# Patient Record
Sex: FEMALE | Race: WHITE | NOT HISPANIC OR LATINO | Employment: UNEMPLOYED | URBAN - METROPOLITAN AREA
[De-identification: names, ages, dates, MRNs, and addresses within clinical notes are randomized per-mention and may not be internally consistent; named-entity substitution may affect disease eponyms.]

---

## 2017-02-03 ENCOUNTER — HISTORICAL (OUTPATIENT)
Dept: CARDIOLOGY | Facility: CLINIC | Age: 52
End: 2017-02-03

## 2017-03-09 ENCOUNTER — HISTORICAL (OUTPATIENT)
Dept: ADMINISTRATIVE | Facility: HOSPITAL | Age: 52
End: 2017-03-09

## 2017-05-12 ENCOUNTER — HISTORICAL (OUTPATIENT)
Dept: WOUND CARE | Facility: HOSPITAL | Age: 52
End: 2017-05-12

## 2017-05-25 ENCOUNTER — HISTORICAL (OUTPATIENT)
Dept: ADMINISTRATIVE | Facility: HOSPITAL | Age: 52
End: 2017-05-25

## 2017-05-25 LAB
ALBUMIN SERPL-MCNC: 3.8 GM/DL (ref 3.4–5)
ALBUMIN/GLOB SERPL: 1 RATIO (ref 1–2)
ALP SERPL-CCNC: 107 UNIT/L (ref 20–120)
ALT SERPL-CCNC: 12 UNIT/L
AST SERPL-CCNC: 16 UNIT/L
BILIRUB SERPL-MCNC: 0.3 MG/DL
BILIRUBIN DIRECT+TOT PNL SERPL-MCNC: <0.1 MG/DL
BILIRUBIN DIRECT+TOT PNL SERPL-MCNC: >0.2 MG/DL
BUN SERPL-MCNC: 18 MG/DL (ref 7–25)
CALCIUM SERPL-MCNC: 9.2 MG/DL (ref 8.4–10.3)
CHLORIDE SERPL-SCNC: 97 MMOL/L (ref 96–110)
CHOLEST SERPL-MCNC: 187 MG/DL
CHOLEST/HDLC SERPL: 2.9 {RATIO} (ref 0–4.4)
CO2 SERPL-SCNC: 26 MMOL/L (ref 24–32)
CREAT SERPL-MCNC: 0.6 MG/DL (ref 0.7–1.1)
EST. AVERAGE GLUCOSE BLD GHB EST-MCNC: 146 MG/DL
GLOBULIN SER-MCNC: 3.8 GM/ML (ref 2.3–3.5)
GLUCOSE SERPL-MCNC: 149 MG/DL (ref 65–99)
HBA1C MFR BLD: 6.7 % (ref 4.7–5.6)
HDLC SERPL-MCNC: 65 MG/DL
LDLC SERPL CALC-MCNC: 97 MG/DL (ref 0–130)
POTASSIUM SERPL-SCNC: 4.1 MMOL/L (ref 3.6–5.2)
PROT SERPL-MCNC: 7.6 GM/DL (ref 6–8)
SODIUM SERPL-SCNC: 132 MMOL/L (ref 135–146)
TRIGL SERPL-MCNC: 124 MG/DL
VLDLC SERPL CALC-MCNC: 25 MG/DL

## 2018-05-14 ENCOUNTER — HISTORICAL (OUTPATIENT)
Dept: RADIOLOGY | Facility: HOSPITAL | Age: 53
End: 2018-05-14

## 2019-02-08 ENCOUNTER — HISTORICAL (OUTPATIENT)
Dept: RADIOLOGY | Facility: HOSPITAL | Age: 54
End: 2019-02-08

## 2019-02-22 ENCOUNTER — HISTORICAL (OUTPATIENT)
Dept: ADMINISTRATIVE | Facility: HOSPITAL | Age: 54
End: 2019-02-22

## 2019-02-22 LAB
ABS NEUT (OLG): 10.55 X10(3)/MCL (ref 2.1–9.2)
ALBUMIN SERPL-MCNC: 3.7 GM/DL (ref 3.4–5)
ALBUMIN/GLOB SERPL: 0.9 RATIO (ref 1.1–2)
ALP SERPL-CCNC: 116 UNIT/L (ref 45–117)
ALT SERPL-CCNC: 13 UNIT/L (ref 12–78)
AST SERPL-CCNC: 12 UNIT/L (ref 15–37)
BASOPHILS # BLD AUTO: 0.05 X10(3)/MCL
BASOPHILS NFR BLD AUTO: 0 %
BILIRUB SERPL-MCNC: 0.3 MG/DL (ref 0.2–1)
BILIRUBIN DIRECT+TOT PNL SERPL-MCNC: <0.1 MG/DL
BILIRUBIN DIRECT+TOT PNL SERPL-MCNC: >0.2 MG/DL
BUN SERPL-MCNC: 13 MG/DL (ref 7–18)
CALCIUM SERPL-MCNC: 9.2 MG/DL (ref 8.5–10.1)
CHLORIDE SERPL-SCNC: 103 MMOL/L (ref 98–107)
CO2 SERPL-SCNC: 24 MMOL/L (ref 21–32)
CREAT SERPL-MCNC: 0.8 MG/DL (ref 0.6–1.3)
EOSINOPHIL # BLD AUTO: 0.27 X10(3)/MCL
EOSINOPHIL NFR BLD AUTO: 2 %
ERYTHROCYTE [DISTWIDTH] IN BLOOD BY AUTOMATED COUNT: 20.8 % (ref 11.5–14.5)
FERRITIN SERPL-MCNC: 9.5 NG/ML (ref 8–388)
GLOBULIN SER-MCNC: 4.3 GM/ML (ref 2.3–3.5)
GLUCOSE SERPL-MCNC: 180 MG/DL (ref 74–106)
HCT VFR BLD AUTO: 36.1 % (ref 35–46)
HGB BLD-MCNC: 10.7 GM/DL (ref 12–16)
IMM GRANULOCYTES # BLD AUTO: 0.19 10*3/UL
IMM GRANULOCYTES NFR BLD AUTO: 1 %
IRON SATN MFR SERPL: 3.2 % (ref 15–50)
IRON SERPL-MCNC: 15 MCG/DL (ref 50–170)
LDH SERPL-CCNC: 149 UNIT/L (ref 84–246)
LYMPHOCYTES # BLD AUTO: 3.11 X10(3)/MCL
LYMPHOCYTES NFR BLD AUTO: 20 % (ref 13–40)
MCH RBC QN AUTO: 21.8 PG (ref 26–34)
MCHC RBC AUTO-ENTMCNC: 29.6 GM/DL (ref 31–37)
MCV RBC AUTO: 73.7 FL (ref 80–100)
MONOCYTES # BLD AUTO: 1.01 X10(3)/MCL
MONOCYTES NFR BLD AUTO: 7 % (ref 4–12)
NEUTROPHILS # BLD AUTO: 10.55 X10(3)/MCL
NEUTROPHILS NFR BLD AUTO: 69 X10(3)/MCL
PLATELET # BLD AUTO: 425 X10(3)/MCL (ref 130–400)
PMV BLD AUTO: 11.6 FL (ref 7.4–10.4)
POTASSIUM SERPL-SCNC: 3.9 MMOL/L (ref 3.5–5.1)
PROT SERPL-MCNC: 8 GM/DL (ref 6.4–8.2)
RBC # BLD AUTO: 4.9 X10(6)/MCL (ref 4–5.2)
SODIUM SERPL-SCNC: 135 MMOL/L (ref 136–145)
TIBC SERPL-MCNC: 468 MCG/DL (ref 250–450)
TRANSFERRIN SERPL-MCNC: 356 MG/DL (ref 200–360)
VIT B12 SERPL-MCNC: 698 PG/ML (ref 193–986)
WBC # SPEC AUTO: 15.2 X10(3)/MCL (ref 4.5–11)

## 2019-03-08 ENCOUNTER — HISTORICAL (OUTPATIENT)
Dept: ADMINISTRATIVE | Facility: HOSPITAL | Age: 54
End: 2019-03-08

## 2019-03-08 LAB
ABS NEUT (OLG): 6 X10(3)/MCL (ref 2.1–9.2)
BASOPHILS # BLD AUTO: 0.07 X10(3)/MCL
BASOPHILS NFR BLD AUTO: 1 %
EOSINOPHIL # BLD AUTO: 0.3 10*3/UL
EOSINOPHIL NFR BLD AUTO: 3 %
ERYTHROCYTE [DISTWIDTH] IN BLOOD BY AUTOMATED COUNT: 21 % (ref 11.5–14.5)
HCT VFR BLD AUTO: 37.8 % (ref 35–46)
HGB BLD-MCNC: 11.1 GM/DL (ref 12–16)
IMM GRANULOCYTES # BLD AUTO: 0.09 10*3/UL
IMM GRANULOCYTES NFR BLD AUTO: 1 %
LYMPHOCYTES # BLD AUTO: 3.06 X10(3)/MCL
LYMPHOCYTES NFR BLD AUTO: 30 % (ref 13–40)
MCH RBC QN AUTO: 21 PG (ref 26–34)
MCHC RBC AUTO-ENTMCNC: 29.4 GM/DL (ref 31–37)
MCV RBC AUTO: 71.6 FL (ref 80–100)
MONOCYTES # BLD AUTO: 0.84 X10(3)/MCL
MONOCYTES NFR BLD AUTO: 8 % (ref 4–12)
NEUTROPHILS # BLD AUTO: 6 X10(3)/MCL
NEUTROPHILS NFR BLD AUTO: 58 X10(3)/MCL
PLATELET # BLD AUTO: 532 X10(3)/MCL (ref 130–400)
PMV BLD AUTO: 10.9 FL (ref 7.4–10.4)
RBC # BLD AUTO: 5.28 X10(6)/MCL (ref 4–5.2)
WBC # SPEC AUTO: 10.4 X10(3)/MCL (ref 4.5–11)

## 2019-03-15 ENCOUNTER — HISTORICAL (OUTPATIENT)
Dept: INFUSION THERAPY | Facility: HOSPITAL | Age: 54
End: 2019-03-15

## 2019-03-22 ENCOUNTER — HISTORICAL (OUTPATIENT)
Dept: INFUSION THERAPY | Facility: HOSPITAL | Age: 54
End: 2019-03-22

## 2019-04-18 ENCOUNTER — HISTORICAL (OUTPATIENT)
Dept: ADMINISTRATIVE | Facility: HOSPITAL | Age: 54
End: 2019-04-18

## 2019-04-18 LAB
ABS NEUT (OLG): 5.83 X10(3)/MCL (ref 2.1–9.2)
ALBUMIN SERPL-MCNC: 3.5 GM/DL (ref 3.4–5)
ALBUMIN/GLOB SERPL: 0.9 RATIO (ref 1.1–2)
ALP SERPL-CCNC: 97 UNIT/L (ref 45–117)
ALT SERPL-CCNC: 30 UNIT/L (ref 12–78)
AST SERPL-CCNC: 16 UNIT/L (ref 15–37)
BASOPHILS # BLD AUTO: 0.05 X10(3)/MCL
BASOPHILS NFR BLD AUTO: 0 %
BILIRUB SERPL-MCNC: 0.2 MG/DL (ref 0.2–1)
BILIRUBIN DIRECT+TOT PNL SERPL-MCNC: 0.1 MG/DL
BILIRUBIN DIRECT+TOT PNL SERPL-MCNC: 0.1 MG/DL
BUN SERPL-MCNC: 11 MG/DL (ref 7–18)
CALCIUM SERPL-MCNC: 9.4 MG/DL (ref 8.5–10.1)
CHLORIDE SERPL-SCNC: 105 MMOL/L (ref 98–107)
CO2 SERPL-SCNC: 28 MMOL/L (ref 21–32)
CREAT SERPL-MCNC: 0.7 MG/DL (ref 0.6–1.3)
EOSINOPHIL # BLD AUTO: 0.39 10*3/UL
EOSINOPHIL NFR BLD AUTO: 4 %
ERYTHROCYTE [DISTWIDTH] IN BLOOD BY AUTOMATED COUNT: 29.4 % (ref 11.5–14.5)
FERRITIN SERPL-MCNC: 33 NG/ML (ref 8–388)
GLOBULIN SER-MCNC: 3.8 GM/ML (ref 2.3–3.5)
GLUCOSE SERPL-MCNC: 202 MG/DL (ref 74–106)
HCT VFR BLD AUTO: 44.7 % (ref 35–46)
HGB BLD-MCNC: 14 GM/DL (ref 12–16)
IMM GRANULOCYTES # BLD AUTO: 0.11 10*3/UL
IMM GRANULOCYTES NFR BLD AUTO: 1 %
IRON SATN MFR SERPL: 9.4 % (ref 15–50)
IRON SERPL-MCNC: 34 MCG/DL (ref 50–170)
LYMPHOCYTES # BLD AUTO: 2.77 X10(3)/MCL
LYMPHOCYTES NFR BLD AUTO: 28 % (ref 13–40)
MCH RBC QN AUTO: 24.8 PG (ref 26–34)
MCHC RBC AUTO-ENTMCNC: 31.3 GM/DL (ref 31–37)
MCV RBC AUTO: 79.3 FL (ref 80–100)
MONOCYTES # BLD AUTO: 0.86 X10(3)/MCL
MONOCYTES NFR BLD AUTO: 9 % (ref 4–12)
NEUTROPHILS # BLD AUTO: 5.83 X10(3)/MCL
NEUTROPHILS NFR BLD AUTO: 58 X10(3)/MCL
PLATELET # BLD AUTO: 508 X10(3)/MCL (ref 130–400)
PMV BLD AUTO: 10.7 FL (ref 7.4–10.4)
POTASSIUM SERPL-SCNC: 4.3 MMOL/L (ref 3.5–5.1)
PROT SERPL-MCNC: 7.3 GM/DL (ref 6.4–8.2)
RBC # BLD AUTO: 5.64 X10(6)/MCL (ref 4–5.2)
SODIUM SERPL-SCNC: 138 MMOL/L (ref 136–145)
TIBC SERPL-MCNC: 362 MCG/DL (ref 250–450)
TRANSFERRIN SERPL-MCNC: 278 MG/DL (ref 200–360)
WBC # SPEC AUTO: 10 X10(3)/MCL (ref 4.5–11)

## 2019-06-12 ENCOUNTER — HISTORICAL (OUTPATIENT)
Dept: ADMINISTRATIVE | Facility: HOSPITAL | Age: 54
End: 2019-06-12

## 2019-06-12 LAB
ABS NEUT (OLG): 7.67 X10(3)/MCL (ref 2.1–9.2)
ALBUMIN SERPL-MCNC: 3.6 GM/DL (ref 3.4–5)
ALBUMIN/GLOB SERPL: 0.8 RATIO (ref 1.1–2)
ALP SERPL-CCNC: 115 UNIT/L (ref 45–117)
ALT SERPL-CCNC: 25 UNIT/L (ref 12–78)
AST SERPL-CCNC: 9 UNIT/L (ref 15–37)
BASOPHILS # BLD AUTO: 0.06 X10(3)/MCL
BASOPHILS NFR BLD AUTO: 0 %
BILIRUB SERPL-MCNC: 0.3 MG/DL (ref 0.2–1)
BILIRUBIN DIRECT+TOT PNL SERPL-MCNC: 0.1 MG/DL
BILIRUBIN DIRECT+TOT PNL SERPL-MCNC: 0.2 MG/DL
BUN SERPL-MCNC: 12 MG/DL (ref 7–18)
CALCIUM SERPL-MCNC: 9 MG/DL (ref 8.5–10.1)
CHLORIDE SERPL-SCNC: 106 MMOL/L (ref 98–107)
CO2 SERPL-SCNC: 27 MMOL/L (ref 21–32)
CREAT SERPL-MCNC: 0.8 MG/DL (ref 0.6–1.3)
EOSINOPHIL # BLD AUTO: 0.28 10*3/UL
EOSINOPHIL NFR BLD AUTO: 3 %
ERYTHROCYTE [DISTWIDTH] IN BLOOD BY AUTOMATED COUNT: 19.7 % (ref 11.5–14.5)
FERRITIN SERPL-MCNC: 10.8 NG/ML (ref 8–388)
GLOBULIN SER-MCNC: 4.4 GM/ML (ref 2.3–3.5)
GLUCOSE SERPL-MCNC: 244 MG/DL (ref 74–106)
HCT VFR BLD AUTO: 45 % (ref 35–46)
HGB BLD-MCNC: 14.9 GM/DL (ref 12–16)
IMM GRANULOCYTES # BLD AUTO: 0.1 10*3/UL
IMM GRANULOCYTES NFR BLD AUTO: 1 %
IRON SATN MFR SERPL: 7 % (ref 15–50)
IRON SERPL-MCNC: 27 MCG/DL (ref 50–170)
LYMPHOCYTES # BLD AUTO: 1.96 X10(3)/MCL
LYMPHOCYTES NFR BLD AUTO: 18 % (ref 13–40)
MCH RBC QN AUTO: 27.3 PG (ref 26–34)
MCHC RBC AUTO-ENTMCNC: 33.1 GM/DL (ref 31–37)
MCV RBC AUTO: 82.6 FL (ref 80–100)
MONOCYTES # BLD AUTO: 0.89 X10(3)/MCL
MONOCYTES NFR BLD AUTO: 8 % (ref 4–12)
NEUTROPHILS # BLD AUTO: 7.67 X10(3)/MCL
NEUTROPHILS NFR BLD AUTO: 70 X10(3)/MCL
PLATELET # BLD AUTO: 446 X10(3)/MCL (ref 130–400)
PMV BLD AUTO: 9.9 FL (ref 7.4–10.4)
POTASSIUM SERPL-SCNC: 4.4 MMOL/L (ref 3.5–5.1)
PROT SERPL-MCNC: 8 GM/DL (ref 6.4–8.2)
RBC # BLD AUTO: 5.45 X10(6)/MCL (ref 4–5.2)
SODIUM SERPL-SCNC: 137 MMOL/L (ref 136–145)
TIBC SERPL-MCNC: 386 MCG/DL (ref 250–450)
TRANSFERRIN SERPL-MCNC: 302 MG/DL (ref 200–360)
WBC # SPEC AUTO: 11 X10(3)/MCL (ref 4.5–11)

## 2019-06-18 ENCOUNTER — HISTORICAL (OUTPATIENT)
Dept: SURGERY | Facility: HOSPITAL | Age: 54
End: 2019-06-18

## 2019-06-18 LAB
ABS NEUT (OLG): 9.96 X10(3)/MCL (ref 2.1–9.2)
BASOPHILS # BLD AUTO: 0.06 X10(3)/MCL
BASOPHILS NFR BLD AUTO: 0 %
EOSINOPHIL # BLD AUTO: 0.24 X10(3)/MCL
EOSINOPHIL NFR BLD AUTO: 2 %
ERYTHROCYTE [DISTWIDTH] IN BLOOD BY AUTOMATED COUNT: 18.8 % (ref 11.5–14.5)
HCT VFR BLD AUTO: 45.2 % (ref 35–46)
HGB BLD-MCNC: 14.8 GM/DL (ref 12–16)
IMM GRANULOCYTES # BLD AUTO: 0.14 10*3/UL
IMM GRANULOCYTES NFR BLD AUTO: 1 %
INR PPP: 0.99 (ref 0.9–1.2)
LYMPHOCYTES # BLD AUTO: 2.57 X10(3)/MCL
LYMPHOCYTES NFR BLD AUTO: 18 % (ref 13–40)
MCH RBC QN AUTO: 27.8 PG (ref 26–34)
MCHC RBC AUTO-ENTMCNC: 32.7 GM/DL (ref 31–37)
MCV RBC AUTO: 85 FL (ref 80–100)
MONOCYTES # BLD AUTO: 0.94 X10(3)/MCL
MONOCYTES NFR BLD AUTO: 7 % (ref 4–12)
NEUTROPHILS # BLD AUTO: 9.96 X10(3)/MCL
NEUTROPHILS NFR BLD AUTO: 72 X10(3)/MCL
PLATELET # BLD AUTO: 437 X10(3)/MCL (ref 130–400)
PMV BLD AUTO: 9.9 FL (ref 7.4–10.4)
PROTHROMBIN TIME: 13 SECOND(S) (ref 11.9–14.4)
RBC # BLD AUTO: 5.32 X10(6)/MCL (ref 4–5.2)
WBC # SPEC AUTO: 13.9 X10(3)/MCL (ref 4.5–11)

## 2019-07-03 ENCOUNTER — HISTORICAL (OUTPATIENT)
Dept: RADIOLOGY | Facility: HOSPITAL | Age: 54
End: 2019-07-03

## 2019-07-10 ENCOUNTER — HISTORICAL (OUTPATIENT)
Dept: ADMINISTRATIVE | Facility: HOSPITAL | Age: 54
End: 2019-07-10

## 2019-07-10 LAB
ABS NEUT (OLG): 6.73 X10(3)/MCL (ref 2.1–9.2)
ALBUMIN SERPL-MCNC: 3.6 GM/DL (ref 3.4–5)
ALBUMIN/GLOB SERPL: 0.9 RATIO (ref 1.1–2)
ALP SERPL-CCNC: 87 UNIT/L (ref 45–117)
ALT SERPL-CCNC: 17 UNIT/L (ref 12–78)
AST SERPL-CCNC: 8 UNIT/L (ref 15–37)
BASOPHILS # BLD AUTO: 0.07 X10(3)/MCL
BASOPHILS NFR BLD AUTO: 1 %
BILIRUB SERPL-MCNC: 0.4 MG/DL (ref 0.2–1)
BILIRUBIN DIRECT+TOT PNL SERPL-MCNC: 0.1 MG/DL
BILIRUBIN DIRECT+TOT PNL SERPL-MCNC: 0.3 MG/DL
BUN SERPL-MCNC: 10 MG/DL (ref 7–18)
CALCIUM SERPL-MCNC: 8.9 MG/DL (ref 8.5–10.1)
CHLORIDE SERPL-SCNC: 107 MMOL/L (ref 98–107)
CO2 SERPL-SCNC: 25 MMOL/L (ref 21–32)
CREAT SERPL-MCNC: 0.8 MG/DL (ref 0.6–1.3)
EOSINOPHIL # BLD AUTO: 0.35 X10(3)/MCL
EOSINOPHIL NFR BLD AUTO: 3 %
ERYTHROCYTE [DISTWIDTH] IN BLOOD BY AUTOMATED COUNT: 15.5 % (ref 11.5–14.5)
FERRITIN SERPL-MCNC: 7.9 NG/ML (ref 8–388)
GLOBULIN SER-MCNC: 3.9 GM/ML (ref 2.3–3.5)
GLUCOSE SERPL-MCNC: 228 MG/DL (ref 74–106)
HCT VFR BLD AUTO: 44.4 % (ref 35–46)
HGB BLD-MCNC: 14.2 GM/DL (ref 12–16)
IMM GRANULOCYTES # BLD AUTO: 0.11 10*3/UL
IMM GRANULOCYTES NFR BLD AUTO: 1 %
IRON SATN MFR SERPL: 8.6 % (ref 15–50)
IRON SERPL-MCNC: 35 MCG/DL (ref 50–170)
LYMPHOCYTES # BLD AUTO: 2.24 X10(3)/MCL
LYMPHOCYTES NFR BLD AUTO: 22 % (ref 13–40)
MCH RBC QN AUTO: 27.2 PG (ref 26–34)
MCHC RBC AUTO-ENTMCNC: 32 GM/DL (ref 31–37)
MCV RBC AUTO: 85.1 FL (ref 80–100)
MONOCYTES # BLD AUTO: 0.79 X10(3)/MCL
MONOCYTES NFR BLD AUTO: 8 % (ref 4–12)
NEUTROPHILS # BLD AUTO: 6.73 X10(3)/MCL
NEUTROPHILS NFR BLD AUTO: 65 X10(3)/MCL
PLATELET # BLD AUTO: 439 X10(3)/MCL (ref 130–400)
PMV BLD AUTO: 10.7 FL (ref 7.4–10.4)
POTASSIUM SERPL-SCNC: 4.2 MMOL/L (ref 3.5–5.1)
PROT SERPL-MCNC: 7.5 GM/DL (ref 6.4–8.2)
RBC # BLD AUTO: 5.22 X10(6)/MCL (ref 4–5.2)
SODIUM SERPL-SCNC: 138 MMOL/L (ref 136–145)
TIBC SERPL-MCNC: 408 MCG/DL (ref 250–450)
TRANSFERRIN SERPL-MCNC: 308 MG/DL (ref 200–360)
WBC # SPEC AUTO: 10.3 X10(3)/MCL (ref 4.5–11)

## 2019-08-22 ENCOUNTER — HISTORICAL (OUTPATIENT)
Dept: RADIOLOGY | Facility: HOSPITAL | Age: 54
End: 2019-08-22

## 2019-08-28 ENCOUNTER — HISTORICAL (OUTPATIENT)
Dept: ADMINISTRATIVE | Facility: HOSPITAL | Age: 54
End: 2019-08-28

## 2019-08-28 LAB
ABS NEUT (OLG): 8.06 X10(3)/MCL (ref 2.1–9.2)
ALBUMIN SERPL-MCNC: 3.5 GM/DL (ref 3.4–5)
ALBUMIN/GLOB SERPL: 0.9 RATIO (ref 1.1–2)
ALP SERPL-CCNC: 92 UNIT/L (ref 45–117)
ALT SERPL-CCNC: 13 UNIT/L (ref 12–78)
AST SERPL-CCNC: 6 UNIT/L (ref 15–37)
BASOPHILS # BLD AUTO: 0.1 X10(3)/MCL (ref 0–0.2)
BASOPHILS NFR BLD AUTO: 1 %
BILIRUB SERPL-MCNC: 0.3 MG/DL (ref 0.2–1)
BILIRUBIN DIRECT+TOT PNL SERPL-MCNC: <0.1 MG/DL
BILIRUBIN DIRECT+TOT PNL SERPL-MCNC: ABNORMAL MG/DL
BUN SERPL-MCNC: 10 MG/DL (ref 7–18)
CALCIUM SERPL-MCNC: 9.1 MG/DL (ref 8.5–10.1)
CHLORIDE SERPL-SCNC: 106 MMOL/L (ref 98–107)
CHOLEST SERPL-MCNC: 183 MG/DL
CHOLEST/HDLC SERPL: 2.9 {RATIO} (ref 0–4.4)
CO2 SERPL-SCNC: 22 MMOL/L (ref 21–32)
CREAT SERPL-MCNC: 0.9 MG/DL (ref 0.6–1.3)
EOSINOPHIL # BLD AUTO: 0.3 X10(3)/MCL (ref 0–0.9)
EOSINOPHIL NFR BLD AUTO: 3 %
ERYTHROCYTE [DISTWIDTH] IN BLOOD BY AUTOMATED COUNT: 15.1 % (ref 11.5–14.5)
GLOBULIN SER-MCNC: 4 GM/ML (ref 2.3–3.5)
GLUCOSE SERPL-MCNC: 318 MG/DL (ref 74–106)
HCT VFR BLD AUTO: 31 % (ref 35–46)
HDLC SERPL-MCNC: 64 MG/DL
HGB BLD-MCNC: 9.5 GM/DL (ref 12–16)
IMM GRANULOCYTES # BLD AUTO: 0.11 10*3/UL
IMM GRANULOCYTES NFR BLD AUTO: 1 %
LDLC SERPL CALC-MCNC: 91 MG/DL (ref 0–130)
LYMPHOCYTES # BLD AUTO: 2.4 X10(3)/MCL (ref 0.6–4.6)
LYMPHOCYTES NFR BLD AUTO: 20 %
MCH RBC QN AUTO: 24.3 PG (ref 26–34)
MCHC RBC AUTO-ENTMCNC: 30.6 GM/DL (ref 31–37)
MCV RBC AUTO: 79.3 FL (ref 80–100)
MONOCYTES # BLD AUTO: 1.1 X10(3)/MCL (ref 0.1–1.3)
MONOCYTES NFR BLD AUTO: 9 %
NEUTROPHILS # BLD AUTO: 8.06 X10(3)/MCL (ref 2.1–9.2)
NEUTROPHILS NFR BLD AUTO: 67 %
PLATELET # BLD AUTO: 425 X10(3)/MCL (ref 130–400)
PMV BLD AUTO: 10.8 FL (ref 7.4–10.4)
POTASSIUM SERPL-SCNC: 4.5 MMOL/L (ref 3.5–5.1)
PROT SERPL-MCNC: 7.5 GM/DL (ref 6.4–8.2)
RBC # BLD AUTO: 3.91 X10(6)/MCL (ref 4–5.2)
SODIUM SERPL-SCNC: 137 MMOL/L (ref 136–145)
TRIGL SERPL-MCNC: 141 MG/DL
VLDLC SERPL CALC-MCNC: 28 MG/DL
WBC # SPEC AUTO: 12.1 X10(3)/MCL (ref 4.5–11)

## 2019-09-09 ENCOUNTER — HISTORICAL (OUTPATIENT)
Dept: INFUSION THERAPY | Facility: HOSPITAL | Age: 54
End: 2019-09-09

## 2019-09-16 ENCOUNTER — HISTORICAL (OUTPATIENT)
Dept: INFUSION THERAPY | Facility: HOSPITAL | Age: 54
End: 2019-09-16

## 2019-09-23 ENCOUNTER — HISTORICAL (OUTPATIENT)
Dept: CARDIOLOGY | Facility: HOSPITAL | Age: 54
End: 2019-09-23

## 2019-11-04 ENCOUNTER — HISTORICAL (OUTPATIENT)
Dept: ADMINISTRATIVE | Facility: HOSPITAL | Age: 54
End: 2019-11-04

## 2019-11-04 LAB
ABS NEUT (OLG): 5.64 X10(3)/MCL (ref 2.1–9.2)
ALBUMIN SERPL-MCNC: 3.4 GM/DL (ref 3.4–5)
ALBUMIN/GLOB SERPL: 0.8 RATIO (ref 1.1–2)
ALP SERPL-CCNC: 91 UNIT/L (ref 45–117)
ALT SERPL-CCNC: 20 UNIT/L (ref 12–78)
AST SERPL-CCNC: 11 UNIT/L (ref 15–37)
BASOPHILS # BLD AUTO: 0.1 X10(3)/MCL (ref 0–0.2)
BASOPHILS NFR BLD AUTO: 1 %
BILIRUB SERPL-MCNC: 0.2 MG/DL (ref 0.2–1)
BILIRUBIN DIRECT+TOT PNL SERPL-MCNC: <0.1 MG/DL (ref 0–0.2)
BILIRUBIN DIRECT+TOT PNL SERPL-MCNC: >0.1 MG/DL
BUN SERPL-MCNC: 11 MG/DL (ref 7–18)
CALCIUM SERPL-MCNC: 8.7 MG/DL (ref 8.5–10.1)
CHLORIDE SERPL-SCNC: 106 MMOL/L (ref 98–107)
CO2 SERPL-SCNC: 25 MMOL/L (ref 21–32)
CREAT SERPL-MCNC: 0.8 MG/DL (ref 0.6–1.3)
EOSINOPHIL # BLD AUTO: 0.2 X10(3)/MCL (ref 0–0.9)
EOSINOPHIL NFR BLD AUTO: 3 %
ERYTHROCYTE [DISTWIDTH] IN BLOOD BY AUTOMATED COUNT: 24.6 % (ref 11.5–14.5)
FERRITIN SERPL-MCNC: 23 NG/ML (ref 8–388)
GLOBULIN SER-MCNC: 4.2 GM/ML (ref 2.3–3.5)
GLUCOSE SERPL-MCNC: 217 MG/DL (ref 74–106)
HCT VFR BLD AUTO: 45.6 % (ref 35–46)
HGB BLD-MCNC: 13.7 GM/DL (ref 12–16)
IMM GRANULOCYTES # BLD AUTO: 0.07 10*3/UL
IMM GRANULOCYTES NFR BLD AUTO: 1 %
IRON SATN MFR SERPL: 9.9 % (ref 15–50)
IRON SERPL-MCNC: 38 MCG/DL (ref 50–170)
LYMPHOCYTES # BLD AUTO: 1.3 X10(3)/MCL (ref 0.6–4.6)
LYMPHOCYTES NFR BLD AUTO: 16 %
MCH RBC QN AUTO: 25 PG (ref 26–34)
MCHC RBC AUTO-ENTMCNC: 30 GM/DL (ref 31–37)
MCV RBC AUTO: 83.4 FL (ref 80–100)
MONOCYTES # BLD AUTO: 0.7 X10(3)/MCL (ref 0.1–1.3)
MONOCYTES NFR BLD AUTO: 9 %
NEUTROPHILS # BLD AUTO: 5.64 X10(3)/MCL (ref 2.1–9.2)
NEUTROPHILS NFR BLD AUTO: 70 %
PLATELET # BLD AUTO: 431 X10(3)/MCL (ref 130–400)
PMV BLD AUTO: 10.1 FL (ref 7.4–10.4)
POTASSIUM SERPL-SCNC: 4.5 MMOL/L (ref 3.5–5.1)
PROT SERPL-MCNC: 7.6 GM/DL (ref 6.4–8.2)
RBC # BLD AUTO: 5.47 X10(6)/MCL (ref 4–5.2)
SODIUM SERPL-SCNC: 137 MMOL/L (ref 136–145)
TIBC SERPL-MCNC: 382 MCG/DL (ref 250–450)
TRANSFERRIN SERPL-MCNC: 291 MG/DL (ref 200–360)
WBC # SPEC AUTO: 8 X10(3)/MCL (ref 4.5–11)

## 2020-03-10 ENCOUNTER — HISTORICAL (OUTPATIENT)
Dept: ADMINISTRATIVE | Facility: HOSPITAL | Age: 55
End: 2020-03-10

## 2020-03-10 LAB
ABS NEUT (OLG): 9.57 X10(3)/MCL (ref 2.1–9.2)
BASOPHILS # BLD AUTO: 0.1 X10(3)/MCL (ref 0–0.2)
BASOPHILS NFR BLD AUTO: 1 %
EOSINOPHIL # BLD AUTO: 0.4 X10(3)/MCL (ref 0–0.9)
EOSINOPHIL NFR BLD AUTO: 3 %
ERYTHROCYTE [DISTWIDTH] IN BLOOD BY AUTOMATED COUNT: 22.7 % (ref 11.5–14.5)
FERRITIN SERPL-MCNC: 19.2 NG/ML (ref 8–388)
HCT VFR BLD AUTO: 47.3 % (ref 35–46)
HGB BLD-MCNC: 15 GM/DL (ref 12–16)
IMM GRANULOCYTES # BLD AUTO: 0.19 10*3/UL
IMM GRANULOCYTES NFR BLD AUTO: 1 %
IRON SATN MFR SERPL: 13.2 % (ref 15–50)
IRON SERPL-MCNC: 62 MCG/DL (ref 50–170)
LYMPHOCYTES # BLD AUTO: 3.3 X10(3)/MCL (ref 0.6–4.6)
LYMPHOCYTES NFR BLD AUTO: 22 %
MCH RBC QN AUTO: 26.1 PG (ref 26–34)
MCHC RBC AUTO-ENTMCNC: 31.7 GM/DL (ref 31–37)
MCV RBC AUTO: 82.4 FL (ref 80–100)
MONOCYTES # BLD AUTO: 1.1 X10(3)/MCL (ref 0.1–1.3)
MONOCYTES NFR BLD AUTO: 8 %
NEUTROPHILS # BLD AUTO: 9.57 X10(3)/MCL (ref 2.1–9.2)
NEUTROPHILS NFR BLD AUTO: 65 %
PLATELET # BLD AUTO: 549 X10(3)/MCL (ref 130–400)
PMV BLD AUTO: 11.3 FL (ref 7.4–10.4)
RBC # BLD AUTO: 5.74 X10(6)/MCL (ref 4–5.2)
TIBC SERPL-MCNC: 471 MCG/DL (ref 250–450)
TRANSFERRIN SERPL-MCNC: 293 MG/DL (ref 200–360)
WBC # SPEC AUTO: 14.7 X10(3)/MCL (ref 4.5–11)

## 2020-03-11 ENCOUNTER — HISTORICAL (OUTPATIENT)
Dept: LAB | Facility: HOSPITAL | Age: 55
End: 2020-03-11

## 2020-03-11 LAB
ALBUMIN SERPL-MCNC: 4 GM/DL (ref 3.4–5)
ALBUMIN/GLOB SERPL: 1.1 RATIO (ref 1.1–2)
ALP SERPL-CCNC: 95 UNIT/L (ref 46–116)
ALT SERPL-CCNC: 18 UNIT/L (ref 12–78)
APPEARANCE, UA: CLEAR
AST SERPL-CCNC: 14 UNIT/L (ref 15–37)
BACTERIA SPEC CULT: ABNORMAL
BILIRUB SERPL-MCNC: 0.3 MG/DL (ref 0.2–1)
BILIRUB UR QL STRIP: ABNORMAL
BILIRUBIN DIRECT+TOT PNL SERPL-MCNC: 0.09 MG/DL (ref 0–0.2)
BILIRUBIN DIRECT+TOT PNL SERPL-MCNC: 0.21 MG/DL (ref 0–0.8)
BUN SERPL-MCNC: 14.4 MG/DL (ref 7–18)
CALCIUM SERPL-MCNC: 10.1 MG/DL (ref 8.5–10.1)
CHLORIDE SERPL-SCNC: 99 MMOL/L (ref 98–107)
CHOLEST SERPL-MCNC: 174 MG/DL (ref 0–200)
CHOLEST/HDLC SERPL: 3 {RATIO} (ref 0–4)
CO2 SERPL-SCNC: 27.1 MMOL/L (ref 21–32)
COLOR UR: YELLOW
CREAT SERPL-MCNC: 0.88 MG/DL (ref 0.6–1.3)
ERYTHROCYTE [DISTWIDTH] IN BLOOD BY AUTOMATED COUNT: 22.6 % (ref 11.5–17)
EST. AVERAGE GLUCOSE BLD GHB EST-MCNC: 151 MG/DL
GLOBULIN SER-MCNC: 3.6 GM/DL (ref 2.4–3.5)
GLUCOSE (UA): NEGATIVE
GLUCOSE SERPL-MCNC: 120 MG/DL (ref 74–106)
HBA1C MFR BLD: 6.9 % (ref 4.5–6.2)
HCT VFR BLD AUTO: 47.3 % (ref 37–47)
HDLC SERPL-MCNC: 58 MG/DL (ref 40–60)
HGB BLD-MCNC: 15.4 GM/DL (ref 12–16)
HGB UR QL STRIP: NEGATIVE
KETONES UR QL STRIP: NEGATIVE
LDLC SERPL CALC-MCNC: 91 MG/DL (ref 0–129)
LEUKOCYTE ESTERASE UR QL STRIP: ABNORMAL
MCH RBC QN AUTO: 26.6 PG (ref 27–31)
MCHC RBC AUTO-ENTMCNC: 32.6 GM/DL (ref 33–36)
MCV RBC AUTO: 81.8 FL (ref 80–94)
NITRITE UR QL STRIP: NEGATIVE
PH UR STRIP: 5.5 [PH] (ref 5–9)
PLATELET # BLD AUTO: 576 X10(3)/MCL (ref 130–400)
PMV BLD AUTO: 11 FL (ref 9.4–12.4)
POTASSIUM SERPL-SCNC: 4.5 MMOL/L (ref 3.5–5.1)
PROT SERPL-MCNC: 7.6 GM/DL (ref 6.4–8.2)
PROT UR QL STRIP: 30
RBC # BLD AUTO: 5.78 X10(6)/MCL (ref 4.2–5.4)
RBC #/AREA URNS HPF: ABNORMAL /[HPF]
SODIUM SERPL-SCNC: 135 MMOL/L (ref 136–145)
SP GR UR STRIP: >=1.03 (ref 1–1.03)
SQUAMOUS EPITHELIAL, UA: ABNORMAL
TRIGL SERPL-MCNC: 125 MG/DL
TSH SERPL-ACNC: 2.31 MIU/ML (ref 0.36–3.74)
UROBILINOGEN UR STRIP-ACNC: 0.2
VLDLC SERPL CALC-MCNC: 25 MG/DL
WBC # SPEC AUTO: 12.8 X10(3)/MCL (ref 4.5–11.5)
WBC #/AREA URNS HPF: ABNORMAL /HPF

## 2020-03-13 LAB — FINAL CULTURE: NO GROWTH

## 2020-04-27 ENCOUNTER — HISTORICAL (OUTPATIENT)
Dept: INFUSION THERAPY | Facility: HOSPITAL | Age: 55
End: 2020-04-27

## 2020-04-27 LAB
ABS NEUT (OLG): 10.35 X10(3)/MCL (ref 2.1–9.2)
ALBUMIN SERPL-MCNC: 3.3 GM/DL (ref 3.4–5)
ALBUMIN/GLOB SERPL: 0.8 RATIO (ref 1.1–2)
ALP SERPL-CCNC: 93 UNIT/L (ref 45–117)
ALT SERPL-CCNC: 13 UNIT/L (ref 12–78)
AST SERPL-CCNC: 10 UNIT/L (ref 15–37)
BASOPHILS # BLD AUTO: 0.1 X10(3)/MCL (ref 0–0.2)
BASOPHILS NFR BLD AUTO: 0 %
BILIRUB SERPL-MCNC: 0.2 MG/DL (ref 0.2–1)
BILIRUBIN DIRECT+TOT PNL SERPL-MCNC: <0.1 MG/DL (ref 0–0.2)
BILIRUBIN DIRECT+TOT PNL SERPL-MCNC: ABNORMAL MG/DL
BUN SERPL-MCNC: 16 MG/DL (ref 7–18)
CALCIUM SERPL-MCNC: 8.8 MG/DL (ref 8.5–10.1)
CHLORIDE SERPL-SCNC: 103 MMOL/L (ref 98–107)
CO2 SERPL-SCNC: 25 MMOL/L (ref 21–32)
CREAT SERPL-MCNC: 0.8 MG/DL (ref 0.6–1.3)
EOSINOPHIL # BLD AUTO: 0.4 X10(3)/MCL (ref 0–0.9)
EOSINOPHIL NFR BLD AUTO: 3 %
ERYTHROCYTE [DISTWIDTH] IN BLOOD BY AUTOMATED COUNT: 17.3 % (ref 11.5–14.5)
FERRITIN SERPL-MCNC: 4.9 NG/ML (ref 8–388)
GLOBULIN SER-MCNC: 4.3 GM/ML (ref 2.3–3.5)
GLUCOSE SERPL-MCNC: 276 MG/DL (ref 74–106)
HCT VFR BLD AUTO: 38.1 % (ref 35–46)
HGB BLD-MCNC: 12.4 GM/DL (ref 12–16)
IMM GRANULOCYTES # BLD AUTO: 0.17 10*3/UL
IMM GRANULOCYTES NFR BLD AUTO: 1 %
IRON SATN MFR SERPL: 5 % (ref 15–50)
IRON SERPL-MCNC: 20 MCG/DL (ref 50–170)
LYMPHOCYTES # BLD AUTO: 2.2 X10(3)/MCL (ref 0.6–4.6)
LYMPHOCYTES NFR BLD AUTO: 16 %
MCH RBC QN AUTO: 27 PG (ref 26–34)
MCHC RBC AUTO-ENTMCNC: 32.5 GM/DL (ref 31–37)
MCV RBC AUTO: 83 FL (ref 80–100)
MONOCYTES # BLD AUTO: 1.2 X10(3)/MCL (ref 0.1–1.3)
MONOCYTES NFR BLD AUTO: 8 %
NEUTROPHILS # BLD AUTO: 10.35 X10(3)/MCL (ref 2.1–9.2)
NEUTROPHILS NFR BLD AUTO: 72 %
PLATELET # BLD AUTO: 500 X10(3)/MCL (ref 130–400)
PMV BLD AUTO: 10.6 FL (ref 7.4–10.4)
POTASSIUM SERPL-SCNC: 4.4 MMOL/L (ref 3.5–5.1)
PROT SERPL-MCNC: 7.6 GM/DL (ref 6.4–8.2)
RBC # BLD AUTO: 4.59 X10(6)/MCL (ref 4–5.2)
SODIUM SERPL-SCNC: 136 MMOL/L (ref 136–145)
TIBC SERPL-MCNC: 400 MCG/DL (ref 250–450)
TRANSFERRIN SERPL-MCNC: 310 MG/DL (ref 200–360)
WBC # SPEC AUTO: 14.4 X10(3)/MCL (ref 4.5–11)

## 2020-05-04 ENCOUNTER — HISTORICAL (OUTPATIENT)
Dept: INFUSION THERAPY | Facility: HOSPITAL | Age: 55
End: 2020-05-04

## 2020-06-01 ENCOUNTER — HISTORICAL (OUTPATIENT)
Dept: ADMINISTRATIVE | Facility: HOSPITAL | Age: 55
End: 2020-06-01

## 2020-06-01 LAB
ABS NEUT (OLG): 10.13 X10(3)/MCL (ref 2.1–9.2)
BASOPHILS # BLD AUTO: 0.1 X10(3)/MCL (ref 0–0.2)
BASOPHILS NFR BLD AUTO: 1 %
EOSINOPHIL # BLD AUTO: 0.5 X10(3)/MCL (ref 0–0.9)
EOSINOPHIL NFR BLD AUTO: 3 %
ERYTHROCYTE [DISTWIDTH] IN BLOOD BY AUTOMATED COUNT: 18.1 % (ref 11.5–14.5)
FERRITIN SERPL-MCNC: 84.9 NG/ML (ref 8–388)
HCT VFR BLD AUTO: 39 % (ref 35–46)
HGB BLD-MCNC: 12.8 GM/DL (ref 12–16)
IMM GRANULOCYTES # BLD AUTO: 0.3 10*3/UL
IMM GRANULOCYTES NFR BLD AUTO: 2 %
IRON SATN MFR SERPL: 15.4 % (ref 15–50)
IRON SERPL-MCNC: 49 MCG/DL (ref 50–170)
LYMPHOCYTES # BLD AUTO: 3 X10(3)/MCL (ref 0.6–4.6)
LYMPHOCYTES NFR BLD AUTO: 20 %
MCH RBC QN AUTO: 29.2 PG (ref 26–34)
MCHC RBC AUTO-ENTMCNC: 32.8 GM/DL (ref 31–37)
MCV RBC AUTO: 88.8 FL (ref 80–100)
MONOCYTES # BLD AUTO: 1.2 X10(3)/MCL (ref 0.1–1.3)
MONOCYTES NFR BLD AUTO: 8 %
NEUTROPHILS # BLD AUTO: 10.13 X10(3)/MCL (ref 2.1–9.2)
NEUTROPHILS NFR BLD AUTO: 66 %
PLATELET # BLD AUTO: 561 X10(3)/MCL (ref 130–400)
PMV BLD AUTO: 11.4 FL (ref 7.4–10.4)
RBC # BLD AUTO: 4.39 X10(6)/MCL (ref 4–5.2)
TIBC SERPL-MCNC: 318 MCG/DL (ref 250–450)
TRANSFERRIN SERPL-MCNC: 246 MG/DL (ref 200–360)
WBC # SPEC AUTO: 15.3 X10(3)/MCL (ref 4.5–11)

## 2020-07-01 ENCOUNTER — HISTORICAL (OUTPATIENT)
Dept: HEMATOLOGY/ONCOLOGY | Facility: CLINIC | Age: 55
End: 2020-07-01

## 2020-07-01 LAB
ABS NEUT (OLG): 9.57 X10(3)/MCL (ref 2.1–9.2)
ALBUMIN SERPL-MCNC: 3.6 GM/DL (ref 3.4–5)
ALBUMIN/GLOB SERPL: 0.8 RATIO (ref 1.1–2)
ALP SERPL-CCNC: 113 UNIT/L (ref 45–117)
ALT SERPL-CCNC: 19 UNIT/L (ref 12–78)
AST SERPL-CCNC: 12 UNIT/L (ref 15–37)
BASOPHILS # BLD AUTO: 0.1 X10(3)/MCL (ref 0–0.2)
BASOPHILS NFR BLD AUTO: 0 %
BILIRUB SERPL-MCNC: 0.3 MG/DL (ref 0.2–1)
BILIRUBIN DIRECT+TOT PNL SERPL-MCNC: 0.1 MG/DL (ref 0–0.2)
BILIRUBIN DIRECT+TOT PNL SERPL-MCNC: 0.2 MG/DL
BUN SERPL-MCNC: 8 MG/DL (ref 7–18)
CALCIUM SERPL-MCNC: 9.2 MG/DL (ref 8.5–10.1)
CHLORIDE SERPL-SCNC: 103 MMOL/L (ref 98–107)
CO2 SERPL-SCNC: 27 MMOL/L (ref 21–32)
CREAT SERPL-MCNC: 0.8 MG/DL (ref 0.6–1.3)
EOSINOPHIL # BLD AUTO: 0.3 X10(3)/MCL (ref 0–0.9)
EOSINOPHIL NFR BLD AUTO: 2 %
ERYTHROCYTE [DISTWIDTH] IN BLOOD BY AUTOMATED COUNT: 15.9 % (ref 11.5–14.5)
FERRITIN SERPL-MCNC: 11.3 NG/ML (ref 8–388)
GLOBULIN SER-MCNC: 4.3 GM/ML (ref 2.3–3.5)
GLUCOSE SERPL-MCNC: 235 MG/DL (ref 74–106)
HCT VFR BLD AUTO: 42.3 % (ref 35–46)
HGB BLD-MCNC: 13.8 GM/DL (ref 12–16)
IMM GRANULOCYTES # BLD AUTO: 0.21 10*3/UL
IMM GRANULOCYTES NFR BLD AUTO: 2 %
IRON SATN MFR SERPL: 5.6 % (ref 15–50)
IRON SERPL-MCNC: 22 MCG/DL (ref 50–170)
LYMPHOCYTES # BLD AUTO: 3.2 X10(3)/MCL (ref 0.6–4.6)
LYMPHOCYTES NFR BLD AUTO: 22 %
MCH RBC QN AUTO: 28.7 PG (ref 26–34)
MCHC RBC AUTO-ENTMCNC: 32.6 GM/DL (ref 31–37)
MCV RBC AUTO: 87.9 FL (ref 80–100)
MONOCYTES # BLD AUTO: 1 X10(3)/MCL (ref 0.1–1.3)
MONOCYTES NFR BLD AUTO: 7 %
NEUTROPHILS # BLD AUTO: 9.57 X10(3)/MCL (ref 2.1–9.2)
NEUTROPHILS NFR BLD AUTO: 67 %
PLATELET # BLD AUTO: 506 X10(3)/MCL (ref 130–400)
PMV BLD AUTO: 11.3 FL (ref 7.4–10.4)
POTASSIUM SERPL-SCNC: 4 MMOL/L (ref 3.5–5.1)
PROT SERPL-MCNC: 7.9 GM/DL (ref 6.4–8.2)
RBC # BLD AUTO: 4.81 X10(6)/MCL (ref 4–5.2)
SODIUM SERPL-SCNC: 136 MMOL/L (ref 136–145)
TIBC SERPL-MCNC: 391 MCG/DL (ref 250–450)
TRANSFERRIN SERPL-MCNC: 308 MG/DL (ref 200–360)
WBC # SPEC AUTO: 14.3 X10(3)/MCL (ref 4.5–11)

## 2020-07-06 ENCOUNTER — HISTORICAL (OUTPATIENT)
Dept: LAB | Facility: HOSPITAL | Age: 55
End: 2020-07-06

## 2020-07-23 ENCOUNTER — HISTORICAL (OUTPATIENT)
Dept: RADIOLOGY | Facility: HOSPITAL | Age: 55
End: 2020-07-23

## 2020-09-02 ENCOUNTER — HISTORICAL (OUTPATIENT)
Dept: HEMATOLOGY/ONCOLOGY | Facility: CLINIC | Age: 55
End: 2020-09-02

## 2020-09-02 LAB
ABS NEUT (OLG): 9.22 X10(3)/MCL (ref 2.1–9.2)
ALBUMIN SERPL-MCNC: 3.8 GM/DL (ref 3.4–5)
ALBUMIN/GLOB SERPL: 0.9 RATIO (ref 1.1–2)
ALP SERPL-CCNC: 95 UNIT/L (ref 45–117)
ALT SERPL-CCNC: 21 UNIT/L (ref 12–78)
AST SERPL-CCNC: 19 UNIT/L (ref 15–37)
BASOPHILS # BLD AUTO: 0.1 X10(3)/MCL (ref 0–0.2)
BASOPHILS NFR BLD AUTO: 1 %
BILIRUB SERPL-MCNC: 0.4 MG/DL (ref 0.2–1)
BILIRUBIN DIRECT+TOT PNL SERPL-MCNC: 0.1 MG/DL (ref 0–0.2)
BILIRUBIN DIRECT+TOT PNL SERPL-MCNC: 0.3 MG/DL
BUN SERPL-MCNC: 11 MG/DL (ref 7–18)
CALCIUM SERPL-MCNC: 9.7 MG/DL (ref 8.5–10.1)
CHLORIDE SERPL-SCNC: 104 MMOL/L (ref 98–107)
CO2 SERPL-SCNC: 22 MMOL/L (ref 21–32)
CREAT SERPL-MCNC: 0.9 MG/DL (ref 0.6–1.3)
EOSINOPHIL # BLD AUTO: 0.4 X10(3)/MCL (ref 0–0.9)
EOSINOPHIL NFR BLD AUTO: 3 %
ERYTHROCYTE [DISTWIDTH] IN BLOOD BY AUTOMATED COUNT: 17.2 % (ref 11.5–14.5)
FERRITIN SERPL-MCNC: 21 NG/ML (ref 8–388)
GLOBULIN SER-MCNC: 4.3 GM/ML (ref 2.3–3.5)
GLUCOSE SERPL-MCNC: 196 MG/DL (ref 74–106)
HCT VFR BLD AUTO: 45.1 % (ref 35–46)
HGB BLD-MCNC: 14.8 GM/DL (ref 12–16)
IMM GRANULOCYTES # BLD AUTO: 0.13 10*3/UL
IMM GRANULOCYTES NFR BLD AUTO: 1 %
IRON SATN MFR SERPL: 15.8 % (ref 15–50)
IRON SERPL-MCNC: 74 MCG/DL (ref 50–170)
LYMPHOCYTES # BLD AUTO: 2.9 X10(3)/MCL (ref 0.6–4.6)
LYMPHOCYTES NFR BLD AUTO: 21 %
MCH RBC QN AUTO: 28.2 PG (ref 26–34)
MCHC RBC AUTO-ENTMCNC: 32.8 GM/DL (ref 31–37)
MCV RBC AUTO: 85.9 FL (ref 80–100)
MONOCYTES # BLD AUTO: 1.2 X10(3)/MCL (ref 0.1–1.3)
MONOCYTES NFR BLD AUTO: 9 %
NEUTROPHILS # BLD AUTO: 9.22 X10(3)/MCL (ref 2.1–9.2)
NEUTROPHILS NFR BLD AUTO: 66 %
PLATELET # BLD AUTO: 534 X10(3)/MCL (ref 130–400)
PMV BLD AUTO: 11.1 FL (ref 7.4–10.4)
POTASSIUM SERPL-SCNC: 3.7 MMOL/L (ref 3.5–5.1)
PROT SERPL-MCNC: 8.1 GM/DL (ref 6.4–8.2)
RBC # BLD AUTO: 5.25 X10(6)/MCL (ref 4–5.2)
SODIUM SERPL-SCNC: 135 MMOL/L (ref 136–145)
TIBC SERPL-MCNC: 468 MCG/DL (ref 250–450)
TRANSFERRIN SERPL-MCNC: 290 MG/DL (ref 200–360)
WBC # SPEC AUTO: 13.9 X10(3)/MCL (ref 4.5–11)

## 2020-11-12 ENCOUNTER — HISTORICAL (OUTPATIENT)
Dept: LAB | Facility: HOSPITAL | Age: 55
End: 2020-11-12

## 2020-11-12 ENCOUNTER — HISTORICAL (OUTPATIENT)
Dept: HEMATOLOGY/ONCOLOGY | Facility: CLINIC | Age: 55
End: 2020-11-12

## 2020-11-12 LAB
ABS NEUT (OLG): 9.68 X10(3)/MCL (ref 2.1–9.2)
ALBUMIN SERPL-MCNC: 3.6 GM/DL (ref 3.5–5)
ALBUMIN/GLOB SERPL: 1 RATIO (ref 1.1–2)
ALP SERPL-CCNC: 108 UNIT/L (ref 40–150)
ALT SERPL-CCNC: 15 UNIT/L (ref 0–55)
APPEARANCE, UA: CLEAR
AST SERPL-CCNC: 13 UNIT/L (ref 5–34)
BACTERIA SPEC CULT: ABNORMAL
BASOPHILS # BLD AUTO: 0.1 X10(3)/MCL (ref 0–0.2)
BASOPHILS NFR BLD AUTO: 1 %
BILIRUB SERPL-MCNC: 0.3 MG/DL
BILIRUB UR QL STRIP: NEGATIVE
BILIRUBIN DIRECT+TOT PNL SERPL-MCNC: 0.1 MG/DL (ref 0–0.5)
BILIRUBIN DIRECT+TOT PNL SERPL-MCNC: 0.2 MG/DL (ref 0–0.8)
BUN SERPL-MCNC: 12.5 MG/DL (ref 9.8–20.1)
CALCIUM SERPL-MCNC: 9.4 MG/DL (ref 8.4–10.2)
CHLORIDE SERPL-SCNC: 100 MMOL/L (ref 98–107)
CO2 SERPL-SCNC: 27 MMOL/L (ref 22–29)
COLOR UR: YELLOW
CREAT SERPL-MCNC: 0.72 MG/DL (ref 0.55–1.02)
EOSINOPHIL # BLD AUTO: 0.3 X10(3)/MCL (ref 0–0.9)
EOSINOPHIL NFR BLD AUTO: 2 %
ERYTHROCYTE [DISTWIDTH] IN BLOOD BY AUTOMATED COUNT: 15.3 % (ref 11.5–14.5)
EST. AVERAGE GLUCOSE BLD GHB EST-MCNC: 205.9 MG/DL
FERRITIN SERPL-MCNC: 21.15 NG/ML (ref 4.63–204)
GLOBULIN SER-MCNC: 3.7 GM/DL (ref 2.4–3.5)
GLUCOSE (UA): NEGATIVE
GLUCOSE SERPL-MCNC: 164 MG/DL (ref 74–100)
HBA1C MFR BLD: 8.8 %
HCT VFR BLD AUTO: 45.7 % (ref 35–46)
HGB BLD-MCNC: 15.1 GM/DL (ref 12–16)
HGB UR QL STRIP: NEGATIVE
IMM GRANULOCYTES # BLD AUTO: 0.28 10*3/UL
IMM GRANULOCYTES NFR BLD AUTO: 2 %
IRON SATN MFR SERPL: 11 % (ref 20–50)
IRON SERPL-MCNC: 38 UG/DL (ref 50–170)
KETONES UR QL STRIP: NEGATIVE
LEUKOCYTE ESTERASE UR QL STRIP: ABNORMAL
LYMPHOCYTES # BLD AUTO: 2.8 X10(3)/MCL (ref 0.6–4.6)
LYMPHOCYTES NFR BLD AUTO: 19 %
MCH RBC QN AUTO: 28.5 PG (ref 26–34)
MCHC RBC AUTO-ENTMCNC: 33 GM/DL (ref 31–37)
MCV RBC AUTO: 86.2 FL (ref 80–100)
MONOCYTES # BLD AUTO: 1 X10(3)/MCL (ref 0.1–1.3)
MONOCYTES NFR BLD AUTO: 7 %
NEUTROPHILS # BLD AUTO: 9.68 X10(3)/MCL (ref 2.1–9.2)
NEUTROPHILS NFR BLD AUTO: 68 %
NITRITE UR QL STRIP: NEGATIVE
PH UR STRIP: 6 [PH] (ref 5–9)
PLATELET # BLD AUTO: 567 X10(3)/MCL (ref 130–400)
PMV BLD AUTO: 11 FL (ref 7.4–10.4)
POTASSIUM SERPL-SCNC: 3.7 MMOL/L (ref 3.5–5.1)
PROT SERPL-MCNC: 7.3 GM/DL (ref 6.4–8.3)
PROT UR QL STRIP: NEGATIVE
RBC # BLD AUTO: 5.3 X10(6)/MCL (ref 4–5.2)
RBC #/AREA URNS HPF: ABNORMAL /[HPF]
SODIUM SERPL-SCNC: 135 MMOL/L (ref 136–145)
SP GR UR STRIP: 1.01 (ref 1–1.03)
SQUAMOUS EPITHELIAL, UA: ABNORMAL
TIBC SERPL-MCNC: 294 UG/DL (ref 70–310)
TIBC SERPL-MCNC: 332 UG/DL (ref 250–450)
TRANSFERRIN SERPL-MCNC: 307 MG/DL (ref 180–382)
UROBILINOGEN UR STRIP-ACNC: 0.2
WBC # SPEC AUTO: 14.2 X10(3)/MCL (ref 4.5–11)
WBC #/AREA URNS HPF: ABNORMAL /HPF

## 2021-01-11 ENCOUNTER — HISTORICAL (OUTPATIENT)
Dept: HEMATOLOGY/ONCOLOGY | Facility: CLINIC | Age: 56
End: 2021-01-11

## 2021-01-11 LAB
ABS NEUT (OLG): 8.3 X10(3)/MCL (ref 2.1–9.2)
BASOPHILS # BLD AUTO: 0.1 X10(3)/MCL (ref 0–0.2)
BASOPHILS NFR BLD AUTO: 0 %
EOSINOPHIL # BLD AUTO: 0.4 X10(3)/MCL (ref 0–0.9)
EOSINOPHIL NFR BLD AUTO: 3 %
ERYTHROCYTE [DISTWIDTH] IN BLOOD BY AUTOMATED COUNT: 14.1 % (ref 11.5–14.5)
FERRITIN SERPL-MCNC: 4.51 NG/ML (ref 4.63–204)
HCT VFR BLD AUTO: 38.2 % (ref 35–46)
HGB BLD-MCNC: 12.2 GM/DL (ref 12–16)
IMM GRANULOCYTES # BLD AUTO: 0.23 10*3/UL
IMM GRANULOCYTES NFR BLD AUTO: 2 %
IRON SATN MFR SERPL: 6 % (ref 20–50)
IRON SERPL-MCNC: 23 UG/DL (ref 50–170)
LYMPHOCYTES # BLD AUTO: 3.1 X10(3)/MCL (ref 0.6–4.6)
LYMPHOCYTES NFR BLD AUTO: 23 %
MCH RBC QN AUTO: 26.6 PG (ref 26–34)
MCHC RBC AUTO-ENTMCNC: 31.9 GM/DL (ref 31–37)
MCV RBC AUTO: 83.2 FL (ref 80–100)
MONOCYTES # BLD AUTO: 1.2 X10(3)/MCL (ref 0.1–1.3)
MONOCYTES NFR BLD AUTO: 9 %
NEUTROPHILS # BLD AUTO: 8.3 X10(3)/MCL (ref 2.1–9.2)
NEUTROPHILS NFR BLD AUTO: 62 %
PLATELET # BLD AUTO: 472 X10(3)/MCL (ref 130–400)
PMV BLD AUTO: 10.8 FL (ref 7.4–10.4)
RBC # BLD AUTO: 4.59 X10(6)/MCL (ref 4–5.2)
TIBC SERPL-MCNC: 368 UG/DL (ref 70–310)
TIBC SERPL-MCNC: 391 UG/DL (ref 250–450)
TRANSFERRIN SERPL-MCNC: 362 MG/DL (ref 180–382)
WBC # SPEC AUTO: 13.3 X10(3)/MCL (ref 4.5–11)

## 2021-03-17 ENCOUNTER — HISTORICAL (OUTPATIENT)
Dept: LAB | Facility: HOSPITAL | Age: 56
End: 2021-03-17

## 2021-03-17 LAB
ALBUMIN SERPL-MCNC: 3.9 GM/DL (ref 3.5–5)
ALBUMIN/GLOB SERPL: 1.3 RATIO (ref 1.1–2)
ALP SERPL-CCNC: 96 UNIT/L (ref 40–150)
ALT SERPL-CCNC: 17 UNIT/L (ref 0–55)
APPEARANCE, UA: ABNORMAL
AST SERPL-CCNC: 18 UNIT/L (ref 5–34)
BACTERIA SPEC CULT: ABNORMAL
BILIRUB SERPL-MCNC: 0.5 MG/DL
BILIRUB UR QL STRIP: NEGATIVE
BILIRUBIN DIRECT+TOT PNL SERPL-MCNC: 0.2 MG/DL (ref 0–0.5)
BILIRUBIN DIRECT+TOT PNL SERPL-MCNC: 0.3 MG/DL (ref 0–0.8)
BUN SERPL-MCNC: 11.5 MG/DL (ref 9.8–20.1)
CALCIUM SERPL-MCNC: 8.7 MG/DL (ref 8.4–10.2)
CHLORIDE SERPL-SCNC: 103 MMOL/L (ref 98–107)
CHOLEST SERPL-MCNC: 191 MG/DL
CHOLEST/HDLC SERPL: 3 {RATIO} (ref 0–5)
CO2 SERPL-SCNC: 23 MMOL/L (ref 22–29)
COLOR UR: YELLOW
CREAT SERPL-MCNC: 0.62 MG/DL (ref 0.55–1.02)
ERYTHROCYTE [DISTWIDTH] IN BLOOD BY AUTOMATED COUNT: 15.8 % (ref 11.5–17)
EST. AVERAGE GLUCOSE BLD GHB EST-MCNC: 191.5 MG/DL
GLOBULIN SER-MCNC: 3.1 GM/DL (ref 2.4–3.5)
GLUCOSE (UA): NEGATIVE
GLUCOSE SERPL-MCNC: 78 MG/DL (ref 74–100)
HBA1C MFR BLD: 8.3 %
HCT VFR BLD AUTO: 42.6 % (ref 37–47)
HDLC SERPL-MCNC: 60 MG/DL (ref 35–60)
HGB BLD-MCNC: 12.7 GM/DL (ref 12–16)
HGB UR QL STRIP: ABNORMAL
KETONES UR QL STRIP: NEGATIVE
LDLC SERPL CALC-MCNC: 107 MG/DL (ref 50–140)
LEUKOCYTE ESTERASE UR QL STRIP: ABNORMAL
MCH RBC QN AUTO: 23.2 PG (ref 27–31)
MCHC RBC AUTO-ENTMCNC: 29.8 GM/DL (ref 33–36)
MCV RBC AUTO: 77.9 FL (ref 80–94)
NITRITE UR QL STRIP: NEGATIVE
PH UR STRIP: 6.5 [PH] (ref 5–9)
PLATELET # BLD AUTO: 559 X10(3)/MCL (ref 130–400)
PMV BLD AUTO: 10.7 FL (ref 9.4–12.4)
POTASSIUM SERPL-SCNC: 4.7 MMOL/L (ref 3.5–5.1)
PROT SERPL-MCNC: 7 GM/DL (ref 6.4–8.3)
PROT UR QL STRIP: NEGATIVE
RBC # BLD AUTO: 5.47 X10(6)/MCL (ref 4.2–5.4)
RBC #/AREA URNS HPF: ABNORMAL /HPF
SODIUM SERPL-SCNC: 140 MMOL/L (ref 136–145)
SP GR UR STRIP: 1.02 (ref 1–1.03)
SQUAMOUS EPITHELIAL, UA: ABNORMAL
TRIGL SERPL-MCNC: 121 MG/DL (ref 37–140)
TSH SERPL-ACNC: 2.37 UIU/ML (ref 0.35–4.94)
UROBILINOGEN UR STRIP-ACNC: 0.2
VLDLC SERPL CALC-MCNC: 24 MG/DL
WBC # SPEC AUTO: 13.1 X10(3)/MCL (ref 4.5–11.5)
WBC #/AREA URNS HPF: ABNORMAL /HPF

## 2021-04-14 ENCOUNTER — HISTORICAL (OUTPATIENT)
Dept: LAB | Facility: HOSPITAL | Age: 56
End: 2021-04-14

## 2021-04-14 LAB
ALBUMIN SERPL-MCNC: 3.7 GM/DL (ref 3.5–5)
ALBUMIN/GLOB SERPL: 1 RATIO (ref 1.1–2)
ALP SERPL-CCNC: 75 UNIT/L (ref 40–150)
ALT SERPL-CCNC: 14 UNIT/L (ref 0–55)
APPEARANCE, UA: CLEAR
AST SERPL-CCNC: 17 UNIT/L (ref 5–34)
BACTERIA #/AREA URNS AUTO: ABNORMAL /HPF
BILIRUB SERPL-MCNC: 0.2 MG/DL
BILIRUB UR QL STRIP: NEGATIVE
BILIRUBIN DIRECT+TOT PNL SERPL-MCNC: 0.1 MG/DL (ref 0–0.5)
BILIRUBIN DIRECT+TOT PNL SERPL-MCNC: 0.1 MG/DL (ref 0–0.8)
BUN SERPL-MCNC: 9.3 MG/DL (ref 9.8–20.1)
CALCIUM SERPL-MCNC: 9.8 MG/DL (ref 8.4–10.2)
CHLORIDE SERPL-SCNC: 105 MMOL/L (ref 98–107)
CHOLEST SERPL-MCNC: 176 MG/DL
CHOLEST/HDLC SERPL: 3 {RATIO} (ref 0–5)
CO2 SERPL-SCNC: 22 MMOL/L (ref 22–29)
COLOR UR: YELLOW
CREAT SERPL-MCNC: 0.71 MG/DL (ref 0.55–1.02)
GLOBULIN SER-MCNC: 3.6 GM/DL (ref 2.4–3.5)
GLUCOSE (UA): NEGATIVE
GLUCOSE SERPL-MCNC: 108 MG/DL (ref 74–100)
HDLC SERPL-MCNC: 56 MG/DL (ref 35–60)
HGB UR QL STRIP: NEGATIVE
KETONES UR QL STRIP: NEGATIVE
LDLC SERPL CALC-MCNC: 91 MG/DL (ref 50–140)
LEUKOCYTE ESTERASE UR QL STRIP: NEGATIVE
NITRITE UR QL STRIP.AUTO: NEGATIVE
PH UR STRIP: 7.5 [PH] (ref 5–9)
POTASSIUM SERPL-SCNC: 4.3 MMOL/L (ref 3.5–5.1)
PROT SERPL-MCNC: 7.3 GM/DL (ref 6.4–8.3)
PROT UR QL STRIP: NEGATIVE
RBC #/AREA URNS HPF: ABNORMAL /[HPF]
SODIUM SERPL-SCNC: 140 MMOL/L (ref 136–145)
SP GR UR STRIP: 1.02 (ref 1–1.03)
SQUAMOUS EPITHELIAL, UA: ABNORMAL
TRIGL SERPL-MCNC: 145 MG/DL (ref 37–140)
UROBILINOGEN UR STRIP-ACNC: 0.2
VLDLC SERPL CALC-MCNC: 29 MG/DL
WBC #/AREA URNS AUTO: ABNORMAL /[HPF]

## 2021-04-17 LAB — FINAL CULTURE: NO GROWTH

## 2021-04-28 ENCOUNTER — HISTORICAL (OUTPATIENT)
Dept: ADMINISTRATIVE | Facility: HOSPITAL | Age: 56
End: 2021-04-28

## 2021-04-28 LAB
ABS NEUT (OLG): 11.98 X10(3)/MCL (ref 2.1–9.2)
BASOPHILS # BLD AUTO: 0.1 X10(3)/MCL (ref 0–0.2)
BASOPHILS NFR BLD AUTO: 0 %
EOSINOPHIL # BLD AUTO: 0.3 X10(3)/MCL (ref 0–0.9)
EOSINOPHIL NFR BLD AUTO: 2 %
ERYTHROCYTE [DISTWIDTH] IN BLOOD BY AUTOMATED COUNT: 18.3 % (ref 11.5–14.5)
FERRITIN SERPL-MCNC: 7.61 NG/ML (ref 4.63–204)
HCT VFR BLD AUTO: 36.4 % (ref 35–46)
HGB BLD-MCNC: 10.9 GM/DL (ref 12–16)
IMM GRANULOCYTES # BLD AUTO: 0.19 10*3/UL
IMM GRANULOCYTES NFR BLD AUTO: 1 %
IRON SATN MFR SERPL: 4 % (ref 20–50)
IRON SERPL-MCNC: 14 UG/DL (ref 50–170)
LYMPHOCYTES # BLD AUTO: 1.9 X10(3)/MCL (ref 0.6–4.6)
LYMPHOCYTES NFR BLD AUTO: 12 %
MCH RBC QN AUTO: 21.8 PG (ref 26–34)
MCHC RBC AUTO-ENTMCNC: 29.9 GM/DL (ref 31–37)
MCV RBC AUTO: 72.9 FL (ref 80–100)
MONOCYTES # BLD AUTO: 1.1 X10(3)/MCL (ref 0.1–1.3)
MONOCYTES NFR BLD AUTO: 7 %
NEUTROPHILS # BLD AUTO: 11.98 X10(3)/MCL (ref 2.1–9.2)
NEUTROPHILS NFR BLD AUTO: 77 %
PLATELET # BLD AUTO: 531 X10(3)/MCL (ref 130–400)
PMV BLD AUTO: 11.2 FL (ref 7.4–10.4)
RBC # BLD AUTO: 4.99 X10(6)/MCL (ref 4–5.2)
TIBC SERPL-MCNC: 362 UG/DL (ref 70–310)
TIBC SERPL-MCNC: 376 UG/DL (ref 250–450)
TRANSFERRIN SERPL-MCNC: 343 MG/DL (ref 180–382)
WBC # SPEC AUTO: 15.6 X10(3)/MCL (ref 4.5–11)

## 2021-05-17 ENCOUNTER — HISTORICAL (OUTPATIENT)
Dept: INFUSION THERAPY | Facility: HOSPITAL | Age: 56
End: 2021-05-17

## 2021-05-24 ENCOUNTER — HISTORICAL (OUTPATIENT)
Dept: INFUSION THERAPY | Facility: HOSPITAL | Age: 56
End: 2021-05-24

## 2021-06-04 ENCOUNTER — HISTORICAL (OUTPATIENT)
Dept: RADIOLOGY | Facility: HOSPITAL | Age: 56
End: 2021-06-04

## 2021-07-09 ENCOUNTER — HISTORICAL (OUTPATIENT)
Dept: INFUSION THERAPY | Facility: HOSPITAL | Age: 56
End: 2021-07-09

## 2021-07-09 LAB
ABS NEUT (OLG): 9.06 X10(3)/MCL (ref 2.1–9.2)
ALBUMIN SERPL-MCNC: 3.8 GM/DL (ref 3.5–5)
ALBUMIN/GLOB SERPL: 1.2 RATIO (ref 1.1–2)
ALP SERPL-CCNC: 102 UNIT/L (ref 40–150)
ALT SERPL-CCNC: 19 UNIT/L (ref 0–55)
AST SERPL-CCNC: 13 UNIT/L (ref 5–34)
BASOPHILS # BLD AUTO: 0.1 X10(3)/MCL (ref 0–0.2)
BASOPHILS NFR BLD AUTO: 1 %
BILIRUB SERPL-MCNC: 0.1 MG/DL
BILIRUBIN DIRECT+TOT PNL SERPL-MCNC: 0 MG/DL (ref 0–0.8)
BILIRUBIN DIRECT+TOT PNL SERPL-MCNC: 0.1 MG/DL (ref 0–0.5)
BUN SERPL-MCNC: 15.4 MG/DL (ref 9.8–20.1)
CALCIUM SERPL-MCNC: 10.2 MG/DL (ref 8.4–10.2)
CHLORIDE SERPL-SCNC: 100 MMOL/L (ref 98–107)
CO2 SERPL-SCNC: 24 MMOL/L (ref 22–29)
CREAT SERPL-MCNC: 0.85 MG/DL (ref 0.55–1.02)
EOSINOPHIL # BLD AUTO: 0.4 X10(3)/MCL (ref 0–0.9)
EOSINOPHIL NFR BLD AUTO: 3 %
ERYTHROCYTE [DISTWIDTH] IN BLOOD BY AUTOMATED COUNT: 19.4 % (ref 11.5–14.5)
EST CREAT CLEARANCE SER (OHS): 72.53 ML/MIN
FERRITIN SERPL-MCNC: 22.48 NG/ML (ref 4.63–204)
GLOBULIN SER-MCNC: 3.2 GM/DL (ref 2.4–3.5)
GLUCOSE SERPL-MCNC: 289 MG/DL (ref 74–100)
HCT VFR BLD AUTO: 42.6 % (ref 35–46)
HGB BLD-MCNC: 13.7 GM/DL (ref 12–16)
IMM GRANULOCYTES # BLD AUTO: 0.18 10*3/UL
IMM GRANULOCYTES NFR BLD AUTO: 1 %
IRON SATN MFR SERPL: 8 % (ref 20–50)
IRON SERPL-MCNC: 24 UG/DL (ref 50–170)
LYMPHOCYTES # BLD AUTO: 3 X10(3)/MCL (ref 0.6–4.6)
LYMPHOCYTES NFR BLD AUTO: 22 %
MCH RBC QN AUTO: 27.6 PG (ref 26–34)
MCHC RBC AUTO-ENTMCNC: 32.2 GM/DL (ref 31–37)
MCV RBC AUTO: 85.9 FL (ref 80–100)
MONOCYTES # BLD AUTO: 1.2 X10(3)/MCL (ref 0.1–1.3)
MONOCYTES NFR BLD AUTO: 8 %
NEUTROPHILS # BLD AUTO: 9.06 X10(3)/MCL (ref 2.1–9.2)
NEUTROPHILS NFR BLD AUTO: 65 %
NRBC BLD AUTO-RTO: 0 % (ref 0–0.2)
PLATELET # BLD AUTO: 562 X10(3)/MCL (ref 130–400)
PMV BLD AUTO: 10.3 FL (ref 7.4–10.4)
POTASSIUM SERPL-SCNC: 4.9 MMOL/L (ref 3.5–5.1)
PROT SERPL-MCNC: 7 GM/DL (ref 6.4–8.3)
RBC # BLD AUTO: 4.96 X10(6)/MCL (ref 4–5.2)
SODIUM SERPL-SCNC: 136 MMOL/L (ref 136–145)
TIBC SERPL-MCNC: 294 UG/DL (ref 70–310)
TIBC SERPL-MCNC: 318 UG/DL (ref 250–450)
TRANSFERRIN SERPL-MCNC: 290 MG/DL (ref 180–382)
WBC # SPEC AUTO: 14 X10(3)/MCL (ref 4.5–11)

## 2021-07-19 ENCOUNTER — HISTORICAL (OUTPATIENT)
Dept: INFUSION THERAPY | Facility: HOSPITAL | Age: 56
End: 2021-07-19

## 2021-08-31 ENCOUNTER — HISTORICAL (OUTPATIENT)
Dept: LAB | Facility: HOSPITAL | Age: 56
End: 2021-08-31

## 2021-08-31 LAB
ABS NEUT (OLG): 9.07 X10(3)/MCL (ref 2.1–9.2)
ALBUMIN SERPL-MCNC: 4.1 GM/DL (ref 3.5–5)
ALBUMIN/GLOB SERPL: 1.3 RATIO (ref 1.1–2)
ALP SERPL-CCNC: 131 UNIT/L (ref 40–150)
ALT SERPL-CCNC: 31 UNIT/L (ref 0–55)
APPEARANCE, UA: CLEAR
AST SERPL-CCNC: 20 UNIT/L (ref 5–34)
BACTERIA SPEC CULT: ABNORMAL
BASOPHILS # BLD AUTO: 0 X10(3)/MCL (ref 0–0.2)
BASOPHILS NFR BLD AUTO: 0 %
BILIRUB SERPL-MCNC: 0.7 MG/DL
BILIRUB UR QL STRIP: ABNORMAL
BILIRUBIN DIRECT+TOT PNL SERPL-MCNC: 0.2 MG/DL (ref 0–0.5)
BILIRUBIN DIRECT+TOT PNL SERPL-MCNC: 0.5 MG/DL (ref 0–0.8)
BUN SERPL-MCNC: 15.1 MG/DL (ref 9.8–20.1)
CALCIUM SERPL-MCNC: 9.5 MG/DL (ref 8.4–10.2)
CHLORIDE SERPL-SCNC: 101 MMOL/L (ref 98–107)
CO2 SERPL-SCNC: 22 MMOL/L (ref 22–29)
COLOR UR: YELLOW
CREAT SERPL-MCNC: 0.76 MG/DL (ref 0.55–1.02)
EOSINOPHIL # BLD AUTO: 0.4 X10(3)/MCL (ref 0–0.9)
EOSINOPHIL NFR BLD AUTO: 3 %
ERYTHROCYTE [DISTWIDTH] IN BLOOD BY AUTOMATED COUNT: 16.4 % (ref 11.5–17)
EST. AVERAGE GLUCOSE BLD GHB EST-MCNC: 217.3 MG/DL
FERRITIN SERPL-MCNC: 232.3 NG/ML (ref 4.63–204)
GLOBULIN SER-MCNC: 3.2 GM/DL (ref 2.4–3.5)
GLUCOSE (UA): NEGATIVE
GLUCOSE SERPL-MCNC: 225 MG/DL (ref 74–100)
HBA1C MFR BLD: 9.2 %
HCT VFR BLD AUTO: 49.5 % (ref 37–47)
HGB BLD-MCNC: 16.9 GM/DL (ref 12–16)
HGB UR QL STRIP: NEGATIVE
IMM GRANULOCYTES # BLD AUTO: 0.13 % (ref 0–0.02)
IMM GRANULOCYTES NFR BLD AUTO: 1 % (ref 0–0.43)
IRON SATN MFR SERPL: 31 % (ref 20–50)
IRON SERPL-MCNC: 86 UG/DL (ref 50–170)
KETONES UR QL STRIP: ABNORMAL
LEUKOCYTE ESTERASE UR QL STRIP: ABNORMAL
LYMPHOCYTES # BLD AUTO: 1.8 X10(3)/MCL (ref 0.6–4.6)
LYMPHOCYTES NFR BLD AUTO: 14 %
MCH RBC QN AUTO: 29.3 PG (ref 27–31)
MCHC RBC AUTO-ENTMCNC: 34.1 GM/DL (ref 33–36)
MCV RBC AUTO: 85.9 FL (ref 80–94)
MONOCYTES # BLD AUTO: 1 X10(3)/MCL (ref 0.1–1.3)
MONOCYTES NFR BLD AUTO: 8 %
NEUTROPHILS # BLD AUTO: 9.07 X10(3)/MCL (ref 1.4–7.9)
NEUTROPHILS NFR BLD AUTO: 73 %
NITRITE UR QL STRIP: NEGATIVE
PH UR STRIP: 6 [PH] (ref 5–9)
PLATELET # BLD AUTO: 504 X10(3)/MCL (ref 130–400)
PMV BLD AUTO: 10.5 FL (ref 9.4–12.4)
POTASSIUM SERPL-SCNC: 4.5 MMOL/L (ref 3.5–5.1)
PROT SERPL-MCNC: 7.3 GM/DL (ref 6.4–8.3)
PROT UR QL STRIP: 30
RBC # BLD AUTO: 5.76 X10(6)/MCL (ref 4.2–5.4)
RBC #/AREA URNS HPF: ABNORMAL /[HPF]
SODIUM SERPL-SCNC: 136 MMOL/L (ref 136–145)
SP GR UR STRIP: >=1.03 (ref 1–1.03)
SQUAMOUS EPITHELIAL, UA: ABNORMAL
TIBC SERPL-MCNC: 193 UG/DL (ref 70–310)
TIBC SERPL-MCNC: 279 UG/DL (ref 250–450)
TRANSFERRIN SERPL-MCNC: 232 MG/DL (ref 180–382)
UROBILINOGEN UR STRIP-ACNC: 1
WBC # SPEC AUTO: 12.4 X10(3)/MCL (ref 4.5–11.5)
WBC #/AREA URNS HPF: ABNORMAL /HPF

## 2021-09-03 LAB — FINAL CULTURE: NORMAL

## 2021-09-28 ENCOUNTER — HISTORICAL (OUTPATIENT)
Dept: LAB | Facility: HOSPITAL | Age: 56
End: 2021-09-28

## 2021-09-28 LAB
ABS NEUT (OLG): 8.27 X10(3)/MCL (ref 2.1–9.2)
ALBUMIN SERPL-MCNC: 3.7 GM/DL (ref 3.5–5)
ALBUMIN/GLOB SERPL: 1.1 RATIO (ref 1.1–2)
ALP SERPL-CCNC: 121 UNIT/L (ref 40–150)
ALT SERPL-CCNC: 34 UNIT/L (ref 0–55)
AST SERPL-CCNC: 18 UNIT/L (ref 5–34)
BASOPHILS # BLD AUTO: 0 X10(3)/MCL (ref 0–0.2)
BASOPHILS NFR BLD AUTO: 0 %
BILIRUB SERPL-MCNC: 0.4 MG/DL
BILIRUBIN DIRECT+TOT PNL SERPL-MCNC: 0.2 MG/DL (ref 0–0.5)
BILIRUBIN DIRECT+TOT PNL SERPL-MCNC: 0.2 MG/DL (ref 0–0.8)
BUN SERPL-MCNC: 11.8 MG/DL (ref 9.8–20.1)
CALCIUM SERPL-MCNC: 9.5 MG/DL (ref 8.4–10.2)
CHLORIDE SERPL-SCNC: 102 MMOL/L (ref 98–107)
CHOLEST SERPL-MCNC: 150 MG/DL
CHOLEST/HDLC SERPL: 4 {RATIO} (ref 0–5)
CO2 SERPL-SCNC: 25 MMOL/L (ref 22–29)
CREAT SERPL-MCNC: 1.01 MG/DL (ref 0.55–1.02)
DEPRECATED CALCIDIOL+CALCIFEROL SERPL-MC: 48.2 NG/ML (ref 30–80)
EOSINOPHIL # BLD AUTO: 0.5 X10(3)/MCL (ref 0–0.9)
EOSINOPHIL NFR BLD AUTO: 4 %
ERYTHROCYTE [DISTWIDTH] IN BLOOD BY AUTOMATED COUNT: 15.7 % (ref 11.5–17)
EST. AVERAGE GLUCOSE BLD GHB EST-MCNC: 223.1 MG/DL
GLOBULIN SER-MCNC: 3.5 GM/DL (ref 2.4–3.5)
GLUCOSE SERPL-MCNC: 320 MG/DL (ref 74–100)
HBA1C MFR BLD: 9.4 %
HCT VFR BLD AUTO: 44 % (ref 37–47)
HDLC SERPL-MCNC: 42 MG/DL (ref 35–60)
HGB BLD-MCNC: 14.8 GM/DL (ref 12–16)
IMM GRANULOCYTES # BLD AUTO: 0.13 % (ref 0–0.02)
IMM GRANULOCYTES NFR BLD AUTO: 1.1 % (ref 0–0.43)
LDLC SERPL CALC-MCNC: 80 MG/DL (ref 50–140)
LYMPHOCYTES # BLD AUTO: 2.3 X10(3)/MCL (ref 0.6–4.6)
LYMPHOCYTES NFR BLD AUTO: 19 %
MCH RBC QN AUTO: 29.5 PG (ref 27–31)
MCHC RBC AUTO-ENTMCNC: 33.6 GM/DL (ref 33–36)
MCV RBC AUTO: 87.6 FL (ref 80–94)
MONOCYTES # BLD AUTO: 0.9 X10(3)/MCL (ref 0.1–1.3)
MONOCYTES NFR BLD AUTO: 7 %
NEUTROPHILS # BLD AUTO: 8.27 X10(3)/MCL (ref 1.4–7.9)
NEUTROPHILS NFR BLD AUTO: 68 %
PLATELET # BLD AUTO: 434 X10(3)/MCL (ref 130–400)
PMV BLD AUTO: 10.1 FL (ref 9.4–12.4)
POTASSIUM SERPL-SCNC: 4.6 MMOL/L (ref 3.5–5.1)
PROT SERPL-MCNC: 7.2 GM/DL (ref 6.4–8.3)
RBC # BLD AUTO: 5.02 X10(6)/MCL (ref 4.2–5.4)
SODIUM SERPL-SCNC: 137 MMOL/L (ref 136–145)
TRIGL SERPL-MCNC: 141 MG/DL (ref 37–140)
TSH SERPL-ACNC: 3.46 UIU/ML (ref 0.35–4.94)
VLDLC SERPL CALC-MCNC: 28 MG/DL
WBC # SPEC AUTO: 12.1 X10(3)/MCL (ref 4.5–11.5)

## 2021-10-27 LAB — CRC RECOMMENDATION EXT: NORMAL

## 2021-11-23 ENCOUNTER — HISTORICAL (OUTPATIENT)
Dept: LAB | Facility: HOSPITAL | Age: 56
End: 2021-11-23

## 2021-11-23 LAB
ABS NEUT (OLG): 8.46 X10(3)/MCL (ref 2.1–9.2)
BASOPHILS # BLD AUTO: 0 X10(3)/MCL (ref 0–0.2)
BASOPHILS NFR BLD AUTO: 0 %
EOSINOPHIL # BLD AUTO: 0.4 X10(3)/MCL (ref 0–0.9)
EOSINOPHIL NFR BLD AUTO: 4 %
ERYTHROCYTE [DISTWIDTH] IN BLOOD BY AUTOMATED COUNT: 14.1 % (ref 11.5–17)
FERRITIN SERPL-MCNC: 103.9 NG/ML (ref 4.63–204)
HCT VFR BLD AUTO: 44.3 % (ref 37–47)
HGB BLD-MCNC: 14.9 GM/DL (ref 12–16)
IMM GRANULOCYTES # BLD AUTO: 0.16 % (ref 0–0.02)
IMM GRANULOCYTES NFR BLD AUTO: 1.3 % (ref 0–0.43)
IRON SATN MFR SERPL: 28 % (ref 20–50)
IRON SERPL-MCNC: 86 UG/DL (ref 50–170)
LYMPHOCYTES # BLD AUTO: 2.1 X10(3)/MCL (ref 0.6–4.6)
LYMPHOCYTES NFR BLD AUTO: 17 %
MCH RBC QN AUTO: 30.4 PG (ref 27–31)
MCHC RBC AUTO-ENTMCNC: 33.6 GM/DL (ref 33–36)
MCV RBC AUTO: 90.4 FL (ref 80–94)
MONOCYTES # BLD AUTO: 0.9 X10(3)/MCL (ref 0.1–1.3)
MONOCYTES NFR BLD AUTO: 8 %
NEUTROPHILS # BLD AUTO: 8.46 X10(3)/MCL (ref 1.4–7.9)
NEUTROPHILS NFR BLD AUTO: 70 %
PLATELET # BLD AUTO: 487 X10(3)/MCL (ref 130–400)
PMV BLD AUTO: 10.3 FL (ref 9.4–12.4)
RBC # BLD AUTO: 4.9 X10(6)/MCL (ref 4.2–5.4)
TIBC SERPL-MCNC: 226 UG/DL (ref 70–310)
TIBC SERPL-MCNC: 312 UG/DL (ref 250–450)
TRANSFERRIN SERPL-MCNC: 266 MG/DL (ref 180–382)
WBC # SPEC AUTO: 12.1 X10(3)/MCL (ref 4.5–11.5)

## 2021-12-01 ENCOUNTER — HISTORICAL (OUTPATIENT)
Dept: LAB | Facility: HOSPITAL | Age: 56
End: 2021-12-01

## 2021-12-01 LAB
ABS NEUT (OLG): 9.04 X10(3)/MCL (ref 2.1–9.2)
ALBUMIN SERPL-MCNC: 3.8 GM/DL (ref 3.5–5)
ALBUMIN/GLOB SERPL: 1.2 RATIO (ref 1.1–2)
ALP SERPL-CCNC: 123 UNIT/L (ref 40–150)
ALT SERPL-CCNC: 19 UNIT/L (ref 0–55)
AST SERPL-CCNC: 13 UNIT/L (ref 5–34)
BASOPHILS # BLD AUTO: 0.1 X10(3)/MCL (ref 0–0.2)
BASOPHILS NFR BLD AUTO: 1 %
BILIRUB SERPL-MCNC: 0.4 MG/DL
BILIRUBIN DIRECT+TOT PNL SERPL-MCNC: 0.2 MG/DL (ref 0–0.5)
BILIRUBIN DIRECT+TOT PNL SERPL-MCNC: 0.2 MG/DL (ref 0–0.8)
BUN SERPL-MCNC: 10.9 MG/DL (ref 9.8–20.1)
CALCIUM SERPL-MCNC: 9.9 MG/DL (ref 8.7–10.5)
CHLORIDE SERPL-SCNC: 100 MMOL/L (ref 98–107)
CO2 SERPL-SCNC: 26 MMOL/L (ref 22–29)
CREAT SERPL-MCNC: 1.04 MG/DL (ref 0.55–1.02)
DEPRECATED CALCIDIOL+CALCIFEROL SERPL-MC: 49.8 NG/ML (ref 30–80)
EOSINOPHIL # BLD AUTO: 0.4 X10(3)/MCL (ref 0–0.9)
EOSINOPHIL NFR BLD AUTO: 3 %
ERYTHROCYTE [DISTWIDTH] IN BLOOD BY AUTOMATED COUNT: 13.7 % (ref 11.5–17)
GLOBULIN SER-MCNC: 3.3 GM/DL (ref 2.4–3.5)
GLUCOSE SERPL-MCNC: 341 MG/DL (ref 74–100)
HCT VFR BLD AUTO: 46.1 % (ref 37–47)
HGB BLD-MCNC: 15.3 GM/DL (ref 12–16)
IMM GRANULOCYTES # BLD AUTO: 0.14 % (ref 0–0.02)
IMM GRANULOCYTES NFR BLD AUTO: 1.1 % (ref 0–0.43)
LIPASE SERPL-CCNC: 33 U/L
LYMPHOCYTES # BLD AUTO: 2.2 X10(3)/MCL (ref 0.6–4.6)
LYMPHOCYTES NFR BLD AUTO: 17 %
MCH RBC QN AUTO: 30.1 PG (ref 27–31)
MCHC RBC AUTO-ENTMCNC: 33.2 GM/DL (ref 33–36)
MCV RBC AUTO: 90.6 FL (ref 80–94)
MONOCYTES # BLD AUTO: 0.9 X10(3)/MCL (ref 0.1–1.3)
MONOCYTES NFR BLD AUTO: 7 %
NEUTROPHILS # BLD AUTO: 9.04 X10(3)/MCL (ref 1.4–7.9)
NEUTROPHILS NFR BLD AUTO: 71 %
PLATELET # BLD AUTO: 476 X10(3)/MCL (ref 130–400)
PMV BLD AUTO: 10.6 FL (ref 9.4–12.4)
POTASSIUM SERPL-SCNC: 4.7 MMOL/L (ref 3.5–5.1)
PROT SERPL-MCNC: 7.1 GM/DL (ref 6.4–8.3)
RBC # BLD AUTO: 5.09 X10(6)/MCL (ref 4.2–5.4)
SODIUM SERPL-SCNC: 135 MMOL/L (ref 136–145)
VIT B12 SERPL-MCNC: 1266 PG/ML (ref 213–816)
WBC # SPEC AUTO: 12.8 X10(3)/MCL (ref 4.5–11.5)

## 2022-01-07 ENCOUNTER — HISTORICAL (OUTPATIENT)
Dept: RADIOLOGY | Facility: HOSPITAL | Age: 57
End: 2022-01-07

## 2022-02-21 ENCOUNTER — HISTORICAL (OUTPATIENT)
Dept: RESPIRATORY THERAPY | Facility: HOSPITAL | Age: 57
End: 2022-02-21

## 2022-03-02 ENCOUNTER — HISTORICAL (OUTPATIENT)
Dept: LAB | Facility: HOSPITAL | Age: 57
End: 2022-03-02

## 2022-03-02 ENCOUNTER — HISTORICAL (OUTPATIENT)
Dept: ADMINISTRATIVE | Facility: HOSPITAL | Age: 57
End: 2022-03-02

## 2022-03-02 LAB
EST. AVERAGE GLUCOSE BLD GHB EST-MCNC: 174.3 MG/DL
HBA1C MFR BLD: 7.7 %

## 2022-04-10 ENCOUNTER — HISTORICAL (OUTPATIENT)
Dept: ADMINISTRATIVE | Facility: HOSPITAL | Age: 57
End: 2022-04-10
Payer: MEDICAID

## 2022-04-25 VITALS
WEIGHT: 219.13 LBS | BODY MASS INDEX: 34.39 KG/M2 | DIASTOLIC BLOOD PRESSURE: 78 MMHG | SYSTOLIC BLOOD PRESSURE: 117 MMHG | OXYGEN SATURATION: 99 % | HEIGHT: 67 IN

## 2022-04-29 ENCOUNTER — HISTORICAL (OUTPATIENT)
Dept: ADMINISTRATIVE | Facility: HOSPITAL | Age: 57
End: 2022-04-29
Payer: MEDICAID

## 2022-04-29 LAB
ABS NEUT (OLG): 9.27 (ref 2.1–9.2)
ALBUMIN SERPL-MCNC: 3.7 G/DL (ref 3.5–5)
ALBUMIN/GLOB SERPL: 1.1 {RATIO} (ref 1.1–2)
ALP SERPL-CCNC: 111 U/L (ref 40–150)
ALT SERPL-CCNC: 25 U/L (ref 0–55)
AST SERPL-CCNC: 19 U/L (ref 5–34)
BASOPHILS # BLD AUTO: 0.1 10*3/UL (ref 0–0.2)
BASOPHILS NFR BLD AUTO: 1 %
BILIRUB SERPL-MCNC: 0.3 MG/DL
BILIRUBIN DIRECT+TOT PNL SERPL-MCNC: 0.1 (ref 0–0.5)
BILIRUBIN DIRECT+TOT PNL SERPL-MCNC: 0.2 (ref 0–0.8)
BUN SERPL-MCNC: 9.9 MG/DL (ref 9.8–20.1)
CALCIUM SERPL-MCNC: 9.9 MG/DL (ref 8.7–10.5)
CHLORIDE SERPL-SCNC: 102 MMOL/L (ref 98–107)
CO2 SERPL-SCNC: 24 MMOL/L (ref 22–29)
CREAT SERPL-MCNC: 0.77 MG/DL (ref 0.55–1.02)
EOSINOPHIL # BLD AUTO: 0.4 10*3/UL (ref 0–0.9)
EOSINOPHIL NFR BLD AUTO: 3 %
ERYTHROCYTE [DISTWIDTH] IN BLOOD BY AUTOMATED COUNT: 16.5 % (ref 11.5–14.5)
FERRITIN SERPL-MCNC: 23.28 NG/ML (ref 4.63–204)
FLAG2 (OHS): 60
FLAG3 (OHS): 80
FLAGS (OHS): 80
GLOBULIN SER-MCNC: 3.5 G/DL (ref 2.4–3.5)
GLUCOSE SERPL-MCNC: 263 MG/DL (ref 74–100)
HCT VFR BLD AUTO: 45.7 % (ref 35–46)
HEMOLYSIS INTERF INDEX SERPL-ACNC: 7
HGB BLD-MCNC: 14.7 G/DL (ref 12–16)
ICTERIC INTERF INDEX SERPL-ACNC: 0
IMM GRANULOCYTES # BLD AUTO: 0.19 10*3/UL
IMM GRANULOCYTES NFR BLD AUTO: 1 %
IMM. NE 1 SUSPECT FLAG (OHS): 10
IMM. NE 2 SUSPECT FLAG (OHS): 10
IRON SATN MFR SERPL: 41 % (ref 20–50)
IRON SERPL-MCNC: 140 UG/DL (ref 50–170)
LIPEMIC INTERF INDEX SERPL-ACNC: 3
LOW EVENT # SUSPECT FLAG (OHS): 80
LYMPHOCYTES # BLD AUTO: 2.1 10*3/UL (ref 0.6–4.6)
LYMPHOCYTES NFR BLD AUTO: 16 %
MANUAL DIFF? (OHS): NO
MCH RBC QN AUTO: 27.5 PG (ref 26–34)
MCHC RBC AUTO-ENTMCNC: 32.2 G/DL (ref 31–37)
MCV RBC AUTO: 85.6 FL (ref 80–100)
MO BLASTS SUSPECT FLAG (OHS): 40
MONOCYTES # BLD AUTO: 1 10*3/UL (ref 0.1–1.3)
MONOCYTES NFR BLD AUTO: 8 %
NEUTROPHILS # BLD AUTO: 9.27 10*3/UL (ref 2.1–9.2)
NEUTROPHILS NFR BLD AUTO: 71 %
NRBC BLD AUTO-RTO: 0 % (ref 0–0.2)
PLATELET # BLD AUTO: 535 10*3/UL (ref 130–400)
PLATELET CLUMPS SUSPECT FLAG (OHS): 30
PMV BLD AUTO: 11 FL (ref 7.4–10.4)
POTASSIUM SERPL-SCNC: 4.5 MMOL/L (ref 3.5–5.1)
PROT SERPL-MCNC: 7.2 G/DL (ref 6.4–8.3)
RBC # BLD AUTO: 5.34 10*6/UL (ref 4–5.2)
SODIUM SERPL-SCNC: 136 MMOL/L (ref 136–145)
TIBC SERPL-MCNC: 200 UG/DL (ref 70–310)
TIBC SERPL-MCNC: 340 UG/DL (ref 250–450)
TRANSFERRIN SERPL-MCNC: 293 MG/DL (ref 180–382)
WBC # SPEC AUTO: 13.1 10*3/UL (ref 4.5–11)
ZZGIANT PLATELETS (OHS): 20

## 2022-05-04 DIAGNOSIS — D50.9 IRON DEFICIENCY ANEMIA, UNSPECIFIED IRON DEFICIENCY ANEMIA TYPE: ICD-10-CM

## 2022-05-04 RX ORDER — SODIUM CHLORIDE 0.9 % (FLUSH) 0.9 %
10 SYRINGE (ML) INJECTION
Status: CANCELLED | OUTPATIENT
Start: 2022-05-09

## 2022-05-04 RX ORDER — EPINEPHRINE 0.3 MG/.3ML
0.3 INJECTION SUBCUTANEOUS ONCE AS NEEDED
Status: CANCELLED | OUTPATIENT
Start: 2022-05-09

## 2022-05-04 RX ORDER — DIPHENHYDRAMINE HYDROCHLORIDE 50 MG/ML
50 INJECTION INTRAMUSCULAR; INTRAVENOUS ONCE AS NEEDED
Status: CANCELLED | OUTPATIENT
Start: 2022-05-09

## 2022-05-04 RX ORDER — METHYLPREDNISOLONE SOD SUCC 125 MG
125 VIAL (EA) INJECTION ONCE AS NEEDED
Status: CANCELLED | OUTPATIENT
Start: 2022-05-09

## 2022-05-04 RX ORDER — HEPARIN 100 UNIT/ML
500 SYRINGE INTRAVENOUS
Status: CANCELLED | OUTPATIENT
Start: 2022-05-09

## 2022-05-09 ENCOUNTER — INFUSION (OUTPATIENT)
Dept: INFUSION THERAPY | Facility: HOSPITAL | Age: 57
End: 2022-05-09
Attending: INTERNAL MEDICINE
Payer: MEDICAID

## 2022-05-09 VITALS — BODY MASS INDEX: 34.95 KG/M2 | HEIGHT: 67 IN | HEART RATE: 78 BPM | WEIGHT: 222.69 LBS

## 2022-05-09 DIAGNOSIS — D50.9 IRON DEFICIENCY ANEMIA, UNSPECIFIED IRON DEFICIENCY ANEMIA TYPE: Primary | ICD-10-CM

## 2022-05-09 PROCEDURE — 63600175 PHARM REV CODE 636 W HCPCS: Mod: JG | Performed by: PHARMACIST

## 2022-05-09 PROCEDURE — 96367 TX/PROPH/DG ADDL SEQ IV INF: CPT

## 2022-05-09 PROCEDURE — 96365 THER/PROPH/DIAG IV INF INIT: CPT

## 2022-05-09 PROCEDURE — A4216 STERILE WATER/SALINE, 10 ML: HCPCS | Performed by: PHARMACIST

## 2022-05-09 PROCEDURE — 25000003 PHARM REV CODE 250: Performed by: PHARMACIST

## 2022-05-09 RX ORDER — EPINEPHRINE 0.3 MG/.3ML
0.3 INJECTION SUBCUTANEOUS ONCE AS NEEDED
Status: CANCELLED | OUTPATIENT
Start: 2022-05-09

## 2022-05-09 RX ORDER — HEPARIN 100 UNIT/ML
500 SYRINGE INTRAVENOUS
Status: CANCELLED | OUTPATIENT
Start: 2022-05-09

## 2022-05-09 RX ORDER — SODIUM CHLORIDE 0.9 % (FLUSH) 0.9 %
10 SYRINGE (ML) INJECTION
Status: CANCELLED | OUTPATIENT
Start: 2022-05-09

## 2022-05-09 RX ORDER — SODIUM CHLORIDE 0.9 % (FLUSH) 0.9 %
10 SYRINGE (ML) INJECTION
Status: DISCONTINUED | OUTPATIENT
Start: 2022-05-09 | End: 2022-05-09 | Stop reason: HOSPADM

## 2022-05-09 RX ORDER — DIPHENHYDRAMINE HYDROCHLORIDE 50 MG/ML
50 INJECTION INTRAMUSCULAR; INTRAVENOUS ONCE AS NEEDED
Status: CANCELLED | OUTPATIENT
Start: 2022-05-09

## 2022-05-09 RX ORDER — METHYLPREDNISOLONE SOD SUCC 125 MG
125 VIAL (EA) INJECTION ONCE AS NEEDED
Status: CANCELLED | OUTPATIENT
Start: 2022-05-09

## 2022-05-09 RX ADMIN — FERUMOXYTOL 510 MG: 510 INJECTION INTRAVENOUS at 12:05

## 2022-05-16 ENCOUNTER — INFUSION (OUTPATIENT)
Dept: INFUSION THERAPY | Facility: HOSPITAL | Age: 57
End: 2022-05-16
Attending: INTERNAL MEDICINE
Payer: MEDICAID

## 2022-05-16 VITALS
OXYGEN SATURATION: 99 % | BODY MASS INDEX: 34.53 KG/M2 | HEIGHT: 67 IN | RESPIRATION RATE: 24 BRPM | WEIGHT: 220 LBS | HEART RATE: 88 BPM | TEMPERATURE: 98 F | SYSTOLIC BLOOD PRESSURE: 112 MMHG | DIASTOLIC BLOOD PRESSURE: 61 MMHG

## 2022-05-16 DIAGNOSIS — D50.9 IRON DEFICIENCY ANEMIA, UNSPECIFIED IRON DEFICIENCY ANEMIA TYPE: Primary | ICD-10-CM

## 2022-05-16 PROCEDURE — 25000003 PHARM REV CODE 250: Performed by: PHARMACIST

## 2022-05-16 PROCEDURE — 63600175 PHARM REV CODE 636 W HCPCS: Mod: JG | Performed by: PHARMACIST

## 2022-05-16 PROCEDURE — 96365 THER/PROPH/DIAG IV INF INIT: CPT

## 2022-05-16 RX ORDER — EPINEPHRINE 0.3 MG/.3ML
0.3 INJECTION SUBCUTANEOUS ONCE AS NEEDED
Status: CANCELLED | OUTPATIENT
Start: 2022-05-16

## 2022-05-16 RX ORDER — HEPARIN 100 UNIT/ML
500 SYRINGE INTRAVENOUS
Status: CANCELLED | OUTPATIENT
Start: 2022-05-16

## 2022-05-16 RX ORDER — DIPHENHYDRAMINE HYDROCHLORIDE 50 MG/ML
50 INJECTION INTRAMUSCULAR; INTRAVENOUS ONCE AS NEEDED
Status: CANCELLED | OUTPATIENT
Start: 2022-05-16

## 2022-05-16 RX ORDER — SODIUM CHLORIDE 0.9 % (FLUSH) 0.9 %
10 SYRINGE (ML) INJECTION
Status: DISCONTINUED | OUTPATIENT
Start: 2022-05-16 | End: 2022-05-16 | Stop reason: HOSPADM

## 2022-05-16 RX ORDER — SODIUM CHLORIDE 0.9 % (FLUSH) 0.9 %
10 SYRINGE (ML) INJECTION
Status: CANCELLED | OUTPATIENT
Start: 2022-05-16

## 2022-05-16 RX ORDER — METHYLPREDNISOLONE SOD SUCC 125 MG
125 VIAL (EA) INJECTION ONCE AS NEEDED
Status: CANCELLED | OUTPATIENT
Start: 2022-05-16

## 2022-05-16 RX ADMIN — FERUMOXYTOL 510 MG: 510 INJECTION INTRAVENOUS at 11:05

## 2022-05-16 RX ADMIN — SODIUM CHLORIDE: 9 INJECTION, SOLUTION INTRAVENOUS at 12:05

## 2022-05-16 NOTE — NURSING
Pt here for feraheme #2.  Reports feeling very weak.  Instructed pt that if she did not start feeling better or in symptom worsened, to go to ER.

## 2022-05-16 NOTE — PLAN OF CARE
Careplan initiated, pt has received numerous doses of feraheme but states is very weak and a little SOB

## 2022-05-17 ENCOUNTER — OFFICE VISIT (OUTPATIENT)
Dept: CARDIOLOGY | Facility: CLINIC | Age: 57
End: 2022-05-17
Payer: MEDICAID

## 2022-05-17 VITALS
HEART RATE: 109 BPM | HEIGHT: 67 IN | BODY MASS INDEX: 34.16 KG/M2 | SYSTOLIC BLOOD PRESSURE: 107 MMHG | WEIGHT: 217.63 LBS | TEMPERATURE: 98 F | DIASTOLIC BLOOD PRESSURE: 73 MMHG | RESPIRATION RATE: 20 BRPM | OXYGEN SATURATION: 99 %

## 2022-05-17 DIAGNOSIS — Z72.0 TOBACCO USE: ICD-10-CM

## 2022-05-17 DIAGNOSIS — E78.5 HYPERLIPIDEMIA LDL GOAL <70: ICD-10-CM

## 2022-05-17 DIAGNOSIS — E11.9 DIABETES MELLITUS WITHOUT COMPLICATION: ICD-10-CM

## 2022-05-17 DIAGNOSIS — I10 PRIMARY HYPERTENSION: ICD-10-CM

## 2022-05-17 DIAGNOSIS — I25.10 MILD CAD: ICD-10-CM

## 2022-05-17 DIAGNOSIS — E66.9 OBESITY (BMI 30-39.9): ICD-10-CM

## 2022-05-17 DIAGNOSIS — R06.09 DOE (DYSPNEA ON EXERTION): ICD-10-CM

## 2022-05-17 PROCEDURE — 99214 OFFICE O/P EST MOD 30 MIN: CPT | Mod: S$PBB,,, | Performed by: NURSE PRACTITIONER

## 2022-05-17 PROCEDURE — 1160F PR REVIEW ALL MEDS BY PRESCRIBER/CLIN PHARMACIST DOCUMENTED: ICD-10-PCS | Mod: CPTII,,, | Performed by: NURSE PRACTITIONER

## 2022-05-17 PROCEDURE — 3051F PR MOST RECENT HEMOGLOBIN A1C LEVEL 7.0 - < 8.0%: ICD-10-PCS | Mod: CPTII,,, | Performed by: NURSE PRACTITIONER

## 2022-05-17 PROCEDURE — 3051F HG A1C>EQUAL 7.0%<8.0%: CPT | Mod: CPTII,,, | Performed by: NURSE PRACTITIONER

## 2022-05-17 PROCEDURE — 3008F PR BODY MASS INDEX (BMI) DOCUMENTED: ICD-10-PCS | Mod: CPTII,,, | Performed by: NURSE PRACTITIONER

## 2022-05-17 PROCEDURE — 3008F BODY MASS INDEX DOCD: CPT | Mod: CPTII,,, | Performed by: NURSE PRACTITIONER

## 2022-05-17 PROCEDURE — 99214 PR OFFICE/OUTPT VISIT, EST, LEVL IV, 30-39 MIN: ICD-10-PCS | Mod: S$PBB,,, | Performed by: NURSE PRACTITIONER

## 2022-05-17 PROCEDURE — 1159F PR MEDICATION LIST DOCUMENTED IN MEDICAL RECORD: ICD-10-PCS | Mod: CPTII,,, | Performed by: NURSE PRACTITIONER

## 2022-05-17 PROCEDURE — 1160F RVW MEDS BY RX/DR IN RCRD: CPT | Mod: CPTII,,, | Performed by: NURSE PRACTITIONER

## 2022-05-17 PROCEDURE — 99214 OFFICE O/P EST MOD 30 MIN: CPT | Mod: PBBFAC | Performed by: NURSE PRACTITIONER

## 2022-05-17 PROCEDURE — 3074F SYST BP LT 130 MM HG: CPT | Mod: CPTII,,, | Performed by: NURSE PRACTITIONER

## 2022-05-17 PROCEDURE — 3078F PR MOST RECENT DIASTOLIC BLOOD PRESSURE < 80 MM HG: ICD-10-PCS | Mod: CPTII,,, | Performed by: NURSE PRACTITIONER

## 2022-05-17 PROCEDURE — 3078F DIAST BP <80 MM HG: CPT | Mod: CPTII,,, | Performed by: NURSE PRACTITIONER

## 2022-05-17 PROCEDURE — 1159F MED LIST DOCD IN RCRD: CPT | Mod: CPTII,,, | Performed by: NURSE PRACTITIONER

## 2022-05-17 PROCEDURE — 3074F PR MOST RECENT SYSTOLIC BLOOD PRESSURE < 130 MM HG: ICD-10-PCS | Mod: CPTII,,, | Performed by: NURSE PRACTITIONER

## 2022-05-17 PROCEDURE — 4010F PR ACE/ARB THEARPY RXD/TAKEN: ICD-10-PCS | Mod: CPTII,,, | Performed by: NURSE PRACTITIONER

## 2022-05-17 PROCEDURE — 4010F ACE/ARB THERAPY RXD/TAKEN: CPT | Mod: CPTII,,, | Performed by: NURSE PRACTITIONER

## 2022-05-17 RX ORDER — METOPROLOL TARTRATE 25 MG/1
25 TABLET, FILM COATED ORAL
COMMUNITY
Start: 2021-10-26 | End: 2022-08-19 | Stop reason: SDUPTHER

## 2022-05-17 RX ORDER — ASPIRIN 81 MG/1
TABLET ORAL
COMMUNITY
End: 2022-08-19 | Stop reason: SDUPTHER

## 2022-05-17 RX ORDER — CALCIUM CITRATE/VITAMIN D3 200MG-6.25
TABLET ORAL
COMMUNITY
Start: 2022-05-13 | End: 2023-12-06 | Stop reason: SDUPTHER

## 2022-05-17 RX ORDER — GLIPIZIDE 5 MG/1
5 TABLET ORAL
COMMUNITY
Start: 2022-04-25 | End: 2023-03-23

## 2022-05-17 RX ORDER — INSULIN GLARGINE 100 [IU]/ML
INJECTION, SOLUTION SUBCUTANEOUS
COMMUNITY
Start: 2022-05-12 | End: 2022-11-29 | Stop reason: SDUPTHER

## 2022-05-17 RX ORDER — ASPIRIN 81 MG/1
81 TABLET ORAL DAILY
COMMUNITY
Start: 2022-03-22

## 2022-05-17 RX ORDER — INSULIN ASPART 100 [IU]/ML
INJECTION, SUSPENSION SUBCUTANEOUS
COMMUNITY
End: 2022-11-29

## 2022-05-17 RX ORDER — FERROUS SULFATE TAB 325 MG (65 MG ELEMENTAL FE) 325 (65 FE) MG
325 TAB ORAL DAILY
COMMUNITY
Start: 2021-12-10 | End: 2022-11-29

## 2022-05-17 RX ORDER — QUETIAPINE FUMARATE 50 MG/1
50 TABLET, FILM COATED ORAL NIGHTLY
COMMUNITY
Start: 2022-01-21 | End: 2022-11-29

## 2022-05-17 RX ORDER — PRAVASTATIN SODIUM 40 MG/1
40 TABLET ORAL DAILY
COMMUNITY
Start: 2022-02-04 | End: 2022-06-02

## 2022-05-17 RX ORDER — CALCIUM CARBONATE/VITAMIN D3 400-133.3
TABLET ORAL
COMMUNITY
Start: 2022-02-04 | End: 2022-11-10

## 2022-05-17 RX ORDER — LISINOPRIL 20 MG/1
TABLET ORAL
COMMUNITY
Start: 2021-10-06 | End: 2022-08-19 | Stop reason: SDUPTHER

## 2022-05-17 RX ORDER — METFORMIN HYDROCHLORIDE 1000 MG/1
TABLET ORAL
COMMUNITY
Start: 2021-10-26 | End: 2022-07-12

## 2022-05-17 RX ORDER — ATORVASTATIN CALCIUM 40 MG/1
40 TABLET, FILM COATED ORAL NIGHTLY
COMMUNITY
Start: 2022-04-25 | End: 2022-06-02 | Stop reason: SDUPTHER

## 2022-05-17 RX ORDER — FLUOXETINE HYDROCHLORIDE 20 MG/1
CAPSULE ORAL
COMMUNITY
End: 2022-11-29

## 2022-05-17 RX ORDER — PANTOPRAZOLE SODIUM 40 MG/1
40 TABLET, DELAYED RELEASE ORAL DAILY
COMMUNITY
Start: 2021-12-01 | End: 2023-07-11 | Stop reason: SDUPTHER

## 2022-05-17 NOTE — PROGRESS NOTES
CHIEF COMPLAINT:   Chief Complaint   Patient presents with    Right side neck pain x 1 month, c/o weakness, states low ir     CPAP notes, for a new machine    CPAP notes, for a new machine                   Review of patient's allergies indicates:  No Known Allergies                                       HPI:  Jessica Walker 56 y.o. female with a past medical history of Mild CAD, JAK2 Gene mutation, Thrombocytosis, JIN on CPAP, tobacco abuse, DMII, HTN, HLD, and GERD presents for 6 month follow up and results of PFTs. Patient completed PFT testing in February of 2022 which revealed normal PFT except for mild reduction in diffusion capacity.  She completed an Echocardiogram on June 4, 2021 which revealed an ejection fraction of approximately 50 to 55%, mild MR, and mild TR. She completed a Dobutamine Stress Echocardiogram in July 2020 which was negative for ischemia.     Patient denies chest pain, but continues to endorse shortness of breath with minimal exertion.  She states her activity is significantly limited due to lower extremity weakness and shortness of breath.  She also reports occasional palpitations, but states she has not taken her metoprolol in 2 days.  She denies syncope.  She does have other noncardiac complaints such as right neck pain.  She reports nightly compliance with her CPAP and feels she is benefitting from use, however patient feels she may need a new CPAP machine due to a potential leak in the machine.    She continues to smoke approximately a pack of cigarettes per day, but states she will try to quit on her own.                                                                                                                                                                                                                                            Patient Active Problem List   Diagnosis    Iron deficiency anemia     No past surgical history on file.  Social History     Socioeconomic  History    Marital status: Single   Tobacco Use    Smoking status: Current Every Day Smoker     Packs/day: 1.00     Types: Cigarettes        No family history on file.      Current Outpatient Medications:     aspirin (ECOTRIN) 81 MG EC tablet, 1 tablet, Disp: , Rfl:     atorvastatin (LIPITOR) 40 MG tablet, Take 40 mg by mouth every evening., Disp: , Rfl:     FEROSUL 325 mg (65 mg iron) Tab tablet, , Disp: , Rfl:     FLUoxetine 20 MG capsule, 1 capsule, Disp: , Rfl:     GENTLE LAXATIVE, BISACODYL, 5 mg EC tablet, TAKE 2 TABLET BY MOUTH AS A ONE TIME DOSE AS DIRECTED PER COLONOSCOPY PREP INSTRUCTIONS, Disp: , Rfl:     glipiZIDE (GLUCOTROL) 5 MG tablet, , Disp: , Rfl:     insulin asp prt-insulin aspart, NovoLOG 70/30, (NOVOLOG 70/30) 100 unit/mL (70-30) Soln, as directed, Disp: , Rfl:     LANTUS SOLOSTAR U-100 INSULIN glargine 100 units/mL (3mL) SubQ pen, , Disp: , Rfl:     linaCLOtide (LINZESS) 72 mcg Cap capsule, 1 capsule at least 30 minutes before the first meal of the day on an empty stomach, Disp: , Rfl:     lisinopriL (PRINIVIL,ZESTRIL) 20 MG tablet, 1 tablet, Disp: , Rfl:     metFORMIN (GLUCOPHAGE) 1000 MG tablet, 1 Tablet, Disp: , Rfl:     metoprolol tartrate (LOPRESSOR) 25 MG tablet, Take 25 mg by mouth., Disp: , Rfl:     pantoprazole (PROTONIX) 40 MG tablet, 1 tablet, Disp: , Rfl:     pravastatin (PRAVACHOL) 40 MG tablet, Take 40 mg by mouth once daily., Disp: , Rfl:     QUEtiapine (SEROQUEL) 50 MG tablet, Take 50 mg by mouth nightly., Disp: , Rfl:     TRUE METRIX GLUCOSE TEST STRIP Strp, USE 1 STRIP TO CHECK GLUCOSE ONCE DAILY, Disp: , Rfl:     aspirin (ECOTRIN) 81 MG EC tablet, Take 81 mg by mouth once daily., Disp: , Rfl:      ROS:                                                                                                                                                                             Review of Systems   Constitutional: Negative.         Weakness   Respiratory: Positive  "for shortness of breath.    Cardiovascular: Negative.    Gastrointestinal: Negative.    Musculoskeletal: Positive for neck pain.   Skin: Negative.    Neurological: Negative.    Psychiatric/Behavioral: Negative.         Blood pressure 107/73, pulse 109, temperature 98.2 °F (36.8 °C), resp. rate 20, height 5' 7" (1.702 m), weight 98.7 kg (217 lb 9.5 oz), SpO2 99 %.   PE:  Physical Exam  Constitutional:       Appearance: Normal appearance.   HENT:      Head: Normocephalic and atraumatic.   Eyes:      Extraocular Movements: Extraocular movements intact.      Pupils: Pupils are equal, round, and reactive to light.   Cardiovascular:      Rate and Rhythm: Regular rhythm. Tachycardia present.   Pulmonary:      Effort: Pulmonary effort is normal.      Breath sounds: Normal breath sounds.   Abdominal:      Palpations: Abdomen is soft.   Musculoskeletal:         General: Normal range of motion.      Cervical back: Normal range of motion. No tenderness.   Skin:     General: Skin is warm and dry.   Neurological:      General: No focal deficit present.      Mental Status: She is alert and oriented to person, place, and time.   Psychiatric:         Mood and Affect: Mood normal.         Behavior: Behavior normal.       ASSESSMENT/PLAN:  Mild CAD per [facility] Sept. 2016  Treadmill Stress Test Sept. 2019 - [facility] treadmill score indicated moderate risk.  Left heart cath September 2016 with predominantly normal epicardial coronary arteries, normal LVEF 65%  Patient denies chest pain  Dobutamine Stress Echocardiogram July 2020 - negative for ischemia   EF 50-55% per Echo 6.4.21    POSEY  EF 50-55% per Echo 6.4.21  PFTs Feb. 2022 - normal PFTs except for mild reduction in diffusion capacity  Will order Dobutamine Stress Echocardiogram due to continued c/o SOB which may be patient's angina equivalent - patient unable to walk on a treadmill due to BLE weakness and SOB with minimal exertion    Palpitations  48 hour Holter monitor Aug. 2019 - see " results under HPI  Counseled on the importance of medication compliance - Continue Metoprolol Tartrate 25mg BID    HTN  BP at goal - 107/73  Continue Metoprolol Tartrate 25mg BID and Lisinopril 20mg daily  Counseled on low sodium diet    HLD (hyperlipidemia)  LDL not at goal - 80 per labs Sept. 2021  Continue Atorvastatin 40mg daily at bedtime  Encouraged diet and exercise    DM (diabetes mellitus)  Continue management per PCP  Last A1C improved, but not at goal- 7.7    Tobacco abuse  Counseled importance of smoking cessation  Continues to smoke about a pack of cigarettes per day - states she will try to quit     JIN on CPAP  Patient reports compliance with CPAP use and feels she is benefiting from CPAP use.   Patient feels she may need a new CPAP machine due to a potential leak in the machine.    Recommend continued nightly CPAP use       Dobutamine Stress Test  Follow up in Cardiology Clinic in 3 months  Follow up with PCP as directed

## 2022-05-17 NOTE — PATIENT INSTRUCTIONS
Dobutamine Stress Test  Follow up in Cardiology Clinic in 3 months  Follow up with PCP as directed

## 2022-05-21 NOTE — HISTORICAL [facility]ER
This is a historical note converted from Felix. Formatting and pictures may have been removed.  Please reference Felix for original formatting and attached multimedia. Chief Complaint  Iron Deficiency Anemia  History of Present Illness  ?  Treatment History:  Ferrous sulfate 325mg po daily started 2020  Feraheme x 2 doses as needed  Aspirin 81 mg daily  ?   Past medical history: NIDDM.? Hypertension.? Dyslipidemia.? History of anemia.? Vitamin D deficiency.? Tobacco abuse.? Obesity.? Obstructive sleep apnea.? HFpEF. Leukocytosis.? Cholecystectomy.? Goiter surgery.  Social history:?Single. ?Lives in [place], [place].? Has 2 grown up?sons. ?Has been smoking 1-1/2 packs of cigarettes daily since age 10. ?Has tried to quit smoking many times?but?does not get refills of Chantix, therefore, has been unable to. ?Currently, down to 10 cigarettes daily. ?Denies history of alcohol or illicit drug abuse.  Family history:?Sister  from cervical cancer at age 55.  Health maintenance:  EGD 2018 (Dr. Koo in [place]:?Esophageal dilation).?  Colonoscopy 2018?(Dr. Koo in [place]), apparently to be repeated in 2 years.?  Mammogram 2018: Negative for malignancy.  ThinPrep cervical Pap smear 2016: Negative for intraepithelial lesion or malignancy; negative for HPV high-risk types (type 16 and type 18/45)  ?   Menstrual and OB/GYN history:?Menopause in .? For last 3 months or so,?has experienced vaginal spotting.? Apparently, had Pap smear done at [facility]?2 months back?(probably 2018),?which was apparently unremarkable. Pelvic ultrasound on 2019 showed normal endometrium; likely myomatous changes of uterus with a lower uterine segment lipoma leiomyoma.?  ?  56 year-old lady referred from [facility] for evaluation of leukocytosis. For details, refer to note dated 2020. Please also refer to assessment and plan  section.  ?   Interval history: Seen in follow up today, 4/29/22; unaccompanied. Noted fatigue; denies other significant problems or concerns. Reportedly stopped iron pills. Was due for clinic follow up here apparently Nov 2021 however missed her appointment.  Review of Systems  Negative except as otherwise stated in HPI  Physical Exam  Vitals & Measurements  T:?36.5? ?C (Oral)? HR:?68(Peripheral)? RR:?18? BP:?118/75? SpO2:?99%?  HT:?170.00?cm? WT:?101.060?kg? BMI:?34.97?  General: NAD; fatigued  Eye: Pupils are equal, round and reactive to light, Extraocular movements intact, normal conjunctiva  HENT: Normocephalic. Oropharynx exam deferred; mask in place due to coronavirus pandemic  Neck: Supple, nontender  Respiratory: Respirations are non-labored, Breath sounds CTA bilaterally, Symmetrical chest wall expansion  Cardiovascular: RRR, normal peripheral perfusion, No edema  Breast: Exam deferred  Gastrointestinal: Non tender, non distended; present bowel sounds  GYN: Exam deferred  Genitourinary: Exam deferred  Lymphatics: No palpable adenopathies appreciated  Musculoskeletal: Moves all extremities  Integumentary: Intact  Neurologic: Alert and oriented. No focal defects  Psychiatric: Cooperative. Appropriate mood and affect?  Assessment/Plan  Iron deficiency anemia?  Recurrent  Presented as?moderate anemia with severe microcytosis?  EGD, colonoscopy?negative for any bleeders (02/2018)  Severe iron deficiency?  Feraheme?(03/2019)  Hemoglobin dropped from 14.8 (06/2019)?to 9.5 (08/28/2019) due to iron deficiency  Feraheme x2?(09/2019) -->?improvement in iron stores and normalization of hemoglobin?(9.5 --> 13.7)  CT?enterogram?(09/11/2019):?Hiatal hernia; 1.7 cm left adrenal nodule  -03/10/2020:?Remains iron deficient?(transferrin saturation 13.2%,?ferritin 19.2)  -Did not present for Feraheme shots in a timely manner  -04/27/2020:?Ferritin 4.9?(down from 19.2 on 03/10/2020); however, hemoglobin 12.4, MCV 83.0  (Iron  deficiency erythropoiesis)  -Feraheme?510 mg IV x2 (04/27/2020; 05/04/2020)  -06/01/2020: Iron deficiency resolved?with parenteral iron?therapy  -However,?as of 06/01/2020,?severe recurrent iron deficiency remains unexplained  ?   Labs today revealing iron deficiency;Pursue IV iron replacement with Feraheme x 2 doses given a week apart; to be scheduled first available  Continue daily baby ASA  RTC for routine labs to include repeat iron studies, visit in 2 months  Advised on following with [facility] for further recommendations  Refill today on Pantoprazole??  RTC for labs, visit in?2 months; see sooner if needed  ?  Thrombocytosis?(low risk?IPSET-thrombosis score ET)?  (age?< 60 years;?JAK2 V617F mutation positive)  GAGANDEEP-2 V617F mutation positive (03/2019)  She has low risk IPSET-thrombosis?score?ET?(age <60 years;?GAGANDEEP-2 V617F mutation positive)  Bone marrow consistent?(06/2019)  Low risk?IPSET-thrombosis score?ET  Cardiovascular risk factors: NIDDM,?hypertension, tobacco abuse  No indication of myelosuppressive therapy  Cont baby aspirin daily; platelets stable - 4/2022  ?  Chronic, very mild, benign-appearing, intermittent leukocytosis  Secondary to smoking?or underlying myeloproliferative disorder  GAGANDEEP-2 V617F mutation positive?(03/2019)  BCR-ABL 1 fusion transcripts negative?(03/2019)  Underlying essential thrombocytosis  stable  ?   Health?maintenance:  - Screening mammogram 07/03/2019 was BI-RADS 2, benign  -12/28/2020: ?bilateral MMG: Right Breast: Negative (BI-RADS 1) Left Breast: Negative (BI-RADS 1)  -1/7/22 Right Breast: Negative (BI-RADS 1); Left Breast: Negative (BI-RADS 1)  ?  ?  ?  ?   Problem List/Past Medical History  Ongoing  (HFpEF) heart failure with preserved ejection fraction  Acid reflux  CAD (coronary artery disease)  COVID-19 immunization  DM (diabetes mellitus)  HLD (hyperlipidemia)  HTN - Hypertension  Iron deficiency anemia  JAK2 gene mutation  Melena  JIN (obstructive sleep apnea)  Polyp  colon  Tobacco user  Historical  Pregnant  Pregnant  Procedure/Surgical History  Colonoscopy, flexible; diagnostic, including collection of specimen(s) by brushing or washing, when performed (separate procedure) (10/27/2021)  Esophagogastroduodenoscopy, flexible, transoral; with ablation of tumor(s), polyp(s), or other lesion(s) (includes pre- and post-dilation and guide wire passage, when performed) (10/27/2021)  Esophagogastroduodenoscopy, flexible, transoral; with biopsy, single or multiple (10/27/2021)  colonoscopy (10/26/2021)  EGD (10/26/2021)  Bone Marrow Biopsy and Aspiration (06/18/2019)  Diagnostic bone marrow; biopsy(ies) and aspiration(s) (06/18/2019)  Drainage of Bone Marrow, Percutaneous Approach, Diagnostic (06/18/2019)  Extraction of Iliac Bone Marrow, Percutaneous Approach, Diagnostic (06/18/2019)  EGD (10.2018)  Colonoscopy (2018)  [facility] (09/[facility]/2016)  Cholecystectomy  GOITER REMOVED   Medications  Aspirin Low Dose 81 mg oral delayed release tablet, 81 mg= 1 tab(s), Oral, Daily, 1 refills  atorvastatin 40 mg oral tablet, 40 mg= 1 tab(s), Oral, At Bedtime, 1 refills  glipiZIDE 5 mg oral tablet, 5 mg= 1 tab(s), Oral, Daily, 6 refills   mg oral tablet, 800 mg= 1 tab(s), Oral, q8hr, PRN, 1 refills  Lantus Solostar Pen 100 units/mL subcutaneous solution, 75 units, Subcutaneous, Daily, 6 refills,? ?Still taking, not as prescribed: taking 70 units  Linzess 72 mcg oral capsule, 72 mcg= 1 cap(s), Oral, qAM, PRN  lisinopril 20 mg oral tablet, 20 mg= 1 tab(s), Oral, Daily, 3 refills  metFORMIN 1000 mg oral tablet, 1000 mg= 1 tab(s), Oral, BID, 6 refills  metoprolol tartrate 25 mg oral tab, 25 mg= 1 tab(s), Oral, BID, 1 refills  nitroglycerin 0.4 mg sublingual TAB, 0.4 mg= 1 tab(s), SL, q5min, PRN, 6 refills  Pantoprazole 40 mg ORAL EC-Tablet, 40 mg= 1 tab(s), Oral, Daily, 1 refills  syringes/needles, See Instructions, 3 refills  true metrix glucometer, lancets, strips, See Instructions, 11 refills  True  Metrix glucose test strips, See Instructions, 3 refills  Allergies  No Known Allergies  Social History  Abuse/Neglect  No, 04/29/2022  Alcohol  Past, 09/28/2021  Employment/School  Work/School description: homemaker. Highest education level: Some college. Operates hazardous equipment: No., 01/06/2016  Exercise  Exercise duration: 0., 03/01/2015  Home/Environment  Lives with Alone. Living situation: Home/Independent. Home equipment: Glucose monitoring, BLOOD PRESSURE MONITOR. Alcohol abuse in household: No. Substance abuse in household: No. Smoker in household: Yes. Injuries/Abuse/Neglect in household: No. Feels unsafe at home: No. Safe place to go: Yes. Family/Friends available for support: Yes. Concern for family members at home: No. Major illness in household: No. Financial concerns: No. TV/Computer concerns: No., 07/13/2017  Nutrition/Health  Regular, Good, 11/02/2021  Sexual  Sexually active: No. Sexually active at age 13 Years. Number of lifetime partners 10. Sexual orientation: Heterosexual. History of sexual abuse: No., 03/01/2015  Substance Use - No Risk, 03/01/2015  Never, 09/28/2021  Tobacco  10 or more cigarettes (1/2 pack or more)/day in last 30 days, No, 04/29/2022  Family History  Acute myocardial infarction.: Negative: Mother, Father, Sister and Brother.  Alcoholism.: Mother.  Cataract.: Mother.  Cervical cancer: Sister.  Depression.: Sister.  Diabetes mellitus type 2: Mother and Father.  Drug addiction.: Sister.  Heart disease: Mother.  Hyperlipidemia.: Mother.  Hypertension.: Mother and Father.  Primary malignant neoplasm of female genital organ: Sister.  Suicidal thoughts: Sister.  Tobacco user: Sister.  Immunizations  Vaccine Date Status Comments   influenza virus vaccine, inactivated 12/01/2021 Given    influenza virus vaccine, inactivated 11/05/2018 Recorded SWLA   influenza virus vaccine, inactivated 10/24/2016 Given    influenza virus vaccine, inactivated 10/29/2014 Recorded    tetanus toxoid  03/19/2008 Recorded    pneumococcal vacc 02/28/2008 Recorded    Health Maintenance  Health Maintenance  ???Pending?(in the next year)  ??? ??OverDue  ??? ? ? ?Tetanus Vaccine due??03/19/18??and every 10??year(s)  ??? ? ? ?Diabetes Maintenance-Eye Exam due??09/13/19??and every 2??year(s)  ??? ? ? ?Smoking Cessation due??01/01/22??and every 1??year(s)  ??? ? ? ?Alcohol Misuse Screening due??01/02/22??and every 1??year(s)  ??? ? ? ?Functional Assessment due??01/02/22??and every 1??year(s)  ??? ??Due?  ??? ? ? ?Diabetes Maintenance-Foot Exam due??04/29/22??Unknown Frequency  ??? ? ? ?HF-Ejection Fraction Past 13 Months if Hospitalized due??04/29/22??and every 1??year(s)  ??? ? ? ?HF-Fluid Restriction/Low Sodium Diet Education due??04/29/22??and every 1??year(s)  ??? ? ? ?Hypertension Management-Education due??04/29/22??and every 1??year(s)  ??? ? ? ?Lung Cancer Screening due??04/29/22??and every 1??year(s)  ??? ? ? ?Zoster Vaccine due??04/29/22??Unknown Frequency  ??? ??Due In Future?  ??? ? ? ?HF-LVEF not due until??06/04/22??and every 1??year(s)  ??? ? ? ?HF-ACEI/ARB Prescribed if Clinically Indicated not due until??10/06/22??and every 1??year(s)  ??? ? ? ?ADL Screening not due until??11/02/22??and every 1??year(s)  ??? ? ? ?Obesity Screening not due until??01/01/23??and every 1??year(s)  ??? ? ? ?Aspirin Therapy for CVD Prevention not due until??01/10/23??and every 1??year(s)  ??? ? ? ?HF-Heart Failure Education not due until??01/11/23??and every 366??day(s)  ??? ? ? ?Diabetes Maintenance-HgbA1c not due until??03/02/23??and every 1??year(s)  ???Satisfied?(in the past 1 year)  ??? ??Satisfied?  ??? ? ? ?ADL Screening on??11/02/21.??Satisfied by Mackenzie Junior LPN  ??? ? ? ?Alcohol Misuse Screening on??11/02/21.??Satisfied by Mackenzie Junior LPN  ??? ? ? ?Aspirin Therapy for CVD Prevention on??01/10/22.??Satisfied by Shahida Becker  ??? ? ? ?Blood Pressure Screening on??04/29/22.??Satisfied by Janak  Anne MENON  ??? ? ? ?Body Mass Index Check on??04/29/22.??Satisfied by LYNSEY Briceno Ericka  ??? ? ? ?Breast Cancer Screening on??01/07/22.??Satisfied by Karime Mata  ??? ? ? ?Cervical Cancer Screening on??11/02/21.??Satisfied by Moriah Roman  ??? ? ? ?Coronary Artery Disease Maintenance-Antiplatelet Agent Prescribed on??01/10/22.??Satisfied by Shahida Becker  ??? ? ? ?Coronary Artery Disease Maintenance-Lipid Lowering Therapy on??01/10/22.??Satisfied by Shahida Becker  ??? ? ? ?Depression Screening on??04/29/22.??Satisfied by LYNSEY Briceno Ericka  ??? ? ? ?Diabetes Maintenance-HgbA1c on??03/02/22.??Satisfied by Micah Lopez  ??? ? ? ?Diabetes Maintenance-Fasting Lipid Profile on??09/28/21.??Satisfied by Micah Lopez  ??? ? ? ?Diabetes Screening on??04/29/22.??Satisfied by Sera Ho MT  ??? ? ? ?Functional Assessment on??10/27/21.??Satisfied by Jocelyn Mitchell RN  ??? ? ? ?HF-ACEI/ARB Prescribed if Clinically Indicated on??10/06/21.??Satisfied by January Darnell NP  ??? ? ? ?HF-Beta Blocker Prescribed if Clinically Indicated on??01/10/22.??Satisfied by Shahida Becker  ??? ? ? ?Hypertension Management-Blood Pressure on??04/29/22.??Satisfied by LYSNEY Briceno Ericka  ??? ? ? ?Hypertension Management-BMP on??04/29/22.??Satisfied by Sera Ho MT  ??? ? ? ?Influenza Vaccine on??12/01/21.??Satisfied by LYNSEY Tong Shawndala  ??? ? ? ?Lipid Screening on??09/28/21.??Satisfied by Micah Lopez  ??? ? ? ?Obesity Screening on??04/29/22.??Satisfied by LYNSEY Briceno Ericka  ?  Lab Results  Test Name Test Result Date/Time   Potassium Lvl 4.5 mmol/L 04/29/2022 12:19 CDT   Chloride 102 mmol/L 04/29/2022 12:19 CDT   CO2 24 mmol/L 04/29/2022 12:19 CDT   Calcium Lvl 9.9 mg/dL 04/29/2022 12:19 CDT   Glucose Lvl 263 mg/dL (High) 04/29/2022 12:19 CDT   BUN 9.9 mg/dL 04/29/2022 12:19 CDT   Creatinine 0.77 mg/dL 04/29/2022 12:19 CDT   eGFR- 04/29/2022 12:19  CDT   eGFR-LEEANNE 82 mL/min/1.73 m2 (Low) 04/29/2022 12:19 CDT   Bili Total 0.3 mg/dL 04/29/2022 12:19 CDT   AST 19 unit/L 04/29/2022 12:19 CDT   ALT 25 unit/L 04/29/2022 12:19 CDT   Alk Phos 111 unit/L 04/29/2022 12:19 CDT   Albumin Lvl 3.7 gm/dL 04/29/2022 12:19 CDT   TIBC 340 ug/dL 04/29/2022 12:19 CDT   Ferritin Lvl 23.28 ng/mL 04/29/2022 12:19 CDT   WBC 13.1 x10(3)/mcL (High) 04/29/2022 12:19 CDT   Hgb 14.7 gm/dL 04/29/2022 12:19 CDT   Hct 45.7 % 04/29/2022 12:19 CDT   Platelet 535 x10(3)/mcL (High) 04/29/2022 12:19 CDT   MCV 85.6 fL 04/29/2022 12:19 CDT   Neutro Auto 71 % 04/29/2022 12:19 CDT   Mono Auto 8 % 04/29/2022 12:19 CDT

## 2022-06-02 DIAGNOSIS — E78.5 HYPERLIPIDEMIA, UNSPECIFIED HYPERLIPIDEMIA TYPE: Primary | ICD-10-CM

## 2022-06-02 RX ORDER — ATORVASTATIN CALCIUM 40 MG/1
40 TABLET, FILM COATED ORAL NIGHTLY
Qty: 90 TABLET | Refills: 3 | Status: SHIPPED | OUTPATIENT
Start: 2022-06-02 | End: 2022-08-02 | Stop reason: SDUPTHER

## 2022-06-21 ENCOUNTER — HOSPITAL ENCOUNTER (OUTPATIENT)
Dept: CARDIOLOGY | Facility: HOSPITAL | Age: 57
Discharge: HOME | End: 2022-06-21
Attending: NURSE PRACTITIONER
Payer: MEDICAID

## 2022-06-21 VITALS — RESPIRATION RATE: 20 BRPM | SYSTOLIC BLOOD PRESSURE: 137 MMHG | DIASTOLIC BLOOD PRESSURE: 103 MMHG | HEART RATE: 72 BPM

## 2022-06-21 DIAGNOSIS — I25.10 MILD CAD: ICD-10-CM

## 2022-06-21 DIAGNOSIS — R06.09 DOE (DYSPNEA ON EXERTION): ICD-10-CM

## 2022-06-21 PROCEDURE — 63600175 PHARM REV CODE 636 W HCPCS

## 2022-06-21 PROCEDURE — 25000003 PHARM REV CODE 250: Performed by: NURSE PRACTITIONER

## 2022-06-21 PROCEDURE — 63600175 PHARM REV CODE 636 W HCPCS: Performed by: NURSE PRACTITIONER

## 2022-06-21 PROCEDURE — 93351 STRESS TTE COMPLETE: CPT

## 2022-06-21 PROCEDURE — 25000003 PHARM REV CODE 250

## 2022-06-21 RX ORDER — DOBUTAMINE HYDROCHLORIDE 400 MG/100ML
20 INJECTION INTRAVENOUS CONTINUOUS
Status: DISCONTINUED | OUTPATIENT
Start: 2022-06-21 | End: 2023-09-18 | Stop reason: ALTCHOICE

## 2022-06-21 RX ORDER — METOPROLOL TARTRATE 1 MG/ML
5 INJECTION, SOLUTION INTRAVENOUS
Status: COMPLETED | OUTPATIENT
Start: 2022-06-21 | End: 2022-06-21

## 2022-06-21 RX ORDER — DOBUTAMINE HYDROCHLORIDE 400 MG/100ML
20 INJECTION INTRAVENOUS CONTINUOUS
Status: CANCELLED | OUTPATIENT
Start: 2022-06-21

## 2022-06-21 RX ORDER — ATROPINE SULFATE 0.4 MG/ML
0.4 INJECTION, SOLUTION ENDOTRACHEAL; INTRAMEDULLARY; INTRAMUSCULAR; INTRAVENOUS; SUBCUTANEOUS
Status: COMPLETED | OUTPATIENT
Start: 2022-06-21 | End: 2022-06-21

## 2022-06-21 RX ADMIN — DOBUTAMINE HYDROCHLORIDE 20 MCG/KG/MIN: 400 INJECTION INTRAVENOUS at 09:06

## 2022-06-21 RX ADMIN — ATROPINE SULFATE 0.4 MG: 0.4 INJECTION, SOLUTION INTRAMUSCULAR; INTRAVENOUS; SUBCUTANEOUS at 08:06

## 2022-06-21 RX ADMIN — METOPROLOL TARTRATE 5 MG: 5 INJECTION, SOLUTION INTRAVENOUS at 08:06

## 2022-06-22 LAB
CV STRESS BASE HR: 77 BPM
DIASTOLIC BLOOD PRESSURE: 103 MMHG
OHS CV CPX 85 PERCENT MAX PREDICTED HEART RATE MALE: 133
OHS CV CPX MAX PREDICTED HEART RATE: 157
OHS CV CPX PATIENT IS FEMALE: 1
OHS CV CPX PATIENT IS MALE: 0
OHS CV CPX PEAK DIASTOLIC BLOOD PRESSURE: 77 MMHG
OHS CV CPX PEAK HEAR RATE: 187 BPM
OHS CV CPX PEAK RATE PRESSURE PRODUCT: NORMAL
OHS CV CPX PEAK SYSTOLIC BLOOD PRESSURE: 156 MMHG
OHS CV CPX PERCENT MAX PREDICTED HEART RATE ACHIEVED: 119
OHS CV CPX RATE PRESSURE PRODUCT PRESENTING: NORMAL
SYSTOLIC BLOOD PRESSURE: 137 MMHG

## 2022-06-29 ENCOUNTER — OFFICE VISIT (OUTPATIENT)
Dept: HEMATOLOGY/ONCOLOGY | Facility: CLINIC | Age: 57
End: 2022-06-29
Payer: MEDICAID

## 2022-06-29 ENCOUNTER — INFUSION (OUTPATIENT)
Dept: INFUSION THERAPY | Facility: HOSPITAL | Age: 57
End: 2022-06-29
Attending: INTERNAL MEDICINE
Payer: MEDICAID

## 2022-06-29 VITALS
DIASTOLIC BLOOD PRESSURE: 71 MMHG | HEART RATE: 102 BPM | BODY MASS INDEX: 34.6 KG/M2 | SYSTOLIC BLOOD PRESSURE: 100 MMHG | HEIGHT: 67 IN | TEMPERATURE: 98 F | WEIGHT: 220.44 LBS | OXYGEN SATURATION: 96 %

## 2022-06-29 DIAGNOSIS — D50.8 OTHER IRON DEFICIENCY ANEMIA: Primary | ICD-10-CM

## 2022-06-29 DIAGNOSIS — E61.1 IRON DEFICIENCY: Primary | ICD-10-CM

## 2022-06-29 DIAGNOSIS — D75.839 THROMBOCYTOSIS: ICD-10-CM

## 2022-06-29 DIAGNOSIS — Z72.0 TOBACCO USE: ICD-10-CM

## 2022-06-29 PROCEDURE — 99214 OFFICE O/P EST MOD 30 MIN: CPT | Mod: S$PBB,,, | Performed by: NURSE PRACTITIONER

## 2022-06-29 PROCEDURE — 96374 THER/PROPH/DIAG INJ IV PUSH: CPT

## 2022-06-29 PROCEDURE — 3008F PR BODY MASS INDEX (BMI) DOCUMENTED: ICD-10-PCS | Mod: CPTII,,, | Performed by: NURSE PRACTITIONER

## 2022-06-29 PROCEDURE — 99214 PR OFFICE/OUTPT VISIT, EST, LEVL IV, 30-39 MIN: ICD-10-PCS | Mod: S$PBB,,, | Performed by: NURSE PRACTITIONER

## 2022-06-29 PROCEDURE — 3074F SYST BP LT 130 MM HG: CPT | Mod: CPTII,,, | Performed by: NURSE PRACTITIONER

## 2022-06-29 PROCEDURE — 3008F BODY MASS INDEX DOCD: CPT | Mod: CPTII,,, | Performed by: NURSE PRACTITIONER

## 2022-06-29 PROCEDURE — 63600175 PHARM REV CODE 636 W HCPCS: Mod: JG | Performed by: NURSE PRACTITIONER

## 2022-06-29 PROCEDURE — 96375 TX/PRO/DX INJ NEW DRUG ADDON: CPT

## 2022-06-29 PROCEDURE — 4010F PR ACE/ARB THEARPY RXD/TAKEN: ICD-10-PCS | Mod: CPTII,,, | Performed by: NURSE PRACTITIONER

## 2022-06-29 PROCEDURE — 4010F ACE/ARB THERAPY RXD/TAKEN: CPT | Mod: CPTII,,, | Performed by: NURSE PRACTITIONER

## 2022-06-29 PROCEDURE — 99214 OFFICE O/P EST MOD 30 MIN: CPT | Mod: PBBFAC,25 | Performed by: NURSE PRACTITIONER

## 2022-06-29 PROCEDURE — 25000003 PHARM REV CODE 250: Performed by: NURSE PRACTITIONER

## 2022-06-29 PROCEDURE — 3078F PR MOST RECENT DIASTOLIC BLOOD PRESSURE < 80 MM HG: ICD-10-PCS | Mod: CPTII,,, | Performed by: NURSE PRACTITIONER

## 2022-06-29 PROCEDURE — 3078F DIAST BP <80 MM HG: CPT | Mod: CPTII,,, | Performed by: NURSE PRACTITIONER

## 2022-06-29 PROCEDURE — 1159F PR MEDICATION LIST DOCUMENTED IN MEDICAL RECORD: ICD-10-PCS | Mod: CPTII,,, | Performed by: NURSE PRACTITIONER

## 2022-06-29 PROCEDURE — 3074F PR MOST RECENT SYSTOLIC BLOOD PRESSURE < 130 MM HG: ICD-10-PCS | Mod: CPTII,,, | Performed by: NURSE PRACTITIONER

## 2022-06-29 PROCEDURE — 1159F MED LIST DOCD IN RCRD: CPT | Mod: CPTII,,, | Performed by: NURSE PRACTITIONER

## 2022-06-29 RX ORDER — DIPHENHYDRAMINE HYDROCHLORIDE 50 MG/ML
50 INJECTION INTRAMUSCULAR; INTRAVENOUS ONCE AS NEEDED
Status: CANCELLED | OUTPATIENT
Start: 2022-06-29

## 2022-06-29 RX ORDER — METHYLPREDNISOLONE SOD SUCC 125 MG
125 VIAL (EA) INJECTION ONCE AS NEEDED
Status: CANCELLED | OUTPATIENT
Start: 2022-06-29

## 2022-06-29 RX ORDER — HEPARIN 100 UNIT/ML
500 SYRINGE INTRAVENOUS
Status: CANCELLED | OUTPATIENT
Start: 2022-06-29

## 2022-06-29 RX ORDER — EPINEPHRINE 0.3 MG/.3ML
0.3 INJECTION SUBCUTANEOUS ONCE AS NEEDED
Status: CANCELLED | OUTPATIENT
Start: 2022-06-29

## 2022-06-29 RX ORDER — SODIUM CHLORIDE 0.9 % (FLUSH) 0.9 %
10 SYRINGE (ML) INJECTION
Status: CANCELLED | OUTPATIENT
Start: 2022-07-06

## 2022-06-29 RX ORDER — SODIUM CHLORIDE 0.9 % (FLUSH) 0.9 %
10 SYRINGE (ML) INJECTION
Status: CANCELLED | OUTPATIENT
Start: 2022-06-29

## 2022-06-29 RX ORDER — HEPARIN 100 UNIT/ML
500 SYRINGE INTRAVENOUS
Status: CANCELLED | OUTPATIENT
Start: 2022-07-06

## 2022-06-29 RX ADMIN — FERUMOXYTOL 510 MG: 510 INJECTION INTRAVENOUS at 12:06

## 2022-06-29 NOTE — PROGRESS NOTES
Chief Complaint   Iron Deficiency Anemia; follow up     Treatment History:  Ferrous sulfate 325mg po daily started 2020  Feraheme x 2 doses as needed  Aspirin 81 mg daily    Past medical history: NIDDM. Hypertension. Dyslipidemia. History of anemia. Vitamin D deficiency. Tobacco abuse. Obesity. Obstructive sleep apnea. HFpEF. Leukocytosis. Cholecystectomy. Goiter surgery.  Social history: Single. Lives in [place], [place]. Has 2 grown up sons. Has been smoking 1-1/2 packs of cigarettes daily since age 10. Has tried to quit smoking many times but does not get refills of Chantix, therefore, has been unable to. Currently, down to 10 cigarettes daily. Denies history of alcohol or illicit drug abuse.  Family history: Sister  from cervical cancer at age 55.  Health maintenance:  EGD 2018 (Dr. Koo in [place]: Esophageal dilation).   Colonoscopy 2018 (Dr. Koo in [place]), apparently to be repeated in 2 years.   Mammogram 2018: Negative for malignancy.  ThinPrep cervical Pap smear 2016: Negative for intraepithelial lesion or malignancy; negative for HPV high-risk types (type 16 and type 18/45)    Menstrual and OB/GYN history: Menopause in . For last 3 months or so, has experienced vaginal spotting. Apparently, had Pap smear done at [facility] 2 months back (probably 2018), which was apparently unremarkable. Pelvic ultrasound on 2019 showed normal endometrium; likely myomatous changes of uterus with a lower uterine segment lipoma leiomyoma.     History if Present Illness   56 year-old lady referred from [facility] for evaluation of leukocytosis, iron deficiency anemia.     Interval history 2022: Patient presents today for scheduled follow up for Iron Deficiency Anemia. She is currently receiving Feraheme infusions. She reports some weakness, fatigue, hot flashes, shortness of breath on exertion, urinary  frequency, back pain and confusion. She denies any fever, chills, lymphadenopathy, HA, heartburn, numbness/tingling, or skin lesions. Labs are stable and were reviewed with patient. Patient is still taking Asprin 81mg daily. She states she last saw GI about 6 months ago for a colonoscopy. She has no other needs, concerns or complaints at this time.     Review of Systems Negative except as otherwise stated in HPI     Physical Exam   Vitals:    06/29/22 1033   BP: 100/71   Pulse: 102   Temp: 97.5 °F (36.4 °C)     General: fatigued; NAD   Eye: Pupils are equal, round and reactive to light, Extraocular movements intact, normal conjunctiva  HENT: Normocephalic. Oropharynx exam deferred; mask in place due to coronavirus pandemic  Neck: Supple, nontender  Respiratory: Respirations are non-labored, Breath sounds CTA bilaterally, Symmetrical chest wall expansion  Cardiovascular: RRR, normal peripheral perfusion, No edema  Breast: Exam deferred  Gastrointestinal: Non tender, non distended; present bowel sounds  GYN: Exam deferred  Genitourinary: Exam deferred  Lymphatics: No palpable adenopathies appreciated  Musculoskeletal: Moves all extremities  Integumentary: Intact  Neurologic: Alert and oriented. No focal defects  Psychiatric: Cooperative. Appropriate mood and affect     Lab Results   Component Value Date    WBC 16.8 (H) 06/29/2022    RBC 5.06 06/29/2022    HGB 13.4 06/29/2022    HCT 43.5 06/29/2022    MCV 86.0 06/29/2022    MCH 26.5 (L) 06/29/2022    MCHC 30.8 (L) 06/29/2022    RDW 17.1 (H) 06/29/2022     (H) 06/29/2022    MPV 11.1 06/29/2022     CMP  Sodium Level   Date Value Ref Range Status   06/29/2022 137 136 - 145 mmol/L Final     Potassium Level   Date Value Ref Range Status   06/29/2022 4.5 3.5 - 5.1 mmol/L Final     Carbon Dioxide   Date Value Ref Range Status   06/29/2022 24 22 - 29 mmol/L Final     Blood Urea Nitrogen   Date Value Ref Range Status   06/29/2022 12.0 9.8 - 20.1 mg/dL Final      Creatinine   Date Value Ref Range Status   06/29/2022 0.89 0.55 - 1.02 mg/dL Final     Calcium Level Total   Date Value Ref Range Status   06/29/2022 9.8 8.4 - 10.2 mg/dL Final     Albumin Level   Date Value Ref Range Status   06/29/2022 3.8 3.5 - 5.0 gm/dL Final     Bilirubin Total   Date Value Ref Range Status   06/29/2022 0.3 <=1.5 mg/dL Final     Alkaline Phosphatase   Date Value Ref Range Status   06/29/2022 112 40 - 150 unit/L Final     Aspartate Aminotransferase   Date Value Ref Range Status   06/29/2022 15 5 - 34 unit/L Final     Alanine Aminotransferase   Date Value Ref Range Status   06/29/2022 17 0 - 55 unit/L Final     Estimated GFR-Non    Date Value Ref Range Status   06/29/2022 >60 mls/min/1.73/m2 Final     Lab Results   Component Value Date    IRON 26 (L) 06/29/2022    TIBC 353 06/29/2022    FERRITIN 24.88 06/29/2022       Assessment   1.) Iron deficiency anemia   Recurrent  Presented as moderate anemia with severe microcytosis   EGD, colonoscopy negative for any bleeders (02/2018)  Severe iron deficiency   Feraheme (03/2019)  Hemoglobin dropped from 14.8 (06/2019) to 9.5 (08/28/2019) due to iron deficiency  Feraheme x2 (09/2019) --> improvement in iron stores and normalization of hemoglobin (9.5 --> 13.7)  CT enterogram (09/11/2019): Hiatal hernia; 1.7 cm left adrenal nodule  -03/10/2020: Remains iron deficient (transferrin saturation 13.2%, ferritin 19.2)  -Did not present for Feraheme shots in a timely manner  -04/27/2020: Ferritin 4.9 (down from 19.2 on 03/10/2020); however, hemoglobin 12.4, MCV 83.0  (Iron deficiency erythropoiesis)  -Feraheme 510 mg IV x2 (04/27/2020; 05/04/2020)  -06/01/2020: Iron deficiency resolved with parenteral iron therapy  -However, as of 06/01/2020, severe recurrent iron deficiency remains unexplained    Plan:   Labs today revealing iron deficiency; Pursue IV iron replacement with Feraheme x 2 doses given a week apart; first dose today  Continue  daily baby ASA  RTC for labs, visit in 2 months; future doses of IV iron if needed     2.) Thrombocytosis (low risk IPSET-thrombosis score ET)   (age < 60 years; JAK2 V617F mutation positive)  GAGANDEEP-2 V617F mutation positive (03/2019)  She has low risk IPSET-thrombosis score ET (age <60 years; GAGANDEEP-2 V617F mutation positive)  Bone marrow consistent (06/2019)  Low risk IPSET-thrombosis score ET  Cardiovascular risk factors: NIDDM, hypertension, tobacco abuse  No indication of myelosuppressive therapy  Cont baby aspirin daily; platelets stable     3.) Chronic, very mild, benign-appearing, intermittent leukocytosis  Secondary to smoking or underlying myeloproliferative disorder  GAGANDEEP-2 V617F mutation positive (03/2019)  BCR-ABL 1 fusion transcripts negative (03/2019)  Underlying essential thrombocytosis  stable    4.) Health maintenance:  - Screening mammogram 07/03/2019 was BI-RADS 2, benign  -12/28/2020: bilateral MMG: Right Breast: Negative (BI-RADS 1) Left Breast: Negative (BI-RADS 1)  -1/7/22 Right Breast: Negative (BI-RADS 1); Left Breast: Negative (BI-RADS 1)

## 2022-07-06 ENCOUNTER — INFUSION (OUTPATIENT)
Dept: INFUSION THERAPY | Facility: HOSPITAL | Age: 57
End: 2022-07-06
Attending: INTERNAL MEDICINE
Payer: MEDICAID

## 2022-07-06 VITALS
RESPIRATION RATE: 20 BRPM | DIASTOLIC BLOOD PRESSURE: 68 MMHG | HEART RATE: 92 BPM | BODY MASS INDEX: 34.78 KG/M2 | HEIGHT: 67 IN | SYSTOLIC BLOOD PRESSURE: 123 MMHG | WEIGHT: 221.56 LBS

## 2022-07-06 DIAGNOSIS — E61.1 IRON DEFICIENCY: ICD-10-CM

## 2022-07-06 DIAGNOSIS — D50.9 IRON DEFICIENCY ANEMIA, UNSPECIFIED IRON DEFICIENCY ANEMIA TYPE: Primary | ICD-10-CM

## 2022-07-06 PROCEDURE — 96375 TX/PRO/DX INJ NEW DRUG ADDON: CPT

## 2022-07-06 PROCEDURE — 96365 THER/PROPH/DIAG IV INF INIT: CPT

## 2022-07-06 PROCEDURE — 25000003 PHARM REV CODE 250: Performed by: NURSE PRACTITIONER

## 2022-07-06 PROCEDURE — 63600175 PHARM REV CODE 636 W HCPCS: Mod: JG | Performed by: NURSE PRACTITIONER

## 2022-07-06 RX ORDER — METHYLPREDNISOLONE SOD SUCC 125 MG
125 VIAL (EA) INJECTION ONCE AS NEEDED
Status: CANCELLED | OUTPATIENT
Start: 2022-07-06

## 2022-07-06 RX ORDER — DIPHENHYDRAMINE HYDROCHLORIDE 50 MG/ML
50 INJECTION INTRAMUSCULAR; INTRAVENOUS ONCE AS NEEDED
Status: DISCONTINUED | OUTPATIENT
Start: 2022-07-06 | End: 2022-07-06 | Stop reason: HOSPADM

## 2022-07-06 RX ORDER — HEPARIN 100 UNIT/ML
500 SYRINGE INTRAVENOUS
Status: DISCONTINUED | OUTPATIENT
Start: 2022-07-06 | End: 2022-07-06 | Stop reason: HOSPADM

## 2022-07-06 RX ORDER — SODIUM CHLORIDE 0.9 % (FLUSH) 0.9 %
10 SYRINGE (ML) INJECTION
Status: DISCONTINUED | OUTPATIENT
Start: 2022-07-06 | End: 2022-07-06 | Stop reason: HOSPADM

## 2022-07-06 RX ORDER — EPINEPHRINE 0.3 MG/.3ML
0.3 INJECTION SUBCUTANEOUS ONCE AS NEEDED
Status: CANCELLED | OUTPATIENT
Start: 2022-07-06

## 2022-07-06 RX ORDER — METHYLPREDNISOLONE SOD SUCC 125 MG
125 VIAL (EA) INJECTION ONCE AS NEEDED
Status: DISCONTINUED | OUTPATIENT
Start: 2022-07-06 | End: 2022-07-06 | Stop reason: HOSPADM

## 2022-07-06 RX ORDER — DIPHENHYDRAMINE HYDROCHLORIDE 50 MG/ML
50 INJECTION INTRAMUSCULAR; INTRAVENOUS ONCE AS NEEDED
Status: CANCELLED | OUTPATIENT
Start: 2022-07-06

## 2022-07-06 RX ORDER — HEPARIN 100 UNIT/ML
500 SYRINGE INTRAVENOUS
Status: CANCELLED | OUTPATIENT
Start: 2022-07-06

## 2022-07-06 RX ADMIN — FERUMOXYTOL 510 MG: 510 INJECTION INTRAVENOUS at 11:07

## 2022-07-06 RX ADMIN — SODIUM CHLORIDE: 9 INJECTION, SOLUTION INTRAVENOUS at 11:07

## 2022-08-02 DIAGNOSIS — E78.5 HYPERLIPIDEMIA, UNSPECIFIED HYPERLIPIDEMIA TYPE: ICD-10-CM

## 2022-08-02 RX ORDER — ATORVASTATIN CALCIUM 40 MG/1
40 TABLET, FILM COATED ORAL NIGHTLY
Qty: 90 TABLET | Refills: 3 | Status: SHIPPED | OUTPATIENT
Start: 2022-08-02 | End: 2023-08-17 | Stop reason: SDUPTHER

## 2022-08-19 ENCOUNTER — OFFICE VISIT (OUTPATIENT)
Dept: CARDIOLOGY | Facility: CLINIC | Age: 57
End: 2022-08-19
Payer: MEDICAID

## 2022-08-19 VITALS
OXYGEN SATURATION: 98 % | HEART RATE: 76 BPM | SYSTOLIC BLOOD PRESSURE: 105 MMHG | RESPIRATION RATE: 17 BRPM | WEIGHT: 222.44 LBS | HEIGHT: 67 IN | TEMPERATURE: 98 F | DIASTOLIC BLOOD PRESSURE: 72 MMHG | BODY MASS INDEX: 34.91 KG/M2

## 2022-08-19 DIAGNOSIS — I10 PRIMARY HYPERTENSION: ICD-10-CM

## 2022-08-19 DIAGNOSIS — E11.9 DIABETES MELLITUS WITHOUT COMPLICATION: ICD-10-CM

## 2022-08-19 DIAGNOSIS — I25.10 MILD CAD: ICD-10-CM

## 2022-08-19 DIAGNOSIS — E78.5 HYPERLIPIDEMIA LDL GOAL <70: ICD-10-CM

## 2022-08-19 DIAGNOSIS — E66.9 OBESITY (BMI 30-39.9): ICD-10-CM

## 2022-08-19 DIAGNOSIS — Z72.0 TOBACCO USE: ICD-10-CM

## 2022-08-19 DIAGNOSIS — R06.09 DOE (DYSPNEA ON EXERTION): Primary | ICD-10-CM

## 2022-08-19 PROCEDURE — 4010F ACE/ARB THERAPY RXD/TAKEN: CPT | Mod: CPTII,,, | Performed by: NURSE PRACTITIONER

## 2022-08-19 PROCEDURE — 99215 OFFICE O/P EST HI 40 MIN: CPT | Mod: PBBFAC | Performed by: NURSE PRACTITIONER

## 2022-08-19 PROCEDURE — 3008F PR BODY MASS INDEX (BMI) DOCUMENTED: ICD-10-PCS | Mod: CPTII,,, | Performed by: NURSE PRACTITIONER

## 2022-08-19 PROCEDURE — 1160F RVW MEDS BY RX/DR IN RCRD: CPT | Mod: CPTII,,, | Performed by: NURSE PRACTITIONER

## 2022-08-19 PROCEDURE — 1159F PR MEDICATION LIST DOCUMENTED IN MEDICAL RECORD: ICD-10-PCS | Mod: CPTII,,, | Performed by: NURSE PRACTITIONER

## 2022-08-19 PROCEDURE — 1159F MED LIST DOCD IN RCRD: CPT | Mod: CPTII,,, | Performed by: NURSE PRACTITIONER

## 2022-08-19 PROCEDURE — 3008F BODY MASS INDEX DOCD: CPT | Mod: CPTII,,, | Performed by: NURSE PRACTITIONER

## 2022-08-19 PROCEDURE — 3074F PR MOST RECENT SYSTOLIC BLOOD PRESSURE < 130 MM HG: ICD-10-PCS | Mod: CPTII,,, | Performed by: NURSE PRACTITIONER

## 2022-08-19 PROCEDURE — 4010F PR ACE/ARB THEARPY RXD/TAKEN: ICD-10-PCS | Mod: CPTII,,, | Performed by: NURSE PRACTITIONER

## 2022-08-19 PROCEDURE — 99213 PR OFFICE/OUTPT VISIT, EST, LEVL III, 20-29 MIN: ICD-10-PCS | Mod: S$PBB,,, | Performed by: NURSE PRACTITIONER

## 2022-08-19 PROCEDURE — 3078F PR MOST RECENT DIASTOLIC BLOOD PRESSURE < 80 MM HG: ICD-10-PCS | Mod: CPTII,,, | Performed by: NURSE PRACTITIONER

## 2022-08-19 PROCEDURE — 3078F DIAST BP <80 MM HG: CPT | Mod: CPTII,,, | Performed by: NURSE PRACTITIONER

## 2022-08-19 PROCEDURE — 99213 OFFICE O/P EST LOW 20 MIN: CPT | Mod: S$PBB,,, | Performed by: NURSE PRACTITIONER

## 2022-08-19 PROCEDURE — 1160F PR REVIEW ALL MEDS BY PRESCRIBER/CLIN PHARMACIST DOCUMENTED: ICD-10-PCS | Mod: CPTII,,, | Performed by: NURSE PRACTITIONER

## 2022-08-19 PROCEDURE — 3074F SYST BP LT 130 MM HG: CPT | Mod: CPTII,,, | Performed by: NURSE PRACTITIONER

## 2022-08-19 RX ORDER — OMEPRAZOLE 20 MG/1
20 CAPSULE, DELAYED RELEASE ORAL DAILY
COMMUNITY
Start: 2021-10-26 | End: 2022-11-29

## 2022-08-19 RX ORDER — METOPROLOL TARTRATE 25 MG/1
25 TABLET, FILM COATED ORAL 2 TIMES DAILY
Qty: 180 TABLET | Refills: 3 | Status: SHIPPED | OUTPATIENT
Start: 2022-08-19 | End: 2023-08-17 | Stop reason: SDUPTHER

## 2022-08-19 RX ORDER — VARENICLINE TARTRATE 1 MG/1
TABLET, FILM COATED ORAL
COMMUNITY
Start: 2022-08-16 | End: 2022-08-19 | Stop reason: SDUPTHER

## 2022-08-19 RX ORDER — VARENICLINE TARTRATE 1 MG/1
1 TABLET, FILM COATED ORAL 2 TIMES DAILY
Qty: 60 TABLET | Refills: 2 | Status: SHIPPED | OUTPATIENT
Start: 2022-08-19 | End: 2022-11-29

## 2022-08-19 RX ORDER — LISINOPRIL 20 MG/1
20 TABLET ORAL DAILY
Qty: 90 TABLET | Refills: 3 | Status: SHIPPED | OUTPATIENT
Start: 2022-08-19 | End: 2023-08-17 | Stop reason: SDUPTHER

## 2022-08-19 RX ORDER — IBUPROFEN 800 MG/1
800 TABLET ORAL
COMMUNITY
Start: 2022-03-02

## 2022-08-19 NOTE — PROGRESS NOTES
"CHIEF COMPLAINT:   Chief Complaint   Patient presents with    Follow-up    Dizziness    Shortness of Breath     Patient here for 3 mth f/u w stress results. Pt c/o "lightheadedness" and dyspnea on exertion. Pt denies cp and palpitations. Questions.                   Review of patient's allergies indicates:  No Known Allergies                                       HPI:  Jessica Walker 56 y.o. female with a past medical history of Mild CAD, JAK2 Gene mutation, Thrombocytosis, JIN on CPAP, tobacco abuse, DMII, HTN, HLD, and GERD presents for follow up and results of Dobutamine Stress Echocardiogram completed on 6.21.22 in which stress echo portion of the study was negative for myocardial ischemia.  Patient completed PFT testing in February of 2022 which revealed normal PFT except for mild reduction in diffusion capacity.  She completed an Echocardiogram on June 4, 2021 which revealed an ejection fraction of approximately 50 to 55%, mild MR, and mild TR.  She completed a Dobutamine Stress Echocardiogram in July 2020 which was negative for ischemia.     Patient denies chest pain, palpitations, orthopnea, or syncope.  She continues to endorse shortness of breath with exertion, but feels the shortness of breath has mildly improved since her previous visit.  She also reports occasional lightheadedness with ambulation, but denies near-syncope.  She reports she has not been using her CPAP nightly for the last week as she has to clean her mask.  She reports compliance with her medications.  She continues to smoke approximately a pack of cigarettes per day, but is interested in quitting.  Patient is asking for refills of Chantix.                                                                                                                                                                 Dobutamine Stress Echocardiogram 6.21.22:  The stress echo portion of this study is negative for myocardial ischemia.  The ECG " portion of this study is positive for myocardial ischemia.  There were no arrhythmias during stress.                                                                         Patient Active Problem List   Diagnosis    Iron deficiency anemia    POSEY (dyspnea on exertion)    Primary hypertension    Mild CAD    Hyperlipidemia LDL goal <70    Diabetes mellitus without complication    Obesity (BMI 30-39.9)    Tobacco use    Thrombocytosis    Iron deficiency     Past Surgical History:   Procedure Laterality Date    CHOLECYSTECTOMY       Social History     Socioeconomic History    Marital status: Single   Tobacco Use    Smoking status: Current Every Day Smoker     Packs/day: 1.00     Types: Cigarettes    Smokeless tobacco: Never Used   Substance and Sexual Activity    Alcohol use: Not Currently    Drug use: Not Currently    Sexual activity: Not Currently        Family History   Problem Relation Age of Onset    Diabetes Mother     Coronary artery disease Mother     Diabetes Father     Cervical cancer Sister     Fibromyalgia Sister     Breast cancer Sister          Current Outpatient Medications:     atorvastatin (LIPITOR) 40 MG tablet, Take 1 tablet (40 mg total) by mouth every evening., Disp: 90 tablet, Rfl: 3    FEROSUL 325 mg (65 mg iron) Tab tablet, , Disp: , Rfl:     FLUoxetine 20 MG capsule, 1 capsule, Disp: , Rfl:     glipiZIDE (GLUCOTROL) 5 MG tablet, , Disp: , Rfl:     ibuprofen (ADVIL,MOTRIN) 800 MG tablet, Take 800 mg by mouth., Disp: , Rfl:     omeprazole (PRILOSEC) 20 MG capsule, Take 20 mg by mouth., Disp: , Rfl:     aspirin (ECOTRIN) 81 MG EC tablet, Take 81 mg by mouth once daily., Disp: , Rfl:     GENTLE LAXATIVE, BISACODYL, 5 mg EC tablet, TAKE 2 TABLET BY MOUTH AS A ONE TIME DOSE AS DIRECTED PER COLONOSCOPY PREP INSTRUCTIONS, Disp: , Rfl:     insulin asp prt-insulin aspart, NovoLOG 70/30, (NOVOLOG 70/30) 100 unit/mL (70-30) Soln, as directed, Disp: , Rfl:     LANTUS SOLOSTAR  "U-100 INSULIN glargine 100 units/mL (3mL) SubQ pen, , Disp: , Rfl:     linaCLOtide (LINZESS) 72 mcg Cap capsule, 1 capsule at least 30 minutes before the first meal of the day on an empty stomach, Disp: , Rfl:     lisinopriL (PRINIVIL,ZESTRIL) 20 MG tablet, 1 tablet, Disp: , Rfl:     metFORMIN (GLUCOPHAGE) 1000 MG tablet, TAKE ONE TABLET BY MOUTH TWICE A DAY, Disp: 60 tablet, Rfl: 6    metoprolol tartrate (LOPRESSOR) 25 MG tablet, Take 25 mg by mouth., Disp: , Rfl:     pantoprazole (PROTONIX) 40 MG tablet, 1 tablet, Disp: , Rfl:     QUEtiapine (SEROQUEL) 50 MG tablet, Take 50 mg by mouth nightly., Disp: , Rfl:     TRUE METRIX GLUCOSE TEST STRIP Strp, USE 1 STRIP TO CHECK GLUCOSE ONCE DAILY, Disp: , Rfl:     varenicline (CHANTIX) 1 mg Tab, Take by mouth., Disp: , Rfl:     Current Facility-Administered Medications:     DOBUtamine 1000 mg in D5W 250 mL infusion, 20 mcg/kg/min (Order-Specific), Intravenous, Continuous, Shahida Francisco, ANP, Last Rate: 29.5 mL/hr at 06/21/22 0945, 20 mcg/kg/min at 06/21/22 0945     ROS:                                                                                                                                                                             Review of Systems   Constitutional: Negative.         Weakness   Respiratory: Positive for shortness of breath.    Cardiovascular: Negative.    Gastrointestinal: Negative.    Skin: Negative.    Neurological: Positive for dizziness.   Psychiatric/Behavioral: Negative.         Blood pressure 105/72, pulse 76, temperature 98 °F (36.7 °C), resp. rate 17, height 5' 6.97" (1.701 m), weight 100.9 kg (222 lb 7.1 oz), SpO2 98 %.   PE:  Physical Exam  Constitutional:       Appearance: Normal appearance.   HENT:      Head: Normocephalic and atraumatic.   Eyes:      Extraocular Movements: Extraocular movements intact.      Pupils: Pupils are equal, round, and reactive to light.   Cardiovascular:      Rate and Rhythm: Regular rhythm. " Tachycardia present.   Pulmonary:      Effort: Pulmonary effort is normal.      Breath sounds: Normal breath sounds.   Abdominal:      Palpations: Abdomen is soft.   Musculoskeletal:         General: Normal range of motion.      Cervical back: Normal range of motion.   Skin:     General: Skin is warm and dry.   Neurological:      General: No focal deficit present.      Mental Status: She is alert and oriented to person, place, and time.   Psychiatric:         Mood and Affect: Mood normal.         Behavior: Behavior normal.       ASSESSMENT/PLAN:  Mild CAD per [facility] Sept. 2016  Dobutamine Stress Echocardiogram June 2022:  The stress echo portion of this study is negative for myocardial ischemia. The ECG portion of this study is positive for myocardial ischemia.  Treadmill Stress Test Sept. 2019 - [facility] treadmill score indicated moderate risk.  Left heart cath September 2016 with predominantly normal epicardial coronary arteries, normal LVEF 65%  EF 50-55% per Echo 6.4.21    POSEY - mildly improved  EF 50-55% per Echo 6.4.21  PFTs Feb. 2022 - normal PFTs except for mild reduction in diffusion capacity  Dobutamine Stress Echocardiogram 6.21.22: The stress echo portion of this study is negative for myocardial ischemia. The ECG portion of this study is positive for myocardial ischemia.    Palpitations - resolved  48 hour Holter monitor Aug. 2019 - see results under HPI  Continue Metoprolol Tartrate 25mg BID    HTN  BP at goal - 105/72  Continue Metoprolol Tartrate 25mg BID and Lisinopril 20mg daily  Counseled on low sodium diet    HLD (hyperlipidemia)  LDL not at goal - 80 per labs Sept. 2021  Will request recent FLP from [facility]   Continue Atorvastatin 40mg daily at bedtime  Encouraged diet and exercise    DM (diabetes mellitus)  Continue management per PCP    Tobacco abuse  Counseled importance of smoking cessation  Continues to smoke about a pack of cigarettes per day - states she will try to quit   Refilled Chantix per  patient's request    JIN on CPAP  Patient states she has not been using her CPAP for the last week as she has to clean her mask     Recommend nightly CPAP use     Request recent FLP results from [facility]   Follow-up in cardiology clinic in 4 months

## 2022-08-29 ENCOUNTER — OFFICE VISIT (OUTPATIENT)
Dept: HEMATOLOGY/ONCOLOGY | Facility: CLINIC | Age: 57
End: 2022-08-29
Payer: MEDICAID

## 2022-08-29 ENCOUNTER — INFUSION (OUTPATIENT)
Dept: INFUSION THERAPY | Facility: HOSPITAL | Age: 57
End: 2022-08-29
Attending: INTERNAL MEDICINE
Payer: MEDICAID

## 2022-08-29 VITALS
OXYGEN SATURATION: 98 % | WEIGHT: 222 LBS | BODY MASS INDEX: 34.84 KG/M2 | TEMPERATURE: 98 F | SYSTOLIC BLOOD PRESSURE: 126 MMHG | RESPIRATION RATE: 20 BRPM | DIASTOLIC BLOOD PRESSURE: 80 MMHG | HEIGHT: 67 IN | HEART RATE: 73 BPM

## 2022-08-29 VITALS
WEIGHT: 222 LBS | OXYGEN SATURATION: 98 % | TEMPERATURE: 98 F | SYSTOLIC BLOOD PRESSURE: 118 MMHG | BODY MASS INDEX: 35.68 KG/M2 | HEART RATE: 70 BPM | HEIGHT: 66 IN | RESPIRATION RATE: 16 BRPM | DIASTOLIC BLOOD PRESSURE: 78 MMHG

## 2022-08-29 DIAGNOSIS — F32.A DEPRESSION, UNSPECIFIED DEPRESSION TYPE: ICD-10-CM

## 2022-08-29 DIAGNOSIS — Z79.899 ENCOUNTER FOR MEDICATION MANAGEMENT: ICD-10-CM

## 2022-08-29 DIAGNOSIS — R45.86 MOOD CHANGE: ICD-10-CM

## 2022-08-29 DIAGNOSIS — E61.1 IRON DEFICIENCY: Primary | ICD-10-CM

## 2022-08-29 DIAGNOSIS — D50.8 OTHER IRON DEFICIENCY ANEMIA: Primary | ICD-10-CM

## 2022-08-29 PROCEDURE — 3008F PR BODY MASS INDEX (BMI) DOCUMENTED: ICD-10-PCS | Mod: CPTII,,, | Performed by: NURSE PRACTITIONER

## 2022-08-29 PROCEDURE — 4010F ACE/ARB THERAPY RXD/TAKEN: CPT | Mod: CPTII,,, | Performed by: NURSE PRACTITIONER

## 2022-08-29 PROCEDURE — 96365 THER/PROPH/DIAG IV INF INIT: CPT

## 2022-08-29 PROCEDURE — 3079F DIAST BP 80-89 MM HG: CPT | Mod: CPTII,,, | Performed by: NURSE PRACTITIONER

## 2022-08-29 PROCEDURE — 63600175 PHARM REV CODE 636 W HCPCS: Mod: JG | Performed by: NURSE PRACTITIONER

## 2022-08-29 PROCEDURE — 99214 PR OFFICE/OUTPT VISIT, EST, LEVL IV, 30-39 MIN: ICD-10-PCS | Mod: S$PBB,,, | Performed by: NURSE PRACTITIONER

## 2022-08-29 PROCEDURE — 25000003 PHARM REV CODE 250: Performed by: NURSE PRACTITIONER

## 2022-08-29 PROCEDURE — 4010F PR ACE/ARB THEARPY RXD/TAKEN: ICD-10-PCS | Mod: CPTII,,, | Performed by: NURSE PRACTITIONER

## 2022-08-29 PROCEDURE — 3074F PR MOST RECENT SYSTOLIC BLOOD PRESSURE < 130 MM HG: ICD-10-PCS | Mod: CPTII,,, | Performed by: NURSE PRACTITIONER

## 2022-08-29 PROCEDURE — 3079F PR MOST RECENT DIASTOLIC BLOOD PRESSURE 80-89 MM HG: ICD-10-PCS | Mod: CPTII,,, | Performed by: NURSE PRACTITIONER

## 2022-08-29 PROCEDURE — 99214 OFFICE O/P EST MOD 30 MIN: CPT | Mod: S$PBB,,, | Performed by: NURSE PRACTITIONER

## 2022-08-29 PROCEDURE — 3074F SYST BP LT 130 MM HG: CPT | Mod: CPTII,,, | Performed by: NURSE PRACTITIONER

## 2022-08-29 PROCEDURE — 99215 OFFICE O/P EST HI 40 MIN: CPT | Mod: PBBFAC,25 | Performed by: NURSE PRACTITIONER

## 2022-08-29 PROCEDURE — 3008F BODY MASS INDEX DOCD: CPT | Mod: CPTII,,, | Performed by: NURSE PRACTITIONER

## 2022-08-29 RX ORDER — SODIUM CHLORIDE 0.9 % (FLUSH) 0.9 %
10 SYRINGE (ML) INJECTION
Status: DISCONTINUED | OUTPATIENT
Start: 2022-08-29 | End: 2022-08-29 | Stop reason: HOSPADM

## 2022-08-29 RX ORDER — SODIUM CHLORIDE 0.9 % (FLUSH) 0.9 %
10 SYRINGE (ML) INJECTION
Status: DISCONTINUED | OUTPATIENT
Start: 2022-08-29 | End: 2022-08-29

## 2022-08-29 RX ORDER — HEPARIN 100 UNIT/ML
500 SYRINGE INTRAVENOUS
Status: DISCONTINUED | OUTPATIENT
Start: 2022-08-29 | End: 2022-08-29

## 2022-08-29 RX ORDER — HEPARIN 100 UNIT/ML
500 SYRINGE INTRAVENOUS
Status: CANCELLED | OUTPATIENT
Start: 2022-09-05

## 2022-08-29 RX ORDER — HEPARIN 100 UNIT/ML
500 SYRINGE INTRAVENOUS
Status: CANCELLED | OUTPATIENT
Start: 2022-08-29

## 2022-08-29 RX ORDER — SODIUM CHLORIDE 0.9 % (FLUSH) 0.9 %
10 SYRINGE (ML) INJECTION
Status: CANCELLED | OUTPATIENT
Start: 2022-08-29

## 2022-08-29 RX ORDER — SODIUM CHLORIDE 0.9 % (FLUSH) 0.9 %
10 SYRINGE (ML) INJECTION
Status: CANCELLED | OUTPATIENT
Start: 2022-09-05

## 2022-08-29 RX ORDER — HEPARIN 100 UNIT/ML
500 SYRINGE INTRAVENOUS
Status: DISCONTINUED | OUTPATIENT
Start: 2022-08-29 | End: 2022-08-29 | Stop reason: HOSPADM

## 2022-08-29 RX ADMIN — FERUMOXYTOL 510 MG: 510 INJECTION INTRAVENOUS at 12:08

## 2022-08-29 NOTE — PROGRESS NOTES
Chief Complaint   Iron Deficiency Anemia; follow up     Treatment History:  Ferrous sulfate 325mg po daily started 2020  Feraheme x 2 doses as needed  Aspirin 81 mg daily    Past medical history: NIDDM. Hypertension. Dyslipidemia. History of anemia. Vitamin D deficiency. Tobacco abuse. Obesity. Obstructive sleep apnea. HFpEF. Leukocytosis. Cholecystectomy. Goiter surgery.  Social history: Single. Lives in [place], [place]. Has 2 grown up sons. Has been smoking 1-1/2 packs of cigarettes daily since age 10. Has tried to quit smoking many times but does not get refills of Chantix, therefore, has been unable to. Currently, down to 10 cigarettes daily. Denies history of alcohol or illicit drug abuse.  Family history: Sister  from cervical cancer at age 55.  Health maintenance:  EGD 2018 (Dr. Koo in [place]: Esophageal dilation).   Colonoscopy 2018 (Dr. Koo in [place]), apparently to be repeated in 2 years.   Mammogram 2018: Negative for malignancy.  ThinPrep cervical Pap smear 2016: Negative for intraepithelial lesion or malignancy; negative for HPV high-risk types (type 16 and type 18/45)    Menstrual and OB/GYN history: Menopause in . For last 3 months or so, has experienced vaginal spotting. Apparently, had Pap smear done at [facility] 2 months back (probably 2018), which was apparently unremarkable. Pelvic ultrasound on 2019 showed normal endometrium; likely myomatous changes of uterus with a lower uterine segment lipoma leiomyoma.     History if Present Illness   56 year-old lady referred from [facility] for evaluation of leukocytosis, iron deficiency anemia.     Interval history 2022: Clinic follow up today for iron deficiency anemia. In the past has received IV Feraheme infusions. She reports some weakness, fatigue, hot flashes, shortness of breath on exertion, urinary frequency, back pain and  confusion, not new symptoms and not progressive in nature. Mood changes and reportedly feeling depressed; denies harming self or others or thoughts of suicidal ideation. She denies any fever, chills, lymphadenopathy, HA, heartburn, numbness/tingling, or skin lesions. Labs are stable and were reviewed with patient. Continues on Asprin 81mg daily. Denies other significant complaints or concerns.    Review of Systems Negative except as otherwise stated in HPI     Physical Exam   Vitals:    08/29/22 1033   BP: 126/80   Pulse: 73   Resp: 20   Temp: 98.2 °F (36.8 °C)     General: fatigued; NAD   Eye: Pupils are equal, round and reactive to light, Extraocular movements intact, normal conjunctiva  HENT: Normocephalic. Oropharynx exam deferred; mask in place due to coronavirus pandemic  Neck: Supple, nontender  Respiratory: Respirations are non-labored, Breath sounds CTA bilaterally, Symmetrical chest wall expansion  Cardiovascular: RRR, normal peripheral perfusion, No edema  Breast: Exam deferred  Gastrointestinal: Non tender, non distended; present bowel sounds  GYN: Exam deferred  Genitourinary: Exam deferred  Lymphatics: No palpable adenopathies appreciated  Musculoskeletal: Moves all extremities  Integumentary: Intact  Neurologic: Alert and oriented. No focal defects  Psychiatric: Cooperative. Appropriate mood and affect     Assessment   1.) Iron deficiency anemia   Recurrent  Presented as moderate anemia with severe microcytosis   EGD, colonoscopy negative for any bleeders (02/2018)  Severe iron deficiency   Feraheme (03/2019)  Hemoglobin dropped from 14.8 (06/2019) to 9.5 (08/28/2019) due to iron deficiency  Feraheme x2 (09/2019) --> improvement in iron stores and normalization of hemoglobin (9.5 --> 13.7)  CT enterogram (09/11/2019): Hiatal hernia; 1.7 cm left adrenal nodule  -03/10/2020: Remains iron deficient (transferrin saturation 13.2%, ferritin 19.2)  -Did not present for Feraheme shots in a timely  manner  -04/27/2020: Ferritin 4.9 (down from 19.2 on 03/10/2020); however, hemoglobin 12.4, MCV 83.0  (Iron deficiency erythropoiesis)  -Feraheme 510 mg IV x2 (04/27/2020; 05/04/2020)  -06/01/2020: Iron deficiency resolved with parenteral iron therapy  -However, as of 06/01/2020, severe recurrent iron deficiency remains unexplained    Plan:   Labs today revealing iron deficiency; pursue IV iron replacement today with Feraheme x 1 dose today  Referral to GI at this location for further evaluation; in the past apparently had EGD / Colonoscopy most recently in 2020 in [place] - she is wanting to establish care closer to home   RTC for labs, visit in 2 months; future doses of IV iron if needed     2.) Thrombocytosis (low risk IPSET-thrombosis score ET)   (age < 60 years; JAK2 V617F mutation positive)  GAGANDEEP-2 V617F mutation positive (03/2019)  She has low risk IPSET-thrombosis score ET (age <60 years; GAGANDEEP-2 V617F mutation positive)  Bone marrow consistent (06/2019)  Low risk IPSET-thrombosis score ET  Cardiovascular risk factors: NIDDM, hypertension, tobacco abuse  No indication of myelosuppressive therapy    Plan: Cont baby aspirin daily; platelets stable     3.) Chronic, very mild, benign-appearing, intermittent leukocytosis  Secondary to smoking or underlying myeloproliferative disorder  GAGANDEEP-2 V617F mutation positive (03/2019)  BCR-ABL 1 fusion transcripts negative (03/2019)  Underlying essential thrombocytosis; stable     4.) Health maintenance:  - Screening mammogram 07/03/2019 was BI-RADS 2, benign  -12/28/2020: bilateral MMG: Right Breast: Negative (BI-RADS 1) Left Breast: Negative (BI-RADS 1)  -1/7/22 Right Breast: Negative (BI-RADS 1); Left Breast: Negative (BI-RADS 1)    Plan: next annual screening MMG due 1/27/23     5.) Mood changes / depression  - denies any thoughts of harming self, others or thoughts of suicidal ideation    - Plan: referral to psych for further evaluation

## 2022-08-30 DIAGNOSIS — Z00.00 WELLNESS EXAMINATION: Primary | ICD-10-CM

## 2022-09-01 DIAGNOSIS — R14.0 POSTPRANDIAL ABDOMINAL BLOATING: Primary | ICD-10-CM

## 2022-09-08 ENCOUNTER — INFUSION (OUTPATIENT)
Dept: INFUSION THERAPY | Facility: HOSPITAL | Age: 57
End: 2022-09-08
Attending: INTERNAL MEDICINE
Payer: MEDICAID

## 2022-09-08 VITALS
BODY MASS INDEX: 36.4 KG/M2 | RESPIRATION RATE: 20 BRPM | WEIGHT: 225.5 LBS | HEART RATE: 88 BPM | OXYGEN SATURATION: 99 % | TEMPERATURE: 98 F | DIASTOLIC BLOOD PRESSURE: 83 MMHG | SYSTOLIC BLOOD PRESSURE: 116 MMHG

## 2022-09-08 DIAGNOSIS — E61.1 IRON DEFICIENCY: Primary | ICD-10-CM

## 2022-09-08 PROCEDURE — 96374 THER/PROPH/DIAG INJ IV PUSH: CPT

## 2022-09-08 PROCEDURE — 25000003 PHARM REV CODE 250: Performed by: NURSE PRACTITIONER

## 2022-09-08 PROCEDURE — 63600175 PHARM REV CODE 636 W HCPCS: Mod: JG | Performed by: NURSE PRACTITIONER

## 2022-09-08 PROCEDURE — 96367 TX/PROPH/DG ADDL SEQ IV INF: CPT

## 2022-09-08 RX ORDER — SODIUM CHLORIDE 0.9 % (FLUSH) 0.9 %
10 SYRINGE (ML) INJECTION
Status: DISCONTINUED | OUTPATIENT
Start: 2022-09-08 | End: 2022-09-08 | Stop reason: HOSPADM

## 2022-09-08 RX ORDER — HEPARIN 100 UNIT/ML
500 SYRINGE INTRAVENOUS
Status: DISCONTINUED | OUTPATIENT
Start: 2022-09-08 | End: 2022-09-08 | Stop reason: HOSPADM

## 2022-09-08 RX ADMIN — FERUMOXYTOL 510 MG: 510 INJECTION INTRAVENOUS at 11:09

## 2022-09-08 RX ADMIN — SODIUM CHLORIDE: 9 INJECTION, SOLUTION INTRAVENOUS at 11:09

## 2022-09-09 ENCOUNTER — HOSPITAL ENCOUNTER (OUTPATIENT)
Dept: RADIOLOGY | Facility: HOSPITAL | Age: 57
Discharge: HOME | End: 2022-09-09
Attending: FAMILY MEDICINE
Payer: MEDICAID

## 2022-09-09 DIAGNOSIS — R14.0 POSTPRANDIAL ABDOMINAL BLOATING: ICD-10-CM

## 2022-09-09 PROCEDURE — 78264 GASTRIC EMPTYING IMG STUDY: CPT | Mod: TC

## 2022-09-28 DIAGNOSIS — D50.8 OTHER IRON DEFICIENCY ANEMIA: Primary | ICD-10-CM

## 2022-10-10 ENCOUNTER — PATIENT OUTREACH (OUTPATIENT)
Dept: GASTROENTEROLOGY | Facility: CLINIC | Age: 57
End: 2022-10-10
Payer: MEDICAID

## 2022-10-10 DIAGNOSIS — D50.9 IRON DEFICIENCY ANEMIA, UNSPECIFIED IRON DEFICIENCY ANEMIA TYPE: Primary | ICD-10-CM

## 2022-10-10 LAB — CRC RECOMMENDATION EXT: NORMAL

## 2022-10-19 RX ORDER — NITROGLYCERIN 0.4 MG/1
0.4 TABLET SUBLINGUAL
Status: CANCELLED | OUTPATIENT
Start: 2022-10-19 | End: 2022-10-19

## 2022-10-19 RX ORDER — NITROGLYCERIN 0.4 MG/1
0.4 TABLET SUBLINGUAL EVERY 5 MIN PRN
Qty: 25 TABLET | Refills: 6 | Status: SHIPPED | OUTPATIENT
Start: 2022-10-19 | End: 2024-02-19

## 2022-10-21 ENCOUNTER — PATIENT OUTREACH (OUTPATIENT)
Dept: GASTROENTEROLOGY | Facility: CLINIC | Age: 57
End: 2022-10-21
Payer: MEDICAID

## 2022-10-21 LAB — CRC RECOMMENDATION EXT: NORMAL

## 2022-11-10 ENCOUNTER — OFFICE VISIT (OUTPATIENT)
Dept: GYNECOLOGY | Facility: CLINIC | Age: 57
End: 2022-11-10
Payer: MEDICAID

## 2022-11-10 VITALS
HEIGHT: 66 IN | HEART RATE: 89 BPM | OXYGEN SATURATION: 97 % | BODY MASS INDEX: 35.29 KG/M2 | DIASTOLIC BLOOD PRESSURE: 75 MMHG | WEIGHT: 219.56 LBS | TEMPERATURE: 98 F | SYSTOLIC BLOOD PRESSURE: 107 MMHG | RESPIRATION RATE: 18 BRPM

## 2022-11-10 DIAGNOSIS — Z12.31 SCREENING MAMMOGRAM FOR BREAST CANCER: ICD-10-CM

## 2022-11-10 DIAGNOSIS — R30.0 DYSURIA: ICD-10-CM

## 2022-11-10 DIAGNOSIS — D50.8 OTHER IRON DEFICIENCY ANEMIA: Primary | ICD-10-CM

## 2022-11-10 DIAGNOSIS — D75.839 THROMBOCYTOSIS: ICD-10-CM

## 2022-11-10 DIAGNOSIS — Z01.419 WOMEN'S ANNUAL ROUTINE GYNECOLOGICAL EXAMINATION: Primary | ICD-10-CM

## 2022-11-10 LAB
C TRACH DNA SPEC QL NAA+PROBE: NOT DETECTED
CLUE CELLS VAG QL WET PREP: NORMAL
N GONORRHOEA DNA SPEC QL NAA+PROBE: NOT DETECTED
T VAGINALIS VAG QL WET PREP: NORMAL
WBC #/AREA VAG WET PREP: NORMAL
YEAST SPEC QL WET PREP: NORMAL

## 2022-11-10 PROCEDURE — 4010F PR ACE/ARB THEARPY RXD/TAKEN: ICD-10-PCS | Mod: CPTII,,, | Performed by: NURSE PRACTITIONER

## 2022-11-10 PROCEDURE — 99215 OFFICE O/P EST HI 40 MIN: CPT | Mod: PBBFAC | Performed by: NURSE PRACTITIONER

## 2022-11-10 PROCEDURE — 87210 SMEAR WET MOUNT SALINE/INK: CPT | Performed by: NURSE PRACTITIONER

## 2022-11-10 PROCEDURE — 3008F BODY MASS INDEX DOCD: CPT | Mod: CPTII,,, | Performed by: NURSE PRACTITIONER

## 2022-11-10 PROCEDURE — 99396 PR PREVENTIVE VISIT,EST,40-64: ICD-10-PCS | Mod: S$PBB,,, | Performed by: NURSE PRACTITIONER

## 2022-11-10 PROCEDURE — 99396 PREV VISIT EST AGE 40-64: CPT | Mod: S$PBB,,, | Performed by: NURSE PRACTITIONER

## 2022-11-10 PROCEDURE — 3078F DIAST BP <80 MM HG: CPT | Mod: CPTII,,, | Performed by: NURSE PRACTITIONER

## 2022-11-10 PROCEDURE — 3074F PR MOST RECENT SYSTOLIC BLOOD PRESSURE < 130 MM HG: ICD-10-PCS | Mod: CPTII,,, | Performed by: NURSE PRACTITIONER

## 2022-11-10 PROCEDURE — 3008F PR BODY MASS INDEX (BMI) DOCUMENTED: ICD-10-PCS | Mod: CPTII,,, | Performed by: NURSE PRACTITIONER

## 2022-11-10 PROCEDURE — 3074F SYST BP LT 130 MM HG: CPT | Mod: CPTII,,, | Performed by: NURSE PRACTITIONER

## 2022-11-10 PROCEDURE — 3078F PR MOST RECENT DIASTOLIC BLOOD PRESSURE < 80 MM HG: ICD-10-PCS | Mod: CPTII,,, | Performed by: NURSE PRACTITIONER

## 2022-11-10 PROCEDURE — 1159F PR MEDICATION LIST DOCUMENTED IN MEDICAL RECORD: ICD-10-PCS | Mod: CPTII,,, | Performed by: NURSE PRACTITIONER

## 2022-11-10 PROCEDURE — 87491 CHLMYD TRACH DNA AMP PROBE: CPT | Performed by: NURSE PRACTITIONER

## 2022-11-10 PROCEDURE — 87591 N.GONORRHOEAE DNA AMP PROB: CPT | Performed by: NURSE PRACTITIONER

## 2022-11-10 PROCEDURE — 4010F ACE/ARB THERAPY RXD/TAKEN: CPT | Mod: CPTII,,, | Performed by: NURSE PRACTITIONER

## 2022-11-10 PROCEDURE — 1159F MED LIST DOCD IN RCRD: CPT | Mod: CPTII,,, | Performed by: NURSE PRACTITIONER

## 2022-11-10 RX ORDER — DULAGLUTIDE 1.5 MG/.5ML
1.5 INJECTION, SOLUTION SUBCUTANEOUS
COMMUNITY
Start: 2022-11-06 | End: 2023-12-06 | Stop reason: SDUPTHER

## 2022-11-10 RX ORDER — INSULIN GLARGINE 100 [IU]/ML
70 INJECTION, SOLUTION SUBCUTANEOUS NIGHTLY
COMMUNITY
Start: 2021-10-26 | End: 2023-08-03

## 2022-11-10 RX ORDER — OMEPRAZOLE 40 MG/1
40 CAPSULE, DELAYED RELEASE ORAL EVERY MORNING
COMMUNITY
Start: 2022-09-11 | End: 2023-03-23

## 2022-11-10 RX ORDER — FERROUS SULFATE 325(65) MG
325 TABLET ORAL
COMMUNITY
Start: 2021-09-28 | End: 2022-11-29 | Stop reason: SDUPTHER

## 2022-11-10 RX ORDER — IBUPROFEN 200 MG
1 TABLET ORAL DAILY
COMMUNITY
Start: 2022-10-04 | End: 2023-03-23

## 2022-11-10 NOTE — PROGRESS NOTES
"Patient ID: Jessica Walker is a 57 y.o. female.    Chief Complaint: Well Woman      Review of patient's allergies indicates:  No Known Allergies      Past Medical History:   Diagnosis Date    Anemia     Depression     Diabetes mellitus     Hypertension             HPI:  Pt is  here for annual gyn. Pt is postmenopausal since . Denies vaginal bleeding or discharge.Denies abd/pelvic pain. Pt c/o dysuria/cloudy urine and vaginal irritation. States has not been sexually active since . The patient is diabetic and states her glucose is occasionally high. She has outside pcp at [facility]. Hx of ASCUS HPV negative in . Last pap 2021=NIL; HPV negative. . Pt had normal colonoscopy 10/27/2021. Pt has hx of Depression. This is managed by pcp. Denies SI.     Review of Systems:   Negative except for findings in HPI     Objective:   /75 (BP Location: Left arm, Patient Position: Sitting, BP Method: Large (Automatic))   Pulse 89   Temp 97.9 °F (36.6 °C)   Resp 18   Ht 5' 6" (1.676 m)   Wt 99.6 kg (219 lb 9.3 oz)   SpO2 97%   BMI 35.44 kg/m²    Physical Exam:  GENERAL: Pt is aware and alert and  in no acute distress.  BREASTS: Bilateral-No masses, nipple discharge, skin changes, tenderness.  ABDOMEN: Soft, non tender.  VULVA:  No lesions or skin changes.  URETHRA: No lesions  BLADDER: No tenderness.  VAGINA: Mucosa normal,no discharge or lesions.  CERVIX:  no CMT, NO discharge; NO lesions  BIMANUAL EXAM:  The uterus is not enlarged, is mobile, nontender, no palpable masses. Emerson adnexa reveal no evidence of masses; no fullness   SKIN: Warm and Dry  PSYCHIATRIC: Patient is awake and alert. Mood and affect are normal.Denies SI/HI    Assessment:   Women's annual routine gynecological examination    Dysuria  -     Wet Prep, Genital  -     Chlamydia/GC, PCR  -     Urinalysis, Reflex to Urine Culture Urine, Clean Catch; Future; Expected date: 2022    Screening mammogram for breast cancer  -     Mammo " Digital Screening Bilat w/ Claude; Future; Expected date: 02/10/2023          1. Women's annual routine gynecological examination    2. Dysuria    3. Screening mammogram for breast cancer               -pelvic; pap utd per acog guidelines  -pt unable to provide urine sample in clinic; outpatient ua ordered  -encouraged f/u with pcp for better glucose control; states has an appt tomorrow  -info for [employer] given to pt if she would like additional support for her depression; informed pt that she has to call herself for an appt  - Contact clinic with any vaginal bleeding as this would be abnormal in postmenopausal pt.   Plan:       Follow up in about 1 year (around 11/10/2023).

## 2022-11-11 ENCOUNTER — OFFICE VISIT (OUTPATIENT)
Dept: HEMATOLOGY/ONCOLOGY | Facility: CLINIC | Age: 57
End: 2022-11-11
Payer: MEDICAID

## 2022-11-11 VITALS
DIASTOLIC BLOOD PRESSURE: 69 MMHG | TEMPERATURE: 98 F | BODY MASS INDEX: 35.03 KG/M2 | SYSTOLIC BLOOD PRESSURE: 125 MMHG | RESPIRATION RATE: 20 BRPM | OXYGEN SATURATION: 97 % | WEIGHT: 218 LBS | HEIGHT: 66 IN | HEART RATE: 78 BPM

## 2022-11-11 DIAGNOSIS — D50.9 IRON DEFICIENCY ANEMIA, UNSPECIFIED IRON DEFICIENCY ANEMIA TYPE: Primary | ICD-10-CM

## 2022-11-11 DIAGNOSIS — Z79.899 ENCOUNTER FOR MEDICATION MANAGEMENT: ICD-10-CM

## 2022-11-11 DIAGNOSIS — D50.8 OTHER IRON DEFICIENCY ANEMIA: Primary | ICD-10-CM

## 2022-11-11 DIAGNOSIS — D75.839 THROMBOCYTOSIS: ICD-10-CM

## 2022-11-11 DIAGNOSIS — D50.8 OTHER IRON DEFICIENCY ANEMIA: ICD-10-CM

## 2022-11-11 DIAGNOSIS — Z72.0 TOBACCO USE: ICD-10-CM

## 2022-11-11 LAB
ALBUMIN SERPL-MCNC: 3.7 GM/DL (ref 3.5–5)
ALBUMIN/GLOB SERPL: 1.1 RATIO (ref 1.1–2)
ALP SERPL-CCNC: 87 UNIT/L (ref 40–150)
ALT SERPL-CCNC: 12 UNIT/L (ref 0–55)
AST SERPL-CCNC: 13 UNIT/L (ref 5–34)
BASOPHILS # BLD AUTO: 0.08 X10(3)/MCL (ref 0–0.2)
BASOPHILS NFR BLD AUTO: 0.6 %
BILIRUBIN DIRECT+TOT PNL SERPL-MCNC: 0.4 MG/DL
BUN SERPL-MCNC: 14.3 MG/DL (ref 9.8–20.1)
CALCIUM SERPL-MCNC: 9.9 MG/DL (ref 8.4–10.2)
CHLORIDE SERPL-SCNC: 105 MMOL/L (ref 98–107)
CO2 SERPL-SCNC: 27 MMOL/L (ref 22–29)
CREAT SERPL-MCNC: 0.72 MG/DL (ref 0.55–1.02)
EOSINOPHIL # BLD AUTO: 0.38 X10(3)/MCL (ref 0–0.9)
EOSINOPHIL NFR BLD AUTO: 3 %
ERYTHROCYTE [DISTWIDTH] IN BLOOD BY AUTOMATED COUNT: 15.7 % (ref 11.5–17)
FERRITIN SERPL-MCNC: 251.7 NG/ML (ref 4.63–204)
GFR SERPLBLD CREATININE-BSD FMLA CKD-EPI: >60 MLS/MIN/1.73/M2
GLOBULIN SER-MCNC: 3.3 GM/DL (ref 2.4–3.5)
GLUCOSE SERPL-MCNC: 131 MG/DL (ref 74–100)
HCT VFR BLD AUTO: 39.1 % (ref 37–47)
HGB BLD-MCNC: 12.8 GM/DL (ref 12–16)
IMM GRANULOCYTES # BLD AUTO: 0.46 X10(3)/MCL (ref 0–0.04)
IMM GRANULOCYTES NFR BLD AUTO: 3.6 %
LYMPHOCYTES # BLD AUTO: 1.98 X10(3)/MCL (ref 0.6–4.6)
LYMPHOCYTES NFR BLD AUTO: 15.7 %
MCH RBC QN AUTO: 30.5 PG (ref 27–31)
MCHC RBC AUTO-ENTMCNC: 32.7 MG/DL (ref 33–36)
MCV RBC AUTO: 93.1 FL (ref 80–94)
MONOCYTES # BLD AUTO: 0.86 X10(3)/MCL (ref 0.1–1.3)
MONOCYTES NFR BLD AUTO: 6.8 %
NEUTROPHILS # BLD AUTO: 8.9 X10(3)/MCL (ref 2.1–9.2)
NEUTROPHILS NFR BLD AUTO: 70.3 %
NRBC BLD AUTO-RTO: 0 %
PLATELET # BLD AUTO: 543 X10(3)/MCL (ref 130–400)
PMV BLD AUTO: 10.9 FL (ref 7.4–10.4)
POTASSIUM SERPL-SCNC: 4.2 MMOL/L (ref 3.5–5.1)
PROT SERPL-MCNC: 7 GM/DL (ref 6.4–8.3)
RBC # BLD AUTO: 4.2 X10(6)/MCL (ref 4.2–5.4)
SODIUM SERPL-SCNC: 142 MMOL/L (ref 136–145)
WBC # SPEC AUTO: 12.7 X10(3)/MCL (ref 4.5–11.5)

## 2022-11-11 PROCEDURE — 99214 OFFICE O/P EST MOD 30 MIN: CPT | Mod: S$PBB,,, | Performed by: NURSE PRACTITIONER

## 2022-11-11 PROCEDURE — 3078F PR MOST RECENT DIASTOLIC BLOOD PRESSURE < 80 MM HG: ICD-10-PCS | Mod: CPTII,,, | Performed by: NURSE PRACTITIONER

## 2022-11-11 PROCEDURE — 3074F PR MOST RECENT SYSTOLIC BLOOD PRESSURE < 130 MM HG: ICD-10-PCS | Mod: CPTII,,, | Performed by: NURSE PRACTITIONER

## 2022-11-11 PROCEDURE — 99215 OFFICE O/P EST HI 40 MIN: CPT | Mod: PBBFAC | Performed by: NURSE PRACTITIONER

## 2022-11-11 PROCEDURE — 1159F PR MEDICATION LIST DOCUMENTED IN MEDICAL RECORD: ICD-10-PCS | Mod: CPTII,,, | Performed by: NURSE PRACTITIONER

## 2022-11-11 PROCEDURE — 4010F ACE/ARB THERAPY RXD/TAKEN: CPT | Mod: CPTII,,, | Performed by: NURSE PRACTITIONER

## 2022-11-11 PROCEDURE — 3008F PR BODY MASS INDEX (BMI) DOCUMENTED: ICD-10-PCS | Mod: CPTII,,, | Performed by: NURSE PRACTITIONER

## 2022-11-11 PROCEDURE — 99214 PR OFFICE/OUTPT VISIT, EST, LEVL IV, 30-39 MIN: ICD-10-PCS | Mod: S$PBB,,, | Performed by: NURSE PRACTITIONER

## 2022-11-11 PROCEDURE — 1159F MED LIST DOCD IN RCRD: CPT | Mod: CPTII,,, | Performed by: NURSE PRACTITIONER

## 2022-11-11 PROCEDURE — 36415 COLL VENOUS BLD VENIPUNCTURE: CPT

## 2022-11-11 PROCEDURE — 3078F DIAST BP <80 MM HG: CPT | Mod: CPTII,,, | Performed by: NURSE PRACTITIONER

## 2022-11-11 PROCEDURE — 4010F PR ACE/ARB THEARPY RXD/TAKEN: ICD-10-PCS | Mod: CPTII,,, | Performed by: NURSE PRACTITIONER

## 2022-11-11 PROCEDURE — 3008F BODY MASS INDEX DOCD: CPT | Mod: CPTII,,, | Performed by: NURSE PRACTITIONER

## 2022-11-11 PROCEDURE — 3074F SYST BP LT 130 MM HG: CPT | Mod: CPTII,,, | Performed by: NURSE PRACTITIONER

## 2022-11-11 RX ORDER — SODIUM CHLORIDE 0.9 % (FLUSH) 0.9 %
10 SYRINGE (ML) INJECTION
Status: CANCELLED | OUTPATIENT
Start: 2022-11-11

## 2022-11-11 RX ORDER — HEPARIN 100 UNIT/ML
500 SYRINGE INTRAVENOUS
Status: CANCELLED | OUTPATIENT
Start: 2022-11-11

## 2022-11-11 RX ORDER — HEPARIN 100 UNIT/ML
500 SYRINGE INTRAVENOUS
Status: CANCELLED | OUTPATIENT
Start: 2023-01-12

## 2022-11-11 RX ORDER — SODIUM CHLORIDE 0.9 % (FLUSH) 0.9 %
10 SYRINGE (ML) INJECTION
Status: CANCELLED | OUTPATIENT
Start: 2023-01-12

## 2022-11-11 NOTE — PROGRESS NOTES
Chief Complaint   Iron Deficiency Anemia; follow up     Treatment History:  Ferrous sulfate 325mg po daily started 2020  Feraheme x 2 doses as needed  Aspirin 81 mg daily    Past medical history: NIDDM. Hypertension. Dyslipidemia. History of anemia. Vitamin D deficiency. Tobacco abuse. Obesity. Obstructive sleep apnea. HFpEF. Leukocytosis. Cholecystectomy. Goiter surgery.  Social history: Single. Lives in [place], [place]. Has 2 grown up sons. Has been smoking 1-1/2 packs of cigarettes daily since age 10. Has tried to quit smoking many times but does not get refills of Chantix, therefore, has been unable to. Currently, down to 10 cigarettes daily. Denies history of alcohol or illicit drug abuse.  Family history: Sister  from cervical cancer at age 55.  Health maintenance:  EGD 2018 (Dr. Koo in [place]: Esophageal dilation).   Colonoscopy 2018 (Dr. Koo in [place]), apparently to be repeated in 2 years.   Mammogram 2018: Negative for malignancy.  ThinPrep cervical Pap smear 2016: Negative for intraepithelial lesion or malignancy; negative for HPV high-risk types (type 16 and type 18/45)    Menstrual and OB/GYN history: Menopause in . For last 3 months or so, has experienced vaginal spotting. Apparently, had Pap smear done at [facility] 2 months back (probably 2018), which was apparently unremarkable. Pelvic ultrasound on 2019 showed normal endometrium; likely myomatous changes of uterus with a lower uterine segment lipoma leiomyoma.     History if Present Illness   57 year-old lady referred from [facility] for evaluation of leukocytosis, iron deficiency anemia.     Interval history 22: Clinic follow up today for iron deficiency anemia. In the past has received IV Feraheme infusions. She reports the same weakness symptoms and reportedly reports an episode in the interim since her last visit of nose  bleeding with no further occurrences. Otherwise no new significant complaints or concerns.     Review of Systems Negative except as otherwise stated in HPI     Physical Exam   Vitals:    11/11/22 1006   BP: 125/69   Pulse: 78   Resp: 20   Temp: 97.7 °F (36.5 °C)     General: fatigued; NAD   Eye: Pupils are equal, round and reactive to light, Extraocular movements intact, normal conjunctiva  HENT: Normocephalic. Oropharynx exam deferred; mask in place due to coronavirus pandemic  Neck: Supple, nontender  Respiratory: Respirations are non-labored, Breath sounds CTA bilaterally, Symmetrical chest wall expansion  Cardiovascular: RRR, normal peripheral perfusion, No edema  Breast: Exam deferred  Gastrointestinal: Non tender, non distended; present bowel sounds  GYN: Exam deferred  Genitourinary: Exam deferred  Lymphatics: No palpable adenopathies appreciated  Musculoskeletal: Moves all extremities  Integumentary: Intact  Neurologic: Alert and oriented. No focal defects  Psychiatric: Cooperative. Appropriate mood and affect     Assessment   1.) Iron deficiency anemia   Recurrent  Presented as moderate anemia with severe microcytosis   EGD, colonoscopy negative for any bleeders (02/2018)  Severe iron deficiency   Feraheme (03/2019)  Hemoglobin dropped from 14.8 (06/2019) to 9.5 (08/28/2019) due to iron deficiency  Feraheme x2 (09/2019) --> improvement in iron stores and normalization of hemoglobin (9.5 --> 13.7)  CT enterogram (09/11/2019): Hiatal hernia; 1.7 cm left adrenal nodule  -03/10/2020: Remains iron deficient (transferrin saturation 13.2%, ferritin 19.2)  -Did not present for Feraheme shots in a timely manner  -04/27/2020: Ferritin 4.9 (down from 19.2 on 03/10/2020); however, hemoglobin 12.4, MCV 83.0  (Iron deficiency erythropoiesis)  -Feraheme 510 mg IV x2 (04/27/2020; 05/04/2020)  -06/01/2020: Iron deficiency resolved with parenteral iron therapy  -However, as of 06/01/2020, severe recurrent iron deficiency  remains unexplained  -11/11/2022: Adequate ferritin level 251 on labs     Plan:   -Referral to GI at this location for further evaluation; in the past apparently had EGD / Colonoscopy most recently in 2020 in [place] - she is wanting to establish care closer to home ; scheduled with GI here on 11/29/22  -Adequate ferritin level today therefore holding off on IV iron supplementation  -RTC for labs, visit in 2 months; future doses of IV iron to be given if needed based on iron studies     2.) Thrombocytosis (low risk IPSET-thrombosis score ET)   (age < 60 years; JAK2 V617F mutation positive)  GAGANDEEP-2 V617F mutation positive (03/2019)  She has low risk IPSET-thrombosis score ET (age <60 years; GAGANDEEP-2 V617F mutation positive)  Bone marrow consistent (06/2019)  Low risk IPSET-thrombosis score ET  Cardiovascular risk factors: NIDDM, hypertension, tobacco abuse  No indication of myelosuppressive therapy    Plan: Cont baby aspirin daily; platelets stable     3.) Chronic, very mild, benign-appearing, intermittent leukocytosis  Secondary to smoking or underlying myeloproliferative disorder  GAGANDEEP-2 V617F mutation positive (03/2019)  BCR-ABL 1 fusion transcripts negative (03/2019)  Underlying essential thrombocytosis; stable     4.) Health maintenance:  - Screening mammogram 07/03/2019 was BI-RADS 2, benign  -12/28/2020: bilateral MMG: Right Breast: Negative (BI-RADS 1) Left Breast: Negative (BI-RADS 1)  -1/7/22 Right Breast: Negative (BI-RADS 1); Left Breast: Negative (BI-RADS 1)    Plan: next annual screening MMG due 1/27/23     5.) Mood changes / depression  - denies any thoughts of harming self, others or thoughts of suicidal ideation    - Plan: referral to psych for further evaluation

## 2022-11-29 ENCOUNTER — OFFICE VISIT (OUTPATIENT)
Dept: GASTROENTEROLOGY | Facility: CLINIC | Age: 57
End: 2022-11-29
Payer: MEDICAID

## 2022-11-29 VITALS
WEIGHT: 218.19 LBS | HEIGHT: 67 IN | OXYGEN SATURATION: 97 % | DIASTOLIC BLOOD PRESSURE: 70 MMHG | BODY MASS INDEX: 34.25 KG/M2 | TEMPERATURE: 98 F | SYSTOLIC BLOOD PRESSURE: 108 MMHG | HEART RATE: 66 BPM | RESPIRATION RATE: 16 BRPM

## 2022-11-29 DIAGNOSIS — D50.9 IRON DEFICIENCY ANEMIA, UNSPECIFIED IRON DEFICIENCY ANEMIA TYPE: ICD-10-CM

## 2022-11-29 DIAGNOSIS — Z86.010 PERSONAL HISTORY OF COLONIC POLYPS: Primary | ICD-10-CM

## 2022-11-29 DIAGNOSIS — R10.13 EPIGASTRIC ABDOMINAL PAIN: ICD-10-CM

## 2022-11-29 DIAGNOSIS — Z72.0 TOBACCO USER: ICD-10-CM

## 2022-11-29 PROBLEM — Z86.0100 PERSONAL HISTORY OF COLONIC POLYPS: Status: ACTIVE | Noted: 2022-11-29

## 2022-11-29 PROCEDURE — 3078F DIAST BP <80 MM HG: CPT | Mod: CPTII,,, | Performed by: NURSE PRACTITIONER

## 2022-11-29 PROCEDURE — 1160F RVW MEDS BY RX/DR IN RCRD: CPT | Mod: CPTII,,, | Performed by: NURSE PRACTITIONER

## 2022-11-29 PROCEDURE — 4010F PR ACE/ARB THEARPY RXD/TAKEN: ICD-10-PCS | Mod: CPTII,,, | Performed by: NURSE PRACTITIONER

## 2022-11-29 PROCEDURE — 99214 PR OFFICE/OUTPT VISIT, EST, LEVL IV, 30-39 MIN: ICD-10-PCS | Mod: S$PBB,,, | Performed by: NURSE PRACTITIONER

## 2022-11-29 PROCEDURE — 99215 OFFICE O/P EST HI 40 MIN: CPT | Mod: PBBFAC | Performed by: NURSE PRACTITIONER

## 2022-11-29 PROCEDURE — 3008F BODY MASS INDEX DOCD: CPT | Mod: CPTII,,, | Performed by: NURSE PRACTITIONER

## 2022-11-29 PROCEDURE — 4010F ACE/ARB THERAPY RXD/TAKEN: CPT | Mod: CPTII,,, | Performed by: NURSE PRACTITIONER

## 2022-11-29 PROCEDURE — 3078F PR MOST RECENT DIASTOLIC BLOOD PRESSURE < 80 MM HG: ICD-10-PCS | Mod: CPTII,,, | Performed by: NURSE PRACTITIONER

## 2022-11-29 PROCEDURE — 1159F PR MEDICATION LIST DOCUMENTED IN MEDICAL RECORD: ICD-10-PCS | Mod: CPTII,,, | Performed by: NURSE PRACTITIONER

## 2022-11-29 PROCEDURE — 99214 OFFICE O/P EST MOD 30 MIN: CPT | Mod: S$PBB,,, | Performed by: NURSE PRACTITIONER

## 2022-11-29 PROCEDURE — 3074F SYST BP LT 130 MM HG: CPT | Mod: CPTII,,, | Performed by: NURSE PRACTITIONER

## 2022-11-29 PROCEDURE — 3074F PR MOST RECENT SYSTOLIC BLOOD PRESSURE < 130 MM HG: ICD-10-PCS | Mod: CPTII,,, | Performed by: NURSE PRACTITIONER

## 2022-11-29 PROCEDURE — 3008F PR BODY MASS INDEX (BMI) DOCUMENTED: ICD-10-PCS | Mod: CPTII,,, | Performed by: NURSE PRACTITIONER

## 2022-11-29 PROCEDURE — 1159F MED LIST DOCD IN RCRD: CPT | Mod: CPTII,,, | Performed by: NURSE PRACTITIONER

## 2022-11-29 PROCEDURE — 1160F PR REVIEW ALL MEDS BY PRESCRIBER/CLIN PHARMACIST DOCUMENTED: ICD-10-PCS | Mod: CPTII,,, | Performed by: NURSE PRACTITIONER

## 2022-11-29 NOTE — ASSESSMENT & PLAN NOTE
She underwent colonoscopy with Dr. Koo February 26, 2018 with findings of diverticulosis in the left side of the colon, sigmoid colon and ascending colon, normal mucosa in the right side of the colon, and tubular adenomatous polyp in the ascending colon with a recommended recall of 5 years.

## 2022-11-29 NOTE — ASSESSMENT & PLAN NOTE
Requested colonoscopy results from 1 year ago from Dr. Wong for further review  GERD lifestyle modifications  Reflux precautions  Limit NSAID use  Recommend tobacco cessation   Continue pantoprazole 40 mg daily  EGD and colonoscopy (may cancel colonoscopy procedure pending review of results from Dr. Wong)  Call with updates  ER precautions provided

## 2022-11-29 NOTE — PROGRESS NOTES
Subjective:       Patient ID: Jessica Walker is a 57 y.o. female.    Chief Complaint: Anemia and Abdominal Pain (epigastric)    This 57-year-old  female with a history of pancreatitis, depression, diabetes mellitus, hypertension, and cholecystectomy is referred for anemia.  She presents unaccompanied.  She reports intermittent epigastric abdominal pain described as sharp and aching and nonradiating for approximately one year.  She is unable to identify specific triggers and reports relief with pantoprazole 40 mg daily.  She has occasional nausea without vomiting.  She has frequent regurgitation of acid and burning noted at the back of her throat.  Her appetite is fair and she denies unintentional weight loss.  She denies nocturnal symptoms, fever, chills, hematemesis, odynophagia, dysphagia, or early satiety.  Bowel habits are described as formed stools once daily without melena, hematochezia, fecal urgency, fecal incontinence, or pain with defecation.  She has received iron infusions in the past and denies currently taking oral iron supplementation.     Gastric emptying study was within normal limits September 1, 2022.      She reports having an EGD with Dr. Koo 4-5 years ago but is unsure of the results of the procedure.  She reports having a colonoscopy approximately 1 year ago with Dr. Wong (will request results).  She underwent colonoscopy with Dr. Koo February 26, 2018 with findings of diverticulosis in the left side of the colon, sigmoid colon and ascending colon, normal mucosa in the right side of the colon, and tubular adenomatous polyp in the ascending colon with a recommended recall of 5 years.  She takes aspirin 81 mg daily.  She smokes 1 pack of cigarettes daily and denies alcohol use.  She denies a family history of IBD, colon polyps, or colon cancer.    Review of patient's allergies indicates:  No Known Allergies    Past Medical History:   Diagnosis Date    Anemia      Depression     Diabetes mellitus     Hypertension      Past Surgical History:   Procedure Laterality Date    CHOLECYSTECTOMY      COLONOSCOPY  10/2021     Family History:   family history includes Breast cancer in her sister; Cervical cancer in her sister; Coronary artery disease in her mother; Diabetes in her father and mother; Fibromyalgia in her sister.    Social History:    reports that she has been smoking cigarettes. She has been smoking an average of 1 pack per day. She has never used smokeless tobacco. She reports that she does not currently use alcohol. She reports that she does not currently use drugs.    Review of Systems  Negative except as noted in the HPI.      Objective:      Physical Exam  Constitutional:       Appearance: Normal appearance.   HENT:      Head: Normocephalic.      Mouth/Throat:      Mouth: Mucous membranes are moist.   Eyes:      Extraocular Movements: Extraocular movements intact.      Conjunctiva/sclera: Conjunctivae normal.      Pupils: Pupils are equal, round, and reactive to light.   Cardiovascular:      Rate and Rhythm: Normal rate and regular rhythm.      Pulses: Normal pulses.      Heart sounds: Normal heart sounds.   Pulmonary:      Effort: Pulmonary effort is normal.      Breath sounds: Normal breath sounds.   Abdominal:      General: Bowel sounds are normal.      Palpations: Abdomen is soft.   Musculoskeletal:         General: Normal range of motion.      Cervical back: Normal range of motion and neck supple.   Skin:     General: Skin is warm and dry.   Neurological:      General: No focal deficit present.      Mental Status: She is alert and oriented to person, place, and time.   Psychiatric:         Mood and Affect: Mood normal.         Behavior: Behavior normal.         Thought Content: Thought content normal.         Judgment: Judgment normal.       Home Medications:     Current Outpatient Medications   Medication Sig    pantoprazole (PROTONIX) 40 MG tablet Take 40 mg  by mouth once daily.    aspirin (ECOTRIN) 81 MG EC tablet Take 81 mg by mouth once daily.    atorvastatin (LIPITOR) 40 MG tablet Take 1 tablet (40 mg total) by mouth every evening.    FEROSUL 325 mg (65 mg iron) Tab tablet     ferrous sulfate (FEOSOL) 325 mg (65 mg iron) Tab tablet Take 325 mg by mouth.    FLUoxetine 20 MG capsule 1 capsule    glipiZIDE (GLUCOTROL) 5 MG tablet     ibuprofen (ADVIL,MOTRIN) 800 MG tablet Take 800 mg by mouth.    insulin asp prt-insulin aspart, NovoLOG 70/30, (NOVOLOG 70/30) 100 unit/mL (70-30) Soln as directed    insulin glargine (LANTUS) 100 unit/mL injection Inject into the skin.    LANTUS SOLOSTAR U-100 INSULIN glargine 100 units/mL (3mL) SubQ pen     linaCLOtide (LINZESS) 72 mcg Cap capsule 1 capsule at least 30 minutes before the first meal of the day on an empty stomach    lisinopriL (PRINIVIL,ZESTRIL) 20 MG tablet Take 1 tablet (20 mg total) by mouth once daily.    metFORMIN (GLUCOPHAGE) 1000 MG tablet TAKE ONE TABLET BY MOUTH TWICE A DAY    metoprolol tartrate (LOPRESSOR) 25 MG tablet Take 1 tablet (25 mg total) by mouth 2 (two) times daily.    nicotine (NICODERM CQ) 14 mg/24 hr 1 patch once daily.    nitroGLYCERIN (NITROSTAT) 0.4 MG SL tablet Place 1 tablet (0.4 mg total) under the tongue every 5 (five) minutes as needed for Chest pain.    omeprazole (PRILOSEC) 20 MG capsule Take 20 mg by mouth.    omeprazole (PRILOSEC) 40 MG capsule Take 40 mg by mouth every morning.    QUEtiapine (SEROQUEL) 50 MG tablet Take 50 mg by mouth nightly.    TRUE METRIX GLUCOSE TEST STRIP Strp USE 1 STRIP TO CHECK GLUCOSE ONCE DAILY    TRULICITY 1.5 mg/0.5 mL pen injector Inject 1.5 mg into the skin every 7 days.    varenicline (CHANTIX) 1 mg Tab Take 1 tablet (1 mg total) by mouth 2 (two) times daily.     Current Facility-Administered Medications   Medication Frequency    DOBUtamine 1000 mg in D5W 250 mL infusion Continuous     Laboratory Results:     Recent Results (from the past 2016 hour(s))    Wet Prep, Genital    Collection Time: 11/10/22  8:45 AM    Specimen: Cervicovaginal; Genital   Result Value Ref Range    WBC, Wet Prep None Seen None Seen    Clue Cells, Wet Prep None Seen None Seen    Trichomonas, Wet Prep None Seen None Seen    Yeast, Wet Prep None Seen None Seen   Chlamydia/GC, PCR    Collection Time: 11/10/22  8:45 AM    Specimen: Endocervical; Genital   Result Value Ref Range    Chlamydia trachomatis PCR Not Detected Not Detected    N. gonorrhea PCR Not Detected Not Detected   Comprehensive Metabolic Panel    Collection Time: 11/11/22  6:33 AM   Result Value Ref Range    Sodium Level 142 136 - 145 mmol/L    Potassium Level 4.2 3.5 - 5.1 mmol/L    Chloride 105 98 - 107 mmol/L    Carbon Dioxide 27 22 - 29 mmol/L    Glucose Level 131 (H) 74 - 100 mg/dL    Blood Urea Nitrogen 14.3 9.8 - 20.1 mg/dL    Creatinine 0.72 0.55 - 1.02 mg/dL    Calcium Level Total 9.9 8.4 - 10.2 mg/dL    Protein Total 7.0 6.4 - 8.3 gm/dL    Albumin Level 3.7 3.5 - 5.0 gm/dL    Globulin 3.3 2.4 - 3.5 gm/dL    Albumin/Globulin Ratio 1.1 1.1 - 2.0 ratio    Bilirubin Total 0.4 <=1.5 mg/dL    Alkaline Phosphatase 87 40 - 150 unit/L    Alanine Aminotransferase 12 0 - 55 unit/L    Aspartate Aminotransferase 13 5 - 34 unit/L    eGFR >60 mls/min/1.73/m2   Ferritin    Collection Time: 11/11/22  6:33 AM   Result Value Ref Range    Ferritin Level 251.70 (H) 4.63 - 204.00 ng/mL   CBC with Differential    Collection Time: 11/11/22  6:33 AM   Result Value Ref Range    WBC 12.7 (H) 4.5 - 11.5 x10(3)/mcL    RBC 4.20 4.20 - 5.40 x10(6)/mcL    Hgb 12.8 12.0 - 16.0 gm/dL    Hct 39.1 37.0 - 47.0 %    MCV 93.1 80.0 - 94.0 fL    MCH 30.5 27.0 - 31.0 pg    MCHC 32.7 (L) 33.0 - 36.0 mg/dL    RDW 15.7 11.5 - 17.0 %    Platelet 543 (H) 130 - 400 x10(3)/mcL    MPV 10.9 (H) 7.4 - 10.4 fL    Neut % 70.3 %    Lymph % 15.7 %    Mono % 6.8 %    Eos % 3.0 %    Basophil % 0.6 %    Lymph # 1.98 0.6 - 4.6 x10(3)/mcL    Neut # 8.9 2.1 - 9.2 x10(3)/mcL     Mono # 0.86 0.1 - 1.3 x10(3)/mcL    Eos # 0.38 0 - 0.9 x10(3)/mcL    Baso # 0.08 0 - 0.2 x10(3)/mcL    IG# 0.46 (H) 0 - 0.04 x10(3)/mcL    IG% 3.6 %    NRBC% 0.0 %     Imaging Results:     Narrative & Impression  EXAMINATION:  NM GASTRIC EMPTYING     CLINICAL HISTORY:  R14.0;Abdominal distension (gaseous)     TECHNIQUE:  Scintigraphic analysis of solid gastric motility was  performed following 2.9 mCi of Technetium 99 sulfur colloid labeled solid meal. Time activity curve was generated.     FINDINGS:  There was normal progression of stomach contents. Meal moved from the gastric fundus to the antrum in a normal emptying linear pattern.     70% gastric emptying occurred in one hour and there was 85% gastric emptying occurred in 90 minutes. There was 93% gastric emptying in 2 hours. Delayed scintigraphy was performed and 100 % gastric emptying occurred in 3 hour.     Achievement of the half emptying time or time required by the stomach to empty 50% of the ingested material in 90 minutes is regarded adequate.     Impression:     Solid gastric emptying within normal limits.        Electronically signed by: Thai Davila  Date:                                            09/09/2022  Time:                                           14:20    Assessment/Plan:     Problem List Items Addressed This Visit          Oncology    Iron deficiency anemia     Requested colonoscopy results from 1 year ago from Dr. Wong for further review  GERD lifestyle modifications  Reflux precautions  Limit NSAID use  Recommend tobacco cessation   Continue pantoprazole 40 mg daily  EGD and colonoscopy (may cancel colonoscopy procedure pending review of results from Dr. Wong)  Call with updates  ER precautions provided           Relevant Orders    Case Request Endoscopy: EGD (ESOPHAGOGASTRODUODENOSCOPY), COLONOSCOPY (Completed)       GI    Epigastric abdominal pain     See above         Relevant Orders    Case Request Endoscopy: EGD  (ESOPHAGOGASTRODUODENOSCOPY), COLONOSCOPY (Completed)    Personal history of colonic polyps - Primary     She underwent colonoscopy with Dr. Koo February 26, 2018 with findings of diverticulosis in the left side of the colon, sigmoid colon and ascending colon, normal mucosa in the right side of the colon, and tubular adenomatous polyp in the ascending colon with a recommended recall of 5 years.            Other    Tobacco user     Recommend tobacco cessation.         Relevant Orders    Ambulatory referral/consult to Smoking Cessation Program

## 2022-11-30 ENCOUNTER — PATIENT OUTREACH (OUTPATIENT)
Dept: GASTROENTEROLOGY | Facility: CLINIC | Age: 57
End: 2022-11-30
Payer: MEDICAID

## 2022-12-02 ENCOUNTER — TELEPHONE (OUTPATIENT)
Dept: GASTROENTEROLOGY | Facility: CLINIC | Age: 57
End: 2022-12-02
Payer: MEDICAID

## 2022-12-02 NOTE — TELEPHONE ENCOUNTER
Pt notified of results/recommendations, verbalized understanding. EGD/Capsule scheduled 6/26/23 per pt request. Instructions mailed to pt.    ----- Message from ANAHI Arreguin sent at 11/30/2022  1:11 PM CST -----  Regarding: EGD and colonoscopy; capsule study  I have reviewed EGD and colonoscopy report from Dr. Wong from 10/27/21.  Colonoscopy with findings of diffuse diverticulosis and EGD with findings of large hiatal hernia and gastric punctate AVM fulgurated with APC, small superficial duodenal ulcerations.  Pathology benign.  Her colonoscopy procedure can be canceled.  I would recommend she undergo EGD with capsule placement secondary to noted AVMs on EGD in October 2021.  I have placed an order for capsule study.  Please provide ER precautions for any overt GI bleeding.  Thanks    ----- Message -----  From: Ally Cabrera LPN  Sent: 11/30/2022  12:21 PM CST  To: ANAHI Arreguin    Colonoscopy Dr. Wong 10/27/2021

## 2023-01-10 PROBLEM — D47.3 ESSENTIAL THROMBOCYTOSIS: Status: ACTIVE | Noted: 2023-01-10

## 2023-01-10 PROBLEM — Z15.89 JAK2 GENE MUTATION: Status: ACTIVE | Noted: 2023-01-10

## 2023-01-11 ENCOUNTER — INFUSION (OUTPATIENT)
Dept: INFUSION THERAPY | Facility: HOSPITAL | Age: 58
End: 2023-01-11
Attending: INTERNAL MEDICINE
Payer: MEDICAID

## 2023-01-11 ENCOUNTER — OFFICE VISIT (OUTPATIENT)
Dept: HEMATOLOGY/ONCOLOGY | Facility: CLINIC | Age: 58
End: 2023-01-11
Payer: MEDICAID

## 2023-01-11 VITALS
SYSTOLIC BLOOD PRESSURE: 115 MMHG | DIASTOLIC BLOOD PRESSURE: 76 MMHG | HEART RATE: 82 BPM | RESPIRATION RATE: 20 BRPM | HEIGHT: 67 IN | TEMPERATURE: 97 F | WEIGHT: 219.38 LBS | BODY MASS INDEX: 34.43 KG/M2 | OXYGEN SATURATION: 99 %

## 2023-01-11 DIAGNOSIS — Z15.89 JAK2 GENE MUTATION: ICD-10-CM

## 2023-01-11 DIAGNOSIS — D50.8 OTHER IRON DEFICIENCY ANEMIA: Primary | ICD-10-CM

## 2023-01-11 DIAGNOSIS — E61.1 IRON DEFICIENCY: Primary | ICD-10-CM

## 2023-01-11 DIAGNOSIS — D47.3 ESSENTIAL THROMBOCYTOSIS: ICD-10-CM

## 2023-01-11 PROCEDURE — 1160F PR REVIEW ALL MEDS BY PRESCRIBER/CLIN PHARMACIST DOCUMENTED: ICD-10-PCS | Mod: CPTII,,, | Performed by: INTERNAL MEDICINE

## 2023-01-11 PROCEDURE — 96365 THER/PROPH/DIAG IV INF INIT: CPT

## 2023-01-11 PROCEDURE — 63600175 PHARM REV CODE 636 W HCPCS: Mod: JG | Performed by: NURSE PRACTITIONER

## 2023-01-11 PROCEDURE — 1160F RVW MEDS BY RX/DR IN RCRD: CPT | Mod: CPTII,,, | Performed by: INTERNAL MEDICINE

## 2023-01-11 PROCEDURE — 3008F PR BODY MASS INDEX (BMI) DOCUMENTED: ICD-10-PCS | Mod: CPTII,,, | Performed by: INTERNAL MEDICINE

## 2023-01-11 PROCEDURE — 1159F MED LIST DOCD IN RCRD: CPT | Mod: CPTII,,, | Performed by: INTERNAL MEDICINE

## 2023-01-11 PROCEDURE — 3074F PR MOST RECENT SYSTOLIC BLOOD PRESSURE < 130 MM HG: ICD-10-PCS | Mod: CPTII,,, | Performed by: INTERNAL MEDICINE

## 2023-01-11 PROCEDURE — 3008F BODY MASS INDEX DOCD: CPT | Mod: CPTII,,, | Performed by: INTERNAL MEDICINE

## 2023-01-11 PROCEDURE — 99214 OFFICE O/P EST MOD 30 MIN: CPT | Mod: S$PBB,,, | Performed by: INTERNAL MEDICINE

## 2023-01-11 PROCEDURE — 99214 OFFICE O/P EST MOD 30 MIN: CPT | Mod: PBBFAC | Performed by: INTERNAL MEDICINE

## 2023-01-11 PROCEDURE — 99214 PR OFFICE/OUTPT VISIT, EST, LEVL IV, 30-39 MIN: ICD-10-PCS | Mod: S$PBB,,, | Performed by: INTERNAL MEDICINE

## 2023-01-11 PROCEDURE — 25000003 PHARM REV CODE 250: Performed by: NURSE PRACTITIONER

## 2023-01-11 PROCEDURE — 3074F SYST BP LT 130 MM HG: CPT | Mod: CPTII,,, | Performed by: INTERNAL MEDICINE

## 2023-01-11 PROCEDURE — 3078F PR MOST RECENT DIASTOLIC BLOOD PRESSURE < 80 MM HG: ICD-10-PCS | Mod: CPTII,,, | Performed by: INTERNAL MEDICINE

## 2023-01-11 PROCEDURE — 3078F DIAST BP <80 MM HG: CPT | Mod: CPTII,,, | Performed by: INTERNAL MEDICINE

## 2023-01-11 PROCEDURE — 1159F PR MEDICATION LIST DOCUMENTED IN MEDICAL RECORD: ICD-10-PCS | Mod: CPTII,,, | Performed by: INTERNAL MEDICINE

## 2023-01-11 RX ORDER — SODIUM CHLORIDE 0.9 % (FLUSH) 0.9 %
10 SYRINGE (ML) INJECTION
Status: DISCONTINUED | OUTPATIENT
Start: 2023-01-11 | End: 2023-01-11 | Stop reason: HOSPADM

## 2023-01-11 RX ORDER — HEPARIN 100 UNIT/ML
500 SYRINGE INTRAVENOUS
Status: DISCONTINUED | OUTPATIENT
Start: 2023-01-11 | End: 2023-01-11 | Stop reason: HOSPADM

## 2023-01-11 RX ADMIN — FERUMOXYTOL 510 MG: 510 INJECTION INTRAVENOUS at 03:01

## 2023-01-11 RX ADMIN — SODIUM CHLORIDE: 9 INJECTION, SOLUTION INTRAVENOUS at 03:01

## 2023-01-11 NOTE — NURSING
0821  Pt did labs, saw provider, and here for #1 feraheme.  Reports epigastric burning 2/10.  Also reports SOB, constipation, nausea, and fatigue.

## 2023-01-11 NOTE — PROGRESS NOTES
History:  Past Medical History:   Diagnosis Date    Anemia     Depression     Diabetes mellitus     Hypertension    Past medical history: NIDDM.  Hypertension.  Dyslipidemia.  History of anemia.  Vitamin D deficiency.  Tobacco abuse.  Obesity.  Obstructive sleep apnea.  HFpEF. Leukocytosis.  Cholecystectomy.  Goiter surgery.  Social history: Single.  Lives in [place], [place].  Has 2 grown up sons.  Has been smoking 1-1/2 packs of cigarettes daily since age 10.  Has tried to quit smoking many times but does not get refills of Chantix, therefore, has been unable to.  Currently, down to 10 cigarettes daily.  Denies history of alcohol or illicit drug abuse.  Family history: Sister  from cervical cancer at age 55.  Health maintenance:   EGD 2018 (Dr. Koo in [place]: Esophageal dilation).    Colonoscopy 2018 (Dr. Koo in [place]), apparently to be repeated in 2 years.    Mammogram 2018: Negative for malignancy.   ThinPrep cervical Pap smear 2016: Negative for intraepithelial lesion or malignancy; negative for HPV high-risk types (type 16 and type 18/45)  Menstrual and OB/GYN history: Menopause in .  For last 3 months or so, has experienced vaginal spotting.  Apparently, had Pap smear done at [facility] 2 months back (probably 2018), which was apparently unremarkable. Pelvic ultrasound on 2019 showed normal endometrium; likely myomatous changes of uterus with a lower uterine segment lipoma leiomyoma.     Past Surgical History:   Procedure Laterality Date    CHOLECYSTECTOMY      COLONOSCOPY  10/2021      Social History     Socioeconomic History    Marital status: Single   Tobacco Use    Smoking status: Every Day     Packs/day: 1.00     Types: Cigarettes    Smokeless tobacco: Never   Substance and Sexual Activity    Alcohol use: Not Currently    Drug use: Not Currently    Sexual activity: Not Currently      Family History   Problem Relation  Age of Onset    Diabetes Mother     Coronary artery disease Mother     Diabetes Father     Cervical cancer Sister     Fibromyalgia Sister     Breast cancer Sister         Reason for Follow-up:  -recurrent severe iron-deficiency anemia; no definite GI source of bleeding  -low risk essential thrombocytosis    History of Present Illness:   Anemia        Oncologic/Hematologic History:  Oncology History    No history exists.   53-year-old lady referred from [facility] for evaluation of leukocytosis.    For details, please see my last note dated 09/03/2020.  Please also refer to assessment and plan section.    02/22/2019:  Presents for initial hematology consultation.  Has been feeling weak and tired for last 10 years, without any recent worsening.  Menopause in 2015.  Vaginal spotting for last 3 months, apparently with negative Pap smear, and on ultrasound of pelvis, normal endometrium but uterine leiomyomata.  No repeated fevers or infections.  No unintentional weight loss.  No unusual headaches, loss of consciousness, seizures, strokelike symptoms.  No unusual cough, hemoptysis, bleeding in any other form, or severe exertional dyspnea.  No abdominal pain, nausea, or vomiting.  No hematemesis, melena, or hematochezia.  Appetite is variable but more or less stable.  No urinary problems.      Interval History:  [No matching plan found]   [No matching plan found]   11/16/2020:   -Screening mammogram scheduled for 12/28/2020   -11/12/2020: Iron deficiency noted (serum iron 38, TIBC 342, transferrin saturation 11%, ferritin 21.15).  Hemoglobin 15.1, remains normal.  Benign-appearing leukocytosis persists, 14.2K.  Platelets 567,000/mm³, stable (low risk essential thrombocytosis).   Patient improved via telemedicine visit, audio only.  She was at her home in [place], [place].  Chronically depressed.  Sounded depressed over the phone.  Denied suicidal or homicidal ideation.  Said that she has been  depressed for many years and they have been trying different medications on her.  Denies abdominal pain, nausea, vomiting, or unusual bleeding in any form.  Appetite is fair.  Taking baby aspirin daily for low risk essential thrombocytosis with no unusual headaches, focal neurological symptoms, vision impairment, dizziness, paresthesias, etc.    01/11/2023:  -08/04/2020:  [facility]:  Insurance denied capsule endoscopy.  Upper single balloon enteroscopy performed.  1. Duodenum biopsy:  Minimal focal acute inflammation with reactive changes; no celiac sprue  2. Duodenal bulb:  Biopsy showed no significant histopathologic findings; no evidence of celiac sprue  3. Stomach biopsy:  Chemical/reactive gastropathy; H pylori stain negative  -S/P Feraheme 510 mg IV x2 (05/17/2021, 05/24/2021)  -S/P Feraheme 510 mg IV x2 (07/09/2021, 07/19/2021)  -11/06/2021:  Thin prep cervical Pap smear:  NILM; negative for high-risk HPV  -06/04/2021: TTE:  LVEF 50-55%  -10/27/2021: Colonoscopy (iron-deficiency anemia):  Diffuse diverticulosis; otherwise unremarkable  -10/27/2021:  EGD with APC fulguration and biopsy (iron-deficiency anemia): unctate AVMs within the stomach.  A directed fulguration with APC with good result achieved, superficial ulcerations within the duodenal bulb, possibly secondary to daily aspirin use.  Biopsy taken in the antrum to rule out Helicobacter pylori by pathology  -01/07/2022:  Bilateral screening mammogram (comparison:  12/28/2020 mammogram):  Both breasts negative (BI-RADS 1)  -02/21/2022: PFTs:  Normal PFT except for mild reduction in diffusion capacity.  -S/P Feraheme 510 mg IV x2 (08/29/2022, 09/08/2022)  -11/11/2022:  WBC 12.7.  Hemoglobin 12.8.  Platelets 543 K. ferritin 251.70 (was 93.95 on 08/29/2022, 24.88 on 06/29/2022) (transferrin saturation was 19% on 08/29/2022, 7% on 06/29/2022)  -01/11/2023: Labs reviewed.  Hemoglobin 11.2 (hemoglobin was 15.9 on 08/29/2022).  MCV 85.0.  Platelets 562 K, stable.   Ferritin 9.02, down from 251.7 on 11/11/2022.  Transferrin saturation is 6%, low.  CMP unremarkable except glucose 188 mg/dL.  Labs reviewed.    Presents for a follow-up visit.  Chronic generalized weakness, fatigue, exertional dyspnea, heartburn, and nausea.  No GI bleeding in the form of hematemesis, melena, or hematochezia.  No chronic diarrhea.  No bleeding in any other form.  Once again, iron deficient.      Medications:  Current Outpatient Medications on File Prior to Visit   Medication Sig Dispense Refill    aspirin (ECOTRIN) 81 MG EC tablet Take 81 mg by mouth once daily.      atorvastatin (LIPITOR) 40 MG tablet Take 1 tablet (40 mg total) by mouth every evening. 90 tablet 3    glipiZIDE (GLUCOTROL) 5 MG tablet Take 5 mg by mouth daily with breakfast.      ibuprofen (ADVIL,MOTRIN) 800 MG tablet Take 800 mg by mouth.      insulin glargine (LANTUS) 100 unit/mL injection Inject 70 Units into the skin every evening.      linaCLOtide (LINZESS) 72 mcg Cap capsule Take 72 mcg by mouth as needed.      lisinopriL (PRINIVIL,ZESTRIL) 20 MG tablet Take 1 tablet (20 mg total) by mouth once daily. 90 tablet 3    metFORMIN (GLUCOPHAGE) 1000 MG tablet TAKE ONE TABLET BY MOUTH TWICE A DAY 60 tablet 6    metoprolol tartrate (LOPRESSOR) 25 MG tablet Take 1 tablet (25 mg total) by mouth 2 (two) times daily. 180 tablet 3    nicotine (NICODERM CQ) 14 mg/24 hr 1 patch once daily.      nitroGLYCERIN (NITROSTAT) 0.4 MG SL tablet Place 1 tablet (0.4 mg total) under the tongue every 5 (five) minutes as needed for Chest pain. 25 tablet 6    omeprazole (PRILOSEC) 40 MG capsule Take 40 mg by mouth every morning.      pantoprazole (PROTONIX) 40 MG tablet Take 40 mg by mouth once daily.      TRULICITY 1.5 mg/0.5 mL pen injector Inject 1.5 mg into the skin every 7 days.      TRUE METRIX GLUCOSE TEST STRIP Strp USE 1 STRIP TO CHECK GLUCOSE ONCE DAILY       Current Facility-Administered Medications on File Prior to Visit   Medication Dose  Route Frequency Provider Last Rate Last Admin    DOBUtamine 1000 mg in D5W 250 mL infusion  20 mcg/kg/min (Order-Specific) Intravenous Continuous DIDIER Blanco 29.5 mL/hr at 06/21/22 0945 20 mcg/kg/min at 06/21/22 0945       Review of Systems:   All systems reviewed and found to be negative except for the symptoms detailed above    Physical Examination:   VITAL SIGNS:   Vitals:    01/11/23 1347   BP: 115/76   Pulse: 82   Resp: 20   Temp: 97.3 °F (36.3 °C)     GENERAL:  In no apparent distress.    HEAD:  No signs of head trauma.  EYES:  Pupils are equal.  Extraocular motions intact.    EARS:  Hearing grossly intact.  MOUTH:  Oropharynx is normal.   NECK:  No adenopathy, no JVD.     CHEST:  Chest with clear breath sounds bilaterally.  No wheezes, rales, rhonchi.    CARDIAC:  Regular rate and rhythm.  S1 and S2, without murmurs, gallops, rubs.  VASCULAR:  No Edema.  Peripheral pulses normal and equal in all extremities.  ABDOMEN:  Soft, without detectable tenderness.  No sign of distention.  No   rebound or guarding, and no masses palpated.   Bowel Sounds normal.  MUSCULOSKELETAL:  Good range of motion of all major joints. Extremities without clubbing, cyanosis or edema.    NEUROLOGIC EXAM:  Alert and oriented x 3.  No focal sensory or strength deficits.   Speech normal.  Follows commands.  PSYCHIATRIC:  Mood normal.    No results for input(s): CBC in the last 72 hours.   No results for input(s): CMP in the last 72 hours.     Assessment:  Problem List Items Addressed This Visit          Oncology    Iron deficiency anemia - Primary    Essential thrombocytosis       Genetic    JAK2 gene mutation    #Severe iron deficiency anemia:   Presented as moderate anemia with severe microcytosis    EGD, colonoscopy negative for any bleeders (02/2018)   Severe iron deficiency    Feraheme (03/2019)   Hemoglobin dropped from 14.8 (06/2019) to 9.5 (08/28/2019) due to iron deficiency   Feraheme x2 (09/2019) --> improvement in  iron stores and normalization of hemoglobin (9.5 --> 13.7)   CT enterogram (09/11/2019): Hiatal hernia; 1.7 cm left adrenal nodule   -03/10/2020: Remains iron deficient (transferrin saturation 13.2%, ferritin 19.2)   -Did not present for Feraheme shots in a timely manner   -04/27/2020: Ferritin 4.9 (down from 19.2 on 03/10/2020); however, hemoglobin 12.4, MCV 83.0   (Iron deficiency erythropoiesis)   -Feraheme 510 mg IV x2 (04/27/2020; 05/04/2020)   -06/01/2020: Iron deficiency resolved with parenteral iron therapy   -However, as of 06/01/2020, severe recurrent iron deficiency remains unexplained   -07/01/2020: Iron stores dropping again within a month; hemoglobin remains normal   -09/02/2020: Iron stores normal/stable (ferritin 21, transferrin saturation 15.8%); hemoglobin 14.8, normal   -09/08/2020: Feels better and more energetic than before   -11/12/2020: Iron deficient; hemoglobin 15.1  -08/04/2020:  [facility]:  Insurance denied capsule endoscopy.  Upper single balloon enteroscopy performed.  1. Duodenum biopsy:  Minimal focal acute inflammation with reactive changes; no celiac sprue  2. Duodenal bulb:  Biopsy showed no significant histopathologic findings; no evidence of celiac sprue  3. Stomach biopsy:  Chemical/reactive gastropathy; H pylori stain negative  -S/P Feraheme 510 mg IV x2 (05/17/2021, 05/24/2021)  -S/P Feraheme 510 mg IV x2 (07/09/2021, 07/19/2021)  -10/27/2021: Colonoscopy (iron-deficiency anemia):  Diffuse diverticulosis; otherwise unremarkable  -10/27/2021:  EGD with APC fulguration and biopsy (iron-deficiency anemia): unctate AVMs within the stomach.  A directed fulguration with APC with good result achieved, superficial ulcerations within the duodenal bulb, possibly secondary to daily aspirin use.  Biopsy taken in the antrum to rule out Helicobacter pylori by pathology  -S/P Feraheme 510 mg IV x2 (08/29/2022, 09/08/2022)  -11/11/2022:  WBC 12.7.  Hemoglobin 12.8.  Platelets 543 K. ferritin  251.70 (was 93.95 on 08/29/2022, 24.88 on 06/29/2022) (transferrin saturation was 19% on 08/29/2022, 7% on 06/29/2022)  -01/11/2023: Labs reviewed.  Hemoglobin 11.2 (hemoglobin was 15.9 on 08/29/2022).  MCV 85.0.  Platelets 562 K, stable.  Ferritin 9.02, down from 251.7 on 11/11/2022.  Transferrin saturation is 6%, low.  CMP unremarkable except glucose 188 mg/dL.  To summarize:   -Recurrent severe iron deficiency anemia, unexplained   -EGD, colonoscopy negative (02/2018)   -CT enterogram negative (09/11/2019)   -has required multiple rounds of parenteral iron therapy over past 4-5 years  -no source of bleeding, H pylori gastritis, or celiac sprue on multiple GI endoscopic procedures including EGD, colonoscopy, CT enteroscopy, and upper single balloon enteroscopy   [ferritin level has dropped:  9.02 (01/11/2023), down from 251.7 (11/11/2022)]  [Hemoglobin: 11.2 on 01/11/2023; down from 15.9 on 08/29/2022)       #Thrombocytosis (low risk IPSET-thrombosis score ET)    (age < 60 years; JAK2 V617F mutation positive)   GAGANDEEP-2 V617F mutation positive (03/2019)   Bone marrow consistent (06/2019)   Low risk IPSET-thrombosis score ET   Cardiovascular risk factors: NIDDM, hypertension, tobacco abuse   -04/27/2020: Platelets 500K, stable   -06/01/2020: Platelets 561K, more or less stable   -09/02/2020: Platelets 544K, stable   -11/12/2020: Platelets 567,000/mm³, stable  -11/11/2022:  Platelets 543 K, stable        #Chronic, very mild, benign-appearing, intermittent leukocytosis:   Secondary to smoking or underlying myeloproliferative disorder   GAGANDEEP-2 V617F mutation positive (03/2019)   BCR-ABL 1 fusion transcripts negative (03/2019)   Underlying essential thrombocytosis      Plan:  -Recurrent severe iron deficiency anemia, unexplained  -EGD, colonoscopy negative (02/2018)  -CT enterogram negative (09/11/2019)  -has required multiple rounds of parenteral iron therapy over past 4-5 years  -no source of bleeding, H pylori  gastritis, or celiac sprue on multiple GI endoscopic procedures including EGD, colonoscopy, CT enteroscopy, and upper single balloon enteroscopy  >>>  [ferritin level has dropped:  9.02 (01/11/2023), down from 251.7 (11/11/2022)]  [Hemoglobin: 11.2 on 01/11/2023; down from 15.9 on 08/29/2022)  >>>  Chronic recurrent iron-deficiency anemia, requiring multiple rounds of parenteral iron therapy in the past, essentially with negative exhaustive GI workup  Iron deficient once again   Proceed with Feraheme 510 mg IV x2, administered a week apart   Assess response with repeat CBC, serum iron, TIBC, and ferritin 6 weeks later   She does not want to take iron pills because, in the past, iron pills have been ineffective    Low risk essential thrombocytosis   No indication of myelosuppressive therapy   Continue baby aspirin daily  Avoid smoking.  Medical management of NIDDM and hypertension which are cardiovascular risk factors.  Deferred to PCP.    Follow-up in 6 weeks, with repeat CBC, serum iron, TIBC, and ferritin to assess response to parenteral iron therapy.    Above discussed at length with her.  All questions answered.    Discussed labs and gave her copies of relevant reports.    She understands and agrees with this plan.        Follow-up:  No follow-ups on file.

## 2023-01-11 NOTE — Clinical Note
Proceed with Feraheme 510 mg IV x2, administered a week apart  In 6 weeks, follow-up visit with CBC, serum iron, TIBC, and ferritin  Continue baby aspirin daily.

## 2023-01-18 ENCOUNTER — INFUSION (OUTPATIENT)
Dept: INFUSION THERAPY | Facility: HOSPITAL | Age: 58
End: 2023-01-18
Attending: INTERNAL MEDICINE
Payer: MEDICAID

## 2023-01-18 DIAGNOSIS — E61.1 IRON DEFICIENCY: Primary | ICD-10-CM

## 2023-01-18 PROCEDURE — 25000003 PHARM REV CODE 250: Performed by: NURSE PRACTITIONER

## 2023-01-18 PROCEDURE — 63600175 PHARM REV CODE 636 W HCPCS: Mod: JG | Performed by: NURSE PRACTITIONER

## 2023-01-18 PROCEDURE — 96365 THER/PROPH/DIAG IV INF INIT: CPT

## 2023-01-18 RX ORDER — HEPARIN 100 UNIT/ML
500 SYRINGE INTRAVENOUS
Status: DISCONTINUED | OUTPATIENT
Start: 2023-01-18 | End: 2023-01-18 | Stop reason: HOSPADM

## 2023-01-18 RX ORDER — SODIUM CHLORIDE 0.9 % (FLUSH) 0.9 %
10 SYRINGE (ML) INJECTION
Status: DISCONTINUED | OUTPATIENT
Start: 2023-01-18 | End: 2023-01-18 | Stop reason: HOSPADM

## 2023-01-18 RX ADMIN — FERUMOXYTOL 510 MG: 510 INJECTION INTRAVENOUS at 12:01

## 2023-02-01 ENCOUNTER — HOSPITAL ENCOUNTER (OUTPATIENT)
Dept: RADIOLOGY | Facility: HOSPITAL | Age: 58
Discharge: HOME | End: 2023-02-01
Attending: NURSE PRACTITIONER
Payer: MEDICAID

## 2023-02-01 DIAGNOSIS — Z12.31 SCREENING MAMMOGRAM FOR BREAST CANCER: ICD-10-CM

## 2023-02-01 PROCEDURE — 77067 SCR MAMMO BI INCL CAD: CPT | Mod: 26,,, | Performed by: RADIOLOGY

## 2023-02-01 PROCEDURE — 77067 MAMMO DIGITAL SCREENING BILAT WITH TOMO: ICD-10-PCS | Mod: 26,,, | Performed by: RADIOLOGY

## 2023-02-01 PROCEDURE — 77063 BREAST TOMOSYNTHESIS BI: CPT | Mod: 26,,, | Performed by: RADIOLOGY

## 2023-02-01 PROCEDURE — 77063 MAMMO DIGITAL SCREENING BILAT WITH TOMO: ICD-10-PCS | Mod: 26,,, | Performed by: RADIOLOGY

## 2023-02-01 PROCEDURE — 77067 SCR MAMMO BI INCL CAD: CPT | Mod: TC

## 2023-02-02 ENCOUNTER — OFFICE VISIT (OUTPATIENT)
Dept: CARDIOLOGY | Facility: CLINIC | Age: 58
End: 2023-02-02
Payer: MEDICAID

## 2023-02-02 VITALS
TEMPERATURE: 98 F | OXYGEN SATURATION: 95 % | RESPIRATION RATE: 18 BRPM | HEART RATE: 89 BPM | DIASTOLIC BLOOD PRESSURE: 72 MMHG | BODY MASS INDEX: 33.43 KG/M2 | SYSTOLIC BLOOD PRESSURE: 96 MMHG | HEIGHT: 67 IN | WEIGHT: 213 LBS

## 2023-02-02 DIAGNOSIS — Z72.0 TOBACCO USER: ICD-10-CM

## 2023-02-02 DIAGNOSIS — I25.10 MILD CAD: Primary | ICD-10-CM

## 2023-02-02 DIAGNOSIS — E78.5 HYPERLIPIDEMIA LDL GOAL <70: ICD-10-CM

## 2023-02-02 DIAGNOSIS — Z15.89 JAK2 GENE MUTATION: ICD-10-CM

## 2023-02-02 DIAGNOSIS — I10 PRIMARY HYPERTENSION: ICD-10-CM

## 2023-02-02 PROCEDURE — 3008F PR BODY MASS INDEX (BMI) DOCUMENTED: ICD-10-PCS | Mod: CPTII,,, | Performed by: NURSE PRACTITIONER

## 2023-02-02 PROCEDURE — 3078F PR MOST RECENT DIASTOLIC BLOOD PRESSURE < 80 MM HG: ICD-10-PCS | Mod: CPTII,,, | Performed by: NURSE PRACTITIONER

## 2023-02-02 PROCEDURE — 1160F RVW MEDS BY RX/DR IN RCRD: CPT | Mod: CPTII,,, | Performed by: NURSE PRACTITIONER

## 2023-02-02 PROCEDURE — 1159F PR MEDICATION LIST DOCUMENTED IN MEDICAL RECORD: ICD-10-PCS | Mod: CPTII,,, | Performed by: NURSE PRACTITIONER

## 2023-02-02 PROCEDURE — 3074F PR MOST RECENT SYSTOLIC BLOOD PRESSURE < 130 MM HG: ICD-10-PCS | Mod: CPTII,,, | Performed by: NURSE PRACTITIONER

## 2023-02-02 PROCEDURE — 1159F MED LIST DOCD IN RCRD: CPT | Mod: CPTII,,, | Performed by: NURSE PRACTITIONER

## 2023-02-02 PROCEDURE — 99215 OFFICE O/P EST HI 40 MIN: CPT | Mod: PBBFAC | Performed by: NURSE PRACTITIONER

## 2023-02-02 PROCEDURE — 1160F PR REVIEW ALL MEDS BY PRESCRIBER/CLIN PHARMACIST DOCUMENTED: ICD-10-PCS | Mod: CPTII,,, | Performed by: NURSE PRACTITIONER

## 2023-02-02 PROCEDURE — 99214 OFFICE O/P EST MOD 30 MIN: CPT | Mod: S$PBB,,, | Performed by: NURSE PRACTITIONER

## 2023-02-02 PROCEDURE — 3074F SYST BP LT 130 MM HG: CPT | Mod: CPTII,,, | Performed by: NURSE PRACTITIONER

## 2023-02-02 PROCEDURE — 3078F DIAST BP <80 MM HG: CPT | Mod: CPTII,,, | Performed by: NURSE PRACTITIONER

## 2023-02-02 PROCEDURE — 3008F BODY MASS INDEX DOCD: CPT | Mod: CPTII,,, | Performed by: NURSE PRACTITIONER

## 2023-02-02 PROCEDURE — 99214 PR OFFICE/OUTPT VISIT, EST, LEVL IV, 30-39 MIN: ICD-10-PCS | Mod: S$PBB,,, | Performed by: NURSE PRACTITIONER

## 2023-02-02 NOTE — PATIENT INSTRUCTIONS
Follow-up in cardiology clinic in 6 months with FLP or sooner if needed   Follow-up with PCP as directed

## 2023-02-02 NOTE — PROGRESS NOTES
CHIEF COMPLAINT:   Chief Complaint   Patient presents with    4mt f/u - pt c/o continued POSEY.     Pt states she has no exercise, stays in bed 24/7.                   Review of patient's allergies indicates:  No Known Allergies                                       HPI:  Jessica Walker 57 y.o. female with Mild CAD, JAK2 Gene mutation, Thrombocytosis, JIN on CPAP, tobacco abuse, DM II, HTN, HLD, and GERD who presents for routine follow up and ongoing care. The patient completed a Dobutamine Stress Echocardiogram  on 6.21.22 in which stress echo portion of the study was negative for myocardial ischemia.  Patient completed PFT testing in February of 2022 which revealed normal PFT except for mild reduction in diffusion capacity.  She completed an Echocardiogram on June 4, 2021 which revealed an ejection fraction of approximately 50 to 55%, mild MR, and mild TR.  She completed a Dobutamine Stress Echocardiogram in July 2020 which was negative for ischemia.     The patient continues to endorse ongoing shortness of breath with exertion.  However she endorses a sedentary lifestyle stating that she generally stays in bed the majority of the day. She reports that she does heavy housework only a few times a year and light housework a few times a week.  She denies any exertional chest pain, palpitations, orthopnea or syncope.  She statesd that she is not compliant with her CPAP nightly due to an ill-fitting mask and dry eyes.  She continues to smoke pack of cigarettes a day but states that she would like to quit.                                                                                                                                                                 Dobutamine Stress Echocardiogram 6.21.22:  The stress echo portion of this study is negative for myocardial ischemia.  The ECG portion of this study is positive for myocardial ischemia.  There were no arrhythmias during stress.                                                                          Patient Active Problem List   Diagnosis    Iron deficiency anemia    POSEY (dyspnea on exertion)    Primary hypertension    Mild CAD    Hyperlipidemia LDL goal <70    Diabetes mellitus without complication    Obesity (BMI 30-39.9)    Tobacco user    Thrombocytosis    Iron deficiency    Personal history of colonic polyps    Epigastric abdominal pain    Essential thrombocytosis    JAK2 gene mutation     Past Surgical History:   Procedure Laterality Date    CHOLECYSTECTOMY      COLONOSCOPY  10/2021     Social History     Socioeconomic History    Marital status: Single   Tobacco Use    Smoking status: Every Day     Packs/day: 1.00     Types: Cigarettes    Smokeless tobacco: Never   Substance and Sexual Activity    Alcohol use: Not Currently    Drug use: Not Currently    Sexual activity: Not Currently        Family History   Problem Relation Age of Onset    Diabetes Mother     Coronary artery disease Mother     Diabetes Father     Cervical cancer Sister     Fibromyalgia Sister     Breast cancer Sister          Current Outpatient Medications:     aspirin (ECOTRIN) 81 MG EC tablet, Take 81 mg by mouth once daily., Disp: , Rfl:     atorvastatin (LIPITOR) 40 MG tablet, Take 1 tablet (40 mg total) by mouth every evening., Disp: 90 tablet, Rfl: 3    ibuprofen (ADVIL,MOTRIN) 800 MG tablet, Take 800 mg by mouth., Disp: , Rfl:     insulin glargine (LANTUS) 100 unit/mL injection, Inject 70 Units into the skin every evening., Disp: , Rfl:     lisinopriL (PRINIVIL,ZESTRIL) 20 MG tablet, Take 1 tablet (20 mg total) by mouth once daily., Disp: 90 tablet, Rfl: 3    metFORMIN (GLUCOPHAGE) 1000 MG tablet, TAKE ONE TABLET BY MOUTH TWICE A DAY, Disp: 60 tablet, Rfl: 6    metoprolol tartrate (LOPRESSOR) 25 MG tablet, Take 1 tablet (25 mg total) by mouth 2 (two) times daily., Disp: 180 tablet, Rfl: 3    nicotine (NICODERM CQ) 14 mg/24 hr, 1 patch once daily., Disp: , Rfl:     nitroGLYCERIN  "(NITROSTAT) 0.4 MG SL tablet, Place 1 tablet (0.4 mg total) under the tongue every 5 (five) minutes as needed for Chest pain., Disp: 25 tablet, Rfl: 6    pantoprazole (PROTONIX) 40 MG tablet, Take 40 mg by mouth once daily., Disp: , Rfl:     TRULICITY 1.5 mg/0.5 mL pen injector, Inject 1.5 mg into the skin every 7 days., Disp: , Rfl:     glipiZIDE (GLUCOTROL) 5 MG tablet, Take 5 mg by mouth daily with breakfast., Disp: , Rfl:     linaCLOtide (LINZESS) 72 mcg Cap capsule, Take 72 mcg by mouth as needed., Disp: , Rfl:     omeprazole (PRILOSEC) 40 MG capsule, Take 40 mg by mouth every morning., Disp: , Rfl:     TRUE METRIX GLUCOSE TEST STRIP Strp, USE 1 STRIP TO CHECK GLUCOSE ONCE DAILY, Disp: , Rfl:     Current Facility-Administered Medications:     DOBUtamine 1000 mg in D5W 250 mL infusion, 20 mcg/kg/min (Order-Specific), Intravenous, Continuous, DIDIER Blanco, Last Rate: 29.5 mL/hr at 06/21/22 0945, 20 mcg/kg/min at 06/21/22 0945     ROS:                                                                                                                                                                             Review of Systems   Constitutional: Negative.         Weakness   Respiratory:  Positive for shortness of breath.    Cardiovascular: Negative.    Gastrointestinal: Negative.    Skin: Negative.    Neurological:  Positive for dizziness.   Psychiatric/Behavioral: Negative.        Blood pressure 96/72, pulse 89, temperature 98.1 °F (36.7 °C), resp. rate 18, height 5' 7" (1.702 m), weight 96.6 kg (213 lb), SpO2 95 %.   PE:  Physical Exam  Constitutional:       Appearance: Normal appearance.   HENT:      Head: Normocephalic and atraumatic.   Eyes:      Extraocular Movements: Extraocular movements intact.      Pupils: Pupils are equal, round, and reactive to light.   Cardiovascular:      Rate and Rhythm: Regular rhythm. Tachycardia present.   Pulmonary:      Effort: Pulmonary effort is normal.      Breath " sounds: Normal breath sounds.   Abdominal:      Palpations: Abdomen is soft.   Musculoskeletal:         General: Normal range of motion.      Cervical back: Normal range of motion.   Skin:     General: Skin is warm and dry.   Neurological:      General: No focal deficit present.      Mental Status: She is alert and oriented to person, place, and time.   Psychiatric:         Mood and Affect: Mood normal.         Behavior: Behavior normal.     ASSESSMENT/PLAN:  Mild CAD per [facility] 2016  - Dobutamine Stress Echocardiogram June 2022:  The stress echo portion of this study is negative for myocardial ischemia. The ECG portion of this study is positive for myocardial ischemia.  - Treadmill Stress Test Sept. 2019 - [facility] treadmill score indicated moderate risk.  - Left heart cath September 2016 with predominantly normal epicardial coronary arteries, normal LVEF 65%  - EF 50-55% per Echo 6.4.21    POSEY - Reports stable POSEY  - EF 50-55% per Echo 6.4.21  - PFTs Feb. 2022 - normal PFTs except for mild reduction in diffusion capacity  - Dobutamine Stress Echocardiogram 6.21.22: The stress echo portion of this study is negative for myocardial ischemia. The ECG portion of this study is positive for myocardial ischemia.    Palpitations - resolved  - 48 hour Holter monitor Aug. 2019 - see results under HPI  - Continue Metoprolol Tartrate 25mg BID    HTN  - BP at goal   - Continue Metoprolol Tartrate 25mg BID and Lisinopril 20mg daily  - Counseled on low sodium diet    HLD (hyperlipidemia)  - LDL not at goal - 80 per labs Sept. 2021  - Continue Atorvastatin 40mg daily at bedtime  - Encouraged diet and exercise    DM (diabetes mellitus)  - Continue management per PCP    Tobacco abuse  - Counseled importance of smoking cessation  - Continues to smoke about a pack of cigarettes per day - states she will try to quit       JIN on CPAP  - Patient states she has not been using her CPAP for the last week as she has to clean her mask     -  Recommend nightly CPAP use       Follow-up in cardiology clinic in 6 months with [facility]P or sooner if needed   Follow-up with PCP as directed

## 2023-02-24 DIAGNOSIS — D50.8 OTHER IRON DEFICIENCY ANEMIA: ICD-10-CM

## 2023-02-24 DIAGNOSIS — D47.3 ESSENTIAL THROMBOCYTOSIS: Primary | ICD-10-CM

## 2023-02-27 ENCOUNTER — APPOINTMENT (OUTPATIENT)
Dept: HEMATOLOGY/ONCOLOGY | Facility: CLINIC | Age: 58
End: 2023-02-27
Payer: MEDICAID

## 2023-02-27 ENCOUNTER — OFFICE VISIT (OUTPATIENT)
Dept: HEMATOLOGY/ONCOLOGY | Facility: CLINIC | Age: 58
End: 2023-02-27
Attending: INTERNAL MEDICINE
Payer: MEDICAID

## 2023-02-27 VITALS
TEMPERATURE: 98 F | WEIGHT: 217 LBS | HEIGHT: 67 IN | DIASTOLIC BLOOD PRESSURE: 81 MMHG | BODY MASS INDEX: 34.06 KG/M2 | RESPIRATION RATE: 20 BRPM | HEART RATE: 90 BPM | SYSTOLIC BLOOD PRESSURE: 117 MMHG | OXYGEN SATURATION: 98 %

## 2023-02-27 DIAGNOSIS — D50.8 OTHER IRON DEFICIENCY ANEMIA: ICD-10-CM

## 2023-02-27 DIAGNOSIS — E61.1 IRON DEFICIENCY: ICD-10-CM

## 2023-02-27 DIAGNOSIS — D47.3 ESSENTIAL THROMBOCYTOSIS: ICD-10-CM

## 2023-02-27 DIAGNOSIS — D50.0 IRON DEFICIENCY ANEMIA DUE TO CHRONIC BLOOD LOSS: Primary | ICD-10-CM

## 2023-02-27 DIAGNOSIS — D47.3 ESSENTIAL THROMBOCYTOSIS: Primary | ICD-10-CM

## 2023-02-27 DIAGNOSIS — D75.839 THROMBOCYTOSIS: ICD-10-CM

## 2023-02-27 LAB
ALBUMIN SERPL-MCNC: 3.8 G/DL (ref 3.5–5)
ALBUMIN/GLOB SERPL: 1.1 RATIO (ref 1.1–2)
ALP SERPL-CCNC: 107 UNIT/L (ref 40–150)
ALT SERPL-CCNC: 19 UNIT/L (ref 0–55)
AST SERPL-CCNC: 15 UNIT/L (ref 5–34)
BASOPHILS # BLD AUTO: 0.1 X10(3)/MCL (ref 0–0.2)
BASOPHILS NFR BLD AUTO: 0.7 %
BILIRUBIN DIRECT+TOT PNL SERPL-MCNC: 0.2 MG/DL
BUN SERPL-MCNC: 11.7 MG/DL (ref 9.8–20.1)
CALCIUM SERPL-MCNC: 10.2 MG/DL (ref 8.4–10.2)
CHLORIDE SERPL-SCNC: 101 MMOL/L (ref 98–107)
CO2 SERPL-SCNC: 26 MMOL/L (ref 22–29)
CREAT SERPL-MCNC: 0.78 MG/DL (ref 0.55–1.02)
EOSINOPHIL # BLD AUTO: 0.55 X10(3)/MCL (ref 0–0.9)
EOSINOPHIL NFR BLD AUTO: 3.7 %
ERYTHROCYTE [DISTWIDTH] IN BLOOD BY AUTOMATED COUNT: 17.7 % (ref 11.5–17)
FERRITIN SERPL-MCNC: 55.55 NG/ML (ref 4.63–204)
GFR SERPLBLD CREATININE-BSD FMLA CKD-EPI: >60 MLS/MIN/1.73/M2
GLOBULIN SER-MCNC: 3.5 GM/DL (ref 2.4–3.5)
GLUCOSE SERPL-MCNC: 180 MG/DL (ref 74–100)
HCT VFR BLD AUTO: 44.7 % (ref 37–47)
HGB BLD-MCNC: 14.6 G/DL (ref 12–16)
IMM GRANULOCYTES # BLD AUTO: 0.26 X10(3)/MCL (ref 0–0.04)
IMM GRANULOCYTES NFR BLD AUTO: 1.7 %
IRON SATN MFR SERPL: 17 % (ref 20–50)
IRON SERPL-MCNC: 50 UG/DL (ref 50–170)
LYMPHOCYTES # BLD AUTO: 2.5 X10(3)/MCL (ref 0.6–4.6)
LYMPHOCYTES NFR BLD AUTO: 16.8 %
MCH RBC QN AUTO: 27.8 PG
MCHC RBC AUTO-ENTMCNC: 32.7 G/DL (ref 33–36)
MCV RBC AUTO: 85 FL (ref 80–94)
MONOCYTES # BLD AUTO: 0.99 X10(3)/MCL (ref 0.1–1.3)
MONOCYTES NFR BLD AUTO: 6.7 %
NEUTROPHILS # BLD AUTO: 10.48 X10(3)/MCL (ref 2.1–9.2)
NEUTROPHILS NFR BLD AUTO: 70.4 %
NRBC BLD AUTO-RTO: 0 %
PLATELET # BLD AUTO: 623 X10(3)/MCL (ref 130–400)
PMV BLD AUTO: 10.8 FL (ref 7.4–10.4)
POTASSIUM SERPL-SCNC: 4.3 MMOL/L (ref 3.5–5.1)
PROT SERPL-MCNC: 7.3 GM/DL (ref 6.4–8.3)
RBC # BLD AUTO: 5.26 X10(6)/MCL (ref 4.2–5.4)
SODIUM SERPL-SCNC: 138 MMOL/L (ref 136–145)
TIBC SERPL-MCNC: 251 UG/DL (ref 70–310)
TIBC SERPL-MCNC: 301 UG/DL (ref 250–450)
TRANSFERRIN SERPL-MCNC: 261 MG/DL (ref 180–382)
WBC # SPEC AUTO: 14.9 X10(3)/MCL (ref 4.5–11.5)

## 2023-02-27 PROCEDURE — 3008F BODY MASS INDEX DOCD: CPT | Mod: CPTII,,, | Performed by: INTERNAL MEDICINE

## 2023-02-27 PROCEDURE — 83550 IRON BINDING TEST: CPT

## 2023-02-27 PROCEDURE — 99213 PR OFFICE/OUTPT VISIT, EST, LEVL III, 20-29 MIN: ICD-10-PCS | Mod: S$PBB,,, | Performed by: INTERNAL MEDICINE

## 2023-02-27 PROCEDURE — 1159F PR MEDICATION LIST DOCUMENTED IN MEDICAL RECORD: ICD-10-PCS | Mod: CPTII,,, | Performed by: INTERNAL MEDICINE

## 2023-02-27 PROCEDURE — 3074F PR MOST RECENT SYSTOLIC BLOOD PRESSURE < 130 MM HG: ICD-10-PCS | Mod: CPTII,,, | Performed by: INTERNAL MEDICINE

## 2023-02-27 PROCEDURE — 1159F MED LIST DOCD IN RCRD: CPT | Mod: CPTII,,, | Performed by: INTERNAL MEDICINE

## 2023-02-27 PROCEDURE — 85025 COMPLETE CBC W/AUTO DIFF WBC: CPT

## 2023-02-27 PROCEDURE — 3008F PR BODY MASS INDEX (BMI) DOCUMENTED: ICD-10-PCS | Mod: CPTII,,, | Performed by: INTERNAL MEDICINE

## 2023-02-27 PROCEDURE — 80053 COMPREHEN METABOLIC PANEL: CPT

## 2023-02-27 PROCEDURE — 36415 COLL VENOUS BLD VENIPUNCTURE: CPT

## 2023-02-27 PROCEDURE — 1160F PR REVIEW ALL MEDS BY PRESCRIBER/CLIN PHARMACIST DOCUMENTED: ICD-10-PCS | Mod: CPTII,,, | Performed by: INTERNAL MEDICINE

## 2023-02-27 PROCEDURE — 82728 ASSAY OF FERRITIN: CPT

## 2023-02-27 PROCEDURE — 99214 OFFICE O/P EST MOD 30 MIN: CPT | Mod: PBBFAC | Performed by: INTERNAL MEDICINE

## 2023-02-27 PROCEDURE — 3079F DIAST BP 80-89 MM HG: CPT | Mod: CPTII,,, | Performed by: INTERNAL MEDICINE

## 2023-02-27 PROCEDURE — 1160F RVW MEDS BY RX/DR IN RCRD: CPT | Mod: CPTII,,, | Performed by: INTERNAL MEDICINE

## 2023-02-27 PROCEDURE — 3079F PR MOST RECENT DIASTOLIC BLOOD PRESSURE 80-89 MM HG: ICD-10-PCS | Mod: CPTII,,, | Performed by: INTERNAL MEDICINE

## 2023-02-27 PROCEDURE — 99213 OFFICE O/P EST LOW 20 MIN: CPT | Mod: S$PBB,,, | Performed by: INTERNAL MEDICINE

## 2023-02-27 PROCEDURE — 3074F SYST BP LT 130 MM HG: CPT | Mod: CPTII,,, | Performed by: INTERNAL MEDICINE

## 2023-02-27 NOTE — PROGRESS NOTES
History:  Past Medical History:   Diagnosis Date    Anemia     Depression     Diabetes mellitus     Hypertension    Past medical history: NIDDM.  Hypertension.  Dyslipidemia.  History of anemia.  Vitamin D deficiency.  Tobacco abuse.  Obesity.  Obstructive sleep apnea.  HFpEF. Leukocytosis.  Cholecystectomy.  Goiter surgery.  Social history: Single.  Lives in [place], [place].  Has 2 grown up sons.  Has been smoking 1-1/2 packs of cigarettes daily since age 10.  Has tried to quit smoking many times but does not get refills of Chantix, therefore, has been unable to.  Currently, down to 10 cigarettes daily.  Denies history of alcohol or illicit drug abuse.  Family history: Sister  from cervical cancer at age 55.  Health maintenance:   EGD 2018 (Dr. Koo in [place]: Esophageal dilation).    Colonoscopy 2018 (Dr. Koo in [place]), apparently to be repeated in 2 years.    Mammogram 2018: Negative for malignancy.   ThinPrep cervical Pap smear 2016: Negative for intraepithelial lesion or malignancy; negative for HPV high-risk types (type 16 and type 18/45)  Menstrual and OB/GYN history: Menopause in .  For last 3 months or so, has experienced vaginal spotting.  Apparently, had Pap smear done at [facility] 2 months back (probably 2018), which was apparently unremarkable. Pelvic ultrasound on 2019 showed normal endometrium; likely myomatous changes of uterus with a lower uterine segment lipoma leiomyoma.     Past Surgical History:   Procedure Laterality Date    CHOLECYSTECTOMY      COLONOSCOPY  10/2021      Social History     Socioeconomic History    Marital status: Single   Tobacco Use    Smoking status: Every Day     Packs/day: 1.00     Types: Cigarettes    Smokeless tobacco: Never   Substance and Sexual Activity    Alcohol use: Not Currently    Drug use: Not Currently    Sexual activity: Not Currently      Family History   Problem Relation  Age of Onset    Diabetes Mother     Coronary artery disease Mother     Diabetes Father     Cervical cancer Sister     Fibromyalgia Sister     Breast cancer Sister         Reason for Follow-up:  -recurrent severe iron-deficiency anemia; no definite GI source of bleeding  -low risk essential thrombocytosis    History of Present Illness:   Results        Oncologic/Hematologic History:  Oncology History    No history exists.   53-year-old lady referred from [facility] for evaluation of leukocytosis.    For details, please see my last note dated 09/03/2020.  Please also refer to assessment and plan section.    02/22/2019:  Presents for initial hematology consultation.  Has been feeling weak and tired for last 10 years, without any recent worsening.  Menopause in 2015.  Vaginal spotting for last 3 months, apparently with negative Pap smear, and on ultrasound of pelvis, normal endometrium but uterine leiomyomata.  No repeated fevers or infections.  No unintentional weight loss.  No unusual headaches, loss of consciousness, seizures, strokelike symptoms.  No unusual cough, hemoptysis, bleeding in any other form, or severe exertional dyspnea.  No abdominal pain, nausea, or vomiting.  No hematemesis, melena, or hematochezia.  Appetite is variable but more or less stable.  No urinary problems.      Interval History:  [No matching plan found]   [No matching plan found]   02/27/2023:   -S/P Feraheme 510 mg IV x2 (01/11/2023, 01/18/2023)  -02/01/2023:  Bilateral screening mammogram (comparison:  01/07/2022 mammogram): Both breasts negative (BI-RADS 1)  -no showed 02/22/2023  -02/27/2022:  Hemoglobin 14.6 (was 11.2 on 01/11/2023, 12.8 on 11/11/2022).  MCV 85.0, normal.  Serum iron 50.  TIBC 301.  Transferrin saturation 17% (was 6% on 01/11/2023).  Ferritin level is pending. Ferritin 55.55 (was 9.02 on 01/11/2023)  -02/27/2023:  Platelets 623 K  Labs reviewed.    Presents for a follow-up visit.  Despite  normalization of hemoglobin Feraheme, as usual, continues to complain of a multitude of chronic symptoms including weakness, fatigue, vision changes, pressure and dryness in eyes, hot flashes, dyspnea, trouble swallowing, nausea, numbness and tingling in feet, etc..  Fair appetite.  No abnormal bleeding in any form.      Medications:  Current Outpatient Medications on File Prior to Visit   Medication Sig Dispense Refill    aspirin (ECOTRIN) 81 MG EC tablet Take 81 mg by mouth once daily.      atorvastatin (LIPITOR) 40 MG tablet Take 1 tablet (40 mg total) by mouth every evening. 90 tablet 3    glipiZIDE (GLUCOTROL) 5 MG tablet Take 5 mg by mouth daily with breakfast.      ibuprofen (ADVIL,MOTRIN) 800 MG tablet Take 800 mg by mouth.      insulin glargine (LANTUS) 100 unit/mL injection Inject 70 Units into the skin every evening.      linaCLOtide (LINZESS) 72 mcg Cap capsule Take 72 mcg by mouth as needed.      lisinopriL (PRINIVIL,ZESTRIL) 20 MG tablet Take 1 tablet (20 mg total) by mouth once daily. 90 tablet 3    metFORMIN (GLUCOPHAGE) 1000 MG tablet TAKE ONE TABLET BY MOUTH TWICE A DAY 60 tablet 6    metoprolol tartrate (LOPRESSOR) 25 MG tablet Take 1 tablet (25 mg total) by mouth 2 (two) times daily. 180 tablet 3    nicotine (NICODERM CQ) 14 mg/24 hr 1 patch once daily.      nitroGLYCERIN (NITROSTAT) 0.4 MG SL tablet Place 1 tablet (0.4 mg total) under the tongue every 5 (five) minutes as needed for Chest pain. 25 tablet 6    omeprazole (PRILOSEC) 40 MG capsule Take 40 mg by mouth every morning.      pantoprazole (PROTONIX) 40 MG tablet Take 40 mg by mouth once daily.      TRUE METRIX GLUCOSE TEST STRIP Strp USE 1 STRIP TO CHECK GLUCOSE ONCE DAILY      TRULICITY 1.5 mg/0.5 mL pen injector Inject 1.5 mg into the skin every 7 days.       Current Facility-Administered Medications on File Prior to Visit   Medication Dose Route Frequency Provider Last Rate Last Admin    DOBUtamine 1000 mg in D5W 250 mL infusion  20  mcg/kg/min (Order-Specific) Intravenous Continuous Shahida Francisco, ANP 29.5 mL/hr at 06/21/22 0945 20 mcg/kg/min at 06/21/22 0945       Review of Systems:   All systems reviewed and found to be negative except for the symptoms detailed above    Physical Examination:   VITAL SIGNS:   Vitals:    02/27/23 0941   BP: 117/81   Pulse: 90   Resp: 20   Temp: 98.2 °F (36.8 °C)       GENERAL:  In no apparent distress.    HEAD:  No signs of head trauma.  EYES:  Pupils are equal.  Extraocular motions intact.    EARS:  Hearing grossly intact.  MOUTH:  Oropharynx is normal.   NECK:  No adenopathy, no JVD.     CHEST:  Chest with clear breath sounds bilaterally.  No wheezes, rales, rhonchi.    CARDIAC:  Regular rate and rhythm.  S1 and S2, without murmurs, gallops, rubs.  VASCULAR:  No Edema.  Peripheral pulses normal and equal in all extremities.  ABDOMEN:  Soft, without detectable tenderness.  No sign of distention.  No   rebound or guarding, and no masses palpated.   Bowel Sounds normal.  MUSCULOSKELETAL:  Good range of motion of all major joints. Extremities without clubbing, cyanosis or edema.    NEUROLOGIC EXAM:  Alert and oriented x 3.  No focal sensory or strength deficits.   Speech normal.  Follows commands.  PSYCHIATRIC:  Mood normal.    No results for input(s): CBC in the last 72 hours.   No results for input(s): CMP in the last 72 hours.     Assessment:  Problem List Items Addressed This Visit          Oncology    Iron deficiency anemia - Primary    Essential thrombocytosis     Severe iron deficiency anemia:   Presented as moderate anemia with severe microcytosis    EGD, colonoscopy negative for any bleeders (02/2018)   Severe iron deficiency    Feraheme (03/2019)   Hemoglobin dropped from 14.8 (06/2019) to 9.5 (08/28/2019) due to iron deficiency   Feraheme x2 (09/2019) --> improvement in iron stores and normalization of hemoglobin (9.5 --> 13.7)   CT enterogram (09/11/2019): Hiatal hernia; 1.7 cm left adrenal  nodule   -03/10/2020: Remains iron deficient (transferrin saturation 13.2%, ferritin 19.2)   -Did not present for Feraheme shots in a timely manner   -04/27/2020: Ferritin 4.9 (down from 19.2 on 03/10/2020); however, hemoglobin 12.4, MCV 83.0   (Iron deficiency erythropoiesis)   -Feraheme 510 mg IV x2 (04/27/2020; 05/04/2020)   -06/01/2020: Iron deficiency resolved with parenteral iron therapy   -However, as of 06/01/2020, severe recurrent iron deficiency remains unexplained   -07/01/2020: Iron stores dropping again within a month; hemoglobin remains normal   -09/02/2020: Iron stores normal/stable (ferritin 21, transferrin saturation 15.8%); hemoglobin 14.8, normal   -09/08/2020: Feels better and more energetic than before   -11/12/2020: Iron deficient; hemoglobin 15.1  -08/04/2020:  [facility]:  Insurance denied capsule endoscopy.  Upper single balloon enteroscopy performed.  1. Duodenum biopsy:  Minimal focal acute inflammation with reactive changes; no celiac sprue  2. Duodenal bulb:  Biopsy showed no significant histopathologic findings; no evidence of celiac sprue  3. Stomach biopsy:  Chemical/reactive gastropathy; H pylori stain negative  -S/P Feraheme 510 mg IV x2 (05/17/2021, 05/24/2021)  -S/P Feraheme 510 mg IV x2 (07/09/2021, 07/19/2021)  -10/27/2021: Colonoscopy (iron-deficiency anemia):  Diffuse diverticulosis; otherwise unremarkable  -10/27/2021:  EGD with APC fulguration and biopsy (iron-deficiency anemia): unctate AVMs within the stomach.  A directed fulguration with APC with good result achieved, superficial ulcerations within the duodenal bulb, possibly secondary to daily aspirin use.  Biopsy taken in the antrum to rule out Helicobacter pylori by pathology  -S/P Feraheme 510 mg IV x2 (08/29/2022, 09/08/2022)  -11/11/2022:  WBC 12.7.  Hemoglobin 12.8.  Platelets 543 K. ferritin 251.70 (was 93.95 on 08/29/2022, 24.88 on 06/29/2022) (transferrin saturation was 19% on 08/29/2022, 7% on  06/29/2022)  -01/11/2023: Labs reviewed.  Hemoglobin 11.2 (hemoglobin was 15.9 on 08/29/2022).  MCV 85.0.  Platelets 562 K, stable.  Ferritin 9.02, down from 251.7 on 11/11/2022.  Transferrin saturation is 6%, low.  CMP unremarkable except glucose 188 mg/dL.  -S/P Feraheme 510 mg IV x2 (01/11/2023, 01/18/2023)  To summarize:   -Recurrent severe iron deficiency anemia, unexplained   -EGD, colonoscopy negative (02/2018)   -CT enterogram negative (09/11/2019)   -has required multiple rounds of parenteral iron therapy over past 4-5 years  -no source of bleeding, H pylori gastritis, or celiac sprue on multiple GI endoscopic procedures including EGD, colonoscopy, CT enteroscopy, and upper single balloon enteroscopy   [ferritin level has dropped:  9.02 (01/11/2023), down from 251.7 (11/11/2022)]  [Hemoglobin: 11.2 on 01/11/2023; down from 15.9 on 08/29/2022)  -treated with Feraheme 510 mg IV x2 (01/11/2023, 01/18/2023)  -02/27/2023:  Hemoglobin 14.6, remarkably improved with Feraheme.  Transferrin saturation 17%, improved but remains low. 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 positive (03/2019)   Bone marrow consistent (06/2019)   Low risk IPSET-thrombosis score ET   Cardiovascular risk factors: NIDDM, hypertension, tobacco abuse   -04/27/2020: Platelets 500K, stable   -06/01/2020: Platelets 561K, more or less stable   -09/02/2020: Platelets 544K, stable   -11/12/2020: Platelets 567,000/mm³, stable  -11/11/2022:  Platelets 543 K, stable   -02/27/2023:  Platelets 623 K    Chronic, very mild, benign-appearing, intermittent leukocytosis:   Secondary to smoking or underlying myeloproliferative disorder   GAGANDEEP-2 V617F mutation positive (03/2019)   BCR-ABL 1 fusion transcripts negative (03/2019)   Underlying essential thrombocytosis      Plan:  -Recurrent severe iron deficiency anemia,  unexplained  -EGD, colonoscopy negative (02/2018)  -CT enterogram negative (09/11/2019)  -has required multiple rounds of parenteral iron therapy over past 4-5 years  -no source of bleeding, H pylori gastritis, or celiac sprue on multiple GI endoscopic procedures including EGD, colonoscopy, CT enteroscopy, and upper single balloon enteroscopy  >>>  [ferritin level has dropped:  9.02 (01/11/2023), down from 251.7 (11/11/2022)]  [Hemoglobin: 11.2 on 01/11/2023; down from 15.9 on 08/29/2022)  -treated with Feraheme 510 mg IV x2 (01/11/2023, 01/18/2023)  -02/27/2023:  Hemoglobin 14.6, remarkably improved with Feraheme.  Transferrin saturation 17%, improved but remains low. Ferritin 55.55 (was 9.02 on 01/11/2023)  >>>  Chronic recurrent iron-deficiency anemia, requiring multiple rounds of parenteral iron therapy in the past, essentially with negative exhaustive GI workup  Hemoglobin normalized post Feraheme x2 in January 2023; transferrin saturation remains low; ferritin level is pending  She does not want to take iron pills because, in the past, iron pills were ineffective    Low risk essential thrombocytosis   No indication of myelosuppressive therapy   Continue baby aspirin daily  Avoid smoking.    Medical management of NIDDM and hypertension which are cardiovascular risk factors.  Deferred to PCP.    Follow-up in 3 months, with CBC, CMP, serum iron, TIBC, ferritin.    Above discussed at length with her.  All questions answered.    Discussed labs and gave her copies of relevant reports.    She understands and agrees with this plan.    Follow-up:  No follow-ups on file.

## 2023-03-13 ENCOUNTER — TELEPHONE (OUTPATIENT)
Dept: GYNECOLOGY | Facility: CLINIC | Age: 58
End: 2023-03-13

## 2023-03-13 NOTE — TELEPHONE ENCOUNTER
Shabana has an opening tomorrow morning. Can patient please be called to see if she can make that appt so she can be evaluated. Thank you.     Any other cancellations this week please place patient on the schedule for a problem visit: PMB.

## 2023-03-13 NOTE — TELEPHONE ENCOUNTER
----- Message from Annita Rosales MT sent at 3/10/2023 12:10 PM CST -----  Regarding: Appointment request  Patient called and left a voicemail stating that for the past 6 to 7 months she has had PMB on and off. Currently its been daily. Requesting a call back to schedule an appointment to be seen.

## 2023-03-23 ENCOUNTER — OFFICE VISIT (OUTPATIENT)
Dept: GYNECOLOGY | Facility: CLINIC | Age: 58
End: 2023-03-23
Payer: MEDICAID

## 2023-03-23 VITALS
WEIGHT: 214 LBS | DIASTOLIC BLOOD PRESSURE: 75 MMHG | OXYGEN SATURATION: 98 % | HEART RATE: 96 BPM | RESPIRATION RATE: 16 BRPM | SYSTOLIC BLOOD PRESSURE: 114 MMHG | BODY MASS INDEX: 33.59 KG/M2 | HEIGHT: 67 IN

## 2023-03-23 DIAGNOSIS — N95.0 PMB (POSTMENOPAUSAL BLEEDING): Primary | ICD-10-CM

## 2023-03-23 LAB
APPEARANCE UR: ABNORMAL
B-HCG UR QL: NEGATIVE
BACTERIA #/AREA URNS AUTO: ABNORMAL /HPF
BILIRUB SERPL-MCNC: NEGATIVE MG/DL
BILIRUB UR QL STRIP.AUTO: NEGATIVE MG/DL
BLOOD URINE, POC: NEGATIVE
CLUE CELLS VAG QL WET PREP: NORMAL
COLOR UR AUTO: YELLOW
COLOR, POC UA: YELLOW
CTP QC/QA: YES
GLUCOSE UR QL STRIP.AUTO: NORMAL MG/DL
GLUCOSE UR QL STRIP: NEGATIVE
HPV16+18+H RISK 12 DNA CVX-IMP: NEGATIVE
HYALINE CASTS #/AREA URNS LPF: ABNORMAL /LPF
KETONES UR QL STRIP.AUTO: ABNORMAL MG/DL
KETONES UR QL STRIP: NORMAL
LEUKOCYTE ESTERASE UR QL STRIP.AUTO: 250 UNIT/L
LEUKOCYTE ESTERASE URINE, POC: NORMAL
MUCOUS THREADS URNS QL MICRO: ABNORMAL /LPF
NITRITE UR QL STRIP.AUTO: ABNORMAL
NITRITE, POC UA: POSITIVE
PH UR STRIP.AUTO: 6.5 [PH]
PH, POC UA: 6.5
PROT UR QL STRIP.AUTO: ABNORMAL MG/DL
PROTEIN, POC: NORMAL
RBC #/AREA URNS AUTO: ABNORMAL /HPF
RBC UR QL AUTO: NEGATIVE UNIT/L
SP GR UR STRIP.AUTO: 1.02
SPECIFIC GRAVITY, POC UA: 1.01
SQUAMOUS #/AREA URNS LPF: ABNORMAL /HPF
T VAGINALIS VAG QL WET PREP: NORMAL
UROBILINOGEN UR STRIP-ACNC: ABNORMAL MG/DL
UROBILINOGEN, POC UA: 1
WBC #/AREA URNS AUTO: ABNORMAL /HPF
WBC #/AREA VAG WET PREP: NORMAL
WBC CLUMPS UR QL AUTO: ABNORMAL /HPF
YEAST SPEC QL WET PREP: NORMAL

## 2023-03-23 PROCEDURE — 81001 URINALYSIS AUTO W/SCOPE: CPT | Mod: PBBFAC | Performed by: NURSE PRACTITIONER

## 2023-03-23 PROCEDURE — 81025 URINE PREGNANCY TEST: CPT | Mod: PBBFAC | Performed by: NURSE PRACTITIONER

## 2023-03-23 PROCEDURE — 87591 N.GONORRHOEAE DNA AMP PROB: CPT

## 2023-03-23 PROCEDURE — 3074F PR MOST RECENT SYSTOLIC BLOOD PRESSURE < 130 MM HG: ICD-10-PCS | Mod: CPTII,,, | Performed by: NURSE PRACTITIONER

## 2023-03-23 PROCEDURE — 99214 OFFICE O/P EST MOD 30 MIN: CPT | Mod: PBBFAC | Performed by: NURSE PRACTITIONER

## 2023-03-23 PROCEDURE — 87088 URINE BACTERIA CULTURE: CPT | Performed by: NURSE PRACTITIONER

## 2023-03-23 PROCEDURE — 87491 CHLMYD TRACH DNA AMP PROBE: CPT

## 2023-03-23 PROCEDURE — 3078F DIAST BP <80 MM HG: CPT | Mod: CPTII,,, | Performed by: NURSE PRACTITIONER

## 2023-03-23 PROCEDURE — 99214 OFFICE O/P EST MOD 30 MIN: CPT | Mod: S$PBB,,, | Performed by: NURSE PRACTITIONER

## 2023-03-23 PROCEDURE — 3078F PR MOST RECENT DIASTOLIC BLOOD PRESSURE < 80 MM HG: ICD-10-PCS | Mod: CPTII,,, | Performed by: NURSE PRACTITIONER

## 2023-03-23 PROCEDURE — 3008F PR BODY MASS INDEX (BMI) DOCUMENTED: ICD-10-PCS | Mod: CPTII,,, | Performed by: NURSE PRACTITIONER

## 2023-03-23 PROCEDURE — 99214 PR OFFICE/OUTPT VISIT, EST, LEVL IV, 30-39 MIN: ICD-10-PCS | Mod: S$PBB,,, | Performed by: NURSE PRACTITIONER

## 2023-03-23 PROCEDURE — 81001 URINALYSIS AUTO W/SCOPE: CPT | Performed by: NURSE PRACTITIONER

## 2023-03-23 PROCEDURE — 88174 CYTOPATH C/V AUTO IN FLUID: CPT | Performed by: NURSE PRACTITIONER

## 2023-03-23 PROCEDURE — 87077 CULTURE AEROBIC IDENTIFY: CPT | Performed by: NURSE PRACTITIONER

## 2023-03-23 PROCEDURE — 4010F ACE/ARB THERAPY RXD/TAKEN: CPT | Mod: CPTII,,, | Performed by: NURSE PRACTITIONER

## 2023-03-23 PROCEDURE — 1159F PR MEDICATION LIST DOCUMENTED IN MEDICAL RECORD: ICD-10-PCS | Mod: CPTII,,, | Performed by: NURSE PRACTITIONER

## 2023-03-23 PROCEDURE — 4010F PR ACE/ARB THEARPY RXD/TAKEN: ICD-10-PCS | Mod: CPTII,,, | Performed by: NURSE PRACTITIONER

## 2023-03-23 PROCEDURE — 3008F BODY MASS INDEX DOCD: CPT | Mod: CPTII,,, | Performed by: NURSE PRACTITIONER

## 2023-03-23 PROCEDURE — 1160F RVW MEDS BY RX/DR IN RCRD: CPT | Mod: CPTII,,, | Performed by: NURSE PRACTITIONER

## 2023-03-23 PROCEDURE — 87624 HPV HI-RISK TYP POOLED RSLT: CPT

## 2023-03-23 PROCEDURE — 1159F MED LIST DOCD IN RCRD: CPT | Mod: CPTII,,, | Performed by: NURSE PRACTITIONER

## 2023-03-23 PROCEDURE — 87210 SMEAR WET MOUNT SALINE/INK: CPT | Performed by: NURSE PRACTITIONER

## 2023-03-23 PROCEDURE — 1160F PR REVIEW ALL MEDS BY PRESCRIBER/CLIN PHARMACIST DOCUMENTED: ICD-10-PCS | Mod: CPTII,,, | Performed by: NURSE PRACTITIONER

## 2023-03-23 PROCEDURE — 3074F SYST BP LT 130 MM HG: CPT | Mod: CPTII,,, | Performed by: NURSE PRACTITIONER

## 2023-03-23 RX ORDER — NITROFURANTOIN 25; 75 MG/1; MG/1
100 CAPSULE ORAL 2 TIMES DAILY
Qty: 14 CAPSULE | Refills: 0 | Status: SHIPPED | OUTPATIENT
Start: 2023-03-23 | End: 2023-03-28 | Stop reason: ALTCHOICE

## 2023-03-23 NOTE — PROGRESS NOTES
"Patient ID: Jessica Walker is a 57 y.o. female.    Chief Complaint: PMB      Review of patient's allergies indicates:  No Known Allergies        HPI:  Pt is  here with c/o vaginal spotting intermittent for 3 months. Denies discharge.Reports had suprapubic pain yesterday but otherwise denies pelvic pain. She is postmenopausal since 2015. PMHx includes HTN/DM/Depression. She has outside pcp at [facility].Pt had normal colonoscopy 10/27/2021.      Review of Systems:   Negative except for findings in HPI     Objective:   /75   Pulse 96   Resp 16   Ht 5' 7" (1.702 m)   Wt 97.1 kg (214 lb)   SpO2 98%   BMI 33.52 kg/m²    Physical Exam:  GENERAL: Pt is aware and alert and  in no acute distress.  ABDOMEN: Soft, non tender.  VULVA:  No lesions or skin changes.  URETHRA: No lesions  BLADDER: No tenderness.  VAGINA: Mucosa normal,no discharge or lesions.NO atrophic changes  CERVIX:  no CMT, NO discharge; NO lesions. Friable endocervical canal  BIMANUAL EXAM: reveals an 8 week-sized uterus. The uterus is mobile, nontender, no palpable masses. Emerson adnexa reveal no evidence of masses; no fullness   SKIN: Warm and Dry  PSYCHIATRIC: Patient is awake and alert. Mood and affect are normal.    Assessment:   PMB (postmenopausal bleeding)  -     Urine culture  -     POCT urinalysis, dipstick or tablet reag  -     POCT urine pregnancy  -     Wet Prep, Genital  -     Liquid-Based Pap Smear, Screening Diagnostic  -     US Pelvis Comp with Transvag NON-OB (xpd; Future; Expected date: 2023  -     Urinalysis    Other orders  -     nitrofurantoin, macrocrystal-monohydrate, (MACROBID) 100 MG capsule; Take 1 capsule (100 mg total) by mouth 2 (two) times daily. for 7 days  Dispense: 14 capsule; Refill: 0            1. PMB (postmenopausal bleeding)               -will treat  UTI; check pelvic US; [facility] 2023 for possible EMB  Plan:       Follow up for  @1030 for possible EMB.    "

## 2023-03-25 LAB — BACTERIA UR CULT: ABNORMAL

## 2023-03-28 ENCOUNTER — TELEPHONE (OUTPATIENT)
Dept: GYNECOLOGY | Facility: CLINIC | Age: 58
End: 2023-03-28
Payer: MEDICAID

## 2023-03-28 LAB — PSYCHE PATHOLOGY RESULT: NORMAL

## 2023-03-28 RX ORDER — SULFAMETHOXAZOLE AND TRIMETHOPRIM 800; 160 MG/1; MG/1
1 TABLET ORAL 2 TIMES DAILY
Qty: 10 TABLET | Refills: 0 | Status: SHIPPED | OUTPATIENT
Start: 2023-03-28 | End: 2023-04-02

## 2023-04-10 ENCOUNTER — HOSPITAL ENCOUNTER (OUTPATIENT)
Dept: RADIOLOGY | Facility: HOSPITAL | Age: 58
Discharge: HOME | End: 2023-04-10
Attending: NURSE PRACTITIONER
Payer: MEDICAID

## 2023-04-10 ENCOUNTER — DOCUMENTATION ONLY (OUTPATIENT)
Dept: INFUSION THERAPY | Facility: HOSPITAL | Age: 58
End: 2023-04-10
Payer: MEDICAID

## 2023-04-10 DIAGNOSIS — N95.0 PMB (POSTMENOPAUSAL BLEEDING): ICD-10-CM

## 2023-04-10 PROCEDURE — 76856 US EXAM PELVIC COMPLETE: CPT | Mod: TC

## 2023-04-11 ENCOUNTER — TELEPHONE (OUTPATIENT)
Dept: GYNECOLOGY | Facility: CLINIC | Age: 58
End: 2023-04-11
Payer: MEDICAID

## 2023-04-11 DIAGNOSIS — D47.3 ESSENTIAL THROMBOCYTOSIS: Primary | ICD-10-CM

## 2023-04-11 NOTE — TELEPHONE ENCOUNTER
Called pt, pt said that she is feeling better and I did let her know if she starts to have any symptoms again she should give us a call pt verbalized understanding

## 2023-04-11 NOTE — TELEPHONE ENCOUNTER
----- Message from Annita Rosales MT sent at 4/10/2023 10:51 AM CDT -----  Regarding: patient request  Patient called and left a voicemail stating she completed her medication and is still having issue. Patient would like orders sent to [facility] in [place] to do an urinalysis.

## 2023-04-12 ENCOUNTER — LAB VISIT (OUTPATIENT)
Dept: LAB | Facility: HOSPITAL | Age: 58
End: 2023-04-12
Attending: NURSE PRACTITIONER
Payer: MEDICAID

## 2023-04-12 DIAGNOSIS — Z00.00 WELLNESS EXAMINATION: ICD-10-CM

## 2023-04-12 DIAGNOSIS — D47.3 ESSENTIAL THROMBOCYTOSIS: ICD-10-CM

## 2023-04-12 LAB
ABS NEUT CALC (OHS): 9.65 X10(3)/MCL (ref 2.1–9.2)
CHOLEST SERPL-MCNC: 166 MG/DL
CHOLEST/HDLC SERPL: 3 {RATIO} (ref 0–5)
EOSINOPHIL NFR BLD MANUAL: 1.01 X10(3)/MCL (ref 0–0.9)
EOSINOPHIL NFR BLD MANUAL: 7 % (ref 0–8)
ERYTHROCYTE [DISTWIDTH] IN BLOOD BY AUTOMATED COUNT: 17.4 % (ref 11.5–17)
EST. AVERAGE GLUCOSE BLD GHB EST-MCNC: 168.6 MG/DL
GIANT PLATELETS: ABNORMAL
HBA1C MFR BLD: 7.5 %
HCT VFR BLD AUTO: 32.7 % (ref 37–47)
HDLC SERPL-MCNC: 53 MG/DL (ref 35–60)
HGB BLD-MCNC: 10.2 G/DL (ref 12–16)
LDLC SERPL CALC-MCNC: 91 MG/DL (ref 50–140)
LYMPHOCYTES NFR BLD MANUAL: 1.87 X10(3)/MCL
LYMPHOCYTES NFR BLD MANUAL: 13 % (ref 13–40)
MCH RBC QN AUTO: 24.5 PG (ref 27–31)
MCHC RBC AUTO-ENTMCNC: 31.2 G/DL (ref 33–36)
MCV RBC AUTO: 78.4 FL (ref 80–94)
MONOCYTES NFR BLD MANUAL: 1.01 X10(3)/MCL (ref 0.1–1.3)
MONOCYTES NFR BLD MANUAL: 7 % (ref 2–11)
NEUTROPHILS NFR BLD MANUAL: 65 % (ref 47–80)
NEUTS BAND NFR BLD MANUAL: 2 % (ref 0–11)
OTHER CELLS MANUAL: 6 %
PLATELET # BLD AUTO: 730 X10(3)/MCL (ref 130–400)
PLATELET # BLD EST: ABNORMAL 10*3/UL
PMV BLD AUTO: 9.9 FL (ref 7.4–10.4)
POLYCHROMASIA BLD QL SMEAR: SLIGHT
RBC # BLD AUTO: 4.17 X10(6)/MCL (ref 4.2–5.4)
RBC MORPH BLD: ABNORMAL
TRIGL SERPL-MCNC: 108 MG/DL (ref 37–140)
VLDLC SERPL CALC-MCNC: 22 MG/DL
WBC # SPEC AUTO: 14.4 X10(3)/MCL (ref 4.5–11.5)

## 2023-04-12 PROCEDURE — 83036 HEMOGLOBIN GLYCOSYLATED A1C: CPT

## 2023-04-12 PROCEDURE — 85025 COMPLETE CBC W/AUTO DIFF WBC: CPT

## 2023-04-12 PROCEDURE — 85060 BLOOD SMEAR INTERPRETATION: CPT

## 2023-04-12 PROCEDURE — 85027 COMPLETE CBC AUTOMATED: CPT

## 2023-04-12 PROCEDURE — 36415 COLL VENOUS BLD VENIPUNCTURE: CPT

## 2023-04-12 PROCEDURE — 80061 LIPID PANEL: CPT

## 2023-04-13 LAB — HEMATOLOGIST REVIEW: NORMAL

## 2023-04-19 ENCOUNTER — HOSPITAL ENCOUNTER (OUTPATIENT)
Facility: HOSPITAL | Age: 58
Discharge: LEFT BEFORE DISCHARGE | End: 2023-04-19
Attending: FAMILY MEDICINE | Admitting: STUDENT IN AN ORGANIZED HEALTH CARE EDUCATION/TRAINING PROGRAM
Payer: MEDICAID

## 2023-04-19 ENCOUNTER — OFFICE VISIT (OUTPATIENT)
Dept: GYNECOLOGY | Facility: CLINIC | Age: 58
End: 2023-04-19
Payer: MEDICAID

## 2023-04-19 VITALS
HEART RATE: 81 BPM | WEIGHT: 209.44 LBS | HEIGHT: 67 IN | SYSTOLIC BLOOD PRESSURE: 113 MMHG | OXYGEN SATURATION: 99 % | RESPIRATION RATE: 16 BRPM | TEMPERATURE: 98 F | DIASTOLIC BLOOD PRESSURE: 79 MMHG | BODY MASS INDEX: 32.87 KG/M2

## 2023-04-19 VITALS
RESPIRATION RATE: 16 BRPM | TEMPERATURE: 98 F | HEART RATE: 94 BPM | HEIGHT: 67 IN | DIASTOLIC BLOOD PRESSURE: 68 MMHG | SYSTOLIC BLOOD PRESSURE: 95 MMHG | WEIGHT: 210 LBS | OXYGEN SATURATION: 100 % | BODY MASS INDEX: 32.96 KG/M2

## 2023-04-19 DIAGNOSIS — D50.9 IRON DEFICIENCY ANEMIA, UNSPECIFIED IRON DEFICIENCY ANEMIA TYPE: Primary | ICD-10-CM

## 2023-04-19 DIAGNOSIS — R53.83 FATIGUE: ICD-10-CM

## 2023-04-19 DIAGNOSIS — R42 DIZZINESS: ICD-10-CM

## 2023-04-19 DIAGNOSIS — R53.1 WEAKNESS: ICD-10-CM

## 2023-04-19 DIAGNOSIS — R07.9 CHEST PAIN: ICD-10-CM

## 2023-04-19 DIAGNOSIS — R30.0 DYSURIA: ICD-10-CM

## 2023-04-19 DIAGNOSIS — N95.0 PMB (POSTMENOPAUSAL BLEEDING): Primary | ICD-10-CM

## 2023-04-19 LAB
ALBUMIN SERPL-MCNC: 4.3 G/DL (ref 3.5–5)
ALBUMIN/GLOB SERPL: 1.2 RATIO (ref 1.1–2)
ALP SERPL-CCNC: 110 UNIT/L (ref 40–150)
ALT SERPL-CCNC: 23 UNIT/L (ref 0–55)
APPEARANCE UR: CLEAR
AST SERPL-CCNC: 16 UNIT/L (ref 5–34)
B-HCG UR QL: NEGATIVE
BASOPHILS # BLD AUTO: 0.11 X10(3)/MCL (ref 0–0.2)
BASOPHILS NFR BLD AUTO: 0.6 %
BILIRUB SERPL-MCNC: NORMAL MG/DL
BILIRUB UR QL STRIP.AUTO: NEGATIVE MG/DL
BILIRUBIN DIRECT+TOT PNL SERPL-MCNC: 0.4 MG/DL
BLOOD URINE, POC: NORMAL
BNP BLD-MCNC: 25.8 PG/ML
BUN SERPL-MCNC: 14.8 MG/DL (ref 9.8–20.1)
CALCIUM SERPL-MCNC: 9.7 MG/DL (ref 8.4–10.2)
CHLORIDE SERPL-SCNC: 101 MMOL/L (ref 98–107)
CO2 SERPL-SCNC: 22 MMOL/L (ref 22–29)
COLOR UR AUTO: ABNORMAL
COLOR, POC UA: YELLOW
CREAT SERPL-MCNC: 0.91 MG/DL (ref 0.55–1.02)
CTP QC/QA: YES
EOSINOPHIL # BLD AUTO: 0.31 X10(3)/MCL (ref 0–0.9)
EOSINOPHIL NFR BLD AUTO: 1.6 %
ERYTHROCYTE [DISTWIDTH] IN BLOOD BY AUTOMATED COUNT: 17.6 % (ref 11.5–17)
FERRITIN SERPL-MCNC: 6.84 NG/ML (ref 4.63–204)
FLUAV AG UPPER RESP QL IA.RAPID: NOT DETECTED
FLUBV AG UPPER RESP QL IA.RAPID: NOT DETECTED
GFR SERPLBLD CREATININE-BSD FMLA CKD-EPI: >60 MLS/MIN/1.73/M2
GLOBULIN SER-MCNC: 3.5 GM/DL (ref 2.4–3.5)
GLUCOSE SERPL-MCNC: 232 MG/DL (ref 74–100)
GLUCOSE UR QL STRIP.AUTO: NORMAL MG/DL
GLUCOSE UR QL STRIP: NORMAL
HCT VFR BLD AUTO: 35.7 % (ref 37–47)
HGB BLD-MCNC: 10.9 G/DL (ref 12–16)
IMM GRANULOCYTES # BLD AUTO: 0.54 X10(3)/MCL (ref 0–0.04)
IMM GRANULOCYTES NFR BLD AUTO: 2.9 %
IRON SATN MFR SERPL: 2 % (ref 20–50)
IRON SERPL-MCNC: 10 UG/DL (ref 50–170)
KETONES UR QL STRIP.AUTO: NEGATIVE MG/DL
KETONES UR QL STRIP: NORMAL
LEUKOCYTE ESTERASE UR QL STRIP.AUTO: NEGATIVE UNIT/L
LEUKOCYTE ESTERASE URINE, POC: NORMAL
LYMPHOCYTES # BLD AUTO: 2.17 X10(3)/MCL (ref 0.6–4.6)
LYMPHOCYTES NFR BLD AUTO: 11.5 %
MAGNESIUM SERPL-MCNC: 1.5 MG/DL (ref 1.6–2.6)
MCH RBC QN AUTO: 23.6 PG (ref 27–31)
MCHC RBC AUTO-ENTMCNC: 30.5 G/DL (ref 33–36)
MCV RBC AUTO: 77.3 FL (ref 80–94)
MONOCYTES # BLD AUTO: 1.28 X10(3)/MCL (ref 0.1–1.3)
MONOCYTES NFR BLD AUTO: 6.8 %
NEUTROPHILS # BLD AUTO: 14.43 X10(3)/MCL (ref 2.1–9.2)
NEUTROPHILS NFR BLD AUTO: 76.6 %
NITRITE UR QL STRIP.AUTO: NEGATIVE
NITRITE, POC UA: NORMAL
NRBC BLD AUTO-RTO: 0 %
PH UR STRIP.AUTO: 5.5 [PH]
PH, POC UA: NORMAL
PLATELET # BLD AUTO: 783 X10(3)/MCL (ref 130–400)
PMV BLD AUTO: 10.3 FL (ref 7.4–10.4)
POCT GLUCOSE: 229 MG/DL (ref 70–110)
POTASSIUM SERPL-SCNC: 5 MMOL/L (ref 3.5–5.1)
PROT SERPL-MCNC: 7.8 GM/DL (ref 6.4–8.3)
PROT UR QL STRIP.AUTO: NEGATIVE MG/DL
PROTEIN, POC: NORMAL
RBC # BLD AUTO: 4.62 X10(6)/MCL (ref 4.2–5.4)
RBC #/AREA URNS AUTO: ABNORMAL /HPF
RBC UR QL AUTO: ABNORMAL UNIT/L
RENAL EPI CELLS #/AREA UR COMP ASSIST: ABNORMAL /HPF
SARS-COV-2 RNA RESP QL NAA+PROBE: NOT DETECTED
SODIUM SERPL-SCNC: 136 MMOL/L (ref 136–145)
SP GR UR STRIP.AUTO: 1.01
SPECIFIC GRAVITY, POC UA: NORMAL
SQUAMOUS #/AREA URNS LPF: ABNORMAL /HPF
TIBC SERPL-MCNC: 420 UG/DL (ref 70–310)
TIBC SERPL-MCNC: 430 UG/DL (ref 250–450)
TRANSFERRIN SERPL-MCNC: 386 MG/DL (ref 180–382)
TROPONIN I SERPL-MCNC: <0.01 NG/ML (ref 0–0.04)
TSH SERPL-ACNC: 3.24 UIU/ML (ref 0.35–4.94)
UROBILINOGEN UR STRIP-ACNC: NORMAL MG/DL
UROBILINOGEN, POC UA: NORMAL
WBC # SPEC AUTO: 18.8 X10(3)/MCL (ref 4.5–11.5)
WBC #/AREA URNS AUTO: ABNORMAL /HPF

## 2023-04-19 PROCEDURE — 99285 EMERGENCY DEPT VISIT HI MDM: CPT | Mod: 25,27

## 2023-04-19 PROCEDURE — G0378 HOSPITAL OBSERVATION PER HR: HCPCS

## 2023-04-19 PROCEDURE — 88305 TISSUE EXAM BY PATHOLOGIST: CPT | Mod: TC | Performed by: NURSE PRACTITIONER

## 2023-04-19 PROCEDURE — 3078F DIAST BP <80 MM HG: CPT | Mod: CPTII,,, | Performed by: NURSE PRACTITIONER

## 2023-04-19 PROCEDURE — 81001 URINALYSIS AUTO W/SCOPE: CPT | Mod: PBBFAC | Performed by: NURSE PRACTITIONER

## 2023-04-19 PROCEDURE — 82728 ASSAY OF FERRITIN: CPT | Performed by: NURSE PRACTITIONER

## 2023-04-19 PROCEDURE — 81025 URINE PREGNANCY TEST: CPT | Mod: PBBFAC | Performed by: NURSE PRACTITIONER

## 2023-04-19 PROCEDURE — 80053 COMPREHEN METABOLIC PANEL: CPT | Performed by: PHYSICIAN ASSISTANT

## 2023-04-19 PROCEDURE — 1159F PR MEDICATION LIST DOCUMENTED IN MEDICAL RECORD: ICD-10-PCS | Mod: CPTII,,, | Performed by: NURSE PRACTITIONER

## 2023-04-19 PROCEDURE — 96365 THER/PROPH/DIAG IV INF INIT: CPT

## 2023-04-19 PROCEDURE — 84484 ASSAY OF TROPONIN QUANT: CPT | Performed by: PHYSICIAN ASSISTANT

## 2023-04-19 PROCEDURE — 4010F PR ACE/ARB THEARPY RXD/TAKEN: ICD-10-PCS | Mod: CPTII,,, | Performed by: NURSE PRACTITIONER

## 2023-04-19 PROCEDURE — 3008F BODY MASS INDEX DOCD: CPT | Mod: CPTII,,, | Performed by: NURSE PRACTITIONER

## 2023-04-19 PROCEDURE — 83880 ASSAY OF NATRIURETIC PEPTIDE: CPT | Performed by: PHYSICIAN ASSISTANT

## 2023-04-19 PROCEDURE — 99214 OFFICE O/P EST MOD 30 MIN: CPT | Mod: PBBFAC,25 | Performed by: NURSE PRACTITIONER

## 2023-04-19 PROCEDURE — 96372 THER/PROPH/DIAG INJ SC/IM: CPT | Mod: 59 | Performed by: STUDENT IN AN ORGANIZED HEALTH CARE EDUCATION/TRAINING PROGRAM

## 2023-04-19 PROCEDURE — 0240U COVID/FLU A&B PCR: CPT | Performed by: PHYSICIAN ASSISTANT

## 2023-04-19 PROCEDURE — 85025 COMPLETE CBC W/AUTO DIFF WBC: CPT | Performed by: PHYSICIAN ASSISTANT

## 2023-04-19 PROCEDURE — 25000003 PHARM REV CODE 250: Performed by: STUDENT IN AN ORGANIZED HEALTH CARE EDUCATION/TRAINING PROGRAM

## 2023-04-19 PROCEDURE — 81001 URINALYSIS AUTO W/SCOPE: CPT | Performed by: PHYSICIAN ASSISTANT

## 2023-04-19 PROCEDURE — 3074F PR MOST RECENT SYSTOLIC BLOOD PRESSURE < 130 MM HG: ICD-10-PCS | Mod: CPTII,,, | Performed by: NURSE PRACTITIONER

## 2023-04-19 PROCEDURE — 3074F SYST BP LT 130 MM HG: CPT | Mod: CPTII,,, | Performed by: NURSE PRACTITIONER

## 2023-04-19 PROCEDURE — 93005 ELECTROCARDIOGRAM TRACING: CPT

## 2023-04-19 PROCEDURE — 83735 ASSAY OF MAGNESIUM: CPT | Performed by: PHYSICIAN ASSISTANT

## 2023-04-19 PROCEDURE — 96361 HYDRATE IV INFUSION ADD-ON: CPT

## 2023-04-19 PROCEDURE — 84443 ASSAY THYROID STIM HORMONE: CPT | Performed by: PHYSICIAN ASSISTANT

## 2023-04-19 PROCEDURE — 63600175 PHARM REV CODE 636 W HCPCS: Performed by: STUDENT IN AN ORGANIZED HEALTH CARE EDUCATION/TRAINING PROGRAM

## 2023-04-19 PROCEDURE — 83550 IRON BINDING TEST: CPT | Performed by: NURSE PRACTITIONER

## 2023-04-19 PROCEDURE — 3078F PR MOST RECENT DIASTOLIC BLOOD PRESSURE < 80 MM HG: ICD-10-PCS | Mod: CPTII,,, | Performed by: NURSE PRACTITIONER

## 2023-04-19 PROCEDURE — 82962 GLUCOSE BLOOD TEST: CPT

## 2023-04-19 PROCEDURE — 3008F PR BODY MASS INDEX (BMI) DOCUMENTED: ICD-10-PCS | Mod: CPTII,,, | Performed by: NURSE PRACTITIONER

## 2023-04-19 PROCEDURE — 1159F MED LIST DOCD IN RCRD: CPT | Mod: CPTII,,, | Performed by: NURSE PRACTITIONER

## 2023-04-19 PROCEDURE — 1160F PR REVIEW ALL MEDS BY PRESCRIBER/CLIN PHARMACIST DOCUMENTED: ICD-10-PCS | Mod: CPTII,,, | Performed by: NURSE PRACTITIONER

## 2023-04-19 PROCEDURE — 99214 OFFICE O/P EST MOD 30 MIN: CPT | Mod: S$PBB,,, | Performed by: NURSE PRACTITIONER

## 2023-04-19 PROCEDURE — 4010F ACE/ARB THERAPY RXD/TAKEN: CPT | Mod: CPTII,,, | Performed by: NURSE PRACTITIONER

## 2023-04-19 PROCEDURE — 63600175 PHARM REV CODE 636 W HCPCS: Performed by: NURSE PRACTITIONER

## 2023-04-19 PROCEDURE — 99214 PR OFFICE/OUTPT VISIT, EST, LEVL IV, 30-39 MIN: ICD-10-PCS | Mod: S$PBB,,, | Performed by: NURSE PRACTITIONER

## 2023-04-19 PROCEDURE — 1160F RVW MEDS BY RX/DR IN RCRD: CPT | Mod: CPTII,,, | Performed by: NURSE PRACTITIONER

## 2023-04-19 RX ORDER — GLUCAGON 1 MG
1 KIT INJECTION
Status: DISCONTINUED | OUTPATIENT
Start: 2023-04-19 | End: 2023-04-20 | Stop reason: HOSPADM

## 2023-04-19 RX ORDER — ENOXAPARIN SODIUM 100 MG/ML
40 INJECTION SUBCUTANEOUS EVERY 24 HOURS
Status: DISCONTINUED | OUTPATIENT
Start: 2023-04-19 | End: 2023-04-20 | Stop reason: HOSPADM

## 2023-04-19 RX ORDER — SODIUM CHLORIDE 0.9 % (FLUSH) 0.9 %
10 SYRINGE (ML) INJECTION
Status: CANCELLED | OUTPATIENT
Start: 2023-04-19

## 2023-04-19 RX ORDER — DIPHENHYDRAMINE HYDROCHLORIDE 50 MG/ML
50 INJECTION INTRAMUSCULAR; INTRAVENOUS ONCE AS NEEDED
Status: CANCELLED | OUTPATIENT
Start: 2023-04-19

## 2023-04-19 RX ORDER — METHYLPREDNISOLONE SOD SUCC 125 MG
125 VIAL (EA) INJECTION ONCE AS NEEDED
Status: CANCELLED | OUTPATIENT
Start: 2023-04-19

## 2023-04-19 RX ORDER — EPINEPHRINE 0.3 MG/.3ML
0.3 INJECTION SUBCUTANEOUS ONCE AS NEEDED
Status: CANCELLED | OUTPATIENT
Start: 2023-04-19

## 2023-04-19 RX ORDER — NALOXONE HCL 0.4 MG/ML
0.02 VIAL (ML) INJECTION
Status: DISCONTINUED | OUTPATIENT
Start: 2023-04-19 | End: 2023-04-20 | Stop reason: HOSPADM

## 2023-04-19 RX ORDER — LISINOPRIL 10 MG/1
20 TABLET ORAL DAILY
Status: DISCONTINUED | OUTPATIENT
Start: 2023-04-20 | End: 2023-04-20 | Stop reason: HOSPADM

## 2023-04-19 RX ORDER — DEXTROSE MONOHYDRATE 100 MG/ML
12.5 INJECTION, SOLUTION INTRAVENOUS
Status: DISCONTINUED | OUTPATIENT
Start: 2023-04-19 | End: 2023-04-20 | Stop reason: HOSPADM

## 2023-04-19 RX ORDER — IBUPROFEN 200 MG
16 TABLET ORAL
Status: DISCONTINUED | OUTPATIENT
Start: 2023-04-19 | End: 2023-04-20 | Stop reason: HOSPADM

## 2023-04-19 RX ORDER — PANTOPRAZOLE SODIUM 40 MG/1
40 TABLET, DELAYED RELEASE ORAL DAILY
Status: DISCONTINUED | OUTPATIENT
Start: 2023-04-20 | End: 2023-04-20 | Stop reason: HOSPADM

## 2023-04-19 RX ORDER — HEPARIN 100 UNIT/ML
500 SYRINGE INTRAVENOUS
Status: CANCELLED | OUTPATIENT
Start: 2023-04-19

## 2023-04-19 RX ORDER — SODIUM CHLORIDE 0.9 % (FLUSH) 0.9 %
10 SYRINGE (ML) INJECTION EVERY 12 HOURS PRN
Status: DISCONTINUED | OUTPATIENT
Start: 2023-04-19 | End: 2023-04-20 | Stop reason: HOSPADM

## 2023-04-19 RX ORDER — DEXTROSE MONOHYDRATE 100 MG/ML
25 INJECTION, SOLUTION INTRAVENOUS
Status: DISCONTINUED | OUTPATIENT
Start: 2023-04-19 | End: 2023-04-20 | Stop reason: HOSPADM

## 2023-04-19 RX ORDER — ATORVASTATIN CALCIUM 40 MG/1
40 TABLET, FILM COATED ORAL NIGHTLY
Status: DISCONTINUED | OUTPATIENT
Start: 2023-04-19 | End: 2023-04-20 | Stop reason: HOSPADM

## 2023-04-19 RX ORDER — METOPROLOL TARTRATE 25 MG/1
25 TABLET, FILM COATED ORAL 2 TIMES DAILY
Status: DISCONTINUED | OUTPATIENT
Start: 2023-04-19 | End: 2023-04-20 | Stop reason: HOSPADM

## 2023-04-19 RX ORDER — IBUPROFEN 200 MG
24 TABLET ORAL
Status: DISCONTINUED | OUTPATIENT
Start: 2023-04-19 | End: 2023-04-20 | Stop reason: HOSPADM

## 2023-04-19 RX ORDER — ASPIRIN 81 MG/1
81 TABLET ORAL DAILY
Status: DISCONTINUED | OUTPATIENT
Start: 2023-04-20 | End: 2023-04-20 | Stop reason: HOSPADM

## 2023-04-19 RX ORDER — NITROGLYCERIN 0.4 MG/1
0.4 TABLET SUBLINGUAL EVERY 5 MIN PRN
Status: DISCONTINUED | OUTPATIENT
Start: 2023-04-19 | End: 2023-04-20 | Stop reason: HOSPADM

## 2023-04-19 RX ADMIN — SODIUM CHLORIDE 125 MG: 9 INJECTION, SOLUTION INTRAVENOUS at 04:04

## 2023-04-19 RX ADMIN — SODIUM CHLORIDE, POTASSIUM CHLORIDE, SODIUM LACTATE AND CALCIUM CHLORIDE 1000 ML: 600; 310; 30; 20 INJECTION, SOLUTION INTRAVENOUS at 01:04

## 2023-04-19 RX ADMIN — ENOXAPARIN SODIUM 40 MG: 40 INJECTION SUBCUTANEOUS at 05:04

## 2023-04-19 NOTE — DISCHARGE SUMMARY
This patient is choosing to leave against medical advice. I have personally explained to patient the risks and that choosing to do so may result in permanent bodily harm or even death. Discussed at great length that without further evaluation and monitoring there may have unforeseen circumstances and/or deterioration causing permanent bodily harm or death as a result of leaving against medical advice. Patient verbalizes these risks back to me in layman terms. Patient still desires to leave against medical advice. Patient is alert, oriented, and shows the mental capacity to make clear decisions regarding their health care at this time. In light of patients decision to leave against medical advice, patient is aware of the importance of following up as instructed. Advise patient to return to ED at any time immediately if they change their mind at any time or if condition begins to worsen or change in anyway.    Hospital Course:    Jessica Walker is a 57 y.o. female with a past medical history of recurrent SARAH (unexplained, follows with hem onc), essential thrombocytosis, chronic leukocytosis, DM2, HTN, HLD, JIN on CPAP    who presented to [facility] ED  on 4/19/2023 with a primary complaint of generalized weakness and fatigue for he past 2 weeks. In the ED, afebrile, pulse 89 RR 16, /77. Did have transient drop in BP to 89/57 but maintained MAP above 65 and BP improvedw ith 1L LR bolus.   Labs revealed leukocytosis 18.8, H/H 10.9/35.7. CMP largely unremarkable, iron 10, iron saturation2. Internal Medicine consulted for admission     When admission was first discussed with patient for IV iron infusion and observation, she stated that she would prefer to be treated as an outpatient. She requested that IM team coordinate an appointment with infusion clinic for IV iron. After discussing with IM staff and [facility] Hem Onc, plan was initially to discharge patient as long as she could get an appointment with infusion  clinic ASAP for IV iron. Infusions have been set up with clinic; however the clinic is unable to provide exact date at this time. I again discussed admission with the patient, and she agreed to be admitted for IV iron infusion. Shortly after infusion was complete, was called to patient bedside because she is opting to leave against medical advice. Explained to her the plan to stay overnight for observation given transient episodes of hypotension in ED along with elevation of leukocytosis above her usual baseline (has chronic leukocytosis followed by Hem Onc)  She was able to understand and repeat risks of leaving against medical advice. She states that she understands the risks and will follow up for her 2nd iron infusion with the clinic on an outpatient basis. She is insistent that she needs to go home and tend to her cat.    Zainab Knowles MD  Internal Medicine PGY-2

## 2023-04-19 NOTE — FIRST PROVIDER EVALUATION
Medical screening examination initiated.  I have conducted a focused provider triage encounter, findings are as follows:    Brief history of present illness: Approx 2 week hx of weakness & dizziness.    There were no vitals filed for this visit.    Pertinent physical exam:  VSS, NAD    Brief workup plan:  EKG, labs ordered    Preliminary workup initiated; this workup will be continued and followed by the physician or advanced practice provider that is assigned to the patient when roomed.

## 2023-04-19 NOTE — PROGRESS NOTES
"Patient ID: Jessica Walker is a 57 y.o. female.    Chief Complaint: PMB    Review of patient's allergies indicates:  No Known Allergies      Past Medical History:   Diagnosis Date    Anemia     Depression     Diabetes mellitus     Hypertension             HPI:   Pt is  here to f/u on PMB. She was seen in clinic 3/23/2023 and reported a 3 month hx of intermittent vaginal spotting. Recent pelvic US showed endometrial stripe of 6mm. Pt has multiple RF for endometrial dysplasia/malignancy (age/htn/dm) therefore decision was made to proceed with EMB. Prior to procedure, pt did report that she has been feeling weak and dizzy over the past few days. Denies CP/SOB. States vomited at home 2 days ago and has had poor appetite. States partner who lives with her has same symptoms. Denies fever. Denies pain. States glucose has been in the 160s. We offered to reschedule her procedure today but pt insisted that we proceed as she does not want to delay a diagnosis. She has an outside pcp at [facility].Pt had normal colonoscopy 10/27/2021.  Pt treated for UTI at her last visit. States completed bactrim rx as prescribed.   Review of Systems:   Negative except for findings in HPI     Objective:   BP 95/68   Pulse 94   Temp 98 °F (36.7 °C)   Resp 16   Ht 5' 7" (1.702 m)   Wt 95.3 kg (210 lb)   SpO2 100%   BMI 32.89 kg/m²    Physical Exam:  GENERAL: Pt is aware and alert and  in no acute distress.  ABDOMEN: Soft, non tender.  VULVA:  No lesions or skin changes.  URETHRA: No lesions  BLADDER: No tenderness.  VAGINA: Mucosa normal,no discharge; no lesions.  CERVIX:  no CMT, NO discharge; bright red friable polyp visualized at OS  BIMANUAL EXAM: reveals a 8-week-sized uterus. The uterus is mobile, nontender, no palpable masses. Emerson adnexa reveal no evidence of masses; no fullness   SKIN: Warm and Dry  PSYCHIATRIC: Patient is awake and alert. Mood and affect are normal.    Procedure Note:  Procedure: Endometrial " "Biopsy    Indication: PMB    Consent:  Written Consent Obtained    [facility]  [facility]    Pre-Procedure Exam:  BP 95/68   Pulse 94   Temp 98 °F (36.7 °C)   Resp 16   Ht 5' 7" (1.702 m)   Wt 95.3 kg (210 lb)   SpO2 100%   BMI 32.89 kg/m²   Review of patient's allergies indicates:  No Known Allergies   UPT-negative  Pt AAOx3; NAD        Endometrial Biopsy:    UPT performed in clinic was negative. Discussed risks and benefit of procedure and pt accepts. Written consent was obtained. Time out was performed.   The patient was placed in dorsal lithotomy position. A speculum was placed in the vagina and good visualization of the cervix was achieved. Polyp visualized at OS  The cervix was swabbed with betadine swabs x 3. Polyp grasped with ring forceps and easily removed  The uterus sounded to __7_ cm. The endometrial pipelle was advanced to the fundus without difficulty. Two passes were performed and adequate tissue was noted.    All instruments were removed from the vagina. The patient tolerated the procedure well. Specimen was sent to pathology.      Report called to MEHDI Klein in ER. Pt to ER via  by VONDA Lacey.     Assessment:   PMB (postmenopausal bleeding)  -     POCT urinalysis, dipstick or tablet reag  -     POCT urine pregnancy  -     Specimen to Pathology Gynecology and Obstetrics  -     Specimen to Pathology Gynecology and Obstetrics    Dysuria  -     POCT urinalysis, dipstick or tablet reag  -     POCT urine pregnancy            1. PMB (postmenopausal bleeding)    2. Dysuria           -UPT negative  -ua with no blood/nitrites/leukocytes  -pt requested transport to ER for be evaluated for her nausea/weakness; VONDA Lacey transported pt via   -Front office staff to schedule pt appt with gyn resident team in 2 weeks for results/management of PMB  Plan:       Follow up for 2 weeks with gyn resident team for results.  "

## 2023-04-19 NOTE — ED PROVIDER NOTES
Encounter Date: 4/19/2023       History     Chief Complaint   Patient presents with    Fatigue     PT W CO WEAKNESS/DIZZINESS/SOB/PAL/NAUSEA X 2 WKS.  DENIES CP EKG OBTAINED. PT REQUESTING IRON LEVEL CHECKED. EKG OBTAINED.       Pt is a 57 y.o. female who presents to the [facility] ED complaining of intermittent weakness, fatigue, SOB which has been ongoing x 2 weeks. Pt with Hx of depression, HTN, Anemia, and DM. Seen in the GYN Clinic this AM for follow up of postmenopausal uterine bleeding, had biopsy in clinic this AM. Denies current vaginal bleeding. Denies fever, abdominal pain, diarrhea, rectal bleeding, black stool, or maroon stool.    Review of patient's allergies indicates:  No Known Allergies  Past Medical History:   Diagnosis Date    Anemia     Depression     Diabetes mellitus     Hypertension      Past Surgical History:   Procedure Laterality Date    CHOLECYSTECTOMY      COLONOSCOPY  10/2021     Family History   Problem Relation Age of Onset    Diabetes Mother     Coronary artery disease Mother     Diabetes Father     Cervical cancer Sister     Fibromyalgia Sister     Breast cancer Sister      Social History     Tobacco Use    Smoking status: Every Day     Packs/day: 1.00     Types: Cigarettes    Smokeless tobacco: Never   Substance Use Topics    Alcohol use: Not Currently    Drug use: Not Currently     Review of Systems    Physical Exam     Initial Vitals [04/19/23 1022]   BP Pulse Resp Temp SpO2   111/77 89 16 97.9 °F (36.6 °C) 99 %      MAP       --         Physical Exam    ED Course   Procedures  Labs Reviewed   COMPREHENSIVE METABOLIC PANEL - Abnormal; Notable for the following components:       Result Value    Glucose Level 232 (*)     All other components within normal limits   MAGNESIUM - Abnormal; Notable for the following components:    Magnesium Level 1.50 (*)     All other components within normal limits   CBC WITH DIFFERENTIAL - Abnormal; Notable for the following components:    WBC 18.8  (*)     Hgb 10.9 (*)     Hct 35.7 (*)     MCV 77.3 (*)     MCH 23.6 (*)     MCHC 30.5 (*)     RDW 17.6 (*)     Platelet 783 (*)     Neut # 14.43 (*)     IG# 0.54 (*)     All other components within normal limits   IRON AND TIBC - Abnormal; Notable for the following components:    Iron Binding Capacity Unsaturated 420 (*)     Iron Level 10 (*)     Transferrin 386 (*)     Iron Saturation 2 (*)     All other components within normal limits   POCT GLUCOSE - Abnormal; Notable for the following components:    POCT Glucose 229 (*)     All other components within normal limits   TROPONIN I - Normal   TSH - Normal   B-TYPE NATRIURETIC PEPTIDE - Normal   COVID/FLU A&B PCR - Normal    Narrative:     The [employer] Xpress SARS-CoV-2/FLU/RSV plus is a rapid, multiplexed real-time PCR test intended for the simultaneous qualitative detection and differentiation of SARS-CoV-2, Influenza A, Influenza B, and respiratory syncytial virus (RSV) viral RNA in either nasopharyngeal swab or nasal swab specimens.         FERRITIN - Normal   CBC W/ AUTO DIFFERENTIAL    Narrative:     The following orders were created for panel order CBC Auto Differential.  Procedure                               Abnormality         Status                     ---------                               -----------         ------                     CBC with Differential[[id number]]        Abnormal            Final result                 Please view results for these tests on the individual orders.   URINALYSIS, REFLEX TO URINE CULTURE   EXTRA TUBES    Narrative:     The following orders were created for panel order EXTRA TUBES.  Procedure                               Abnormality         Status                     ---------                               -----------         ------                     Light Blue Top Hold[[id number]]                              In process                 Red Top Hold[[id number]]                                     In process                  Gold Top Hold[[id number]]                                    In process                 Pink Top Hold[[id number]]                                    In process                   Please view results for these tests on the individual orders.   LIGHT BLUE TOP HOLD   RED TOP HOLD   GOLD TOP HOLD   PINK TOP HOLD   POCT GLUCOSE, HAND-HELD DEVICE        ECG Results              EKG 12-lead (Weakness) Age > 50 (In process)  Result time 04/19/23 10:27:02      In process by Interface, Lab In [facility] (04/19/23 10:27:02)                   Narrative:    Test Reason : R53.1,    Vent. Rate : 088 BPM     Atrial Rate : 088 BPM     P-R Int : 158 ms          QRS Dur : 076 ms      QT Int : 346 ms       P-R-T Axes : 071 048 057 degrees     QTc Int : 418 ms    Normal sinus rhythm  Low voltage QRS  Borderline Abnormal ECG  No previous ECGs available    Referred By:             Confirmed By:                                   Imaging Results              X-Ray Chest PA And Lateral (Final result)  Result time 04/19/23 11:49:13      Final result by Thai Davila MD (04/19/23 11:49:13)                   Impression:      No acute cardiopulmonary process identified.    Hiatal hernia.      Electronically signed by: Thai Davila  Date:    04/19/2023  Time:    11:49               Narrative:    EXAMINATION:  XR CHEST PA AND LATERAL    CLINICAL HISTORY:  Other fatigue    TECHNIQUE:  Two-view    COMPARISON:  March 9, 2017..    FINDINGS:  Cardiopericardial silhouette is within normal limits. Lungs are without dense focal or segmental consolidation, congestion, pleural effusion or pneumothorax.    There is a hiatal hernia.                                       Medications - No data to display              ED Course as of 04/19/23 1245   Wed Apr 19, 2023   1236 I have phoned GYN On Call regarding pt since she is s/p GYN appointment this AM. I have also consulted IM On Call regarding pt admission. Discussed pt status with On Call Team. Physician will  evaluate pt at bedside. [JA]      ED Course User Index  [JA] Clifton Caraballo Jr., ROBINP                 Clinical Impression:   Final diagnoses:  [R42] Dizziness  [R53.1] Weakness  [R53.83] Fatigue  [D50.9] Iron deficiency anemia, unspecified iron deficiency anemia type (Primary)        ED Disposition Condition    Observation Stable                Clifton Caraballo Jr., ROBINP  04/19/23 5878

## 2023-04-19 NOTE — H&P
[facility] History & Physical Note     Date of Admit: 4/19/2023    Chief Complaint     Patient presents with    Fatigue       PT W CO WEAKNESS/DIZZINESS/SOB/PAL/NAUSEA X 2 WKS.  DENIES CP EKG OBTAINED. PT REQUESTING IRON LEVEL CHECKED. EKG OBTAINED.         Subjective:      History of Present Illness:  Jessica Walker is a 57 y.o. female with a past medical history of recurrent SARAH (unexplained, follows with hem onc), essential thrombocytosis, chronic leukocytosis, DM2, HTN, HLD, JIN on CPAP    who presented to [facility] ED  on 4/19/2023 with a primary complaint of generalized weakness and fatigue for he past 2 weeks. Patient was seen at [facility] Gynecology clinic today for EMB d/t intermittent vaginal spotting. At that time, she reported feeling generalized weakness and fatigue over the past 2 weeks. This was associated with nausea and decreased appetite. Of note, she follows with [facility] Oncology for recurrent SARAH, essential thrombocytosis and chronic leukocytosis. She usually gets IV iron infusions at [facility] Infusion clinic every 2 months. States her last infusion was in January, and on follow up with Dr. Estrada, was noted to have normal iron levels, thus has not yet had repeat iron infusion. Today, patient attributes symptoms over the past few weeks to need for iron infusion as she is not on iron supplementation at home d/t preference. She presented to ED requesting to have iron levels checked. She denied fever, chills, cough, weight loss, chest pain, palpitations, abd pain, vomiting, urinary/bowel issues    In the ED, afebrile, pulse 89 RR 16, /77. Did have transient drop in BP to 89/57 but maintained MAP above 65 and BP improvedw ith 1L LR bolus.   Labs revealed leukocytosis 18.8, H/H 10.9/35.7. CMP largely unremarkable, iron 10, iron saturation2. Internal Medicine consulted for admission     Past Medical History:  Past Medical History:   Diagnosis Date    Anemia     Depression     Diabetes  mellitus     Hypertension        Past Surgical History:  Past Surgical History:   Procedure Laterality Date    CHOLECYSTECTOMY      COLONOSCOPY  10/2021       Family History:  Family History   Problem Relation Age of Onset    Diabetes Mother     Coronary artery disease Mother     Diabetes Father     Cervical cancer Sister     Fibromyalgia Sister     Breast cancer Sister        Social History:  Social History     Tobacco Use    Smoking status: Every Day     Packs/day: 1.00     Types: Cigarettes    Smokeless tobacco: Never   Substance Use Topics    Alcohol use: Not Currently    Drug use: Not Currently       Allergies:  Review of patient's allergies indicates:  No Known Allergies    Home Medications:  Prior to Admission medications    Medication Sig Start Date End Date Taking? Authorizing Provider   aspirin (ECOTRIN) 81 MG EC tablet Take 81 mg by mouth once daily. 3/22/22   Historical Provider   atorvastatin (LIPITOR) 40 MG tablet Take 1 tablet (40 mg total) by mouth every evening. 8/2/22   DIDIER Blanco   ibuprofen (ADVIL,MOTRIN) 800 MG tablet Take 800 mg by mouth. 3/2/22   Historical Provider   insulin glargine (LANTUS) 100 unit/mL injection Inject 70 Units into the skin every evening. 10/26/21   Historical Provider   lisinopriL (PRINIVIL,ZESTRIL) 20 MG tablet Take 1 tablet (20 mg total) by mouth once daily. 8/19/22 8/19/23  DIDIER Blanco   metFORMIN (GLUCOPHAGE) 1000 MG tablet TAKE ONE TABLET BY MOUTH TWICE A DAY 7/12/22   ANAHI Cagle   metoprolol tartrate (LOPRESSOR) 25 MG tablet Take 1 tablet (25 mg total) by mouth 2 (two) times daily. 8/19/22 8/19/23  DIDIER Blanco   nitroGLYCERIN (NITROSTAT) 0.4 MG SL tablet Place 1 tablet (0.4 mg total) under the tongue every 5 (five) minutes as needed for Chest pain. 10/19/22 10/19/23  DIDIER Blanco   pantoprazole (PROTONIX) 40 MG tablet Take 40 mg by mouth once daily. 12/1/21   Historical Provider   TRUE METRIX GLUCOSE  "TEST STRIP Strp USE 1 STRIP TO CHECK GLUCOSE ONCE DAILY 5/13/22   Historical Provider   TRULICITY 1.5 mg/0.5 mL pen injector Inject 1.5 mg into the skin every 7 days. 11/6/22   Historical Provider         Review of Systems:  Gen: No fever, chills, or weight changes  Eye: No blindness or vision changes  Heart: No chest pain, palpitations, or diaphoresis  Lungs: No shortness of breath, cough, or wheezing  GI: (+) nausea  : No hematuria, No dysuria  Musk: No lower extremity swelling  Integumentary: No rash or itching  Neuro: Normal speech, no focal weakness or headache    Objective:   Last 24 Hour Vital Signs:  Blood pressure 113/79, pulse 79, temperature 97.9 °F (36.6 °C), temperature source Tympanic, resp. rate 17, height 5' 7" (1.702 m), weight 95 kg (209 lb 7 oz), SpO2 99 %, not currently breastfeeding.  Body mass index is 32.8 kg/m².    Physical Examination:  General: Awake, alert, & oriented to person, place & time. No acute distress  Psychiatric: Mood and affect normal  HEENT: Normocephalic, atraumatic. Face symmetric. Mucous membranes moist.   Cardiovascular: Regular rate & rhythm. Normal S1 & S2 w/out murmurs, rubs or gallops.  Pulmonary: Bilateral symmetric chest rise. Non-labored, CTAB  Abdominal:  Soft, nontender, nondistended. Bowel sounds present  Extremities: No clubbing, cyanosis or edema  Skin:  Warm & dry.  Neuro:   No focal neuro deficits on brief exam. Alert and oriented     Laboratory:  Most Recent Data:  Admission on 04/19/2023   Component Date Value    Sodium Level 04/19/2023 136     Potassium Level 04/19/2023 5.0     Chloride 04/19/2023 101     Carbon Dioxide 04/19/2023 22     Glucose Level 04/19/2023 232 (H)     Blood Urea Nitrogen 04/19/2023 14.8     Creatinine 04/19/2023 0.91     Calcium Level Total 04/19/2023 9.7     Protein Total 04/19/2023 7.8     Albumin Level 04/19/2023 4.3     Globulin 04/19/2023 3.5     Albumin/Globulin Ratio 04/19/2023 1.2     Bilirubin Total 04/19/2023 0.4     " Alkaline Phosphatase 04/19/2023 110     Alanine Aminotransferase 04/19/2023 23     Aspartate Aminotransfera* 04/19/2023 16     eGFR 04/19/2023 >60     Magnesium Level 04/19/2023 1.50 (L)     Troponin-I 04/19/2023 <0.010     Thyroid Stimulating Horm* 04/19/2023 3.242     Natriuretic Peptide 04/19/2023 25.8     Color, UA 04/19/2023 Light-Yellow     Appearance, UA 04/19/2023 Clear     Specific Gravity, UA 04/19/2023 1.007     pH, UA 04/19/2023 5.5     Protein, UA 04/19/2023 Negative     Glucose, UA 04/19/2023 Normal     Ketones, UA 04/19/2023 Negative     Blood, UA 04/19/2023 3+ (A)     Bilirubin, UA 04/19/2023 Negative     Urobilinogen, UA 04/19/2023 Normal     Nitrites, UA 04/19/2023 Negative     Leukocyte Esterase, UA 04/19/2023 Negative     WBC, UA 04/19/2023 0-5     Squamous Epithelial Cell* 04/19/2023 Few     Renal Epithelial Cells, * 04/19/2023 Rare (A)     RBC, UA 04/19/2023 21-50 (A)     Influenza A PCR 04/19/2023 Not Detected     Influenza B PCR 04/19/2023 Not Detected     SARS-CoV-2 PCR 04/19/2023 Not Detected     WBC 04/19/2023 18.8 (H)     RBC 04/19/2023 4.62     Hgb 04/19/2023 10.9 (L)     Hct 04/19/2023 35.7 (L)     MCV 04/19/2023 77.3 (L)     MCH 04/19/2023 23.6 (L)     MCHC 04/19/2023 30.5 (L)     RDW 04/19/2023 17.6 (H)     Platelet 04/19/2023 783 (H)     MPV 04/19/2023 10.3     Neut % 04/19/2023 76.6     Lymph % 04/19/2023 11.5     Mono % 04/19/2023 6.8     Eos % 04/19/2023 1.6     Basophil % 04/19/2023 0.6     Lymph # 04/19/2023 2.17     Neut # 04/19/2023 14.43 (H)     Mono # 04/19/2023 1.28     Eos # 04/19/2023 0.31     Baso # 04/19/2023 0.11     IG# 04/19/2023 0.54 (H)     IG% 04/19/2023 2.9     NRBC% 04/19/2023 0.0     POCT Glucose 04/19/2023 229 (H)     Ferritin Level 04/19/2023 6.84     Iron Binding Capacity Un* 04/19/2023 420 (H)     Iron Level 04/19/2023 10 (L)     Transferrin 04/19/2023 386 (H)     Iron Binding Capacity To* 04/19/2023 430     Iron Saturation 04/19/2023 2 (L)    Office Visit  on 04/19/2023   Component Date Value    Color, UA 04/19/2023 Yellow     Spec Grav UA 04/19/2023 neg     pH, UA 04/19/2023 neg     WBC, UA 04/19/2023 neg     Nitrite, UA 04/19/2023 neg     Protein, POC 04/19/2023 neg     Glucose, UA 04/19/2023 neg     Ketones, UA 04/19/2023 neg     Urobilinogen, UA 04/19/2023 neg     Bilirubin, POC 04/19/2023 neg\     Blood, UA 04/19/2023 neg     POC Preg Test, Ur 04/19/2023 Negative      Acceptab* 04/19/2023 Yes    Lab Visit on 04/12/2023   Component Date Value    Hemoglobin A1c 04/12/2023 7.5 (H)     Estimated Average Glucose 04/12/2023 168.6     Cholesterol Total 04/12/2023 166     HDL Cholesterol 04/12/2023 53     Triglyceride 04/12/2023 108     Cholesterol/HDL Ratio 04/12/2023 3     Very Low Density Lipopro* 04/12/2023 22     LDL Cholesterol 04/12/2023 91.00     WBC 04/12/2023 14.4 (H)     RBC 04/12/2023 4.17 (L)     Hgb 04/12/2023 10.2 (L)     Hct 04/12/2023 32.7 (L)     MCV 04/12/2023 78.4 (L)     MCH 04/12/2023 24.5 (L)     MCHC 04/12/2023 31.2 (L)     RDW 04/12/2023 17.4 (H)     Platelet 04/12/2023 730 (H)     MPV 04/12/2023 9.9     Neut Man 04/12/2023 65     Band Neutrophil Man 04/12/2023 2     Lymph Man 04/12/2023 13     Monocyte Man 04/12/2023 7     Eos Man 04/12/2023 7     Other Cells Manual 04/12/2023 6     Abs Neut calc 04/12/2023 9.648 (H)     Abs Mono 04/12/2023 1.008     Abs Lymp 04/12/2023 1.872     Abs Eos  04/12/2023 1.008 (H)     RBC Morph 04/12/2023 Abnormal (A)     Polychrom 04/12/2023 Slight (A)     Platelet Est 04/12/2023 Increased (A)     Giant Platelets 04/12/2023 1+     Peripheral Smear Evaluat* 04/12/2023                      Value:- Leukocytosis with predominantly mature granulocytes; no circulating blasts  - Normocytic, hypochromic anemia.  - Thrombocytosis.    Impression: Leukocytosis with neutrophilia can be seen in infections, drug reactions, chronic inflammatory disorders and can be stress induced. Patient's history of iron  deficiency anemia is noted. There is normocytic, hypochromic anemia suggestive of partially treated iron deficiency anemia. Correlation with iron panel is recommended. As thrombocytosis can accompany iron deficiency, consider complete correction of iron panel before primary causes. Also, consider JAK2 with reflex testing if clinically indicated.    Tony Bryson M.D.        Radiology:  Imaging Results              X-Ray Chest PA And Lateral (Final result)  Result time 04/19/23 11:49:13      Final result by Thai Davila MD (04/19/23 11:49:13)                   Impression:      No acute cardiopulmonary process identified.    Hiatal hernia.      Electronically signed by: Thai Davila  Date:    04/19/2023  Time:    11:49               Narrative:    EXAMINATION:  XR CHEST PA AND LATERAL    CLINICAL HISTORY:  Other fatigue    TECHNIQUE:  Two-view    COMPARISON:  March 9, 2017..    FINDINGS:  Cardiopericardial silhouette is within normal limits. Lungs are without dense focal or segmental consolidation, congestion, pleural effusion or pneumothorax.    There is a hiatal hernia.                                         Assessment & Plan:   Postmenopausal Bleeding  Chronic recurrent iron deficiency anemia  -s/p EMB with gyn today, follow up as scheduled  -has had multiple GI procedures including EGD< colonoscopy, CT enteroscopy which did not reveal any source of bleding  -has required multiple rounds of IV iron infusions, usually done every 2-3 months; however, has not had iron infusion since January, as post infusion labs revealed normal iron level  -have scheduled patient for IV iron with infusion clinic; however, they are unable to provide exact date. Patient opts to be admitted for IV iron infusion     Low risk Essential Thrombocytosis  -Follows with [facility] Hem Onc, continue aspirin 81 mg daily    Chronic leukocytosis  -WBC 18 today, slightly elevated above baseline.  -Discussed with Hem Onc, felt to be d/t low iron  level today. Did not have other SIRS criteria. CXR and UA unremarkable    History of Hypertension  -was transiently hypotensive in ED which resovled after 1L fluid bolus  -continue home medications    Type 2 DM   -on 70 units lantus at home  -ordered 30 units detemir while inpatient and SSI , monitor CBGs and titrate accordingly  -home metformin and trulicity held    HLD  -continue home atorvastatin    JIN on CPAP  -continue CPAP at night    Antibiotics: none  Diet: regular  DVT Prophylaxis: lovenox  GI Prophylaxis: none  Fluids: none    Disposition: Admitted for IV iron infusion. Originally, patient preferred for us to arrange infusion outpatient with clinic at [facility]. Case was discussed with Dr. Estrada, who did recommend outpatient infusion as soon as possible. Due to infusion clinic being unable to accomadte patient tomorrow and also unable to provide exact date of availability, patient opted to be admitted for IV iron. After infusion was complete, was called to bedside as patient would like to sign herself out against medical advice. See AMA note/discharge summary     Zainab Knowles MD  Internal Medicine, PGY-2

## 2023-04-19 NOTE — ED NOTES
Pt requesting to leave AMA. MD at bedside and aware. AMA from printed and is being provided to patient

## 2023-04-19 NOTE — LETTER
Patient: Jessica Walker  YOB: 1965  Date: 4/19/2023 Time: 5:12 PM  Location: [facility]    Leaving the Hospital Against Medical Advice    Chart #:[id number]    This will certify that I, the undersigned,    ______________________________________________________________________    A patient in the above named medical center, having requested discharge and removal from the medical center against the advice of my attending physician(s), hereby release [facility], its physicians, officers and employees, severally and individually, from any and all liability of any nature whatsoever for any injury or harm or complication of any kind that may result directly or indirectly, by reason of my terminating my stay as a patient at [facility] and my departure from [facility], and hereby waive any and all rights of action I may now have or later acquire as a result of my voluntary departure from [facility] and the termination of my stay as a patient therein.    This release is made with the full knowledge of the danger that may result from the action which I am taking.      Date:_______________________                         ___________________________                                                                                    Patient/Legal Representative    Witness:        ____________________________                          ___________________________  Nurse                                                                        Physician

## 2023-04-19 NOTE — PROGRESS NOTES
Pt has [facility]  pt is going to coming  into clinic today to have an EMB done pt does not require a PA for this in office procedure

## 2023-04-20 ENCOUNTER — TELEPHONE (OUTPATIENT)
Dept: GYNECOLOGY | Facility: CLINIC | Age: 58
End: 2023-04-20
Payer: MEDICAID

## 2023-04-20 LAB
ESTROGEN SERPL-MCNC: NORMAL PG/ML
ESTROGEN SERPL-MCNC: NORMAL PG/ML
INSULIN SERPL-ACNC: NORMAL U[IU]/ML
INSULIN SERPL-ACNC: NORMAL U[IU]/ML
LAB AP CLINICAL INFORMATION: NORMAL
LAB AP CLINICAL INFORMATION: NORMAL
LAB AP GROSS DESCRIPTION: NORMAL
LAB AP GROSS DESCRIPTION: NORMAL
LAB AP REPORT FOOTNOTES: NORMAL
LAB AP REPORT FOOTNOTES: NORMAL
T3RU NFR SERPL: NORMAL %
T3RU NFR SERPL: NORMAL %

## 2023-04-20 NOTE — TELEPHONE ENCOUNTER
Pt informed of pathology results. Encouraged pt to keep appt 5/5/2023 with gyn resident team to discuss possible hysteroscopy/D&C for her PMB. Verbalized understanding

## 2023-05-01 ENCOUNTER — TELEPHONE (OUTPATIENT)
Dept: ADMINISTRATIVE | Facility: HOSPITAL | Age: 58
End: 2023-05-01

## 2023-05-01 ENCOUNTER — TELEPHONE (OUTPATIENT)
Dept: HEMATOLOGY/ONCOLOGY | Facility: CLINIC | Age: 58
End: 2023-05-01
Payer: MEDICAID

## 2023-05-01 DIAGNOSIS — D47.3 ESSENTIAL THROMBOCYTOSIS: Primary | ICD-10-CM

## 2023-05-01 NOTE — TELEPHONE ENCOUNTER
"Call transferred from Infusion.  Pt wants to be scheduled for iron infusion and does not want to see provdier.  Per Dr. Estrada - schedule with NP.  Explained to pt.  She advised she is "weak and running into walls and shit."  I advised for her to go to the ER if she is that weak; call 911.  She advised she went to ER last week and had an iron infusion.    Pt was in ER 4/19/23 and left AMA.  Sade Mata NP will see pt tomorrow 5/2/23, have labs to determine if infusion is needed.            MD Maria Esther Beckford LPN  Caller: Unspecified (6 days ago, 10:30 AM)  She needs to be seen by NP.           Previous Messages       ----- Message -----   From: Maria Esther Mata LPN   Sent: 5/1/2023   8:56 AM CDT   To: Beni Estrada MD, Peter Alfredo, RN, *     Dr. Estrada please read below and advise     ----- Message -----   From: Heather Mata MA   Sent: 4/28/2023  12:59 PM CDT   To: Beni Estrada MD, Peter Alfredo, RN, *     She is to follow up with you per Dr. Knowles.   ----- Message -----   From: Zainab Knowles MD   Sent: 4/28/2023  11:21 AM CDT   To: Heather Mata MA     Ms. Walker opted to be admitted for IV iron since she did not know when the exact date of feraheme infusion would be outpatient. She left AMA shortly after infusion, therefore will refer further infusion to Dr. Estrada since she follows with him as outpatient   ----- Message -----   From: Heather Mata MA   Sent: 4/25/2023  10:39 AM CDT   To: MD Dr. Eleni Osullivan,   You put a referral in for Feraheme for Ms Walker. I contacted the patient to schedule her for infusion.  She reported that she received IV iron in the ED on 4/19.  Per the medication record she received Ferrlecit 125mg IV.     Please advise if she does indeed need to take the feraheme infusions as well.              "

## 2023-05-02 ENCOUNTER — INFUSION (OUTPATIENT)
Dept: INFUSION THERAPY | Facility: HOSPITAL | Age: 58
End: 2023-05-02
Attending: INTERNAL MEDICINE
Payer: MEDICAID

## 2023-05-02 ENCOUNTER — OFFICE VISIT (OUTPATIENT)
Dept: HEMATOLOGY/ONCOLOGY | Facility: CLINIC | Age: 58
End: 2023-05-02
Payer: MEDICAID

## 2023-05-02 VITALS
DIASTOLIC BLOOD PRESSURE: 75 MMHG | RESPIRATION RATE: 20 BRPM | SYSTOLIC BLOOD PRESSURE: 110 MMHG | TEMPERATURE: 98 F | OXYGEN SATURATION: 100 % | HEIGHT: 67 IN | WEIGHT: 208.63 LBS | BODY MASS INDEX: 32.74 KG/M2 | HEART RATE: 99 BPM

## 2023-05-02 DIAGNOSIS — D50.9 IRON DEFICIENCY ANEMIA, UNSPECIFIED IRON DEFICIENCY ANEMIA TYPE: ICD-10-CM

## 2023-05-02 DIAGNOSIS — E61.1 IRON DEFICIENCY: Primary | ICD-10-CM

## 2023-05-02 DIAGNOSIS — D50.0 IRON DEFICIENCY ANEMIA DUE TO CHRONIC BLOOD LOSS: Primary | ICD-10-CM

## 2023-05-02 PROCEDURE — 3078F PR MOST RECENT DIASTOLIC BLOOD PRESSURE < 80 MM HG: ICD-10-PCS | Mod: CPTII,,, | Performed by: NURSE PRACTITIONER

## 2023-05-02 PROCEDURE — 4010F PR ACE/ARB THEARPY RXD/TAKEN: ICD-10-PCS | Mod: CPTII,,, | Performed by: NURSE PRACTITIONER

## 2023-05-02 PROCEDURE — 3074F SYST BP LT 130 MM HG: CPT | Mod: CPTII,,, | Performed by: NURSE PRACTITIONER

## 2023-05-02 PROCEDURE — 99214 PR OFFICE/OUTPT VISIT, EST, LEVL IV, 30-39 MIN: ICD-10-PCS | Mod: S$PBB,,, | Performed by: NURSE PRACTITIONER

## 2023-05-02 PROCEDURE — 63600175 PHARM REV CODE 636 W HCPCS: Mod: JZ,JG | Performed by: STUDENT IN AN ORGANIZED HEALTH CARE EDUCATION/TRAINING PROGRAM

## 2023-05-02 PROCEDURE — 25000003 PHARM REV CODE 250: Performed by: STUDENT IN AN ORGANIZED HEALTH CARE EDUCATION/TRAINING PROGRAM

## 2023-05-02 PROCEDURE — 99214 OFFICE O/P EST MOD 30 MIN: CPT | Mod: PBBFAC,25 | Performed by: NURSE PRACTITIONER

## 2023-05-02 PROCEDURE — 3008F BODY MASS INDEX DOCD: CPT | Mod: CPTII,,, | Performed by: NURSE PRACTITIONER

## 2023-05-02 PROCEDURE — 99214 OFFICE O/P EST MOD 30 MIN: CPT | Mod: S$PBB,,, | Performed by: NURSE PRACTITIONER

## 2023-05-02 PROCEDURE — 3008F PR BODY MASS INDEX (BMI) DOCUMENTED: ICD-10-PCS | Mod: CPTII,,, | Performed by: NURSE PRACTITIONER

## 2023-05-02 PROCEDURE — 4010F ACE/ARB THERAPY RXD/TAKEN: CPT | Mod: CPTII,,, | Performed by: NURSE PRACTITIONER

## 2023-05-02 PROCEDURE — 3078F DIAST BP <80 MM HG: CPT | Mod: CPTII,,, | Performed by: NURSE PRACTITIONER

## 2023-05-02 PROCEDURE — 3074F PR MOST RECENT SYSTOLIC BLOOD PRESSURE < 130 MM HG: ICD-10-PCS | Mod: CPTII,,, | Performed by: NURSE PRACTITIONER

## 2023-05-02 PROCEDURE — 96365 THER/PROPH/DIAG IV INF INIT: CPT

## 2023-05-02 RX ORDER — SODIUM CHLORIDE 0.9 % (FLUSH) 0.9 %
10 SYRINGE (ML) INJECTION
Status: DISCONTINUED | OUTPATIENT
Start: 2023-05-02 | End: 2023-05-02 | Stop reason: HOSPADM

## 2023-05-02 RX ORDER — METHYLPREDNISOLONE SOD SUCC 125 MG
125 VIAL (EA) INJECTION ONCE AS NEEDED
Status: CANCELLED | OUTPATIENT
Start: 2023-05-09

## 2023-05-02 RX ORDER — EPINEPHRINE 0.3 MG/.3ML
0.3 INJECTION SUBCUTANEOUS ONCE AS NEEDED
Status: CANCELLED | OUTPATIENT
Start: 2023-05-09

## 2023-05-02 RX ORDER — SODIUM CHLORIDE 0.9 % (FLUSH) 0.9 %
10 SYRINGE (ML) INJECTION
Status: CANCELLED | OUTPATIENT
Start: 2023-05-09

## 2023-05-02 RX ORDER — METHYLPREDNISOLONE SOD SUCC 125 MG
125 VIAL (EA) INJECTION ONCE AS NEEDED
Status: DISCONTINUED | OUTPATIENT
Start: 2023-05-02 | End: 2023-05-02 | Stop reason: HOSPADM

## 2023-05-02 RX ORDER — INSULIN GLARGINE 100 [IU]/ML
INJECTION, SOLUTION SUBCUTANEOUS
COMMUNITY
Start: 2023-03-27 | End: 2023-12-06 | Stop reason: SDUPTHER

## 2023-05-02 RX ORDER — DIPHENHYDRAMINE HYDROCHLORIDE 50 MG/ML
50 INJECTION INTRAMUSCULAR; INTRAVENOUS ONCE AS NEEDED
Status: CANCELLED | OUTPATIENT
Start: 2023-05-09

## 2023-05-02 RX ORDER — EPINEPHRINE 0.3 MG/.3ML
0.3 INJECTION SUBCUTANEOUS ONCE AS NEEDED
Status: DISCONTINUED | OUTPATIENT
Start: 2023-05-02 | End: 2023-05-02 | Stop reason: HOSPADM

## 2023-05-02 RX ORDER — HEPARIN 100 UNIT/ML
500 SYRINGE INTRAVENOUS
Status: CANCELLED | OUTPATIENT
Start: 2023-05-09

## 2023-05-02 RX ORDER — VARENICLINE TARTRATE 1 MG/1
1 TABLET, FILM COATED ORAL 2 TIMES DAILY
COMMUNITY
Start: 2023-03-10 | End: 2023-08-03

## 2023-05-02 RX ORDER — DIPHENHYDRAMINE HYDROCHLORIDE 50 MG/ML
50 INJECTION INTRAMUSCULAR; INTRAVENOUS ONCE AS NEEDED
Status: DISCONTINUED | OUTPATIENT
Start: 2023-05-02 | End: 2023-05-02 | Stop reason: HOSPADM

## 2023-05-02 RX ORDER — HEPARIN 100 UNIT/ML
500 SYRINGE INTRAVENOUS
Status: DISCONTINUED | OUTPATIENT
Start: 2023-05-02 | End: 2023-05-02 | Stop reason: HOSPADM

## 2023-05-02 RX ORDER — OMEPRAZOLE 40 MG/1
CAPSULE, DELAYED RELEASE ORAL
COMMUNITY
Start: 2023-05-01 | End: 2023-08-03

## 2023-05-02 RX ORDER — FERROUS SULFATE 220 (44)/5
220 SOLUTION, ORAL ORAL DAILY
Qty: 473 ML | Refills: 0 | Status: SHIPPED | OUTPATIENT
Start: 2023-05-02 | End: 2023-09-18

## 2023-05-02 RX ORDER — LIFITEGRAST 50 MG/ML
SOLUTION/ DROPS OPHTHALMIC
COMMUNITY
Start: 2023-04-09 | End: 2023-12-20

## 2023-05-02 RX ADMIN — SODIUM CHLORIDE: 9 INJECTION, SOLUTION INTRAVENOUS at 11:05

## 2023-05-02 RX ADMIN — FERUMOXYTOL 510 MG: 510 INJECTION INTRAVENOUS at 11:05

## 2023-05-02 NOTE — PROGRESS NOTES
Reason for Follow-up:  -recurrent severe iron-deficiency anemia; no definite GI source of bleeding  -low risk essential thrombocytosis    Current Treatment:    Treatment History:  -S/P Feraheme 510 mg IV x2 (2023, 2023)    Past medical history: NIDDM.  Hypertension.  Dyslipidemia.  History of anemia.  Vitamin D deficiency.  Tobacco abuse.  Obesity.  Obstructive sleep apnea.  HFpEF. Leukocytosis.  Cholecystectomy.  Goiter surgery.  Social history: Single.  Lives in [place], [place].  Has 2 grown up sons.  Has been smoking 1-1/2 packs of cigarettes daily since age 10.  Has tried to quit smoking many times but does not get refills of Chantix, therefore, has been unable to.  Currently, down to 10 cigarettes daily.  Denies history of alcohol or illicit drug abuse.  Family history: Sister  from cervical cancer at age 55.  Health maintenance:   EGD 2018 (Dr. Koo in [place]: Esophageal dilation).    Colonoscopy 2018 (Dr. Koo in [place]), apparently to be repeated in 2 years.    Mammogram 2018: Negative for malignancy.   ThinPrep cervical Pap smear 2016: Negative for intraepithelial lesion or malignancy; negative for HPV high-risk types (type 16 and type 18/45)  Menstrual and OB/GYN history: Menopause in .  For last 3 months or so, has experienced vaginal spotting.  Apparently, had Pap smear done at [facility] 2 months back (probably 2018), which was apparently unremarkable. Pelvic ultrasound on 2019 showed normal endometrium; likely myomatous changes of uterus with a lower uterine segment lipoma leiomyoma.            History of Present Illness:   53-year-old lady referred from [facility] for evaluation of leukocytosis.    For details, please see my last note dated 2020.  Please also refer to assessment and plan section.      Interval History 2023: Patient presents today for scheduled follow up  for chronic recurrent SARAH. Patient reports weakness, severe fatigue and loss of appetite, abdominal pain, nausea and she reports one episode of vomiting black colored vomitus. She went to the ER for symptoms and was given 1 iron infusion. Lab work reviewed with patient, ferritin and iron profile remain critically low even after 1 iron infusion. Discussed plan of care with patient. She is amenable. She denies any abnormal bleeding of any kind, chest pain or palpitations.       Review of Systems:   All systems reviewed and found to be negative except for the symptoms detailed above    Lab Results   Component Value Date    WBC 14.92 (H) 05/02/2023    RBC 4.14 (L) 05/02/2023    HGB 9.5 (L) 05/02/2023    HCT 30.8 (L) 05/02/2023    MCV 74.4 (L) 05/02/2023    MCH 22.9 (L) 05/02/2023    MCHC 30.8 (L) 05/02/2023    RDW 17.6 (H) 05/02/2023     (H) 05/02/2023    MPV 10.3 05/02/2023     CMP  Sodium Level   Date Value Ref Range Status   05/02/2023 136 136 - 145 mmol/L Final     Potassium Level   Date Value Ref Range Status   05/02/2023 4.5 3.5 - 5.1 mmol/L Final     Carbon Dioxide   Date Value Ref Range Status   05/02/2023 21 (L) 22 - 29 mmol/L Final     Blood Urea Nitrogen   Date Value Ref Range Status   05/02/2023 13.0 9.8 - 20.1 mg/dL Final     Creatinine   Date Value Ref Range Status   05/02/2023 0.78 0.55 - 1.02 mg/dL Final     Calcium Level Total   Date Value Ref Range Status   05/02/2023 9.9 8.4 - 10.2 mg/dL Final     Albumin Level   Date Value Ref Range Status   05/02/2023 3.8 3.5 - 5.0 g/dL Final     Bilirubin Total   Date Value Ref Range Status   05/02/2023 0.3 <=1.5 mg/dL Final     Alkaline Phosphatase   Date Value Ref Range Status   05/02/2023 114 40 - 150 unit/L Final     Aspartate Aminotransferase   Date Value Ref Range Status   05/02/2023 14 5 - 34 unit/L Final     Alanine Aminotransferase   Date Value Ref Range Status   05/02/2023 14 0 - 55 unit/L Final     eGFR   Date Value Ref Range Status   05/02/2023  >60 mls/min/1.73/m2 Final     Lab Results   Component Value Date    UIBC 351 (H) 05/02/2023    IRON 13 (L) 05/02/2023    TRANS 319 05/02/2023    TIBC 364 05/02/2023    LABIRON 4 (L) 05/02/2023      Lab Results   Component Value Date    FERRITIN 18.00 05/02/2023       Physical Examination:   VITAL SIGNS:   Vitals:    05/02/23 0846   BP: 110/75   Pulse: 99   Resp: 20   Temp: 97.7 °F (36.5 °C)     General: Alert and oriented. NAD  Eye: Pupils are equal, round and reactive to light, Extraocular movements are intact. Normal conjunctiva  HENT: Normocephalic. Oropharynx exam deferred; mask in place due to coronavirus  Neck: Supple, Non-tender  Respiratory: Respirations are non-labored, Symmetrical chest wall expansion. Breath sounds CTA bilaterally  Cardiovascular: Regular rate, rhythm, Normal peripheral perfusion, No bilateral lower extremity edema  Gastrointestinal: Non-distended, Present bowel sounds   Genitourinary: Exam deferred  Lymphatics: No lymphadenopathy appreciated  Musculoskeletal: Moves all extremities  Integumentary: Intact. Warm, dry. No rashes, or lesions to visible skin  Neurologic: No focal deficits  Psychiatric: Cooperative. Appropriate mood and affect   ECOG Performance Scale: 0 - Capable of all self-care  nor restrictions    Assessment:  Severe iron deficiency anemia:   Presented as moderate anemia with severe microcytosis    EGD, colonoscopy negative for any bleeders (02/2018)   Severe iron deficiency    Feraheme (03/2019)   Hemoglobin dropped from 14.8 (06/2019) to 9.5 (08/28/2019) due to iron deficiency   Feraheme x2 (09/2019) --> improvement in iron stores and normalization of hemoglobin (9.5 --> 13.7)   CT enterogram (09/11/2019): Hiatal hernia; 1.7 cm left adrenal nodule   -03/10/2020: Remains iron deficient (transferrin saturation 13.2%, ferritin 19.2)   -Did not present for Feraheme shots in a timely manner   -04/27/2020: Ferritin 4.9 (down from 19.2 on 03/10/2020); however, hemoglobin 12.4,  MCV 83.0   (Iron deficiency erythropoiesis)   -Feraheme 510 mg IV x2 (04/27/2020; 05/04/2020)   -06/01/2020: Iron deficiency resolved with parenteral iron therapy   -However, as of 06/01/2020, severe recurrent iron deficiency remains unexplained   -07/01/2020: Iron stores dropping again within a month; hemoglobin remains normal   -09/02/2020: Iron stores normal/stable (ferritin 21, transferrin saturation 15.8%); hemoglobin 14.8, normal   -09/08/2020: Feels better and more energetic than before   -11/12/2020: Iron deficient; hemoglobin 15.1  -08/04/2020:  [facility]:  Insurance denied capsule endoscopy.  Upper single balloon enteroscopy performed.  1. Duodenum biopsy:  Minimal focal acute inflammation with reactive changes; no celiac sprue  2. Duodenal bulb:  Biopsy showed no significant histopathologic findings; no evidence of celiac sprue  3. Stomach biopsy:  Chemical/reactive gastropathy; H pylori stain negative  -S/P Feraheme 510 mg IV x2 (05/17/2021, 05/24/2021)  -S/P Feraheme 510 mg IV x2 (07/09/2021, 07/19/2021)  -10/27/2021: Colonoscopy (iron-deficiency anemia):  Diffuse diverticulosis; otherwise unremarkable  -10/27/2021:  EGD with APC fulguration and biopsy (iron-deficiency anemia): unctate AVMs within the stomach.  A directed fulguration with APC with good result achieved, superficial ulcerations within the duodenal bulb, possibly secondary to daily aspirin use.  Biopsy taken in the antrum to rule out Helicobacter pylori by pathology  -S/P Feraheme 510 mg IV x2 (08/29/2022, 09/08/2022)  -11/11/2022:  WBC 12.7.  Hemoglobin 12.8.  Platelets 543 K. ferritin 251.70 (was 93.95 on 08/29/2022, 24.88 on 06/29/2022) (transferrin saturation was 19% on 08/29/2022, 7% on 06/29/2022)  -01/11/2023: Labs reviewed.  Hemoglobin 11.2 (hemoglobin was 15.9 on 08/29/2022).  MCV 85.0.  Platelets 562 K, stable.  Ferritin 9.02, down from 251.7 on 11/11/2022.  Transferrin saturation is 6%, low.  CMP unremarkable except glucose 188  mg/dL.  -S/P Feraheme 510 mg IV x2 (01/11/2023, 01/18/2023)  To summarize:   -Recurrent severe iron deficiency anemia, unexplained   -EGD, colonoscopy negative (02/2018)   -CT enterogram negative (09/11/2019)   -has required multiple rounds of parenteral iron therapy over past 4-5 years  -no source of bleeding, H pylori gastritis, or celiac sprue on multiple GI endoscopic procedures including EGD, colonoscopy, CT enteroscopy, and upper single balloon enteroscopy   [ferritin level has dropped:  9.02 (01/11/2023), down from 251.7 (11/11/2022)]  [Hemoglobin: 11.2 on 01/11/2023; down from 15.9 on 08/29/2022)  -treated with Feraheme 510 mg IV x2 (01/11/2023, 01/18/2023)  -02/27/2023:  Hemoglobin 14.6, remarkably improved with Feraheme.  Transferrin saturation 17%, improved but remains low. 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 positive (03/2019)   Bone marrow consistent (06/2019)   Low risk IPSET-thrombosis score ET   Cardiovascular risk factors: NIDDM, hypertension, tobacco abuse   -04/27/2020: Platelets 500K, stable   -06/01/2020: Platelets 561K, more or less stable   -09/02/2020: Platelets 544K, stable   -11/12/2020: Platelets 567,000/mm³, stable  -11/11/2022:  Platelets 543 K, stable   -02/27/2023:  Platelets 623 K    Chronic, very mild, benign-appearing, intermittent leukocytosis:   Secondary to smoking or underlying myeloproliferative disorder   GAGANDEEP-2 V617F mutation positive (03/2019)   BCR-ABL 1 fusion transcripts negative (03/2019)   Underlying essential thrombocytosis      Plan:  -Recurrent severe iron deficiency anemia, unexplained  -EGD, colonoscopy negative (02/2018)  -CT enterogram negative (09/11/2019)  -has required multiple rounds of parenteral iron therapy over past 4-5 years  -no source of bleeding, H pylori gastritis, or celiac sprue on multiple GI endoscopic procedures  including EGD, colonoscopy, CT enteroscopy, and upper single balloon enteroscopy  >>>  [ferritin level has dropped:  9.02 (01/11/2023), down from 251.7 (11/11/2022)]  [Hemoglobin: 11.2 on 01/11/2023; down from 15.9 on 08/29/2022)  -treated with Feraheme 510 mg IV x2 (01/11/2023, 01/18/2023)  -02/27/2023:  Hemoglobin 14.6, remarkably improved with Feraheme.  Transferrin saturation 17%, improved but remains low. Ferritin 55.55 (was 9.02 on 01/11/2023)  >>>  Chronic recurrent iron-deficiency anemia, requiring multiple rounds of parenteral iron therapy in the past, essentially with negative exhaustive GI workup      Low risk essential thrombocytosis   No indication of myelosuppressive therapy   Continue baby aspirin daily  Avoid smoking.    Medical management of NIDDM and hypertension which are cardiovascular risk factors.  Deferred to PCP.      Okay to proceed with Feraheme infusion today  Start Ferrous Sulfate 5ml by mouth once daily in the AM take with water or juice, avoid dairy or milk products for at least 1 hour before and 2 hours after each dose-rx sent to pharmacy   Return in 1 week for #2 of Feraheme  Return for lab work only in 6 weeks post 2nd dose of Feraheme with lab work to reassess iron profile, ferritin level  Monthly lab work (cbc,cmp) thereafter  Follow-up in 3 months, with CBC, CMP, serum iron, TIBC, ferritin.    Above discussed at length with her.  All questions answered.    She understands and agrees with this plan.      6/26/2023: Reviewed iron panel, ferritin, CBC from 6/23/2023.   Iron level - 18, Iron sat 6 %, Ferritin 24, Hgb   Anemia has improved, but still some room for improvement.   Recommend repeat Feraheme x 2 more doses.   She would like to get first dose on 6/30/2023.   Orders placed.   She will return to clinic on 8/18/2023 for provider visit w/ labs(CBC, CMP, Iron panel and ferritin) done prior. Standing orders available.

## 2023-05-02 NOTE — Clinical Note
Infusion in 1 week for feraheme infusion #3 Return for lab work in 6 weeks post 5/9/23  with lab work  Then monthly lab work thereafter

## 2023-05-09 ENCOUNTER — INFUSION (OUTPATIENT)
Dept: INFUSION THERAPY | Facility: HOSPITAL | Age: 58
End: 2023-05-09
Attending: INTERNAL MEDICINE
Payer: MEDICAID

## 2023-05-09 VITALS
HEART RATE: 103 BPM | OXYGEN SATURATION: 97 % | SYSTOLIC BLOOD PRESSURE: 130 MMHG | RESPIRATION RATE: 18 BRPM | WEIGHT: 211.19 LBS | BODY MASS INDEX: 33.15 KG/M2 | HEIGHT: 67 IN | TEMPERATURE: 98 F | DIASTOLIC BLOOD PRESSURE: 79 MMHG

## 2023-05-09 DIAGNOSIS — D50.9 IRON DEFICIENCY ANEMIA, UNSPECIFIED IRON DEFICIENCY ANEMIA TYPE: ICD-10-CM

## 2023-05-09 DIAGNOSIS — E61.1 IRON DEFICIENCY: Primary | ICD-10-CM

## 2023-05-09 PROCEDURE — 25000003 PHARM REV CODE 250: Performed by: NURSE PRACTITIONER

## 2023-05-09 PROCEDURE — 63600175 PHARM REV CODE 636 W HCPCS: Mod: JZ,JG | Performed by: NURSE PRACTITIONER

## 2023-05-09 PROCEDURE — 96365 THER/PROPH/DIAG IV INF INIT: CPT

## 2023-05-09 RX ORDER — EPINEPHRINE 0.3 MG/.3ML
0.3 INJECTION SUBCUTANEOUS ONCE AS NEEDED
Status: CANCELLED | OUTPATIENT
Start: 2023-05-09

## 2023-05-09 RX ORDER — DIPHENHYDRAMINE HYDROCHLORIDE 50 MG/ML
50 INJECTION INTRAMUSCULAR; INTRAVENOUS ONCE AS NEEDED
Status: CANCELLED | OUTPATIENT
Start: 2023-05-09

## 2023-05-09 RX ORDER — DIPHENHYDRAMINE HYDROCHLORIDE 50 MG/ML
50 INJECTION INTRAMUSCULAR; INTRAVENOUS ONCE AS NEEDED
Status: DISCONTINUED | OUTPATIENT
Start: 2023-05-09 | End: 2023-05-09 | Stop reason: HOSPADM

## 2023-05-09 RX ORDER — METHYLPREDNISOLONE SOD SUCC 125 MG
125 VIAL (EA) INJECTION ONCE AS NEEDED
Status: DISCONTINUED | OUTPATIENT
Start: 2023-05-09 | End: 2023-05-09 | Stop reason: HOSPADM

## 2023-05-09 RX ORDER — SODIUM CHLORIDE 0.9 % (FLUSH) 0.9 %
10 SYRINGE (ML) INJECTION
Status: CANCELLED | OUTPATIENT
Start: 2023-05-09

## 2023-05-09 RX ORDER — METHYLPREDNISOLONE SOD SUCC 125 MG
125 VIAL (EA) INJECTION ONCE AS NEEDED
Status: CANCELLED | OUTPATIENT
Start: 2023-05-09

## 2023-05-09 RX ORDER — HEPARIN 100 UNIT/ML
500 SYRINGE INTRAVENOUS
Status: CANCELLED | OUTPATIENT
Start: 2023-05-09

## 2023-05-09 RX ORDER — HEPARIN 100 UNIT/ML
500 SYRINGE INTRAVENOUS
Status: DISCONTINUED | OUTPATIENT
Start: 2023-05-09 | End: 2023-05-09 | Stop reason: HOSPADM

## 2023-05-09 RX ORDER — EPINEPHRINE 0.3 MG/.3ML
0.3 INJECTION SUBCUTANEOUS ONCE AS NEEDED
Status: DISCONTINUED | OUTPATIENT
Start: 2023-05-09 | End: 2023-05-09 | Stop reason: HOSPADM

## 2023-05-09 RX ORDER — SODIUM CHLORIDE 0.9 % (FLUSH) 0.9 %
10 SYRINGE (ML) INJECTION
Status: DISCONTINUED | OUTPATIENT
Start: 2023-05-09 | End: 2023-05-09 | Stop reason: HOSPADM

## 2023-05-09 RX ADMIN — FERUMOXYTOL 510 MG: 510 INJECTION INTRAVENOUS at 01:05

## 2023-05-11 ENCOUNTER — TELEPHONE (OUTPATIENT)
Dept: GYNECOLOGY | Facility: CLINIC | Age: 58
End: 2023-05-11
Payer: MEDICAID

## 2023-06-20 ENCOUNTER — ANESTHESIA EVENT (OUTPATIENT)
Dept: ENDOSCOPY | Facility: HOSPITAL | Age: 58
End: 2023-06-20
Payer: MEDICAID

## 2023-06-20 RX ORDER — LIDOCAINE HYDROCHLORIDE 10 MG/ML
1 INJECTION, SOLUTION EPIDURAL; INFILTRATION; INTRACAUDAL; PERINEURAL ONCE
Status: CANCELLED | OUTPATIENT
Start: 2023-06-20 | End: 2023-06-20

## 2023-06-20 NOTE — ANESTHESIA PREPROCEDURE EVALUATION
"                                                                                                             06/20/2023  Jessica Walker is a 57 y.o., female with PMHx of obesity, CAD, HTN, HLD, HFpEF, smoking, JIN, GERD, DM presents for EGD/capsule secondary to epigastric abdominal pain.      Vitals:    06/26/23 0903 06/26/23 0910   BP: 126/73 (!) 126/7   BP Location: Left arm    Patient Position: Lying    Pulse: 76    Resp: 20    Temp: 36.4 °C (97.6 °F)    TempSrc: Oral    SpO2: 97%    Weight: 93 kg (205 lb)    Height: 5' 7" (1.702 m)      Metoprolol--last dose 0700  Trulicity--last dose 6/18/23    Active Ambulatory Problems     Diagnosis Date Noted    Iron deficiency anemia 05/04/2022    POSEY (dyspnea on exertion) 05/17/2022    Primary hypertension 05/17/2022    Mild CAD 05/17/2022    Hyperlipidemia LDL goal <70 05/17/2022    Diabetes mellitus without complication 05/17/2022    Obesity (BMI 30-39.9) 05/17/2022    Tobacco user 05/17/2022    Thrombocytosis 06/29/2022    Iron deficiency 06/29/2022    Personal history of colonic polyps 11/29/2022    Epigastric abdominal pain 11/29/2022    Essential thrombocytosis 01/10/2023    JAK2 gene mutation 01/10/2023     Resolved Ambulatory Problems     Diagnosis Date Noted    No Resolved Ambulatory Problems     Past Medical History:   Diagnosis Date    Anemia     Depression     Diabetes mellitus     Hypertension        Lab Results   Component Value Date    WBC 14.74 (H) 06/23/2023    HGB 13.1 06/23/2023    HCT 43.0 06/23/2023     (H) 06/23/2023    CHOL 166 04/12/2023    TRIG 108 04/12/2023    HDL 53 04/12/2023    ALT 15 06/23/2023    AST 14 06/23/2023     06/23/2023    K 4.1 06/23/2023    CREATININE 0.83 06/23/2023    BUN 10.2 06/23/2023    CO2 25 06/23/2023    TSH 3.242 04/19/2023    INR 0.99 06/18/2019    HGBA1C 7.5 (H) 04/12/2023       Pre-op Assessment    I have reviewed the Patient Summary Reports.     I have reviewed the Nursing " Notes. I have reviewed the NPO Status.   I have reviewed the Medications.     Review of Systems  Anesthesia Hx:  No problems with previous Anesthesia  History of prior surgery of interest to airway management or planning: Denies Family Hx of Anesthesia complications.   Denies Personal Hx of Anesthesia complications.   Hematology/Oncology:  Hematology Normal   Oncology Normal     EENT/Dental:EENT/Dental Normal   Cardiovascular:  Cardiovascular Normal     Pulmonary:  Pulmonary Normal    Renal/:  Renal/ Normal     Hepatic/GI:  Hepatic/GI Normal    Musculoskeletal:  Musculoskeletal Normal    Neurological:  Neurology Normal    Endocrine:  Endocrine Normal    Dermatological:  Skin Normal    Psych:  Psychiatric Normal           Physical Exam  General: Alert    Airway:  Mallampati: III   Mouth Opening: < 3 cm  TM Distance: Normal  Tongue: Normal  Neck ROM: Normal ROM    Dental:  Intact      Lab Results   Component Value Date    WBC 14.92 (H) 05/02/2023    HGB 9.5 (L) 05/02/2023    HCT 30.8 (L) 05/02/2023    MCV 74.4 (L) 05/02/2023     (H) 05/02/2023       CMP  Sodium Level   Date Value Ref Range Status   05/02/2023 136 136 - 145 mmol/L Final     Potassium Level   Date Value Ref Range Status   05/02/2023 4.5 3.5 - 5.1 mmol/L Final     Carbon Dioxide   Date Value Ref Range Status   05/02/2023 21 (L) 22 - 29 mmol/L Final     Blood Urea Nitrogen   Date Value Ref Range Status   05/02/2023 13.0 9.8 - 20.1 mg/dL Final     Creatinine   Date Value Ref Range Status   05/02/2023 0.78 0.55 - 1.02 mg/dL Final     Calcium Level Total   Date Value Ref Range Status   05/02/2023 9.9 8.4 - 10.2 mg/dL Final     Albumin Level   Date Value Ref Range Status   05/02/2023 3.8 3.5 - 5.0 g/dL Final     Bilirubin Total   Date Value Ref Range Status   05/02/2023 0.3 <=1.5 mg/dL Final     Alkaline Phosphatase   Date Value Ref Range Status   05/02/2023 114 40 - 150 unit/L Final     Aspartate Aminotransferase   Date Value Ref Range Status    05/02/2023 14 5 - 34 unit/L Final     Alanine Aminotransferase   Date Value Ref Range Status   05/02/2023 14 0 - 55 unit/L Final     eGFR   Date Value Ref Range Status   05/02/2023 >60 mls/min/1.73/m2 Final       6/21/22        Anesthesia Plan  Type of Anesthesia, risks & benefits discussed:    Anesthesia Type: Gen Natural Airway  Intra-op Monitoring Plan: Standard ASA Monitors  Post Op Pain Control Plan: IV/PO Opioids PRN  (medical reason for not using multimodal pain management)  Induction:  IV  Informed Consent: Informed consent signed with the Patient and all parties understand the risks and agree with anesthesia plan.  All questions answered. Patient consented to blood products? No  ASA Score: 3  Day of Surgery Review of History & Physical: H&P Update referred to the surgeon/provider.    Ready For Surgery From Anesthesia Perspective.     .

## 2023-06-23 ENCOUNTER — LAB VISIT (OUTPATIENT)
Dept: LAB | Facility: HOSPITAL | Age: 58
End: 2023-06-23
Attending: NURSE PRACTITIONER
Payer: MEDICAID

## 2023-06-23 DIAGNOSIS — D50.0 IRON DEFICIENCY ANEMIA DUE TO CHRONIC BLOOD LOSS: ICD-10-CM

## 2023-06-23 PROCEDURE — 85025 COMPLETE CBC W/AUTO DIFF WBC: CPT

## 2023-06-23 PROCEDURE — 80053 COMPREHEN METABOLIC PANEL: CPT

## 2023-06-23 PROCEDURE — 36415 COLL VENOUS BLD VENIPUNCTURE: CPT

## 2023-06-23 PROCEDURE — 82728 ASSAY OF FERRITIN: CPT

## 2023-06-23 PROCEDURE — 83550 IRON BINDING TEST: CPT

## 2023-06-26 ENCOUNTER — ANESTHESIA (OUTPATIENT)
Dept: ENDOSCOPY | Facility: HOSPITAL | Age: 58
End: 2023-06-26
Payer: MEDICAID

## 2023-06-26 ENCOUNTER — HOSPITAL ENCOUNTER (OUTPATIENT)
Facility: HOSPITAL | Age: 58
Discharge: HOME | End: 2023-06-26
Attending: INTERNAL MEDICINE | Admitting: INTERNAL MEDICINE
Payer: MEDICAID

## 2023-06-26 VITALS
OXYGEN SATURATION: 93 % | HEIGHT: 67 IN | HEART RATE: 75 BPM | WEIGHT: 205 LBS | DIASTOLIC BLOOD PRESSURE: 65 MMHG | RESPIRATION RATE: 16 BRPM | TEMPERATURE: 98 F | SYSTOLIC BLOOD PRESSURE: 101 MMHG | BODY MASS INDEX: 32.18 KG/M2

## 2023-06-26 DIAGNOSIS — D50.0 IRON DEFICIENCY ANEMIA DUE TO CHRONIC BLOOD LOSS: Primary | ICD-10-CM

## 2023-06-26 LAB
GLUCOSE SERPL-MCNC: 239 MG/DL (ref 70–110)
POCT GLUCOSE: 239 MG/DL (ref 70–110)
POCT GLUCOSE: 271 MG/DL (ref 70–110)

## 2023-06-26 PROCEDURE — 25000003 PHARM REV CODE 250: Performed by: NURSE ANESTHETIST, CERTIFIED REGISTERED

## 2023-06-26 PROCEDURE — 63600175 PHARM REV CODE 636 W HCPCS: Performed by: SPECIALIST

## 2023-06-26 PROCEDURE — D9220A PRA ANESTHESIA: Mod: ,,, | Performed by: NURSE ANESTHETIST, CERTIFIED REGISTERED

## 2023-06-26 PROCEDURE — D9220A PRA ANESTHESIA: ICD-10-PCS | Mod: ,,, | Performed by: NURSE ANESTHETIST, CERTIFIED REGISTERED

## 2023-06-26 PROCEDURE — 27201423 OPTIME MED/SURG SUP & DEVICES STERILE SUPPLY: Performed by: INTERNAL MEDICINE

## 2023-06-26 PROCEDURE — 63600175 PHARM REV CODE 636 W HCPCS: Performed by: NURSE ANESTHETIST, CERTIFIED REGISTERED

## 2023-06-26 PROCEDURE — 37000009 HC ANESTHESIA EA ADD 15 MINS: Performed by: INTERNAL MEDICINE

## 2023-06-26 PROCEDURE — 37000008 HC ANESTHESIA 1ST 15 MINUTES: Performed by: INTERNAL MEDICINE

## 2023-06-26 PROCEDURE — 63600175 PHARM REV CODE 636 W HCPCS: Performed by: ANESTHESIOLOGY

## 2023-06-26 PROCEDURE — 43235 EGD DIAGNOSTIC BRUSH WASH: CPT | Performed by: INTERNAL MEDICINE

## 2023-06-26 PROCEDURE — 82962 GLUCOSE BLOOD TEST: CPT | Performed by: INTERNAL MEDICINE

## 2023-06-26 RX ORDER — SODIUM CHLORIDE 0.9 % (FLUSH) 0.9 %
10 SYRINGE (ML) INJECTION
Status: CANCELLED | OUTPATIENT
Start: 2023-06-30

## 2023-06-26 RX ORDER — PROPOFOL 10 MG/ML
VIAL (ML) INTRAVENOUS
Status: DISCONTINUED | OUTPATIENT
Start: 2023-06-26 | End: 2023-06-26

## 2023-06-26 RX ORDER — INSULIN ASPART 100 [IU]/ML
0-5 INJECTION, SOLUTION INTRAVENOUS; SUBCUTANEOUS EVERY 4 HOURS PRN
Status: DISCONTINUED | OUTPATIENT
Start: 2023-06-26 | End: 2023-06-26 | Stop reason: HOSPADM

## 2023-06-26 RX ORDER — HEPARIN 100 UNIT/ML
500 SYRINGE INTRAVENOUS
Status: CANCELLED | OUTPATIENT
Start: 2023-06-30

## 2023-06-26 RX ORDER — DIPHENHYDRAMINE HYDROCHLORIDE 50 MG/ML
50 INJECTION INTRAMUSCULAR; INTRAVENOUS ONCE AS NEEDED
Status: CANCELLED | OUTPATIENT
Start: 2023-06-30

## 2023-06-26 RX ORDER — EPINEPHRINE 0.3 MG/.3ML
0.3 INJECTION SUBCUTANEOUS ONCE AS NEEDED
Status: CANCELLED | OUTPATIENT
Start: 2023-06-30

## 2023-06-26 RX ORDER — SODIUM CHLORIDE, SODIUM LACTATE, POTASSIUM CHLORIDE, CALCIUM CHLORIDE 600; 310; 30; 20 MG/100ML; MG/100ML; MG/100ML; MG/100ML
INJECTION, SOLUTION INTRAVENOUS CONTINUOUS
Status: DISCONTINUED | OUTPATIENT
Start: 2023-06-26 | End: 2023-06-26 | Stop reason: HOSPADM

## 2023-06-26 RX ORDER — LIDOCAINE HYDROCHLORIDE 20 MG/ML
INJECTION, SOLUTION EPIDURAL; INFILTRATION; INTRACAUDAL; PERINEURAL
Status: DISCONTINUED | OUTPATIENT
Start: 2023-06-26 | End: 2023-06-26

## 2023-06-26 RX ADMIN — PROPOFOL 70 MG: 10 INJECTION, EMULSION INTRAVENOUS at 10:06

## 2023-06-26 RX ADMIN — PROPOFOL 110 MG: 10 INJECTION, EMULSION INTRAVENOUS at 10:06

## 2023-06-26 RX ADMIN — LIDOCAINE HYDROCHLORIDE 60 MG: 20 INJECTION, SOLUTION EPIDURAL; INFILTRATION; INTRACAUDAL; PERINEURAL at 10:06

## 2023-06-26 RX ADMIN — SODIUM CHLORIDE, POTASSIUM CHLORIDE, SODIUM LACTATE AND CALCIUM CHLORIDE: 600; 310; 30; 20 INJECTION, SOLUTION INTRAVENOUS at 09:06

## 2023-06-26 RX ADMIN — INSULIN ASPART 3 UNITS: 100 INJECTION, SOLUTION INTRAVENOUS; SUBCUTANEOUS at 09:06

## 2023-06-26 RX ADMIN — PROPOFOL 40 MG: 10 INJECTION, EMULSION INTRAVENOUS at 10:06

## 2023-06-26 RX ADMIN — PROPOFOL 50 MG: 10 INJECTION, EMULSION INTRAVENOUS at 10:06

## 2023-06-26 NOTE — ANESTHESIA POSTPROCEDURE EVALUATION
Anesthesia Post Evaluation    Patient: Jessica Walker    Procedure(s) Performed: Procedure(s) (LRB):  EGD (ESOPHAGOGASTRODUODENOSCOPY) (N/A)    Final Anesthesia Type: general      Patient location during evaluation: GI PACU  Patient participation: Yes- Able to Participate  Level of consciousness: awake and alert  Post-procedure vital signs: reviewed and stable  Pain management: adequate  Airway patency: patent    PONV status at discharge: No PONV  Anesthetic complications: no      Cardiovascular status: hemodynamically stable  Respiratory status: unassisted, room air and spontaneous ventilation  Hydration status: euvolemic  Follow-up not needed.          Vitals Value Taken Time   /7 06/26/23 0910   Temp 36.4 °C (97.6 °F) 06/26/23 0903   Pulse 76 06/26/23 0903   Resp 20 06/26/23 0903   SpO2 97 % 06/26/23 0903         No case tracking events are documented in the log.      Pain/Srinivasa Score: No data recorded

## 2023-06-26 NOTE — TRANSFER OF CARE
"Anesthesia Transfer of Care Note    Patient: Jessica Walker    Procedure(s) Performed: Procedure(s) (LRB):  EGD (ESOPHAGOGASTRODUODENOSCOPY) (N/A)    Patient location: GI    Anesthesia Type: general    Post pain: adequate analgesia    Post assessment: no apparent anesthetic complications    Post vital signs: stable    Level of consciousness: sedated    Nausea/Vomiting: no nausea/vomiting    Complications: none    Transfer of care protocol was followed      Last vitals:   Visit Vitals  BP (!) 126/7   Pulse 76   Temp 36.4 °C (97.6 °F) (Oral)   Resp 20   Ht 5' 7" (1.702 m)   Wt 93 kg (205 lb)   SpO2 97%   BMI 32.11 kg/m²     "

## 2023-06-26 NOTE — PROVATION PATIENT INSTRUCTIONS
Discharge Summary/Instructions after an Endoscopic Procedure  Patient Name: Jessica Walker  Patient MRN: [id number]  Patient YOB: 1965  Monday, June 26, 2023  Kita Larose MD  Dear patient,  As a result of recent federal legislation (The Federal Cures Act), you may   receive lab or pathology results from your procedure in your [facility]   account before your physician is able to contact you. Your physician or   their representative will relay the results to you with their   recommendations at their soonest availability.  Thank you,  RESTRICTIONS:  During your procedure today, you received medications for sedation.  These   medications may affect your judgment, balance and coordination.  Therefore,   for 24 hours, you have the following restrictions:   - DO NOT drive a car, operate machinery, make legal/financial decisions,   sign important papers or drink alcohol.    ACTIVITY:  Today: no heavy lifting, straining or running due to procedural   sedation/anesthesia.  The following day: return to full activity including work.  DIET:  Eat and drink normally unless instructed otherwise.     TREATMENT FOR COMMON SIDE EFFECTS:  - Mild abdominal pain, nausea, belching, bloating or excessive gas:  rest,   eat lightly and use a heating pad.  - Sore Throat: treat with throat lozenges and/or gargle with warm salt   water.  - Because air was used during the procedure, expelling large amounts of air   from your rectum or belching is normal.  - If a bowel prep was taken, you may not have a bowel movement for 1-3 days.    This is normal.  SYMPTOMS TO WATCH FOR AND REPORT TO YOUR PHYSICIAN:  1. Abdominal pain or bloating, other than gas cramps.  2. Chest pain.  3. Back pain.  4. Signs of infection such as: chills or fever occurring within 24 hours   after the procedure.  5. Rectal bleeding, which would show as bright red, maroon, or black stools.   (A tablespoon of blood from the rectum is not serious, especially  if   hemorrhoids are present.)  6. Vomiting.  7. Weakness or dizziness.  GO DIRECTLY TO THE NEAREST EMERGENCY ROOM IF YOU HAVE ANY OF THE FOLLOWING:      Difficulty breathing              Chills and/or fever over 101 F   Persistent vomiting and/or vomiting blood   Severe abdominal pain   Severe chest pain   Black, tarry stools   Bleeding- more than one tablespoon   Any other symptom or condition that you feel may need urgent attention  Your doctor recommends these additional instructions:  If any biopsies were taken, your doctors clinic will contact you in 1 to 2   weeks with any results.  - Patient has a contact number available for emergencies.  The signs and   symptoms of potential delayed complications were discussed with the   patient.  Return to normal activities tomorrow.  Written discharge   instructions were provided to the patient.   - Discharge patient to home.   - Resume previous diet.   - Continue present medications including iron supplementation and PPI.   - Refer to a surgeon to discuss antireflux surgery options. Iron deficiency   is likely due to large hernia with Buddy's ulcerations  For questions, problems or results please call your physician - Kita Larose MD at Work:  [phone], Work:  [phone].  [facility] , EMERGENCY ROOM PHONE NUMBER: [phone]  IF A COMPLICATION OR EMERGENCY SITUATION ARISES AND YOU ARE UNABLE TO REACH   YOUR PHYSICIAN - GO DIRECTLY TO THE EMERGENCY ROOM.  MD Kita Diaz MD  6/26/2023 10:48:04 AM  This report has been verified and signed electronically.  Dear patient,  As a result of recent federal legislation (The Federal Cures Act), you may   receive lab or pathology results from your procedure in your [facility]   account before your physician is able to contact you. Your physician or   their representative will relay the results to you with their   recommendations at their soonest  availability.  Thank you,  PROVATION

## 2023-06-26 NOTE — H&P
EGD History and Physical    Patient Name: Jessica Walker  MRN: [id number]  : 1965  Date of Procedure:  2023  Referring Physician: Bridgette Mckeon FNP  Primary Physician: ANAHI Cagle  Procedure Physician: Kita Larose MD, MPH     Procedure - EGD  ASA - per anesthesia  Mallampati - per anesthesia  History of Anesthesia problems - no  Family history Anesthesia problems -  no   Plan of anesthesia - General    Diagnosis: Unexplained iron deficiency anaemia  Chief Complaint: Same as above    HPI: Patient is an 57 y.o. female is here for the above.     Ms. Walker is a 58 yo female with SARAH here for an EGD.     She was seen by Bridgette in clinic.  Reported some issues with epigastric pain at that time.  She is on omeprazole.  She reports burning epigastric pain for which she takes PPI.  She has relief with pantoprazole but is not always able to get it.  Omeprazole does not work as well.  She smokes 1 ppd.  No dysphagia.    Referred for SARAH. Labs variable but baseline Hgb in past 10-11 g/dL at times.  Ferritin as low as 9 in the past.  Last checked 24. Iron sat persistently low.   She does take liquid ferrous sulfate 220 mg daily.  Last Hgb 13 g/dL.  She went through menopause at age 50.  She has regular stools mainly with occasional constipation.  She denies any rectal bleeding or melena.  Her weight is stable.      Colonoscopy with Dr. Wong in 2021 showed diverticulosis otherwise normal.  Recall recommended in 10 years.         Last colonoscopy: Oct 2021  Family history: denies  Anticoagulation: none    ROS:  Constitutional: No fevers, chills, No weight loss  CV: No chest pain  Pulm: No cough, No shortness of breath  GI: see HPI    Medical History:   Past Medical History:   Diagnosis Date    Anemia     Depression     Diabetes mellitus     Hypertension          Surgical History:   Past Surgical History:   Procedure Laterality Date    CHOLECYSTECTOMY      COLONOSCOPY  10/2021        Family History:   Family History   Problem Relation Age of Onset    Diabetes Mother     Coronary artery disease Mother     Diabetes Father     Cervical cancer Sister     Fibromyalgia Sister     Breast cancer Sister    .    Social History:   Social History     Socioeconomic History    Marital status: Single   Tobacco Use    Smoking status: Every Day     Packs/day: 1.00     Types: Cigarettes    Smokeless tobacco: Never   Substance and Sexual Activity    Alcohol use: Not Currently    Drug use: Not Currently    Sexual activity: Not Currently       Review of patient's allergies indicates:  No Known Allergies    Medications:   No medications prior to admission.         Physical Exam:    Vital Signs: There were no vitals filed for this visit.  There were no vitals taken for this visit.    General:          Well appearing in no acute distress  Lungs: Clear to auscultation bilaterally, respirations unlabored  Heart: Regular rate and rhythm, S1 and S2 normal, no obvious murmurs  Abdomen:         Soft, non-tender, bowel sounds normal, no masses, no organomegaly        Labs:  Lab Results   Component Value Date    WBC 14.74 (H) 06/23/2023    HGB 13.1 06/23/2023    HCT 43.0 06/23/2023    MCV 80.2 06/23/2023     (H) 06/23/2023     Lab Results   Component Value Date    INR 0.99 06/18/2019     Lab Results   Component Value Date     06/23/2023    K 4.1 06/23/2023    CO2 25 06/23/2023    BUN 10.2 06/23/2023    CREATININE 0.83 06/23/2023    LABPROT 7.5 06/23/2023    ALBUMIN 3.7 06/23/2023    BILITOT 0.4 06/23/2023    ALKPHOS 127 06/23/2023    ALT 15 06/23/2023    AST 14 06/23/2023       Assessment and Plan:     History reviewed, vital signs satisfactory, cardiopulmonary status satisfactory.  I have explained the sedation options, risks, benefits, and alternatives of this endoscopic procedure to the patient including but not limited to bleeding, inflammation, infection, perforation, and death.  All questions were  answered and the patient consented to proceed with procedure as planned.   The patient is deemed an appropriate candidate for the sedation as planned.      Kita Larose MD, MPH   of Clinical Medicine  Gastroenterology and Hepatology  [facility]    6/25/2023  10:54 PM

## 2023-06-27 DIAGNOSIS — Z86.010 PERSONAL HISTORY OF COLONIC POLYPS: Primary | ICD-10-CM

## 2023-06-27 RX ORDER — POLYETHYLENE GLYCOL 3350 17 G/17G
POWDER, FOR SOLUTION ORAL
Qty: 233 G | Refills: 0 | Status: SHIPPED | OUTPATIENT
Start: 2023-06-27 | End: 2023-08-03

## 2023-06-29 ENCOUNTER — HOSPITAL ENCOUNTER (OUTPATIENT)
Facility: HOSPITAL | Age: 58
Discharge: HOME | End: 2023-06-29
Attending: INTERNAL MEDICINE | Admitting: INTERNAL MEDICINE
Payer: MEDICAID

## 2023-06-29 DIAGNOSIS — D50.8 OTHER IRON DEFICIENCY ANEMIA: Primary | ICD-10-CM

## 2023-06-29 PROCEDURE — 91110 GI TRC IMG INTRAL ESOPH-ILE: CPT | Mod: TC | Performed by: INTERNAL MEDICINE

## 2023-06-29 PROCEDURE — 27201423 OPTIME MED/SURG SUP & DEVICES STERILE SUPPLY: Performed by: INTERNAL MEDICINE

## 2023-06-30 ENCOUNTER — INFUSION (OUTPATIENT)
Dept: INFUSION THERAPY | Facility: HOSPITAL | Age: 58
End: 2023-06-30
Attending: INTERNAL MEDICINE
Payer: MEDICAID

## 2023-06-30 DIAGNOSIS — E61.1 IRON DEFICIENCY: Primary | ICD-10-CM

## 2023-06-30 PROCEDURE — 63600175 PHARM REV CODE 636 W HCPCS: Mod: JZ,JG | Performed by: NURSE PRACTITIONER

## 2023-06-30 PROCEDURE — 96374 THER/PROPH/DIAG INJ IV PUSH: CPT

## 2023-06-30 PROCEDURE — 25000003 PHARM REV CODE 250: Performed by: NURSE PRACTITIONER

## 2023-06-30 RX ORDER — SODIUM CHLORIDE 0.9 % (FLUSH) 0.9 %
10 SYRINGE (ML) INJECTION
Status: CANCELLED | OUTPATIENT
Start: 2023-06-30

## 2023-06-30 RX ORDER — HEPARIN 100 UNIT/ML
500 SYRINGE INTRAVENOUS
Status: CANCELLED | OUTPATIENT
Start: 2023-06-30

## 2023-06-30 RX ORDER — SODIUM CHLORIDE 0.9 % (FLUSH) 0.9 %
10 SYRINGE (ML) INJECTION
Status: DISCONTINUED | OUTPATIENT
Start: 2023-06-30 | End: 2023-06-30 | Stop reason: HOSPADM

## 2023-06-30 RX ORDER — EPINEPHRINE 0.3 MG/.3ML
0.3 INJECTION SUBCUTANEOUS ONCE AS NEEDED
Status: CANCELLED | OUTPATIENT
Start: 2023-06-30

## 2023-06-30 RX ORDER — DIPHENHYDRAMINE HYDROCHLORIDE 50 MG/ML
50 INJECTION INTRAMUSCULAR; INTRAVENOUS ONCE AS NEEDED
Status: CANCELLED | OUTPATIENT
Start: 2023-06-30

## 2023-06-30 RX ADMIN — FERUMOXYTOL 510 MG: 510 INJECTION INTRAVENOUS at 09:06

## 2023-07-07 ENCOUNTER — INFUSION (OUTPATIENT)
Dept: INFUSION THERAPY | Facility: HOSPITAL | Age: 58
End: 2023-07-07
Attending: INTERNAL MEDICINE
Payer: MEDICAID

## 2023-07-07 VITALS
DIASTOLIC BLOOD PRESSURE: 71 MMHG | OXYGEN SATURATION: 96 % | HEART RATE: 78 BPM | SYSTOLIC BLOOD PRESSURE: 114 MMHG | RESPIRATION RATE: 18 BRPM

## 2023-07-07 DIAGNOSIS — E61.1 IRON DEFICIENCY: Primary | ICD-10-CM

## 2023-07-07 PROCEDURE — 63600175 PHARM REV CODE 636 W HCPCS: Mod: JZ,JG | Performed by: NURSE PRACTITIONER

## 2023-07-07 PROCEDURE — 25000003 PHARM REV CODE 250: Performed by: NURSE PRACTITIONER

## 2023-07-07 PROCEDURE — 96374 THER/PROPH/DIAG INJ IV PUSH: CPT

## 2023-07-07 RX ORDER — DIPHENHYDRAMINE HYDROCHLORIDE 50 MG/ML
50 INJECTION INTRAMUSCULAR; INTRAVENOUS ONCE AS NEEDED
Status: CANCELLED | OUTPATIENT
Start: 2023-07-07

## 2023-07-07 RX ORDER — HEPARIN 100 UNIT/ML
500 SYRINGE INTRAVENOUS
Status: CANCELLED | OUTPATIENT
Start: 2023-07-07

## 2023-07-07 RX ORDER — EPINEPHRINE 0.3 MG/.3ML
0.3 INJECTION SUBCUTANEOUS ONCE AS NEEDED
Status: CANCELLED | OUTPATIENT
Start: 2023-07-07

## 2023-07-07 RX ORDER — SODIUM CHLORIDE 0.9 % (FLUSH) 0.9 %
10 SYRINGE (ML) INJECTION
Status: DISCONTINUED | OUTPATIENT
Start: 2023-07-07 | End: 2023-07-07 | Stop reason: HOSPADM

## 2023-07-07 RX ORDER — SODIUM CHLORIDE 0.9 % (FLUSH) 0.9 %
10 SYRINGE (ML) INJECTION
Status: CANCELLED | OUTPATIENT
Start: 2023-07-07

## 2023-07-07 RX ADMIN — FERUMOXYTOL 510 MG: 510 INJECTION INTRAVENOUS at 10:07

## 2023-07-11 DIAGNOSIS — R10.13 EPIGASTRIC ABDOMINAL PAIN: Primary | ICD-10-CM

## 2023-07-11 RX ORDER — PANTOPRAZOLE SODIUM 40 MG/1
40 TABLET, DELAYED RELEASE ORAL DAILY
Qty: 30 TABLET | Refills: 5 | Status: SHIPPED | OUTPATIENT
Start: 2023-07-11

## 2023-07-11 NOTE — TELEPHONE ENCOUNTER
----- Message from Mare Andrews sent at 7/10/2023  1:40 PM CDT -----  Pt called to get results to the capsule that she did. Pt also called to get refill on pantoprazole sent to [facility] in [place].  [phone]

## 2023-07-11 NOTE — TELEPHONE ENCOUNTER
Returned pt call. Left msg notifying pt that Dr. Larose has not yet read the capsule study, but that we will call her as soon as this is completed. Advised pt to call back with any questions

## 2023-07-13 ENCOUNTER — HOSPITAL ENCOUNTER (OUTPATIENT)
Dept: RADIOLOGY | Facility: HOSPITAL | Age: 58
Discharge: HOME | End: 2023-07-13
Attending: NURSE PRACTITIONER
Payer: MEDICAID

## 2023-07-13 DIAGNOSIS — M54.50 MIDLINE LOW BACK PAIN WITHOUT SCIATICA, UNSPECIFIED CHRONICITY: Primary | ICD-10-CM

## 2023-07-13 DIAGNOSIS — M54.50 MIDLINE LOW BACK PAIN WITHOUT SCIATICA, UNSPECIFIED CHRONICITY: ICD-10-CM

## 2023-07-13 PROCEDURE — 72100 X-RAY EXAM L-S SPINE 2/3 VWS: CPT | Mod: TC

## 2023-07-17 ENCOUNTER — OFFICE VISIT (OUTPATIENT)
Dept: GYNECOLOGY | Facility: CLINIC | Age: 58
End: 2023-07-17
Payer: MEDICAID

## 2023-07-17 VITALS
HEART RATE: 83 BPM | RESPIRATION RATE: 14 BRPM | HEIGHT: 67 IN | TEMPERATURE: 98 F | BODY MASS INDEX: 31.86 KG/M2 | DIASTOLIC BLOOD PRESSURE: 70 MMHG | WEIGHT: 203 LBS | SYSTOLIC BLOOD PRESSURE: 101 MMHG

## 2023-07-17 DIAGNOSIS — E78.5 HYPERLIPIDEMIA LDL GOAL <70: Primary | ICD-10-CM

## 2023-07-17 DIAGNOSIS — N95.0 PMB (POSTMENOPAUSAL BLEEDING): Primary | ICD-10-CM

## 2023-07-17 DIAGNOSIS — Z01.818 PREOP EXAMINATION: ICD-10-CM

## 2023-07-17 PROCEDURE — 99213 OFFICE O/P EST LOW 20 MIN: CPT | Mod: PBBFAC

## 2023-07-17 NOTE — ASSESSMENT & PLAN NOTE
Abnormal EMS and proliferative endometrium on EMB. Recommend global eval of intrauteirne cavity with [facility] D&C.  Patient with DM, HTN, and use of sublingual NG prescribed by cardiologist. Will need preop risk stratification from cardiologist and IM. Needs updated A1c - ordered.    RTC for preop visit

## 2023-07-17 NOTE — PROGRESS NOTES
[facility] GYNECOLOGY CLINIC NOTE     Jessica Walker is a 57 y.o.  presenting to GYN clinic for f/up PMB    PMB originally started as vaginal spotting intermittently from -2023. Saw GNY NP in March, noted to have UA without rbc, and colonoscopy completed in  with return recommended for 10 years. 2023 Pap NILM, HRHPV negative. GCCT negative.  Reports no spotting since polyp was removed. TVUS demonstrated 6mm EMS.    Given risk factors of HTN, DM, obesity, and age, decision was made for EMB 2023.  During EMB procedure, a  polyp was noted at cervic, was grasped with ring forceps and removed.     EMB pathology: Scant fragments of proliferative endometrium with stromal breakdown. No evidence of malignancy.   Endocervical polyp, polypectomy: Endometrial polyp. No evidence of malignancy.     Reports no further spotting since polyp was removed in April.    Gynecology  Denies h/o STD  Denies abnormal pap  LMP 2015 at age 50    OB History          2    Para   2    Term                AB        Living   2         SAB        IAB        Ectopic        Multiple        Live Births   2              x2  BB 6lb 15oz    Past Medical History:   Diagnosis Date    Anemia     Depression     Diabetes mellitus     Hypertension    Cardiologist- sees at [facility]     Past Surgical History:   Procedure Laterality Date    CHOLECYSTECTOMY      COLONOSCOPY  10/2021    ESOPHAGOGASTRODUODENOSCOPY N/A 2023    Procedure: EGD (ESOPHAGOGASTRODUODENOSCOPY);  Surgeon: Kita Larose MD;  Location: [facility] ENDOSCOPY;  Service: Gastroenterology;  Laterality: N/A;    INTRALUMINAL GASTROINTESTINAL TRACT IMAGING VIA CAPSULE N/A 2023    Procedure: IMAGING PROCEDURE, GI TRACT, INTRALUMINAL, VIA CAPSULE;  Surgeon: Kita Larose MD;  Location: [facility] ENDOSCOPY;  Service: Gastroenterology;  Laterality: N/A;    Removal of Goiter      UPPER GASTROINTESTINAL ENDOSCOPY        Current Outpatient Medications  "  Medication Instructions    aspirin (ECOTRIN) 81 mg, Oral, Daily    atorvastatin (LIPITOR) 40 mg, Oral, Nightly    ferrous sulfate 220 mg, Oral, Daily    ibuprofen (ADVIL,MOTRIN) 800 mg, Oral    insulin glargine (LANTUS) 70 Units, Subcutaneous, Nightly    LANTUS SOLOSTAR U-100 INSULIN glargine 100 units/mL SubQ pen INJECT 70 UNITS SUBCUTANEOUSLY EVERY DAY AT BEDTIME    lisinopriL (PRINIVIL,ZESTRIL) 20 mg, Oral, Daily    metFORMIN (GLUCOPHAGE) 1000 MG tablet TAKE ONE TABLET BY MOUTH TWICE A DAY    metoprolol tartrate (LOPRESSOR) 25 mg, Oral, 2 times daily    nitroGLYCERIN (NITROSTAT) 0.4 mg, Sublingual, Every 5 min PRN    omeprazole (PRILOSEC) 40 MG capsule No dose, route, or frequency recorded.    pantoprazole (PROTONIX) 40 mg, Oral, Daily    polyethylene glycol (GLYCOLAX) 17 gram/dose powder Pour 233 grams of Miralax into 1 32 oz bottle of Gatorade.    TRUE METRIX GLUCOSE TEST STRIP Strp USE 1 STRIP TO CHECK GLUCOSE ONCE DAILY    TRULICITY 1.5 mg, Subcutaneous, Every 7 days    varenicline (CHANTIX) 1 mg, Oral, 2 times daily    XIIDRA 5 % Dpet No dose, route, or frequency recorded.   Iron infusions    Social History     Tobacco Use    Smoking status: Every Day     Packs/day: 1.00     Types: Cigarettes    Smokeless tobacco: Never   Substance Use Topics    Alcohol use: Not Currently    Drug use: Not Currently   47 years, quit for a couple years in [facility]    Review of Systems  Pertinent items are noted in HPI.     Objective:     /70   Pulse 83   Temp 98.2 °F (36.8 °C)   Resp 14   Ht 5' 7" (1.702 m)   Wt 92.1 kg (203 lb)   BMI 31.79 kg/m²   Physical Exam:  Gen: Well-nourished, well-developed female appearing stated age. Alert, cooperative, in no acute distress.  CV: regular rate  Chest:  no increased WOB    Relevant Labs:   3/23/2023 Pap NILM HPV negative  EMB 4/19/2023 Scant fragments of proliferative endometrium with stromal breakdown.  No evidence of malignancy. Endocervical polyp, polypectomy: " Endometrial polyp.  No evidence of malignancy.      Relevant Imaging:  Pelvic US 3/23/2023  FINDINGS:  Uterus is anteverted measuring 8.0 x 4.0 x 5.5 cm.  Myometrium demonstrates multiple fibroids which are in the myometrium without evidence for submucosal component.  Largest measures 2.4 x 2.4 x 2.0 cm.  Endometrial complex measures 6 mm without mass effect or effacement.     Right ovary is not identified.  Left ovary measures 1.8 x 1.4 x 1.5 cm.  Normal arterial inflow and venous outflow waveforms are identified.  No evidence for adnexal mass.     Impression:     Myomatous changes of the uterus without evidence for submucosal component.  Nonvisualization of the right ovary.    Assessment:       57 y.o.  here for f/up PMB.  1. PMB (postmenopausal bleeding)        2. Preop examination  Hemoglobin A1C        Plan:     Problem List Items Addressed This Visit          Renal/    PMB (postmenopausal bleeding) - Primary     Abnormal EMS and proliferative endometrium on EMB. Recommend global eval of intrauteirne cavity with [facility].  Patient with DM, HTN, and use of sublingual NG prescribed by cardiologist. Will need preop risk stratification from cardiologist and IM. Needs updated A1c - ordered.    RTC for preop visit            Other    Preop examination    Relevant Orders    Hemoglobin A1C     Return to clinic for preop appt    Discussed patient and plan with Dr. Provost Mellissa Teixeira MD  [facility] PGY3  2023 6:26 PM

## 2023-07-18 ENCOUNTER — TELEPHONE (OUTPATIENT)
Dept: GYNECOLOGY | Facility: CLINIC | Age: 58
End: 2023-07-18
Payer: MEDICAID

## 2023-07-18 NOTE — TELEPHONE ENCOUNTER
Good Morning,  GYN is planning a Hysteroscopy D&C for this lady.  She has an appointment with you on 8/1/23.  Could this appt be a cards risk stratification?  GYN is in need if that.  Thank You, Lizette #[phone]

## 2023-07-19 ENCOUNTER — TELEPHONE (OUTPATIENT)
Dept: GASTROENTEROLOGY | Facility: CLINIC | Age: 58
End: 2023-07-19
Payer: MEDICAID

## 2023-07-19 DIAGNOSIS — K44.9 HIATAL HERNIA WITH GERD: Primary | ICD-10-CM

## 2023-07-19 DIAGNOSIS — K21.9 HIATAL HERNIA WITH GERD: Primary | ICD-10-CM

## 2023-07-19 NOTE — TELEPHONE ENCOUNTER
----- Message from Mare Andrews sent at 7/17/2023  3:09 PM CDT -----  Pt called to get results from  Upper GI scope. Please contact pt @ [phone]

## 2023-07-19 NOTE — TELEPHONE ENCOUNTER
Spoke with pt. Advised that M2A has not yet been read. Pt verbalized understanding. Pt also questioned her referral to surgery. No referral had been sent to Gen Surg. Referral placed to Gen surg.

## 2023-07-20 ENCOUNTER — TELEPHONE (OUTPATIENT)
Dept: GYNECOLOGY | Facility: CLINIC | Age: 58
End: 2023-07-20
Payer: MEDICAID

## 2023-07-21 NOTE — TELEPHONE ENCOUNTER
Hi, She is in need of a clearance.  SX date has not been set yet.   Lizette          ----- Message from Mellissa Teixeira MD sent at 7/17/2023  6:31 PM CDT -----  Regarding: preop clearances  Hi Lizette    She needs clearances for hysteroscopy D&C. Have not yet book but can you start getting the clearances arranged?    She has cardiology appt scheduled on 8/1.    Needs cardiology and IM clearance    Will message you again once a date is set requesting a preop ppt    Thanks!

## 2023-07-24 NOTE — [employer] PATIENT INSTRUCTIONS
Discharge Summary/Instructions for after Video Capsule Endoscopy  Patient Name: Jessica Walker  Patient MRN: [id number]  Patient YOB: 1965  Thursday, June 29, 2023  Kita Lraose MD  1.  Do Not eat or drink anything for 1 hour.  Try sips of water first.  If   tolerated, resume your regular diet or one recommended by your physician.  2.  Do not drive, operate machinery, make critical decisions, or do   activities that require coordination or balance for 24 hours.  3.   You may experience a sore throat for 24 to 48 hours.  You may use   throat lozenges or gargle with warm salt water to relieve the discomfort.  4.  Because air was put into your stomach during the procedure, you may   experience some belching.  5.  Do not use any medication containing aspirin for 10 days.  6.  Go directly to the emergency room if you notice any of the following:   Chills and/or fever over 101 F   Persistent vomiting or vomiting with blood/nasal regurgitation   Severe abdominal pain, other than gas cramps   Severe chest pain   Black, tarry stools     Your doctor recommends these additional instructions:  You are being discharged to home.   You have a contact number available for emergencies.  The signs and symptoms   of potential delayed complications were discussed with you.  You may return   to normal activities tomorrow.  Written discharge instructions were   provided to you.   Resume your previous diet.   Continue your present medications.  If you have any questions or problems, please call your physician.  EMERGENCY PHONE NUMBER: [phone]  LAB RESULTS: [phone]  MD Kita Diaz MD  7/24/2023 5:45:44 PM  This report has been verified and signed electronically.  Dear patient,  As a result of recent federal legislation (The Federal Cures Act), you may   receive lab or pathology results from your procedure in your [facility]   account before your physician is able to contact you.  Your physician or   their representative will relay the results to you with their   recommendations at their soonest availability.  Thank you,  [employer]ATION

## 2023-07-25 ENCOUNTER — PATIENT MESSAGE (OUTPATIENT)
Dept: GASTROENTEROLOGY | Facility: CLINIC | Age: 58
End: 2023-07-25
Payer: MEDICAID

## 2023-07-30 ENCOUNTER — TELEPHONE (OUTPATIENT)
Dept: GYNECOLOGY | Facility: CLINIC | Age: 58
End: 2023-07-30
Payer: MEDICAID

## 2023-07-30 DIAGNOSIS — N95.0 PMB (POSTMENOPAUSAL BLEEDING): Primary | ICD-10-CM

## 2023-07-30 NOTE — TELEPHONE ENCOUNTER
Called Ms Jessica Walker to discuss surgical planning.  Patient agreeable to date of 9/26 for surgery.  Preop appt requested, case request placed.  Discussed with her she needs Cardiology clearance. She had an upcoming appt but cancelled bc her car broke down.  She is aware this needs to be rescheduled prior to her preop appt

## 2023-08-02 ENCOUNTER — APPOINTMENT (OUTPATIENT)
Dept: HEMATOLOGY/ONCOLOGY | Facility: CLINIC | Age: 58
End: 2023-08-02
Attending: INTERNAL MEDICINE
Payer: MEDICAID

## 2023-08-02 ENCOUNTER — LAB VISIT (OUTPATIENT)
Dept: LAB | Facility: HOSPITAL | Age: 58
End: 2023-08-02
Attending: NURSE PRACTITIONER
Payer: MEDICAID

## 2023-08-02 DIAGNOSIS — D50.0 IRON DEFICIENCY ANEMIA DUE TO CHRONIC BLOOD LOSS: ICD-10-CM

## 2023-08-02 LAB
ALBUMIN SERPL-MCNC: 4 G/DL (ref 3.5–5)
ALBUMIN/GLOB SERPL: 1.3 RATIO (ref 1.1–2)
ALP SERPL-CCNC: 116 UNIT/L (ref 40–150)
ALT SERPL-CCNC: 23 UNIT/L (ref 0–55)
AST SERPL-CCNC: 16 UNIT/L (ref 5–34)
BASOPHILS # BLD AUTO: 0.07 X10(3)/MCL
BASOPHILS NFR BLD AUTO: 0.4 %
BILIRUBIN DIRECT+TOT PNL SERPL-MCNC: 0.3 MG/DL
BUN SERPL-MCNC: 17.2 MG/DL (ref 9.8–20.1)
CALCIUM SERPL-MCNC: 10 MG/DL (ref 8.4–10.2)
CHLORIDE SERPL-SCNC: 100 MMOL/L (ref 98–107)
CO2 SERPL-SCNC: 24 MMOL/L (ref 22–29)
CREAT SERPL-MCNC: 0.8 MG/DL (ref 0.55–1.02)
EOSINOPHIL # BLD AUTO: 0.34 X10(3)/MCL (ref 0–0.9)
EOSINOPHIL NFR BLD AUTO: 1.9 %
ERYTHROCYTE [DISTWIDTH] IN BLOOD BY AUTOMATED COUNT: 23.8 % (ref 11.5–17)
FERRITIN SERPL-MCNC: 173.1 NG/ML (ref 4.63–204)
GFR SERPLBLD CREATININE-BSD FMLA CKD-EPI: >60 MLS/MIN/1.73/M2
GLOBULIN SER-MCNC: 3.1 GM/DL (ref 2.4–3.5)
GLUCOSE SERPL-MCNC: 102 MG/DL (ref 74–100)
HCT VFR BLD AUTO: 47.4 % (ref 37–47)
HGB BLD-MCNC: 14.8 G/DL (ref 12–16)
IMM GRANULOCYTES # BLD AUTO: 0.16 X10(3)/MCL (ref 0–0.04)
IMM GRANULOCYTES NFR BLD AUTO: 0.9 %
IRON SATN MFR SERPL: 15 % (ref 20–50)
IRON SERPL-MCNC: 40 UG/DL (ref 50–170)
LYMPHOCYTES # BLD AUTO: 1.88 X10(3)/MCL (ref 0.6–4.6)
LYMPHOCYTES NFR BLD AUTO: 10.5 %
MCH RBC QN AUTO: 25.9 PG (ref 27–31)
MCHC RBC AUTO-ENTMCNC: 31.2 G/DL (ref 33–36)
MCV RBC AUTO: 83 FL (ref 80–94)
MONOCYTES # BLD AUTO: 1.01 X10(3)/MCL (ref 0.1–1.3)
MONOCYTES NFR BLD AUTO: 5.7 %
NEUTROPHILS # BLD AUTO: 14.38 X10(3)/MCL (ref 2.1–9.2)
NEUTROPHILS NFR BLD AUTO: 80.6 %
PLATELET # BLD AUTO: 633 X10(3)/MCL (ref 130–400)
PMV BLD AUTO: 9.7 FL (ref 7.4–10.4)
POTASSIUM SERPL-SCNC: 4.7 MMOL/L (ref 3.5–5.1)
PROT SERPL-MCNC: 7.1 GM/DL (ref 6.4–8.3)
RBC # BLD AUTO: 5.71 X10(6)/MCL (ref 4.2–5.4)
SODIUM SERPL-SCNC: 137 MMOL/L (ref 136–145)
TIBC SERPL-MCNC: 227 UG/DL (ref 70–310)
TIBC SERPL-MCNC: 267 UG/DL (ref 250–450)
TRANSFERRIN SERPL-MCNC: 234 MG/DL (ref 180–382)
WBC # SPEC AUTO: 17.84 X10(3)/MCL (ref 4.5–11.5)

## 2023-08-02 PROCEDURE — 85025 COMPLETE CBC W/AUTO DIFF WBC: CPT

## 2023-08-02 PROCEDURE — 36415 COLL VENOUS BLD VENIPUNCTURE: CPT

## 2023-08-02 PROCEDURE — 80053 COMPREHEN METABOLIC PANEL: CPT

## 2023-08-02 PROCEDURE — 82728 ASSAY OF FERRITIN: CPT

## 2023-08-02 PROCEDURE — 83550 IRON BINDING TEST: CPT

## 2023-08-03 ENCOUNTER — TELEPHONE (OUTPATIENT)
Dept: GASTROENTEROLOGY | Facility: CLINIC | Age: 58
End: 2023-08-03
Payer: MEDICAID

## 2023-08-03 ENCOUNTER — OFFICE VISIT (OUTPATIENT)
Dept: HEMATOLOGY/ONCOLOGY | Facility: CLINIC | Age: 58
End: 2023-08-03
Attending: INTERNAL MEDICINE
Payer: MEDICAID

## 2023-08-03 VITALS — HEIGHT: 67 IN | BODY MASS INDEX: 31.86 KG/M2

## 2023-08-03 DIAGNOSIS — Z15.89 JAK2 GENE MUTATION: ICD-10-CM

## 2023-08-03 DIAGNOSIS — D47.3 ESSENTIAL THROMBOCYTOSIS: ICD-10-CM

## 2023-08-03 DIAGNOSIS — D50.0 IRON DEFICIENCY ANEMIA DUE TO CHRONIC BLOOD LOSS: Primary | ICD-10-CM

## 2023-08-03 DIAGNOSIS — R63.4 UNINTENTIONAL WEIGHT LOSS: Primary | ICD-10-CM

## 2023-08-03 DIAGNOSIS — Z72.0 TOBACCO USE: ICD-10-CM

## 2023-08-03 PROCEDURE — 4010F ACE/ARB THERAPY RXD/TAKEN: CPT | Mod: CPTII,95,, | Performed by: NURSE PRACTITIONER

## 2023-08-03 PROCEDURE — 1159F MED LIST DOCD IN RCRD: CPT | Mod: CPTII,95,, | Performed by: NURSE PRACTITIONER

## 2023-08-03 PROCEDURE — 1160F PR REVIEW ALL MEDS BY PRESCRIBER/CLIN PHARMACIST DOCUMENTED: ICD-10-PCS | Mod: CPTII,95,, | Performed by: NURSE PRACTITIONER

## 2023-08-03 PROCEDURE — 4010F PR ACE/ARB THEARPY RXD/TAKEN: ICD-10-PCS | Mod: CPTII,95,, | Performed by: NURSE PRACTITIONER

## 2023-08-03 PROCEDURE — 3051F HG A1C>EQUAL 7.0%<8.0%: CPT | Mod: CPTII,95,, | Performed by: NURSE PRACTITIONER

## 2023-08-03 PROCEDURE — 99215 PR OFFICE/OUTPT VISIT, EST, LEVL V, 40-54 MIN: ICD-10-PCS | Mod: FQ,95,, | Performed by: NURSE PRACTITIONER

## 2023-08-03 PROCEDURE — 1159F PR MEDICATION LIST DOCUMENTED IN MEDICAL RECORD: ICD-10-PCS | Mod: CPTII,95,, | Performed by: NURSE PRACTITIONER

## 2023-08-03 PROCEDURE — 3008F PR BODY MASS INDEX (BMI) DOCUMENTED: ICD-10-PCS | Mod: CPTII,95,, | Performed by: NURSE PRACTITIONER

## 2023-08-03 PROCEDURE — 3051F PR MOST RECENT HEMOGLOBIN A1C LEVEL 7.0 - < 8.0%: ICD-10-PCS | Mod: CPTII,95,, | Performed by: NURSE PRACTITIONER

## 2023-08-03 PROCEDURE — 1160F RVW MEDS BY RX/DR IN RCRD: CPT | Mod: CPTII,95,, | Performed by: NURSE PRACTITIONER

## 2023-08-03 PROCEDURE — 99215 OFFICE O/P EST HI 40 MIN: CPT | Mod: FQ,95,, | Performed by: NURSE PRACTITIONER

## 2023-08-03 PROCEDURE — 3008F BODY MASS INDEX DOCD: CPT | Mod: CPTII,95,, | Performed by: NURSE PRACTITIONER

## 2023-08-03 RX ORDER — FERROUS SULFATE 220 (44)/5
ELIXIR ORAL
COMMUNITY
Start: 2023-07-27 | End: 2023-08-21

## 2023-08-03 RX ORDER — MIRTAZAPINE 15 MG/1
15 TABLET, FILM COATED ORAL NIGHTLY
COMMUNITY
Start: 2023-07-24

## 2023-08-03 RX ORDER — HYDROCODONE BITARTRATE AND ACETAMINOPHEN 5; 325 MG/1; MG/1
TABLET ORAL
COMMUNITY
Start: 2023-06-07 | End: 2023-08-17

## 2023-08-03 RX ORDER — HYDROXYZINE PAMOATE 25 MG/1
25 CAPSULE ORAL NIGHTLY PRN
COMMUNITY
Start: 2023-07-24

## 2023-08-03 RX ORDER — AMOXICILLIN 500 MG/1
CAPSULE ORAL
COMMUNITY
Start: 2023-06-07 | End: 2023-08-17

## 2023-08-03 RX ORDER — DEXTROAMPHETAMINE SACCHARATE, AMPHETAMINE ASPARTATE, DEXTROAMPHETAMINE SULFATE AND AMPHETAMINE SULFATE 1.25; 1.25; 1.25; 1.25 MG/1; MG/1; MG/1; MG/1
1 TABLET ORAL 2 TIMES DAILY
COMMUNITY
Start: 2023-07-24 | End: 2023-10-11

## 2023-08-03 NOTE — PROGRESS NOTES
Reason for Follow-up:  -recurrent severe iron-deficiency anemia; no definite GI source of bleeding  -low risk essential thrombocytosis    Current Treatment:  ASA 81mg po daily    Treatment History:  -S/P Feraheme 510 mg IV x2 (2023, 2023)    Past medical history: NIDDM.  Hypertension.  Dyslipidemia.  History of anemia.  Vitamin D deficiency.  Tobacco abuse.  Obesity.  Obstructive sleep apnea.  HFpEF. Leukocytosis.  Cholecystectomy.  Goiter surgery.  Social history: Single.  Lives in [place], [place].  Has 2 grown up sons.  Has been smoking 1-1/2 packs of cigarettes daily since age 10.  Has tried to quit smoking many times but does not get refills of Chantix, therefore, has been unable to.  Currently, down to 10 cigarettes daily.  Denies history of alcohol or illicit drug abuse.  Family history: Sister  from cervical cancer at age 55.  Health maintenance:   EGD 2018 (Dr. Koo in [place]: Esophageal dilation).    Colonoscopy 2018 (Dr. Koo in [place]), apparently to be repeated in 2 years.    Mammogram 2018: Negative for malignancy.   ThinPrep cervical Pap smear 2016: Negative for intraepithelial lesion or malignancy; negative for HPV high-risk types (type 16 and type 18/45)  Menstrual and OB/GYN history: Menopause in .  For last 3 months or so, has experienced vaginal spotting.  Apparently, had Pap smear done at [facility] 2 months back (probably 2018), which was apparently unremarkable. Pelvic ultrasound on 2019 showed normal endometrium; likely myomatous changes of uterus with a lower uterine segment lipoma leiomyoma.            History of Present Illness:   53-year-old lady referred from [facility] for evaluation of leukocytosis.    For details, please see my last note dated 2020.  Please also refer to assessment and plan section.      Interval History 8/3/2023: Patient presents today  virtually for scheduled follow up for chronic recurrent SARAH and low risk thrombocytosis. Patient reports weakness, severe fatigue and loss of appetite, abdominal pain, nausea. Patient reports unintentional weight loss and difficulty ambulating due to weakness which require assistance to get around and complete ADL's.  Lab work reviewed with patient, ferritin and iron profile improved. Serum iron remains slightly low however ferritin level WNL. Discussed plan of care with patient. She is amenable. She denies any abnormal bleeding of any kind, chest pain or palpitations.     This is a telemedicine note. Patient was treated using telemedicine, real time audio and video, according to [facility] protocols. KARLA distant provider, conducted the visit from location identified below. The patient participated in the visit at a non-[facility] location selected by the patient (or patient's representative), identified below. I am licensed in the state where the patient stated they are located. The patient (or patient's representative) stated that they understood and accepted the privacy and security risks to their information at their location. Patient was located at her/his residence in , [place]. johanna ACOSTA provider, was located at [facility].    Face to face time spent with patient exceeds 15 minutes, over 50% of which was used for education and counseling regarding medical conditions, current medications including risk/benefit and side effects/adverse events, over the counter medications-uses/doses, home self-care and contact precautions, and red flags and indications for immediate medical attention. The patient is receptive, expresses understanding and is agreeable to plan. All questions answered.      Review of Systems:   All systems reviewed and found to be negative except for the symptoms detailed above    Lab Results   Component Value Date    WBC 17.84 (H) 08/02/2023    RBC 5.71 (H) 08/02/2023    HGB 14.8 08/02/2023    HCT 47.4 (H)  08/02/2023    MCV 83.0 08/02/2023    MCH 25.9 (L) 08/02/2023    MCHC 31.2 (L) 08/02/2023    RDW 23.8 (H) 08/02/2023     (H) 08/02/2023    MPV 9.7 08/02/2023     CMP  Sodium Level   Date Value Ref Range Status   08/02/2023 137 136 - 145 mmol/L Final     Potassium Level   Date Value Ref Range Status   08/02/2023 4.7 3.5 - 5.1 mmol/L Final     Carbon Dioxide   Date Value Ref Range Status   08/02/2023 24 22 - 29 mmol/L Final     Blood Urea Nitrogen   Date Value Ref Range Status   08/02/2023 17.2 9.8 - 20.1 mg/dL Final     Creatinine   Date Value Ref Range Status   08/02/2023 0.80 0.55 - 1.02 mg/dL Final     Calcium Level Total   Date Value Ref Range Status   08/02/2023 10.0 8.4 - 10.2 mg/dL Final     Albumin Level   Date Value Ref Range Status   08/02/2023 4.0 3.5 - 5.0 g/dL Final     Bilirubin Total   Date Value Ref Range Status   08/02/2023 0.3 <=1.5 mg/dL Final     Alkaline Phosphatase   Date Value Ref Range Status   08/02/2023 116 40 - 150 unit/L Final     Aspartate Aminotransferase   Date Value Ref Range Status   08/02/2023 16 5 - 34 unit/L Final     Alanine Aminotransferase   Date Value Ref Range Status   08/02/2023 23 0 - 55 unit/L Final     eGFR   Date Value Ref Range Status   08/02/2023 >60 mls/min/1.73/m2 Final     Lab Results   Component Value Date    UIBC 227 08/02/2023    IRON 40 (L) 08/02/2023    TRANS 234 08/02/2023    TIBC 267 08/02/2023    LABIRON 15 (L) 08/02/2023      Lab Results   Component Value Date    FERRITIN 173.10 08/02/2023       Physical Examination: Physical Exam was not completed today as this was a telemedicine visit.      Assessment:  Severe iron deficiency anemia:   Presented as moderate anemia with severe microcytosis    EGD, colonoscopy negative for any bleeders (02/2018)   Severe iron deficiency    Feraheme (03/2019)   Hemoglobin dropped from 14.8 (06/2019) to 9.5 (08/28/2019) due to iron deficiency   Feraheme x2 (09/2019) --> improvement in iron stores and normalization of  hemoglobin (9.5 --> 13.7)   CT enterogram (09/11/2019): Hiatal hernia; 1.7 cm left adrenal nodule   -03/10/2020: Remains iron deficient (transferrin saturation 13.2%, ferritin 19.2)   -Did not present for Feraheme shots in a timely manner   -04/27/2020: Ferritin 4.9 (down from 19.2 on 03/10/2020); however, hemoglobin 12.4, MCV 83.0   (Iron deficiency erythropoiesis)   -Feraheme 510 mg IV x2 (04/27/2020; 05/04/2020)   -06/01/2020: Iron deficiency resolved with parenteral iron therapy   -However, as of 06/01/2020, severe recurrent iron deficiency remains unexplained   -07/01/2020: Iron stores dropping again within a month; hemoglobin remains normal   -09/02/2020: Iron stores normal/stable (ferritin 21, transferrin saturation 15.8%); hemoglobin 14.8, normal   -09/08/2020: Feels better and more energetic than before   -11/12/2020: Iron deficient; hemoglobin 15.1  -08/04/2020:  [facility]:  Insurance denied capsule endoscopy.  Upper single balloon enteroscopy performed.  1. Duodenum biopsy:  Minimal focal acute inflammation with reactive changes; no celiac sprue  2. Duodenal bulb:  Biopsy showed no significant histopathologic findings; no evidence of celiac sprue  3. Stomach biopsy:  Chemical/reactive gastropathy; H pylori stain negative  -S/P Feraheme 510 mg IV x2 (05/17/2021, 05/24/2021)  -S/P Feraheme 510 mg IV x2 (07/09/2021, 07/19/2021)  -10/27/2021: Colonoscopy (iron-deficiency anemia):  Diffuse diverticulosis; otherwise unremarkable  -10/27/2021:  EGD with APC fulguration and biopsy (iron-deficiency anemia): unctate AVMs within the stomach.  A directed fulguration with APC with good result achieved, superficial ulcerations within the duodenal bulb, possibly secondary to daily aspirin use.  Biopsy taken in the antrum to rule out Helicobacter pylori by pathology  -S/P Feraheme 510 mg IV x2 (08/29/2022, 09/08/2022)  -11/11/2022:  WBC 12.7.  Hemoglobin 12.8.  Platelets 543 K. ferritin 251.70 (was 93.95 on 08/29/2022,  24.88 on 06/29/2022) (transferrin saturation was 19% on 08/29/2022, 7% on 06/29/2022)  -01/11/2023: Labs reviewed.  Hemoglobin 11.2 (hemoglobin was 15.9 on 08/29/2022).  MCV 85.0.  Platelets 562 K, stable.  Ferritin 9.02, down from 251.7 on 11/11/2022.  Transferrin saturation is 6%, low.  CMP unremarkable except glucose 188 mg/dL.  -S/P Feraheme 510 mg IV x2 (01/11/2023, 01/18/2023)  To summarize:   -Recurrent severe iron deficiency anemia, unexplained   -EGD, colonoscopy negative (02/2018)   -CT enterogram negative (09/11/2019)   -has required multiple rounds of parenteral iron therapy over past 4-5 years  -no source of bleeding, H pylori gastritis, or celiac sprue on multiple GI endoscopic procedures including EGD, colonoscopy, CT enteroscopy, and upper single balloon enteroscopy   [ferritin level has dropped:  9.02 (01/11/2023), down from 251.7 (11/11/2022)]  [Hemoglobin: 11.2 on 01/11/2023; down from 15.9 on 08/29/2022)  -treated with Feraheme 510 mg IV x2 (01/11/2023, 01/18/2023)  -02/27/2023:  Hemoglobin 14.6, remarkably improved with Feraheme.  Transferrin saturation 17%, improved but remains low. 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 positive (03/2019)   Bone marrow consistent (06/2019)   Low risk IPSET-thrombosis score ET   Cardiovascular risk factors: NIDDM, hypertension, tobacco abuse   -04/27/2020: Platelets 500K, stable   -06/01/2020: Platelets 561K, more or less stable   -09/02/2020: Platelets 544K, stable   -11/12/2020: Platelets 567,000/mm³, stable  -11/11/2022:  Platelets 543 K, stable   -02/27/2023:  Platelets 623 K    Chronic, very mild, benign-appearing, intermittent leukocytosis:   Secondary to smoking or underlying myeloproliferative disorder   GAGANDEEP-2 V617F mutation positive (03/2019)   BCR-ABL 1 fusion transcripts negative (03/2019)   Underlying essential  thrombocytosis      Plan:  -Recurrent severe iron deficiency anemia, unexplained  -EGD, colonoscopy negative (02/2018)  -CT enterogram negative (09/11/2019)  -has required multiple rounds of parenteral iron therapy over past 4-5 years  -no source of bleeding, H pylori gastritis, or celiac sprue on multiple GI endoscopic procedures including EGD, colonoscopy, CT enteroscopy, and upper single balloon enteroscopy  >>>  [ferritin level has dropped:  9.02 (01/11/2023), down from 251.7 (11/11/2022)]  [Hemoglobin: 11.2 on 01/11/2023; down from 15.9 on 08/29/2022)  -treated with Feraheme 510 mg IV x2 (01/11/2023, 01/18/2023)  -02/27/2023:  Hemoglobin 14.6, remarkably improved with Feraheme.  Transferrin saturation 17%, improved but remains low. Ferritin 55.55 (was 9.02 on 01/11/2023)  -8/2/2023: Hgb 14.8, Hct 47.4, Plt 633, iron 40, iron sat 15% (improved)  -requiring multiple rounds of parenteral iron therapy in the past, essentially with negative exhaustive GI workup    -Continue monthly lab work (cbc,cmp)   -Iron infusion when warranted   -Repeat Iron profile, Ferritin in 4 weeks (early Sept 2023)    Low risk essential thrombocytosis   No indication of myelosuppressive therapy   Continue baby aspirin daily  Smoking Cessation encouraged  Medical management of NIDDM and hypertension which are cardiovascular risk factors.  Deferred to PCP.      Schedule CT C/A/P for patient symptoms(unintentional weight loss, fatigue, weakness)-ordered today  Follow up with NP via TM with lab work (cbc,cmp) in 2-3 weeks s/p CT C/A/P  complete if abnormal patient to see   Monthly lab work (cbc,cmp)       Above discussed at length with her.  All questions answered.    She understands and agrees with this plan.

## 2023-08-03 NOTE — Clinical Note
-No iron infusion -Schedule CT C/A/P  -Follow up with NP with lab work (cbc,cmp) via TM after CT C/A/P complete to review results if abnormal will have patient see

## 2023-08-06 ENCOUNTER — TELEPHONE (OUTPATIENT)
Dept: GYNECOLOGY | Facility: CLINIC | Age: 58
End: 2023-08-06
Payer: MEDICAID

## 2023-08-06 NOTE — TELEPHONE ENCOUNTER
Hello,   Looks like this lady did not make her appointment with you on 8/1/23. Can she be rescheduled?  Her sx is on 9/26/23, hysteroscopy D&C.  Thank You, sharon

## 2023-08-17 ENCOUNTER — OFFICE VISIT (OUTPATIENT)
Dept: CARDIOLOGY | Facility: CLINIC | Age: 58
End: 2023-08-17
Payer: MEDICAID

## 2023-08-17 VITALS
TEMPERATURE: 98 F | RESPIRATION RATE: 18 BRPM | HEIGHT: 67 IN | OXYGEN SATURATION: 97 % | SYSTOLIC BLOOD PRESSURE: 116 MMHG | BODY MASS INDEX: 32.28 KG/M2 | DIASTOLIC BLOOD PRESSURE: 77 MMHG | WEIGHT: 205.69 LBS | HEART RATE: 94 BPM

## 2023-08-17 DIAGNOSIS — Z01.818 PREOP EXAMINATION: ICD-10-CM

## 2023-08-17 DIAGNOSIS — E78.5 HYPERLIPIDEMIA LDL GOAL <70: ICD-10-CM

## 2023-08-17 DIAGNOSIS — I10 PRIMARY HYPERTENSION: ICD-10-CM

## 2023-08-17 DIAGNOSIS — I25.10 MILD CAD: ICD-10-CM

## 2023-08-17 DIAGNOSIS — Z72.0 TOBACCO ABUSE: Primary | ICD-10-CM

## 2023-08-17 DIAGNOSIS — R06.09 DOE (DYSPNEA ON EXERTION): ICD-10-CM

## 2023-08-17 DIAGNOSIS — E78.5 HYPERLIPIDEMIA, UNSPECIFIED HYPERLIPIDEMIA TYPE: ICD-10-CM

## 2023-08-17 DIAGNOSIS — Z72.0 TOBACCO USER: ICD-10-CM

## 2023-08-17 PROCEDURE — 99215 OFFICE O/P EST HI 40 MIN: CPT | Mod: PBBFAC

## 2023-08-17 PROCEDURE — 93005 ELECTROCARDIOGRAM TRACING: CPT

## 2023-08-17 RX ORDER — VARENICLINE TARTRATE 0.5 (11)-1
KIT ORAL
Qty: 1 EACH | Refills: 0 | Status: SHIPPED | OUTPATIENT
Start: 2023-08-17 | End: 2023-09-18

## 2023-08-17 RX ORDER — HYDROXYZINE PAMOATE 25 MG/1
CAPSULE ORAL
COMMUNITY
Start: 2023-07-24 | End: 2023-09-18

## 2023-08-17 RX ORDER — METOPROLOL TARTRATE 25 MG/1
25 TABLET, FILM COATED ORAL 2 TIMES DAILY
Qty: 180 TABLET | Refills: 3 | Status: SHIPPED | OUTPATIENT
Start: 2023-08-17 | End: 2024-08-16

## 2023-08-17 RX ORDER — PEN NEEDLE, DIABETIC 31 GX5/16"
1 NEEDLE, DISPOSABLE MISCELLANEOUS 3 TIMES DAILY
COMMUNITY
Start: 2023-08-15 | End: 2023-12-06 | Stop reason: SDUPTHER

## 2023-08-17 RX ORDER — LISINOPRIL 20 MG/1
20 TABLET ORAL DAILY
Qty: 90 TABLET | Refills: 3 | Status: SHIPPED | OUTPATIENT
Start: 2023-08-17 | End: 2024-08-16

## 2023-08-17 RX ORDER — ATORVASTATIN CALCIUM 40 MG/1
40 TABLET, FILM COATED ORAL NIGHTLY
Qty: 90 TABLET | Refills: 3 | Status: SHIPPED | OUTPATIENT
Start: 2023-08-17

## 2023-08-17 NOTE — PATIENT INSTRUCTIONS
Will prescribe Chantix for smoking cessation   Follow-up in general cardiology clinic in 6 months or sooner if needed  Follow up with PCP as directed

## 2023-08-17 NOTE — PROGRESS NOTES
CHIEF COMPLAINT:   Chief Complaint   Patient presents with    clearance     Sob w/wexertion                   Review of patient's allergies indicates:  No Known Allergies                                       HPI:  Jessica Walker 57 y.o. female with Mild CAD, JAK2 Gene mutation, Thrombocytosis, JIN on CPAP, tobacco abuse, DM II, HTN, HLD, and GERD who presents for routine follow up and ongoing care. The patient completed a Dobutamine Stress Echocardiogram  on 6.21.22 in which stress echo portion of the study was negative for myocardial ischemia.  Patient completed PFT testing in February of 2022 which revealed normal PFT except for mild reduction in diffusion capacity.  She completed an Echocardiogram on June 4, 2021 which revealed an ejection fraction of approximately 50 to 55%, mild MR, and mild TR.  She completed a Dobutamine Stress Echocardiogram in July 2020 which was negative for ischemia.  At last appointment patient endorsed shortness of breath with exertion.    Today she reports ongoing shortness of breath with exertion that she states is stable from last appointment.  She denies any chest pain, lightheadedness, dizziness, passing out, peripheral edema, PND, orthopnea.  She does not use occasional palpitations but states that these started when she started taking Vyvanse.  She states that she is generally sedentary at home and does not do much physical activity.  She is able to complete her ADLs without any issues or ischemic symptoms.  She states that she is prescribed a CPAP for sleep apnea however she is not compliant due to her mask not fitting properly and causing her have dry eyes.  She endorses compliance with all medications, however she states that she would like to take herself off of lisinopril in the future.  She continues to smoke 1-1.5 PPD and states that she would like to quit.  She states that she would like a prescription for Chantix today.                                        CARDIAC TESTING                                                                                                                          Dobutamine Stress Echocardiogram 6.21.22:  The stress echo portion of this study is negative for myocardial ischemia.  The ECG portion of this study is positive for myocardial ischemia.  There were no arrhythmias during stress.                                                                         Patient Active Problem List   Diagnosis    Iron deficiency anemia    POSEY (dyspnea on exertion)    Primary hypertension    Mild CAD    Hyperlipidemia LDL goal <70    Diabetes mellitus without complication    Obesity (BMI 30-39.9)    Tobacco user    Thrombocytosis    Iron deficiency    Personal history of colonic polyps    Epigastric abdominal pain    Essential thrombocytosis    JAK2 gene mutation    Preop examination    PMB (postmenopausal bleeding)     Past Surgical History:   Procedure Laterality Date    CHOLECYSTECTOMY      COLONOSCOPY  10/2021    ESOPHAGOGASTRODUODENOSCOPY N/A 06/26/2023    Procedure: EGD (ESOPHAGOGASTRODUODENOSCOPY);  Surgeon: Kita Larose MD;  Location: [facility];  Service: Gastroenterology;  Laterality: N/A;    EYE SURGERY  7/6/93    LK and RK    INTRALUMINAL GASTROINTESTINAL TRACT IMAGING VIA CAPSULE N/A 06/29/2023    Procedure: IMAGING PROCEDURE, GI TRACT, INTRALUMINAL, VIA CAPSULE;  Surgeon: Kita Larose MD;  Location: [facility];  Service: Gastroenterology;  Laterality: N/A;    Removal of Goiter      UPPER GASTROINTESTINAL ENDOSCOPY       Social History     Socioeconomic History    Marital status: Single   Tobacco Use    Smoking status: Every Day     Current packs/day: 1.00     Average packs/day: 1 pack/day for 41.0 years (41.0 ttl pk-yrs)     Types: Cigarettes    Smokeless tobacco: Never   Substance and Sexual Activity    Alcohol use: Not Currently    Drug use: Not Currently    Sexual activity: Not Currently        Family History  "  Problem Relation Age of Onset    Diabetes Mother     Coronary artery disease Mother     Alcohol abuse Mother     Arthritis Mother     Heart disease Mother         Had 13 stents in her heart    Hyperlipidemia Mother     Diabetes Father     Cervical cancer Sister     Arthritis Sister     Cancer Sister         Breast cancer    COPD Sister     Diabetes Sister     Fibromyalgia Sister     Breast cancer Sister     Cancer Sister          from cervical cancer at 55    Early death Sister     Cancer Maternal Grandfather          from Leukemia    Cancer Maternal Grandmother          from throat cancer at 64    Cancer Maternal Uncle          from cancer         Current Outpatient Medications:     hydrOXYzine pamoate (VISTARIL) 25 MG Cap, 1 capsule at bedtime as needed Orally Once a day for 30 day(s), Disp: , Rfl:     aspirin (ECOTRIN) 81 MG EC tablet, Take 81 mg by mouth once daily., Disp: , Rfl:     atorvastatin (LIPITOR) 40 MG tablet, Take 1 tablet (40 mg total) by mouth every evening., Disp: 90 tablet, Rfl: 3    BD ULTRA-FINE SHORT PEN NEEDLE 31 gauge x 5/16" Ndle, Inject 1 each into the skin 3 (three) times daily., Disp: , Rfl:     dextroamphetamine-amphetamine 5 mg Tab, Take 1 tablet by mouth 2 (two) times daily., Disp: , Rfl:     ferrous sulfate (FEROSUL) 220 mg (44 mg iron)/5 mL Elix, Take by mouth., Disp: , Rfl:     ferrous sulfate 220 mg (44 mg iron)/5 mL solution, Take 5 mLs (220 mg total) by mouth once daily., Disp: 473 mL, Rfl: 0    hydrOXYzine pamoate (VISTARIL) 25 MG Cap, Take 25 mg by mouth nightly as needed., Disp: , Rfl:     ibuprofen (ADVIL,MOTRIN) 800 MG tablet, Take 800 mg by mouth., Disp: , Rfl:     LANTUS SOLOSTAR U-100 INSULIN glargine 100 units/mL SubQ pen, INJECT 70 UNITS SUBCUTANEOUSLY EVERY DAY AT BEDTIME, Disp: , Rfl:     lisinopriL (PRINIVIL,ZESTRIL) 20 MG tablet, Take 1 tablet (20 mg total) by mouth once daily., Disp: 90 tablet, Rfl: 3    metFORMIN (GLUCOPHAGE) 1000 MG tablet, TAKE " "ONE TABLET BY MOUTH TWICE A DAY, Disp: 60 tablet, Rfl: 6    metoprolol tartrate (LOPRESSOR) 25 MG tablet, Take 1 tablet (25 mg total) by mouth 2 (two) times daily., Disp: 180 tablet, Rfl: 3    mirtazapine (REMERON) 15 MG tablet, Take 15 mg by mouth every evening., Disp: , Rfl:     nitroGLYCERIN (NITROSTAT) 0.4 MG SL tablet, Place 1 tablet (0.4 mg total) under the tongue every 5 (five) minutes as needed for Chest pain., Disp: 25 tablet, Rfl: 6    pantoprazole (PROTONIX) 40 MG tablet, Take 1 tablet (40 mg total) by mouth once daily., Disp: 30 tablet, Rfl: 5    TRUE METRIX GLUCOSE TEST STRIP Strp, USE 1 STRIP TO CHECK GLUCOSE ONCE DAILY, Disp: , Rfl:     TRULICITY 1.5 mg/0.5 mL pen injector, Inject 1.5 mg into the skin every 7 days., Disp: , Rfl:     XIIDRA 5 % Dpet, , Disp: , Rfl:     Current Facility-Administered Medications:     DOBUtamine 1000 mg in D5W 250 mL infusion, 20 mcg/kg/min (Order-Specific), Intravenous, Continuous, Shahida Francisco ANP, Last Rate: 29.5 mL/hr at 06/21/22 0945, 20 mcg/kg/min at 06/21/22 0945     ROS:                                                                                                                                                                             Review of Systems   Constitutional: Negative.         Weakness   Respiratory:  Positive for shortness of breath.    Cardiovascular:  Positive for palpitations (Occasional). Negative for chest pain, orthopnea, claudication, leg swelling and PND.   Gastrointestinal: Negative.    Skin: Negative.    Neurological:  Positive for dizziness.   Psychiatric/Behavioral: Negative.          Blood pressure 116/77, pulse 94, temperature 97.5 °F (36.4 °C), resp. rate 18, height 5' 7" (1.702 m), weight 93.3 kg (205 lb 11 oz), SpO2 97 %.   PE:  Physical Exam  Constitutional:       Appearance: Normal appearance.   HENT:      Head: Normocephalic and atraumatic.   Eyes:      Extraocular Movements: Extraocular movements intact.      Pupils: " Pupils are equal, round, and reactive to light.   Cardiovascular:      Rate and Rhythm: Normal rate and regular rhythm.      Pulses: Normal pulses.      Heart sounds: Normal heart sounds. No murmur heard.  Pulmonary:      Effort: Pulmonary effort is normal.      Breath sounds: Normal breath sounds.   Abdominal:      Palpations: Abdomen is soft.   Musculoskeletal:         General: Normal range of motion.      Cervical back: Normal range of motion.   Skin:     General: Skin is warm and dry.   Neurological:      General: No focal deficit present.      Mental Status: She is alert and oriented to person, place, and time.   Psychiatric:         Mood and Affect: Mood normal.         Behavior: Behavior normal.       ASSESSMENT/PLAN:  Mild CAD per [facility] Sept. 2016  - Denies any chest pain. Does endorse occasional shortness of breath that is stable  - Dobutamine Stress Echocardiogram June 2022:  The stress echo portion of this study is negative for myocardial ischemia. The ECG portion of this study is positive for myocardial ischemia.  - Treadmill Stress Test Sept. 2019 - [facility] treadmill score indicated moderate risk.  - Left heart cath September 2016 with predominantly normal epicardial coronary arteries, normal LVEF 65%  - EF 50-55% per Echo 6.4.21    POSEY - Reports stable POSEY  - Reports ongoing stable POSEY/SOB that has not worsened since last appointment  - EF 50-55% per Echo 6.4.21  - PFTs Feb. 2022 - normal PFTs except for mild reduction in diffusion capacity  - Dobutamine Stress Echocardiogram 6.21.22: The stress echo portion of this study is negative for myocardial ischemia. The ECG portion of this study is positive for myocardial ischemia.    Palpitations   - Occasional palpitations, she states that they occur with Vyvanse use  - 48 hour Holter monitor Aug. 2019 - see results under HPI  - Continue Metoprolol Tartrate 25mg BID    HTN  - BP at goal today 116/77  - Continue Metoprolol Tartrate 25mg BID and Lisinopril 20mg  daily  - Stated that she would like to discontinue Lisinopril in future as she feels like she is on too many medications  - I stated that at this time we recommend continuing Lisinopril, especially with upcoming surgery  - Counseled on low sodium diet and exercise as tolerated    HLD (hyperlipidemia)  - LDL 91 (April 2023)- not at goal   - Continue Atorvastatin 40mg daily at bedtime  - Encouraged low-cholesterol, heart healthy diet and exercise as tolerated    DM (diabetes mellitus)  - Continue management per PCP    Tobacco abuse  - Counseled importance of smoking cessation  - Continues to smoke about 1-1.5 PPD and is interested in quitting  - Will prescribe Chantix today     JIN on CPAP  - Reports noncompliance with CPAP due to discomfort   - Recommend nightly CPAP use     Cardiac Risk Assessment  According to the RCRI, the patient has a low-moderate risk for cardiac events such as death, MI, or cardiac arrest (10.1% for 2 points on the RCRI scale) for low to moderate risk gynecological surgery.    Will prescribe Chantix for smoking cessation   Follow-up in general cardiology clinic in 6 months or sooner if needed  Follow up with PCP as directed

## 2023-08-18 ENCOUNTER — HOSPITAL ENCOUNTER (OUTPATIENT)
Dept: RADIOLOGY | Facility: HOSPITAL | Age: 58
Discharge: HOME | End: 2023-08-18
Attending: NURSE PRACTITIONER
Payer: MEDICAID

## 2023-08-18 DIAGNOSIS — R63.4 UNINTENTIONAL WEIGHT LOSS: ICD-10-CM

## 2023-08-18 PROCEDURE — 71260 CT THORAX DX C+: CPT | Mod: TC

## 2023-08-18 PROCEDURE — 25500020 PHARM REV CODE 255

## 2023-08-18 PROCEDURE — 74177 CT ABD & PELVIS W/CONTRAST: CPT | Mod: TC

## 2023-08-18 RX ADMIN — IOHEXOL 100 ML: 350 INJECTION, SOLUTION INTRAVENOUS at 09:08

## 2023-08-21 ENCOUNTER — OFFICE VISIT (OUTPATIENT)
Dept: HEMATOLOGY/ONCOLOGY | Facility: CLINIC | Age: 58
End: 2023-08-21
Attending: NURSE PRACTITIONER
Payer: MEDICAID

## 2023-08-21 DIAGNOSIS — D47.3 ESSENTIAL THROMBOCYTOSIS: ICD-10-CM

## 2023-08-21 DIAGNOSIS — D50.0 IRON DEFICIENCY ANEMIA DUE TO CHRONIC BLOOD LOSS: Primary | ICD-10-CM

## 2023-08-21 PROCEDURE — 3051F PR MOST RECENT HEMOGLOBIN A1C LEVEL 7.0 - < 8.0%: ICD-10-PCS | Mod: CPTII,95,, | Performed by: NURSE PRACTITIONER

## 2023-08-21 PROCEDURE — 1160F PR REVIEW ALL MEDS BY PRESCRIBER/CLIN PHARMACIST DOCUMENTED: ICD-10-PCS | Mod: CPTII,95,, | Performed by: NURSE PRACTITIONER

## 2023-08-21 PROCEDURE — 1159F MED LIST DOCD IN RCRD: CPT | Mod: CPTII,95,, | Performed by: NURSE PRACTITIONER

## 2023-08-21 PROCEDURE — 4010F ACE/ARB THERAPY RXD/TAKEN: CPT | Mod: CPTII,95,, | Performed by: NURSE PRACTITIONER

## 2023-08-21 PROCEDURE — 1159F PR MEDICATION LIST DOCUMENTED IN MEDICAL RECORD: ICD-10-PCS | Mod: CPTII,95,, | Performed by: NURSE PRACTITIONER

## 2023-08-21 PROCEDURE — 99213 PR OFFICE/OUTPT VISIT, EST, LEVL III, 20-29 MIN: ICD-10-PCS | Mod: 95,,, | Performed by: NURSE PRACTITIONER

## 2023-08-21 PROCEDURE — 4010F PR ACE/ARB THEARPY RXD/TAKEN: ICD-10-PCS | Mod: CPTII,95,, | Performed by: NURSE PRACTITIONER

## 2023-08-21 PROCEDURE — 99213 OFFICE O/P EST LOW 20 MIN: CPT | Mod: 95,,, | Performed by: NURSE PRACTITIONER

## 2023-08-21 PROCEDURE — 1160F RVW MEDS BY RX/DR IN RCRD: CPT | Mod: CPTII,95,, | Performed by: NURSE PRACTITIONER

## 2023-08-21 PROCEDURE — 3051F HG A1C>EQUAL 7.0%<8.0%: CPT | Mod: CPTII,95,, | Performed by: NURSE PRACTITIONER

## 2023-08-21 RX ORDER — FERROUS SULFATE 324(65)MG
324 TABLET, DELAYED RELEASE (ENTERIC COATED) ORAL
Qty: 24 TABLET | Refills: 3 | Status: SHIPPED | OUTPATIENT
Start: 2023-08-21

## 2023-08-21 NOTE — PROGRESS NOTES
Reason for Follow-up:  recurrent severe iron-deficiency anemia; no definite GI source of bleeding  -low risk essential thrombocytosis    Current Treatment:  ASA 81mg po daily    Treatment History:  -S/P Feraheme 510 mg IV x2 (2023, 2023)    Past medical history: NIDDM.  Hypertension.  Dyslipidemia.  History of anemia.  Vitamin D deficiency.  Tobacco abuse.  Obesity.  Obstructive sleep apnea.  HFpEF. Leukocytosis.  Cholecystectomy.  Goiter surgery.  Social history: Single.  Lives in [place], [place].  Has 2 grown up sons.  Has been smoking 1-1/2 packs of cigarettes daily since age 10.  Has tried to quit smoking many times but does not get refills of Chantix, therefore, has been unable to.  Currently, down to 10 cigarettes daily.  Denies history of alcohol or illicit drug abuse.  Family history: Sister  from cervical cancer at age 55.  Health maintenance:   EGD 2018 (Dr. Koo in [place]: Esophageal dilation).    Colonoscopy 2018 (Dr. Koo in [place]), apparently to be repeated in 2 years.    Mammogram 2018: Negative for malignancy.   ThinPrep cervical Pap smear 2016: Negative for intraepithelial lesion or malignancy; negative for HPV high-risk types (type 16 and type 18/45)  Menstrual and OB/GYN history: Menopause in .  For last 3 months or so, has experienced vaginal spotting.  Apparently, had Pap smear done at [facility] 2 months back (probably 2018), which was apparently unremarkable. Pelvic ultrasound on 2019 showed normal endometrium; likely myomatous changes of uterus with a lower uterine segment lipoma leiomyoma.            History of Present Illness:   53-year-old lady referred from [facility] for evaluation of leukocytosis.    For details, please see my last note dated 2020.  Please also refer to assessment and plan section.      Interval History 2023: Patient presents today  virtually for scheduled follow up on CT scan completed to rule out malignancy. CT scan completed was negative for any concern of malignancy. Patient continues to report weakness, unintentional weight loss. Lab work reviewed with patient, iron profile stable. Patient says she has not been taking iron rx because pharmacy did not have it in stock. Patient instructed on how to take iron rx for better absorption. Patient verbalized understanding. Discussed follow up plans.       This is a telemedicine note. Patient was treated using telemedicine, real time audio and video, according to [facility] protocols. I, distant provider, conducted the visit from location identified below. The patient participated in the visit at a non-[facility] location selected by the patient (or patient's representative), identified below. I am licensed in the state where the patient stated they are located. The patient (or patient's representative) stated that they understood and accepted the privacy and security risks to their information at their location. Patient was located at her/his residence in , [place]. I, distant provider, was located at [facility].    Face to face time spent with patient exceeds 15 minutes, over 50% of which was used for education and counseling regarding medical conditions, current medications including risk/benefit and side effects/adverse events, over the counter medications-uses/doses, home self-care and contact precautions, and red flags and indications for immediate medical attention. The patient is receptive, expresses understanding and is agreeable to plan. All questions answered.      Review of Systems:   All systems reviewed and found to be negative except for the symptoms detailed above    Lab Results   Component Value Date    WBC 11.59 (H) 08/18/2023    RBC 5.07 08/18/2023    HGB 13.7 08/18/2023    HCT 42.4 08/18/2023    MCV 83.6 08/18/2023    MCH 27.0 08/18/2023    MCHC 32.3 (L) 08/18/2023    RDW 21.2 (H) 08/18/2023      (H) 08/18/2023    MPV 10.1 08/18/2023     CMP  Sodium Level   Date Value Ref Range Status   08/18/2023 138 136 - 145 mmol/L Final     Potassium Level   Date Value Ref Range Status   08/18/2023 4.4 3.5 - 5.1 mmol/L Final     Carbon Dioxide   Date Value Ref Range Status   08/18/2023 24 22 - 29 mmol/L Final     Blood Urea Nitrogen   Date Value Ref Range Status   08/18/2023 10.5 9.8 - 20.1 mg/dL Final     Creatinine   Date Value Ref Range Status   08/18/2023 0.78 0.55 - 1.02 mg/dL Final     Calcium Level Total   Date Value Ref Range Status   08/18/2023 9.2 8.4 - 10.2 mg/dL Final     Albumin Level   Date Value Ref Range Status   08/18/2023 3.5 3.5 - 5.0 g/dL Final     Bilirubin Total   Date Value Ref Range Status   08/18/2023 0.2 <=1.5 mg/dL Final     Alkaline Phosphatase   Date Value Ref Range Status   08/18/2023 106 40 - 150 unit/L Final     Aspartate Aminotransferase   Date Value Ref Range Status   08/18/2023 15 5 - 34 unit/L Final     Alanine Aminotransferase   Date Value Ref Range Status   08/18/2023 18 0 - 55 unit/L Final     eGFR   Date Value Ref Range Status   08/18/2023 >60 mls/min/1.73/m2 Final     Lab Results   Component Value Date    UIBC 207 08/18/2023    IRON 53 08/18/2023    TRANS 227 08/18/2023    TIBC 260 08/18/2023    LABIRON 20 08/18/2023      Lab Results   Component Value Date    FERRITIN 80.52 08/18/2023       Physical Examination: Physical Exam was not completed today as this was a telemedicine visit.    Assessment:  Severe iron deficiency anemia:   Presented as moderate anemia with severe microcytosis    EGD, colonoscopy negative for any bleeders (02/2018)   Severe iron deficiency    Feraheme (03/2019)   Hemoglobin dropped from 14.8 (06/2019) to 9.5 (08/28/2019) due to iron deficiency   Feraheme x2 (09/2019) --> improvement in iron stores and normalization of hemoglobin (9.5 --> 13.7)   CT enterogram (09/11/2019): Hiatal hernia; 1.7 cm left adrenal nodule   -03/10/2020: Remains iron  deficient (transferrin saturation 13.2%, ferritin 19.2)   -Did not present for Feraheme shots in a timely manner   -04/27/2020: Ferritin 4.9 (down from 19.2 on 03/10/2020); however, hemoglobin 12.4, MCV 83.0   (Iron deficiency erythropoiesis)   -Feraheme 510 mg IV x2 (04/27/2020; 05/04/2020)   -06/01/2020: Iron deficiency resolved with parenteral iron therapy   -However, as of 06/01/2020, severe recurrent iron deficiency remains unexplained   -07/01/2020: Iron stores dropping again within a month; hemoglobin remains normal   -09/02/2020: Iron stores normal/stable (ferritin 21, transferrin saturation 15.8%); hemoglobin 14.8, normal   -09/08/2020: Feels better and more energetic than before   -11/12/2020: Iron deficient; hemoglobin 15.1  -08/04/2020:  [facility]:  Insurance denied capsule endoscopy.  Upper single balloon enteroscopy performed.  1. Duodenum biopsy:  Minimal focal acute inflammation with reactive changes; no celiac sprue  2. Duodenal bulb:  Biopsy showed no significant histopathologic findings; no evidence of celiac sprue  3. Stomach biopsy:  Chemical/reactive gastropathy; H pylori stain negative  -S/P Feraheme 510 mg IV x2 (05/17/2021, 05/24/2021)  -S/P Feraheme 510 mg IV x2 (07/09/2021, 07/19/2021)  -10/27/2021: Colonoscopy (iron-deficiency anemia):  Diffuse diverticulosis; otherwise unremarkable  -10/27/2021:  EGD with APC fulguration and biopsy (iron-deficiency anemia): unctate AVMs within the stomach.  A directed fulguration with APC with good result achieved, superficial ulcerations within the duodenal bulb, possibly secondary to daily aspirin use.  Biopsy taken in the antrum to rule out Helicobacter pylori by pathology  -S/P Feraheme 510 mg IV x2 (08/29/2022, 09/08/2022)  -11/11/2022:  WBC 12.7.  Hemoglobin 12.8.  Platelets 543 K. ferritin 251.70 (was 93.95 on 08/29/2022, 24.88 on 06/29/2022) (transferrin saturation was 19% on 08/29/2022, 7% on 06/29/2022)  -01/11/2023: Labs reviewed.  Hemoglobin  11.2 (hemoglobin was 15.9 on 08/29/2022).  MCV 85.0.  Platelets 562 K, stable.  Ferritin 9.02, down from 251.7 on 11/11/2022.  Transferrin saturation is 6%, low.  CMP unremarkable except glucose 188 mg/dL.  -S/P Feraheme 510 mg IV x2 (01/11/2023, 01/18/2023)  To summarize:   -Recurrent severe iron deficiency anemia, unexplained   -EGD, colonoscopy negative (02/2018)   -CT enterogram negative (09/11/2019)   -has required multiple rounds of parenteral iron therapy over past 4-5 years  -no source of bleeding, H pylori gastritis, or celiac sprue on multiple GI endoscopic procedures including EGD, colonoscopy, CT enteroscopy, and upper single balloon enteroscopy   [ferritin level has dropped:  9.02 (01/11/2023), down from 251.7 (11/11/2022)]  [Hemoglobin: 11.2 on 01/11/2023; down from 15.9 on 08/29/2022)  -treated with Feraheme 510 mg IV x2 (01/11/2023, 01/18/2023)  -02/27/2023:  Hemoglobin 14.6, remarkably improved with Feraheme.  Transferrin saturation 17%, improved but remains low. 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 positive (03/2019)   Bone marrow consistent (06/2019)   Low risk IPSET-thrombosis score ET   Cardiovascular risk factors: NIDDM, hypertension, tobacco abuse   -04/27/2020: Platelets 500K, stable   -06/01/2020: Platelets 561K, more or less stable   -09/02/2020: Platelets 544K, stable   -11/12/2020: Platelets 567,000/mm³, stable  -11/11/2022:  Platelets 543 K, stable   -02/27/2023:  Platelets 623 K    Chronic, very mild, benign-appearing, intermittent leukocytosis:   Secondary to smoking or underlying myeloproliferative disorder   GAGANDEEP-2 V617F mutation positive (03/2019)   BCR-ABL 1 fusion transcripts negative (03/2019)   Underlying essential thrombocytosis      Plan:  -Recurrent severe iron deficiency anemia, unexplained  -EGD, colonoscopy negative (02/2018)  -CT enterogram  negative (09/11/2019)  -has required multiple rounds of parenteral iron therapy over past 4-5 years  -no source of bleeding, H pylori gastritis, or celiac sprue on multiple GI endoscopic procedures including EGD, colonoscopy, CT enteroscopy, and upper single balloon enteroscopy  >>>  [ferritin level has dropped:  9.02 (01/11/2023), down from 251.7 (11/11/2022)]  [Hemoglobin: 11.2 on 01/11/2023; down from 15.9 on 08/29/2022)  -treated with Feraheme 510 mg IV x2 (01/11/2023, 01/18/2023)  -02/27/2023:  Hemoglobin 14.6, remarkably improved with Feraheme.  Transferrin saturation 17%, improved but remains low. Ferritin 55.55 (was 9.02 on 01/11/2023)  -8/2/2023: Hgb 14.8, Hct 47.4, Plt 633, iron 40, iron sat 15% (improved)  -requiring multiple rounds of parenteral iron therapy in the past, essentially with negative exhaustive GI workup    -Continue monthly lab work (cbc,cmp)   -Iron infusion when warranted   -FErrous sulfate 65mg po M,W,F only     Low risk essential thrombocytosis   No indication of myelosuppressive therapy   Continue baby aspirin daily  Smoking Cessation encouraged  Medical management of NIDDM and hypertension which are cardiovascular risk factors.  Deferred to PCP.      Follow up with NP via TM with lab work (cbc,cmp, iron profile, ferritin level)   Monthly lab work (cbc,cmp)       Above discussed at length with her.  All questions answered.    She understands and agrees with this plan.

## 2023-09-08 ENCOUNTER — OFFICE VISIT (OUTPATIENT)
Dept: INTERNAL MEDICINE | Facility: CLINIC | Age: 58
End: 2023-09-08
Payer: MEDICAID

## 2023-09-08 VITALS
HEIGHT: 67 IN | DIASTOLIC BLOOD PRESSURE: 67 MMHG | HEART RATE: 88 BPM | TEMPERATURE: 98 F | OXYGEN SATURATION: 97 % | RESPIRATION RATE: 18 BRPM | BODY MASS INDEX: 31.36 KG/M2 | SYSTOLIC BLOOD PRESSURE: 98 MMHG | WEIGHT: 199.81 LBS

## 2023-09-08 DIAGNOSIS — D75.839 THROMBOCYTOSIS: ICD-10-CM

## 2023-09-08 DIAGNOSIS — Z79.4 TYPE 2 DIABETES MELLITUS WITHOUT COMPLICATION, WITH LONG-TERM CURRENT USE OF INSULIN: Primary | ICD-10-CM

## 2023-09-08 DIAGNOSIS — F17.200 NEEDS SMOKING CESSATION EDUCATION: ICD-10-CM

## 2023-09-08 DIAGNOSIS — I10 PRIMARY HYPERTENSION: ICD-10-CM

## 2023-09-08 DIAGNOSIS — I25.10 MILD CAD: ICD-10-CM

## 2023-09-08 DIAGNOSIS — E11.9 TYPE 2 DIABETES MELLITUS WITHOUT COMPLICATION, WITH LONG-TERM CURRENT USE OF INSULIN: Primary | ICD-10-CM

## 2023-09-08 PROCEDURE — 99215 OFFICE O/P EST HI 40 MIN: CPT | Mod: PBBFAC | Performed by: INTERNAL MEDICINE

## 2023-09-08 RX ORDER — MUPIROCIN 20 MG/G
OINTMENT TOPICAL
COMMUNITY
Start: 2023-09-06

## 2023-09-08 RX ORDER — CLINDAMYCIN HYDROCHLORIDE 300 MG/1
CAPSULE ORAL
COMMUNITY
Start: 2023-09-06 | End: 2023-12-20

## 2023-09-08 NOTE — PROGRESS NOTES
[facility] INTERNAL MEDICINE  PRE-OP CLINIC VISIT    SUBJECTIVE:    HPI: Jessica Walker is a57 yr F  PMH of HTN, GERD, DM and GAGANDEEP 2 mutation,, who presents today for preop evaluation for Gyn surgery. She states no chest pain or discomfort w/ activities and has not noticed any abnormal swelling or dyspnea. She is a  smoker. She lives independently at home and does all ADLs/IADLs w/o assistance. She is able to walk from the parking lot to the clinic w/o any difficulties and is able to climb up >1 flight of stairs and walk up hill >1 block w/o stopping. Patient is not on  any Anticoagulation.She had prior CMPis NML. EKG is NML.she had thyroidectomy and cholecystetectomy    This patient's RCRI score is 1.    ROS:  12 Point Ros is negative.    OBJECTIVE:    Vitals: 116/77.94.98.4.99%.     Physical Exam:  General: Well nourished w/o distress  HEENT: NC/AT; PERRL; nasal and oral mucosa moist and clear; no sinus tenderness; no thyromegaly  Neck: Full ROM; no lymphadenopathy  Pulm: CTA bilaterally, normal work of breathing  CV: S1, S2 w/o murmurs or gallops; no edema noted  GI: Soft with normal bowel sounds in all quadrants, no masses on palpation  MSK: Full ROM of all extremities and spine w/o limitation or discomfort  Derm: No rashes, abnormal bruising, or skin lesions  Neuro: AAOx4; CN II-XII intact; motor/sensory function intact  Psych: Cooperative; appropriate mood and affect    Significant findings:CBC reviewed. Platelets 647K. PT/PTT/INR ordered. cath 2016 Mild non Obs CAD.<20%. Stress echo 6/22 Nml. EF 55%EKG: . NSR.      ASSESSMENT & PLAN:    1.HTN-- well controlled.      2.DM- stable. Well controlled.A1c -pending  -    3.Hyperlipidemia- doing well on statin  -    4. CAD- well compensated.Stress echo in 6/22. No Wall motion abnormality. LVEF -55%    5. GAGANDEEP 2 mutation with mild thrombocytosis.No bleeding tendency.  6.Dc smoking    Disposition:  RCRI score is 0; ASCVD 10 yr risk 10%. No cardiopulmonary sx.Pt not on  anticoagulation.  Pt to hold insulin day of surgery and be covered with insulin sliding scale. restart day after procedure. patient is medically stable for surgery.Recommend checking PT/PTT/INR      Neftali Duncan MD  Pre Op clinic  [facility]

## 2023-09-15 ENCOUNTER — OFFICE VISIT (OUTPATIENT)
Dept: GYNECOLOGY | Facility: CLINIC | Age: 58
End: 2023-09-15
Payer: MEDICAID

## 2023-09-15 VITALS
WEIGHT: 202.38 LBS | OXYGEN SATURATION: 98 % | SYSTOLIC BLOOD PRESSURE: 110 MMHG | TEMPERATURE: 98 F | HEIGHT: 67 IN | RESPIRATION RATE: 14 BRPM | DIASTOLIC BLOOD PRESSURE: 72 MMHG | HEART RATE: 86 BPM | BODY MASS INDEX: 31.76 KG/M2

## 2023-09-15 DIAGNOSIS — Z01.818 PREOP EXAMINATION: Primary | ICD-10-CM

## 2023-09-15 PROCEDURE — 99213 OFFICE O/P EST LOW 20 MIN: CPT | Mod: PBBFAC

## 2023-09-15 NOTE — PROGRESS NOTES
[facility] Preoperative Visit History and Physical    HPI:   57 y.o.  with a history of PMB. History below:   PMB originally started as vaginal spotting intermittently from -2023. Saw GNY NP in March, noted to have UA without rbc, and colonoscopy completed in  with return recommended for 10 years. 2023 Pap NILM, HRHPV negative. GCCT negative.  Reports no spotting since polyp was removed. TVUS demonstrated 6mm EMS.    Given risk factors of HTN, DM, obesity, and age, decision was made for EMB 2023.  During EMB procedure, a  polyp was noted at cervic, was grasped with ring forceps and removed.      EMB pathology: Scant fragments of proliferative endometrium with stromal breakdown. No evidence of malignancy.   Endocervical polyp, polypectomy: Endometrial polyp. No evidence of malignancy.      Reports no further spotting since polyp was removed in April.    Today, patient has had no issues with bleeding since office polypectomy. No complaints or updates to health     Has been seen by IM and risk stratified as low risk RCRI 0. Cardiology has also seen her and deemed her low to moderate risk    OB History    Para Term  AB Living   2 2       2   SAB IAB Ectopic Multiple Live Births           2      # Outcome Date GA Lbr Ankur/2nd Weight Sex Delivery Anes PTL Lv   2 Para      Vag-Spont   [place]   1 Para      Vag-Spont   [place]   BB 6lbs 15 oz    Denies h/o STD  Denies abnormal pap  LMP  at age 50    Past Medical History:   Diagnosis Date    Anemia     Depression     Diabetes mellitus     Hypertension      Past Surgical History:   Procedure Laterality Date    CHOLECYSTECTOMY      COLONOSCOPY  10/2021    ESOPHAGOGASTRODUODENOSCOPY N/A 2023    Procedure: EGD (ESOPHAGOGASTRODUODENOSCOPY);  Surgeon: Kita Larose MD;  Location: [facility] ENDOSCOPY;  Service: Gastroenterology;  Laterality: N/A;    EYE SURGERY  93    LK and RK    INTRALUMINAL GASTROINTESTINAL TRACT IMAGING VIA  "CAPSULE N/A 06/29/2023    Procedure: IMAGING PROCEDURE, GI TRACT, INTRALUMINAL, VIA CAPSULE;  Surgeon: Kita Larose MD;  Location: [facility] ENDOSCOPY;  Service: Gastroenterology;  Laterality: N/A;    Removal of Goiter      UPPER GASTROINTESTINAL ENDOSCOPY       Prior to Admission medications    Medication Sig Start Date End Date Taking? Authorizing Provider   aspirin (ECOTRIN) 81 MG EC tablet Take 81 mg by mouth once daily. 3/22/22   Provider, Historical   atorvastatin (LIPITOR) 40 MG tablet Take 1 tablet (40 mg total) by mouth every evening. 8/17/23   Alla Yuan PA-C   BD ULTRA-FINE SHORT PEN NEEDLE 31 gauge x 5/16" Ndle Inject 1 each into the skin 3 (three) times daily. 8/15/23   Provider, Historical   clindamycin (CLEOCIN) 300 MG capsule  9/6/23   Provider, Historical   dextroamphetamine-amphetamine 5 mg Tab Take 1 tablet by mouth 2 (two) times daily. 7/24/23   Provider, Historical   ferrous sulfate 220 mg (44 mg iron)/5 mL solution Take 5 mLs (220 mg total) by mouth once daily.  Patient not taking: Reported on 9/8/2023 5/2/23   Sade Mata FNP   ferrous sulfate 324 mg (65 mg iron) TbEC Take 1 tablet (324 mg total) by mouth every Mon, Wed, Fri. 8/21/23   Sade Mata FNP   hydrOXYzine pamoate (VISTARIL) 25 MG Cap Take 25 mg by mouth nightly as needed. 7/24/23   Provider, Historical   hydrOXYzine pamoate (VISTARIL) 25 MG Cap 1 capsule at bedtime as needed Orally Once a day for 30 day(s) 7/24/23   Provider, Historical   ibuprofen (ADVIL,MOTRIN) 800 MG tablet Take 800 mg by mouth. 3/2/22   Provider, Historical   LANTUS SOLOSTAR U-100 INSULIN glargine 100 units/mL SubQ pen INJECT 70 UNITS SUBCUTANEOUSLY EVERY DAY AT BEDTIME 3/27/23   Provider, Historical   lisinopriL (PRINIVIL,ZESTRIL) 20 MG tablet Take 1 tablet (20 mg total) by mouth once daily. 8/17/23 8/16/24  Alla Yuan PA-C   metFORMIN (GLUCOPHAGE) 1000 MG tablet TAKE ONE TABLET BY MOUTH TWICE A DAY 7/12/22   Mann, " January KOCH, ANAHI   metoprolol tartrate (LOPRESSOR) 25 MG tablet Take 1 tablet (25 mg total) by mouth 2 (two) times daily. 8/17/23 8/16/24  Alla Yuan PA-C   mirtazapine (REMERON) 15 MG tablet Take 15 mg by mouth every evening. 7/24/23   Provider, Historical   mupirocin (BACTROBAN) 2 % ointment  9/6/23   Provider, Historical   nitroGLYCERIN (NITROSTAT) 0.4 MG SL tablet Place 1 tablet (0.4 mg total) under the tongue every 5 (five) minutes as needed for Chest pain. 10/19/22 10/19/23  Shahida Francisco, ANP   pantoprazole (PROTONIX) 40 MG tablet Take 1 tablet (40 mg total) by mouth once daily. 7/11/23   Bridgette Mckeon, ROBINP   TRUE METRIX GLUCOSE TEST STRIP Strp USE 1 STRIP TO CHECK GLUCOSE ONCE DAILY 5/13/22   Provider, Historical   TRULICITY 1.5 mg/0.5 mL pen injector Inject 1.5 mg into the skin every 7 days. 11/6/22   Provider, Historical   varenicline (CHANTIX STARTING MONTH BOX) 0.5 mg (11)- 1 mg (42) tablet Take one 0.5mg tab by mouth once daily X3 days,then increase to one 0.5mg tab twice daily X4 days,then increase to one 1mg tab twice daily  Patient not taking: Reported on 9/8/2023 8/17/23   Alla Yuan PA-C   XIIDRA 5 % Dpet  4/9/23   Provider, Historical     Review of patient's allergies indicates:  No Known Allergies  Social History     Socioeconomic History    Marital status: Single   Tobacco Use    Smoking status: Every Day     Current packs/day: 1.00     Average packs/day: 1 pack/day for 41.0 years (41.0 ttl pk-yrs)     Types: Cigarettes    Smokeless tobacco: Never   Substance and Sexual Activity    Alcohol use: Not Currently    Drug use: Not Currently    Sexual activity: Not Currently     Family History   Problem Relation Age of Onset    Diabetes Mother     Coronary artery disease Mother     Alcohol abuse Mother     Arthritis Mother     Heart disease Mother         Had 13 stents in her heart    Hyperlipidemia Mother     Diabetes Father     Cervical cancer Sister     Arthritis Sister      Cancer Sister         Breast cancer    COPD Sister     Diabetes Sister     Fibromyalgia Sister     Breast cancer Sister     Cancer Sister          from cervical cancer at 55    Early death Sister     Cancer Maternal Grandfather          from Leukemia    Cancer Maternal Grandmother          from throat cancer at 64    Cancer Maternal Uncle          from cancer       Review of Systems: As stated in HPI.      Objective:     There were no vitals filed for this visit.  There is no height or weight on file to calculate BMI.    PHYSICAL EXAM  GEN: NAD, A&O x3  CV: RRR   LUNGS: CTABL  ABDOMEN: soft, NTND, no masses    Exam per Shabana NP  VAGINA: Mucosa normal,no discharge; no lesions.  CERVIX:  no CMT, NO discharge; bright red friable polyp visualized at OS  BIMANUAL EXAM: reveals a 8-week-sized uterus. The uterus is mobile, nontender, no palpable masses. Emerson adnexa reveal no evidence of masses; no fullness       Relevant Labs:   3/23/2023 Pap NILM HPV negative  EMB 2023 Scant fragments of proliferative endometrium with stromal breakdown.  No evidence of malignancy. Endocervical polyp, polypectomy: Endometrial polyp.  No evidence of malignancy.     Mammogram: 2022 [id number]    Relevant Imaging:  Pelvic US 3/23/2023  FINDINGS:  Uterus is anteverted measuring 8.0 x 4.0 x 5.5 cm.  Myometrium demonstrates multiple fibroids which are in the myometrium without evidence for submucosal component.  Largest measures 2.4 x 2.4 x 2.0 cm.  Endometrial complex measures 6 mm without mass effect or effacement.     Right ovary is not identified.  Left ovary measures 1.8 x 1.4 x 1.5 cm.  Normal arterial inflow and venous outflow waveforms are identified.  No evidence for adnexal mass.     Impression:     Myomatous changes of the uterus without evidence for submucosal component.  Nonvisualization of the right ovary      Assessment:      57 y.o.  with PMB presents for for surgical management.  No diagnosis  found.    Plan:     Counseling: Alternatives to this planned procedure were explained to the patient including expectant, medical and other types of surgical management. Counseled patient that removal of endometrial polyp and or fibroids will likely decrease bleeding but amenorrhea cannot be guaranteed. Discussed alternative hormonal options to reduce abnormal bleeding. This procedure and its risks, reasons, and benefits were were reviewed in detail.    Alternatives to this planned procedure were explained to the patient including expectant management or serial ultrasounds with risk of disease progression to malignancy, in office EMB. This procedure and its risks, reasons, benefits and complications (including perforation, subsequent injury to bowel, air/gas embolism, fluid overload, major blood vessel,hemorrhage, possibility of transfusion, infection, scarring, dyspareunia, further surgery, failure of the procedure) were reviewed in detail. Complication rate less than 1% overall.   We have reviewed the typical perioperative course with hospital stay, and post-operative precautions and restrictions have been reviewed.    Transfusion of blood and blood products discussed. Patient was consented for blood transfusion in the case of an emergency.  She understands the possibility of blood transfusion reaction and the attendant risk of transmission of HIV & Hepatitis C to be 1 in 2 million and the risk of Hepatitis B to be 1 in 200,000.  She is aware of the possibility of transfusion reaction. All questions were answered.   Patient understands we are affiliated with a teaching institution and residents and medical students will be involved in her care.  She has consented to an exam under anesthesia and understands that medical students participate in this portion of the procedure.  All questions were answered.    Preop testing ordered.  Surgery case scheduled. Surgical consents signed.  Instructions reviewed, including  NPO after midnight.    Current contraception: postmenopausal    To OR for [facility]SC D&C with Dr. Chappell   Scheduled on 9/26/23    Discussed with Dr. Provost Marielos Bahena MD MPH   [facility], PGY-3

## 2023-09-18 ENCOUNTER — ANESTHESIA EVENT (OUTPATIENT)
Dept: SURGERY | Facility: HOSPITAL | Age: 58
End: 2023-09-18
Payer: MEDICAID

## 2023-09-18 NOTE — ANESTHESIA PREPROCEDURE EVALUATION
"                                                                                                             09/18/2023  Jessica Walker is a 58 y.o., female.      There were no vitals filed for this visit.    COVID STATUS: PFIZER X 2 (LASTLY 9/23/21)  BETA-BLOCKER: METOPROLOL @ 0640 DOS        PROBLEM LIST:  -  PMB     - 9/5/23 UCC w/VAGINAL ABSCESS; I&D'd, Rx'd CLINDA X 10 DAYS, IBUPROFEN, BACTROBAN OINT.  -  CAD (MILD) - on ASA 81mg; NTG USE ?  -  HTN  -  HLD  -  T2DM (BASAL INSULIN BID, GLP-1 (LD ?), ORAL X 1) - 7/13/23 A1c 6.4%; AVG. FASTING GLUCOSE ?      - INSTRUCT to FAST X 12hrs.  -  GERD; LARGE HIATAL HERNIA  -  ADD/ADHD - on ADDERALL  -  CHRONIC THROMBOCYTOSIS - 8/18/23 JOWY=883 (per  HEME/ONC - Thrombocytosis (low risk IPSET-thrombosis score ET)   -  CHRONIC LEUKOCYTOSIS - 8/18/23 WBC=11.59  (per HEME/ONC - Chronic, very mild, benign-appearing, intermittent leukocytosis)  -  JAK2 GENE MUTATION (Dx 3/2019); per HEME/ONC 6/2019 BONE MARROW Bx CONSISTENT   -  JIN w/CPAP - INSTRUCT to BRING DOS  -  SMOKER 41 PPY    AM Rx DOS: LANTUS 17 UNITs, METOPROLOL, PROTONIX    ORDERS -   SURGEON: OUTSIDE LAB: 7/13/23 A1c; 8/17/23 EKG; 8/18/23 CBC, CMP, CT CHEST;  ANESTHESIA: DM PROTOCOL  CARDs [facility] 8/17/23 OV/CLEARANCE "LOW-MODERATE" RISK (SEE BELOW)  Pre-op Assessment          Review of Systems      Physical Exam  General: Well nourished, Cooperative, Alert and Oriented    Airway:  Mallampati: I / I  Mouth Opening: Normal  TM Distance: Normal  Tongue: Normal  Neck ROM: Normal ROM    Dental:  Dentures  Decaying dentition noted at multiple sites -- patient is aware of risk of damage and dislodgment          Anesthesia Plan  Type of Anesthesia, risks & benefits discussed:    Anesthesia Type: Gen ETT  Intra-op Monitoring Plan: Standard ASA Monitors  Post Op Pain Control Plan: multimodal analgesia and IV/PO Opioids PRN  Induction:  IV and rapid sequence  Airway Plan: Direct  Informed Consent: Informed consent signed " with the Patient and all parties understand the risks and agree with anesthesia plan.  All questions answered. Patient consented to blood products? No  ASA Score: 3  Day of Surgery Review of History & Physical: H&P Update referred to the surgeon/provider.    Ready For Surgery From Anesthesia Perspective.     .    Lab Results   Component Value Date    WBC 11.59 (H) 08/18/2023    HGB 13.7 08/18/2023    HCT 42.4 08/18/2023    MCV 83.6 08/18/2023     (H) 08/18/2023     CMP  Sodium   Date Value Ref Range Status   08/04/2020 140 135 - 146 mmol/L Final     Sodium Level   Date Value Ref Range Status   08/18/2023 138 136 - 145 mmol/L Final     Potassium   Date Value Ref Range Status   08/04/2020 4.0 3.6 - 5.2 mmol/L Final     Potassium Level   Date Value Ref Range Status   08/18/2023 4.4 3.5 - 5.1 mmol/L Final     Carbon Dioxide   Date Value Ref Range Status   08/18/2023 24 22 - 29 mmol/L Final   08/04/2020 27 24 - 32 mmol/L Final     BUN   Date Value Ref Range Status   08/04/2020 10.0 7.0 - 25.0 mg/dL Final     Blood Urea Nitrogen   Date Value Ref Range Status   08/18/2023 10.5 9.8 - 20.1 mg/dL Final     Creatinine   Date Value Ref Range Status   08/18/2023 0.78 0.55 - 1.02 mg/dL Final   08/04/2020 0.63 0.50 - 1.10 mg/dL Final     Calcium   Date Value Ref Range Status   08/04/2020 9.3 8.4 - 10.3 mg/dL Final     Calcium Level Total   Date Value Ref Range Status   08/18/2023 9.2 8.4 - 10.2 mg/dL Final     Albumin Level   Date Value Ref Range Status   08/18/2023 3.5 3.5 - 5.0 g/dL Final     Bilirubin Total   Date Value Ref Range Status   08/18/2023 0.2 <=1.5 mg/dL Final     Alkaline Phosphatase   Date Value Ref Range Status   08/18/2023 106 40 - 150 unit/L Final     Aspartate Aminotransferase   Date Value Ref Range Status   08/18/2023 15 5 - 34 unit/L Final     Alanine Aminotransferase   Date Value Ref Range Status   08/18/2023 18 0 - 55 unit/L Final     eGFR   Date Value Ref Range Status   08/18/2023 >60  mls/min/1.73/m2 Final                                                                                                                           Dobutamine Stress Echocardiogram 6.21.22:  The stress echo portion of this study is negative for myocardial ischemia.  The ECG portion of this study is positive for myocardial ischemia.  There were no arrhythmias during stress.    6/4/21 ECHO        9/2/16 [facility]      8/18/23 CT C/A/P  FINDINGS:  Chest: Heart, pericardium, trachea normal.  No mediastinal mass, lymphadenopathy, hilar enlargement.  There is a large hiatal hernia containing the gastric fundus and portion of the body.     Soft tissues of the surrounding chest wall are normal.  Right thyroid lobe removed.     Lungs clear without infiltrate, consolidation, pleural fluid, or pulmonary nodule.     Abdomen and pelvis: Liver demonstrates normal enhancement.  No focal lesion.     Gallbladder removed.  Biliary ductal system normal.     Pancreas appears partially removed.  Large hiatal hernia.  Spleen normal.  Left adrenal nodule 1.9 cm with nonspecific density characteristics.  Right adrenal gland normal.     Kidneys demonstrate mild renal cortical thinning, normal enhancement, no hydronephrosis.     Aorta tapers normally.     No bowel obstruction, ascites, inflammatory change, lymphadenopathy.     Uterus, adnexa, bladder, rectum normal.     Musculoskeletal: No abnormal bony sclerosis or lucency to indicate metastatic disease.     Impression:     Left adrenal nodule measuring 1.9 cm, slightly larger compared to the prior from 2016, likely a lipid poor adrenal adenoma.     Large hiatal hernia.     Otherwise no evidence of malignancy to the chest abdomen or pelvis.    8/17/23 CARDs OV/CLEARANCE  ASSESSMENT/PLAN:  Mild CAD per [facility] Sept. 2016  - Denies any chest pain. Does endorse occasional shortness of breath that is stable  - Dobutamine Stress Echocardiogram June 2022:  The stress echo portion of this study is negative for  myocardial ischemia. The ECG portion of this study is positive for myocardial ischemia.  - Treadmill Stress Test Sept. 2019 - [facility] treadmill score indicated moderate risk.  - Left heart cath September 2016 with predominantly normal epicardial coronary arteries, normal LVEF 65%  - EF 50-55% per Echo 6.4.21    POSEY - Reports stable POSEY  - Reports ongoing stable POSEY/SOB that has not worsened since last appointment  - EF 50-55% per Echo 6.4.21  - PFTs Feb. 2022 - normal PFTs except for mild reduction in diffusion capacity  - Dobutamine Stress Echocardiogram 6.21.22: The stress echo portion of this study is negative for myocardial ischemia. The ECG portion of this study is positive for myocardial ischemia.    Palpitations   - Occasional palpitations, she states that they occur with Vyvanse use  - 48 hour Holter monitor Aug. 2019 - see results under HPI  - Continue Metoprolol Tartrate 25mg BID    HTN  - BP at goal today 116/77  - Continue Metoprolol Tartrate 25mg BID and Lisinopril 20mg daily  - Stated that she would like to discontinue Lisinopril in future as she feels like she is on too many medications  - I stated that at this time we recommend continuing Lisinopril, especially with upcoming surgery  - Counseled on low sodium diet and exercise as tolerated    HLD (hyperlipidemia)  - LDL 91 (April 2023)- not at goal   - Continue Atorvastatin 40mg daily at bedtime  - Encouraged low-cholesterol, heart healthy diet and exercise as tolerated    DM (diabetes mellitus)  - Continue management per PCP    Tobacco abuse  - Counseled importance of smoking cessation  - Continues to smoke about 1-1.5 PPD and is interested in quitting  - Will prescribe Chantix today     JIN on CPAP  - Reports noncompliance with CPAP due to discomfort   - Recommend nightly CPAP use      Cardiac Risk Assessment  According to the RCRI, the patient has a low-moderate risk for cardiac events such as death, MI, or cardiac arrest (10.1% for 2 points on the  RCRI scale) for low to moderate risk gynecological surgery.     Will prescribe Chantix for smoking cessation   Follow-up in general cardiology clinic in 6 months or sooner if needed  Follow up with PCP as directed

## 2023-09-20 ENCOUNTER — LAB VISIT (OUTPATIENT)
Dept: LAB | Facility: HOSPITAL | Age: 58
End: 2023-09-20
Attending: NURSE PRACTITIONER
Payer: MEDICAID

## 2023-09-20 ENCOUNTER — TELEPHONE (OUTPATIENT)
Dept: HEMATOLOGY/ONCOLOGY | Facility: CLINIC | Age: 58
End: 2023-09-20

## 2023-09-20 DIAGNOSIS — D50.0 IRON DEFICIENCY ANEMIA DUE TO CHRONIC BLOOD LOSS: ICD-10-CM

## 2023-09-20 LAB
FERRITIN SERPL-MCNC: 30.3 NG/ML (ref 4.63–204)
IRON SATN MFR SERPL: 9 % (ref 20–50)
IRON SERPL-MCNC: 26 UG/DL (ref 50–170)
TIBC SERPL-MCNC: 276 UG/DL (ref 70–310)
TIBC SERPL-MCNC: 302 UG/DL (ref 250–450)
TRANSFERRIN SERPL-MCNC: 278 MG/DL (ref 180–382)

## 2023-09-20 PROCEDURE — 82728 ASSAY OF FERRITIN: CPT

## 2023-09-20 PROCEDURE — 80053 COMPREHEN METABOLIC PANEL: CPT

## 2023-09-20 PROCEDURE — 83550 IRON BINDING TEST: CPT

## 2023-09-20 PROCEDURE — 36415 COLL VENOUS BLD VENIPUNCTURE: CPT

## 2023-09-21 ENCOUNTER — OFFICE VISIT (OUTPATIENT)
Dept: HEMATOLOGY/ONCOLOGY | Facility: CLINIC | Age: 58
End: 2023-09-21
Payer: MEDICAID

## 2023-09-21 DIAGNOSIS — E61.1 IRON DEFICIENCY: Primary | ICD-10-CM

## 2023-09-21 DIAGNOSIS — Z01.818 PREOP EXAMINATION: Primary | ICD-10-CM

## 2023-09-21 PROCEDURE — 1160F RVW MEDS BY RX/DR IN RCRD: CPT | Mod: CPTII,95,, | Performed by: NURSE PRACTITIONER

## 2023-09-21 PROCEDURE — 1159F PR MEDICATION LIST DOCUMENTED IN MEDICAL RECORD: ICD-10-PCS | Mod: CPTII,95,, | Performed by: NURSE PRACTITIONER

## 2023-09-21 PROCEDURE — 3051F PR MOST RECENT HEMOGLOBIN A1C LEVEL 7.0 - < 8.0%: ICD-10-PCS | Mod: CPTII,95,, | Performed by: NURSE PRACTITIONER

## 2023-09-21 PROCEDURE — 99214 PR OFFICE/OUTPT VISIT, EST, LEVL IV, 30-39 MIN: ICD-10-PCS | Mod: 95,,, | Performed by: NURSE PRACTITIONER

## 2023-09-21 PROCEDURE — 1159F MED LIST DOCD IN RCRD: CPT | Mod: CPTII,95,, | Performed by: NURSE PRACTITIONER

## 2023-09-21 PROCEDURE — 3051F HG A1C>EQUAL 7.0%<8.0%: CPT | Mod: CPTII,95,, | Performed by: NURSE PRACTITIONER

## 2023-09-21 PROCEDURE — 4010F ACE/ARB THERAPY RXD/TAKEN: CPT | Mod: CPTII,95,, | Performed by: NURSE PRACTITIONER

## 2023-09-21 PROCEDURE — 4010F PR ACE/ARB THEARPY RXD/TAKEN: ICD-10-PCS | Mod: CPTII,95,, | Performed by: NURSE PRACTITIONER

## 2023-09-21 PROCEDURE — 1160F PR REVIEW ALL MEDS BY PRESCRIBER/CLIN PHARMACIST DOCUMENTED: ICD-10-PCS | Mod: CPTII,95,, | Performed by: NURSE PRACTITIONER

## 2023-09-21 PROCEDURE — 99214 OFFICE O/P EST MOD 30 MIN: CPT | Mod: 95,,, | Performed by: NURSE PRACTITIONER

## 2023-09-21 RX ORDER — HEPARIN 100 UNIT/ML
500 SYRINGE INTRAVENOUS
Status: CANCELLED | OUTPATIENT
Start: 2023-10-02

## 2023-09-21 RX ORDER — EPINEPHRINE 0.3 MG/.3ML
0.3 INJECTION SUBCUTANEOUS ONCE AS NEEDED
Status: CANCELLED | OUTPATIENT
Start: 2023-10-02

## 2023-09-21 RX ORDER — DIPHENHYDRAMINE HYDROCHLORIDE 50 MG/ML
50 INJECTION INTRAMUSCULAR; INTRAVENOUS ONCE AS NEEDED
Status: CANCELLED | OUTPATIENT
Start: 2023-10-02

## 2023-09-21 NOTE — Clinical Note
RTC 8 weeks for in office visit with NP CBC, CMP, iron panel and ferritin done prior  Have ordered IV iron therapy today w/ plans on first treatment week of 10/2/2023.

## 2023-09-21 NOTE — PROGRESS NOTES
Reason for Follow-up:  recurrent severe iron-deficiency anemia; no definite GI source of bleeding  -low risk essential thrombocytosis    Current Treatment:  ASA 81mg po daily    Treatment History:  -S/P Feraheme 510 mg IV x2 (2023, 2023)    Past medical history: NIDDM.  Hypertension.  Dyslipidemia.  History of anemia.  Vitamin D deficiency.  Tobacco abuse.  Obesity.  Obstructive sleep apnea.  HFpEF. Leukocytosis.  Cholecystectomy.  Goiter surgery.  Social history: Single.  Lives in [place], [place].  Has 2 grown up sons.  Has been smoking 1-1/2 packs of cigarettes daily since age 10.  Has tried to quit smoking many times but does not get refills of Chantix, therefore, has been unable to.  Currently, down to 10 cigarettes daily.  Denies history of alcohol or illicit drug abuse.  Family history: Sister  from cervical cancer at age 55.  Health maintenance:   EGD 2018 (Dr. Koo in [place]: Esophageal dilation).    Colonoscopy 2018 (Dr. Koo in [place]), apparently to be repeated in 2 years.    Mammogram 2018: Negative for malignancy.   ThinPrep cervical Pap smear 2016: Negative for intraepithelial lesion or malignancy; negative for HPV high-risk types (type 16 and type 18/45)  Menstrual and OB/GYN history: Menopause in .  For last 3 months or so, has experienced vaginal spotting.  Apparently, had Pap smear done at [facility] 2 months back (probably 2018), which was apparently unremarkable. Pelvic ultrasound on 2019 showed normal endometrium; likely myomatous changes of uterus with a lower uterine segment lipoma leiomyoma.        History of Present Illness:   53-year-old lady referred from [facility] for evaluation of leukocytosis.    For details, please see my last note dated 2020.  Please also refer to assessment and plan section.    Interval History: 2023: Patient presents today virtually  for scheduled follow up visit for iron deficiency anemia. States she feels terrible reporting fatigue, no energy. States stays in bed a lot. Also continues to report weakness, unintentional weight loss(45 pounds in the past 2 years). States not hungary most of the time. SOB w/ exertion. Not new. Has been occurring for the past 1-2 years. Denies any CP, cough, wheezing, heart palpitations.   Continues to take a baby aspirin daily and one iron tablet three times per week.   States has seen GI and has some form of stomach deformity/disorder(?). She cannot say exactly, but was apparently identified via capsule camera study recently. Denies any abdominal pain, N/V, diarrhea or constipation.   States she is scheduled for D&C on 9/26/2023 to evaluate abnormality of uterus.     This is a telemedicine note. Patient was treated using telemedicine, real time audio and video, according to [facility] protocols. KARLA, distant provider, conducted the visit from location identified below. The patient participated in the visit at a non-[facility] location selected by the patient (or patient's representative), identified below. I am licensed in the [facility] where the patient stated they are located. The patient (or patient's representative) stated that they understood and accepted the privacy and security risks to their information at their location. Patient was located at her/his residence in , [place]. KARLA distant provider, was located at [facility].    Face to face time spent with patient exceeds 15 minutes, over 50% of which was used for education and counseling regarding medical conditions, current medications including risk/benefit and side effects/adverse events, over the counter medications-uses/doses, home self-care and contact precautions, and red flags and indications for immediate medical attention. The patient is receptive, expresses understanding and is agreeable to plan. All questions answered.      Review of Systems:   All systems reviewed and  found to be negative except for the symptoms detailed above    Lab Results   Component Value Date    WBC 11.58 (H) 09/20/2023    RBC 4.37 09/20/2023    HGB 11.6 (L) 09/20/2023    HCT 37.2 09/20/2023    MCV 85.1 09/20/2023    MCH 26.5 (L) 09/20/2023    MCHC 31.2 (L) 09/20/2023    RDW 18.6 (H) 09/20/2023     (H) 09/20/2023    MPV 10.4 09/20/2023     CMP  Sodium   Date Value Ref Range Status   08/04/2020 140 135 - 146 mmol/L Final     Sodium Level   Date Value Ref Range Status   09/20/2023 138 136 - 145 mmol/L Final     Potassium   Date Value Ref Range Status   08/04/2020 4.0 3.6 - 5.2 mmol/L Final     Potassium Level   Date Value Ref Range Status   09/20/2023 4.6 3.5 - 5.1 mmol/L Final     Carbon Dioxide   Date Value Ref Range Status   09/20/2023 23 22 - 29 mmol/L Final   08/04/2020 27 24 - 32 mmol/L Final     BUN   Date Value Ref Range Status   08/04/2020 10.0 7.0 - 25.0 mg/dL Final     Blood Urea Nitrogen   Date Value Ref Range Status   09/20/2023 9.1 (L) 9.8 - 20.1 mg/dL Final     Creatinine   Date Value Ref Range Status   09/20/2023 0.72 0.55 - 1.02 mg/dL Final   08/04/2020 0.63 0.50 - 1.10 mg/dL Final     Calcium   Date Value Ref Range Status   08/04/2020 9.3 8.4 - 10.3 mg/dL Final     Calcium Level Total   Date Value Ref Range Status   09/20/2023 9.2 8.4 - 10.2 mg/dL Final     Albumin Level   Date Value Ref Range Status   09/20/2023 3.6 3.5 - 5.0 g/dL Final     Bilirubin Total   Date Value Ref Range Status   09/20/2023 0.3 <=1.5 mg/dL Final     Alkaline Phosphatase   Date Value Ref Range Status   09/20/2023 81 40 - 150 unit/L Final     Aspartate Aminotransferase   Date Value Ref Range Status   09/20/2023 15 5 - 34 unit/L Final     Alanine Aminotransferase   Date Value Ref Range Status   09/20/2023 17 0 - 55 unit/L Final     eGFR   Date Value Ref Range Status   09/20/2023 >60 mls/min/1.73/m2 Final     Lab Results   Component Value Date    UIBC 276 09/20/2023    IRON 26 (L) 09/20/2023    TRANS 278  09/20/2023    TIBC 302 09/20/2023    LABIRON 9 (L) 09/20/2023      Lab Results   Component Value Date    FERRITIN 30.30 09/20/2023     Physical Examination: Physical Exam was not completed today as this was a telemedicine visit.    Assessment:  Severe iron deficiency anemia:   Presented as moderate anemia with severe microcytosis    EGD, colonoscopy negative for any bleeders (02/2018)   Severe iron deficiency    Feraheme (03/2019)   Hemoglobin dropped from 14.8 (06/2019) to 9.5 (08/28/2019) due to iron deficiency   Feraheme x2 (09/2019) --> improvement in iron stores and normalization of hemoglobin (9.5 --> 13.7)   CT enterogram (09/11/2019): Hiatal hernia; 1.7 cm left adrenal nodule   -03/10/2020: Remains iron deficient (transferrin saturation 13.2%, ferritin 19.2)   -Did not present for Feraheme shots in a timely manner   -04/27/2020: Ferritin 4.9 (down from 19.2 on 03/10/2020); however, hemoglobin 12.4, MCV 83.0   (Iron deficiency erythropoiesis)   -Feraheme 510 mg IV x2 (04/27/2020; 05/04/2020)   -06/01/2020: Iron deficiency resolved with parenteral iron therapy   -However, as of 06/01/2020, severe recurrent iron deficiency remains unexplained   -07/01/2020: Iron stores dropping again within a month; hemoglobin remains normal   -09/02/2020: Iron stores normal/stable (ferritin 21, transferrin saturation 15.8%); hemoglobin 14.8, normal   -09/08/2020: Feels better and more energetic than before   -11/12/2020: Iron deficient; hemoglobin 15.1  -08/04/2020:  [facility]:  Insurance denied capsule endoscopy.  Upper single balloon enteroscopy performed.  1. Duodenum biopsy:  Minimal focal acute inflammation with reactive changes; no celiac sprue  2. Duodenal bulb:  Biopsy showed no significant histopathologic findings; no evidence of celiac sprue  3. Stomach biopsy:  Chemical/reactive gastropathy; H pylori stain negative  -S/P Feraheme 510 mg IV x2 (05/17/2021, 05/24/2021)  -S/P Feraheme 510 mg IV x2 (07/09/2021,  07/19/2021)  -10/27/2021: Colonoscopy (iron-deficiency anemia):  Diffuse diverticulosis; otherwise unremarkable  -10/27/2021:  EGD with APC fulguration and biopsy (iron-deficiency anemia): unctate AVMs within the stomach.  A directed fulguration with APC with good result achieved, superficial ulcerations within the duodenal bulb, possibly secondary to daily aspirin use.  Biopsy taken in the antrum to rule out Helicobacter pylori by pathology  -S/P Feraheme 510 mg IV x2 (08/29/2022, 09/08/2022)  -11/11/2022:  WBC 12.7.  Hemoglobin 12.8.  Platelets 543 K. ferritin 251.70 (was 93.95 on 08/29/2022, 24.88 on 06/29/2022) (transferrin saturation was 19% on 08/29/2022, 7% on 06/29/2022)  -01/11/2023: Labs reviewed.  Hemoglobin 11.2 (hemoglobin was 15.9 on 08/29/2022).  MCV 85.0.  Platelets 562 K, stable.  Ferritin 9.02, down from 251.7 on 11/11/2022.  Transferrin saturation is 6%, low.  CMP unremarkable except glucose 188 mg/dL.  -S/P Feraheme 510 mg IV x2 (01/11/2023, 01/18/2023)  To summarize:   -Recurrent severe iron deficiency anemia, unexplained   -EGD, colonoscopy negative (02/2018)   -CT enterogram negative (09/11/2019)   -has required multiple rounds of parenteral iron therapy over past 4-5 years  -no source of bleeding, H pylori gastritis, or celiac sprue on multiple GI endoscopic procedures including EGD, colonoscopy, CT enteroscopy, and upper single balloon enteroscopy   [ferritin level has dropped:  9.02 (01/11/2023), down from 251.7 (11/11/2022)]  [Hemoglobin: 11.2 on 01/11/2023; down from 15.9 on 08/29/2022)  -treated with Feraheme 510 mg IV x2 (01/11/2023, 01/18/2023)  -02/27/2023:  Hemoglobin 14.6, remarkably improved with Feraheme.  Transferrin saturation 17%, improved but remains low. Ferritin 55.55 (was 9.02 on 01/11/2023)  -09/21/2023: Hgb 11.6, iron 26, iron sat 9 %, TIBC 276, Ferritin 30.30     Thrombocytosis (low risk IPSET-thrombosis score ET)    (age < 60 years; JAK2 V617F mutation positive)   GAGANDEEP-2  V617F mutation positive (03/2019)   Bone marrow consistent (06/2019)   Low risk IPSET-thrombosis score ET   Cardiovascular risk factors: NIDDM, hypertension, tobacco abuse   -04/27/2020: Platelets 500K, stable   -06/01/2020: Platelets 561K, more or less stable   -09/02/2020: Platelets 544K, stable   -11/12/2020: Platelets 567,000/mm³, stable  -11/11/2022:  Platelets 543 K, stable   -02/27/2023:  Platelets 623 K  -09/21/2023:  Platelets 600k, stable.     Chronic, very mild, benign-appearing, intermittent leukocytosis:   Secondary to smoking or underlying myeloproliferative disorder   GAGANDEEP-2 V617F mutation positive (03/2019)   BCR-ABL 1 fusion transcripts negative (03/2019)   Underlying essential thrombocytosis    09/21/2023: SARAH. Low serum iron, iron saturation and ferritin. She is symptomatic. Will place order for Feraheme 2 doses  by 7 days. She wants treatment immediately. Advised her needs to be approved by insurance. Have placed tentative start date of 10/2/2023.    GI disorder per patient report: Reviewed GI note from 7/25/23: Your capsule study was normal. No change in treatment at this time.Patient to follow up with GI as directed.     Postmenopausal bleeding and proliferative endometrium. Scheduled for hysteroscopy and D&C on 09/26/2023.     Plan:  -Recurrent severe iron deficiency anemia, unexplained  -EGD, colonoscopy negative (02/2018)  -CT enterogram negative (09/11/2019)  -has required multiple rounds of parenteral iron therapy over past 4-5 years  -no source of bleeding, H pylori gastritis, or celiac sprue on multiple GI endoscopic procedures including EGD, colonoscopy, CT enteroscopy, and upper single balloon enteroscopy  >>>  [ferritin level has dropped:  9.02 (01/11/2023), down from 251.7 (11/11/2022)]  [Hemoglobin: 11.2 on 01/11/2023; down from 15.9 on 08/29/2022)  -treated with Feraheme 510 mg IV x2 (01/11/2023, 01/18/2023)  -02/27/2023:  Hemoglobin 14.6, remarkably improved with Feraheme.   Transferrin saturation 17%, improved but remains low. Ferritin 55.55 (was 9.02 on 01/11/2023)  -8/2/2023: Hgb 14.8, Hct 47.4, Plt 633, iron 40, iron sat 15% (improved)  -requiring multiple rounds of parenteral iron therapy in the past, essentially with negative exhaustive GI workup    -Have ordred IV  Feraheme 2 doses. Tentative start date of 10/2/2023.   - Continue monthly lab work (cbc,cmp)   - Continue Ferrous sulfate 65mg po M,W,F only for now.   - return to clinic in 8 weeks for reevaluation with CBC, CMP, iron panel and ferritin done prior.     - Low risk essential thrombocytosis   No indication of myelosuppressive therapy   Continue baby aspirin daily  Medical management of NIDDM and hypertension which are cardiovascular risk factors.  Deferred to PCP. - she recently seen PCP on 9/8/2023 for chronic disease management. Per review of note DM, HTN, Hyperlipidemia and CAD stable.       Follow up with NP via TM with lab work (cbc,cmp, iron profile, ferritin level)   Monthly lab work (cbc,cmp)     AT end of visit, patient asked the following: if my arm is red where my watch is, does that mean I am allergic to the watch. Advised her to remove watch and see if redness goes away.  If it does not or worsens, advised her to seek care from UC or PCP.   She verbalized her understanding.     Above discussed at length with her.  All questions answered.    She understands and agrees with this plan.      Answers submitted by the patient for this visit:  Review of Systems Questionnaire (Submitted on 9/20/2023)  appetite change : Yes  unexpected weight change: Yes  mouth sores: No  visual disturbance: No  cough: No  shortness of breath: Yes  chest pain: No  abdominal pain: No  diarrhea: No  frequency: No  back pain: Yes  rash: No  headaches: No  adenopathy: No  nervous/ anxious: Yes

## 2023-09-25 ENCOUNTER — TELEPHONE (OUTPATIENT)
Dept: GYNECOLOGY | Facility: CLINIC | Age: 58
End: 2023-09-25
Payer: MEDICAID

## 2023-09-25 ENCOUNTER — LAB VISIT (OUTPATIENT)
Dept: LAB | Facility: HOSPITAL | Age: 58
End: 2023-09-25
Payer: MEDICAID

## 2023-09-25 ENCOUNTER — OFFICE VISIT (OUTPATIENT)
Dept: FAMILY MEDICINE | Facility: CLINIC | Age: 58
End: 2023-09-25
Payer: MEDICAID

## 2023-09-25 ENCOUNTER — PATIENT MESSAGE (OUTPATIENT)
Dept: SURGERY | Facility: HOSPITAL | Age: 58
End: 2023-09-25
Payer: MEDICAID

## 2023-09-25 VITALS
BODY MASS INDEX: 30.92 KG/M2 | HEIGHT: 67 IN | HEART RATE: 95 BPM | OXYGEN SATURATION: 100 % | RESPIRATION RATE: 18 BRPM | TEMPERATURE: 98 F | DIASTOLIC BLOOD PRESSURE: 79 MMHG | WEIGHT: 197 LBS | SYSTOLIC BLOOD PRESSURE: 110 MMHG

## 2023-09-25 DIAGNOSIS — I10 PRIMARY HYPERTENSION: ICD-10-CM

## 2023-09-25 DIAGNOSIS — E27.8 ADRENAL NODULE: ICD-10-CM

## 2023-09-25 DIAGNOSIS — E83.42 HYPOMAGNESEMIA: ICD-10-CM

## 2023-09-25 DIAGNOSIS — E11.69 TYPE 2 DIABETES MELLITUS WITH OTHER SPECIFIED COMPLICATION, WITH LONG-TERM CURRENT USE OF INSULIN: Primary | ICD-10-CM

## 2023-09-25 DIAGNOSIS — E78.5 HYPERLIPIDEMIA LDL GOAL <70: ICD-10-CM

## 2023-09-25 DIAGNOSIS — Z01.818 PREOP EXAMINATION: ICD-10-CM

## 2023-09-25 DIAGNOSIS — B35.3 TINEA PEDIS OF BOTH FEET: ICD-10-CM

## 2023-09-25 DIAGNOSIS — L85.3 DRY SKIN: ICD-10-CM

## 2023-09-25 DIAGNOSIS — Z79.4 TYPE 2 DIABETES MELLITUS WITH OTHER SPECIFIED COMPLICATION, WITH LONG-TERM CURRENT USE OF INSULIN: Primary | ICD-10-CM

## 2023-09-25 DIAGNOSIS — L68.0 HIRSUTISM: ICD-10-CM

## 2023-09-25 LAB
APPEARANCE UR: CLEAR
BACTERIA #/AREA URNS AUTO: ABNORMAL /HPF
BILIRUB UR QL STRIP.AUTO: ABNORMAL
COLOR UR AUTO: ABNORMAL
GLUCOSE UR QL STRIP.AUTO: NEGATIVE
KETONES UR QL STRIP.AUTO: ABNORMAL
LEUKOCYTE ESTERASE UR QL STRIP.AUTO: ABNORMAL
NITRITE UR QL STRIP.AUTO: POSITIVE
PH UR STRIP.AUTO: 5.5 [PH]
PROT UR QL STRIP.AUTO: 30
RBC #/AREA URNS AUTO: ABNORMAL /HPF
RBC UR QL AUTO: ABNORMAL
SP GR UR STRIP.AUTO: >=1.03 (ref 1–1.03)
SQUAMOUS #/AREA URNS AUTO: ABNORMAL /HPF
UROBILINOGEN UR STRIP-ACNC: 0.2
WBC #/AREA URNS AUTO: ABNORMAL /HPF

## 2023-09-25 PROCEDURE — 99214 PR OFFICE/OUTPT VISIT, EST, LEVL IV, 30-39 MIN: ICD-10-PCS | Mod: S$PBB,,, | Performed by: FAMILY MEDICINE

## 2023-09-25 PROCEDURE — 3008F BODY MASS INDEX DOCD: CPT | Mod: CPTII,,, | Performed by: FAMILY MEDICINE

## 2023-09-25 PROCEDURE — 87088 URINE BACTERIA CULTURE: CPT

## 2023-09-25 PROCEDURE — 87077 CULTURE AEROBIC IDENTIFY: CPT

## 2023-09-25 PROCEDURE — 3051F PR MOST RECENT HEMOGLOBIN A1C LEVEL 7.0 - < 8.0%: ICD-10-PCS | Mod: CPTII,,, | Performed by: FAMILY MEDICINE

## 2023-09-25 PROCEDURE — 99215 OFFICE O/P EST HI 40 MIN: CPT | Mod: PBBFAC | Performed by: FAMILY MEDICINE

## 2023-09-25 PROCEDURE — 4010F PR ACE/ARB THEARPY RXD/TAKEN: ICD-10-PCS | Mod: CPTII,,, | Performed by: FAMILY MEDICINE

## 2023-09-25 PROCEDURE — 3074F PR MOST RECENT SYSTOLIC BLOOD PRESSURE < 130 MM HG: ICD-10-PCS | Mod: CPTII,,, | Performed by: FAMILY MEDICINE

## 2023-09-25 PROCEDURE — 81001 URINALYSIS AUTO W/SCOPE: CPT

## 2023-09-25 PROCEDURE — 3078F PR MOST RECENT DIASTOLIC BLOOD PRESSURE < 80 MM HG: ICD-10-PCS | Mod: CPTII,,, | Performed by: FAMILY MEDICINE

## 2023-09-25 PROCEDURE — 3074F SYST BP LT 130 MM HG: CPT | Mod: CPTII,,, | Performed by: FAMILY MEDICINE

## 2023-09-25 PROCEDURE — 3051F HG A1C>EQUAL 7.0%<8.0%: CPT | Mod: CPTII,,, | Performed by: FAMILY MEDICINE

## 2023-09-25 PROCEDURE — 3078F DIAST BP <80 MM HG: CPT | Mod: CPTII,,, | Performed by: FAMILY MEDICINE

## 2023-09-25 PROCEDURE — 3008F PR BODY MASS INDEX (BMI) DOCUMENTED: ICD-10-PCS | Mod: CPTII,,, | Performed by: FAMILY MEDICINE

## 2023-09-25 PROCEDURE — 99214 OFFICE O/P EST MOD 30 MIN: CPT | Mod: S$PBB,,, | Performed by: FAMILY MEDICINE

## 2023-09-25 PROCEDURE — 4010F ACE/ARB THERAPY RXD/TAKEN: CPT | Mod: CPTII,,, | Performed by: FAMILY MEDICINE

## 2023-09-25 RX ORDER — FLASH GLUCOSE SENSOR
KIT MISCELLANEOUS
Qty: 2 KIT | Refills: 2 | Status: SHIPPED | OUTPATIENT
Start: 2023-09-25 | End: 2023-12-20

## 2023-09-25 RX ORDER — FLASH GLUCOSE SCANNING READER
EACH MISCELLANEOUS
Qty: 2 EACH | Refills: 2 | Status: SHIPPED | OUTPATIENT
Start: 2023-09-25 | End: 2023-12-20

## 2023-09-25 RX ORDER — NITROFURANTOIN 25; 75 MG/1; MG/1
100 CAPSULE ORAL 2 TIMES DAILY
Qty: 10 CAPSULE | Refills: 0 | Status: SHIPPED | OUTPATIENT
Start: 2023-09-25 | End: 2023-09-30

## 2023-09-25 RX ORDER — MUPIROCIN 20 MG/G
OINTMENT TOPICAL
Status: CANCELLED | OUTPATIENT
Start: 2023-09-25

## 2023-09-25 RX ORDER — CLOTRIMAZOLE AND BETAMETHASONE DIPROPIONATE 10; .64 MG/G; MG/G
CREAM TOPICAL 2 TIMES DAILY
Qty: 45 G | Refills: 1 | Status: SHIPPED | OUTPATIENT
Start: 2023-09-25

## 2023-09-25 NOTE — PROGRESS NOTES
Patient Name: Jessica Walker   : 1965  MRN: [id number]     SUBJECTIVE:  Jessica Walker is a 58 y.o. female here for Establish Care  .    HPI  PMH of HTN, CAD, GERD, DM and GAGANDEEP 2 mutation with thrombocytosis, SARAH here to establish care.  Follows with gynecology closely for abnormal uterine bleeding.  Has hysteroscopy with D&C scheduled for tomorrow.  Follows with Heme-Onc for iron-deficiency anemia, thrombocytosis.  On baby aspirin and iron infusion. Feels tired and week.  Would like to get checked for magnesium because it was low at one point.  Follows with GI and Cardiology    Last A1C 7.5%- on Lantus 70 units nightly, metformin 1000 mg b.i.d. and Trulicity 1.5 mg weekly  Sugars: In am 60's. At night 200's. Taking 35 UI BID since the low sugars and still low in am. Not eating in am. At night does have the biggest meal. But states she has had unintentional weight loss but then realized she is on trulicity and causes weight loss. Seeing GI doctor.    Lisinopril 20 mg daily, Lopressor 25 mg b.i.d for hypertension.  Well-controlled.  On Adderall, Remeron from Psychiatry.  Refuses shots.   Hirsutism for a year.  Does have an adrenal nodule seen on imaging.  would like to see endocrinology.          ALLERGIES:   Review of patient's allergies indicates:   Allergen Reactions    Ivp dye [iodinated contrast media] Itching         ROS:  Review of Systems   Constitutional:  Positive for weight loss. Negative for chills and fever.   HENT:  Negative for congestion.    Eyes:  Negative for blurred vision.   Respiratory:  Negative for cough and shortness of breath.    Cardiovascular:  Negative for chest pain and leg swelling.   Gastrointestinal:  Negative for abdominal pain, nausea and vomiting.   Genitourinary:  Negative for dysuria and hematuria.   Neurological:  Negative for headaches.   Psychiatric/Behavioral:  Negative for depression. The patient is not nervous/anxious.          OBJECTIVE:  Vital  "signs  Vitals:    09/25/23 1416   BP: 110/79   Pulse: 95   Resp: 18   Temp: 97.8 °F (36.6 °C)   TempSrc: Oral   SpO2: 100%   Weight: 89.4 kg (197 lb)   Height: 5' 7" (1.702 m)      Body mass index is 30.85 kg/m².    PHYSICAL EXAM:   Physical Exam  Vitals reviewed.   Constitutional:       General: She is not in acute distress.     Appearance: Normal appearance. She is obese. She is not ill-appearing.   HENT:      Head: Normocephalic and atraumatic.      Mouth/Throat:      Mouth: Mucous membranes are moist.   Eyes:      General: No scleral icterus.        Right eye: No discharge.         Left eye: No discharge.      Conjunctiva/sclera: Conjunctivae normal.      Pupils: Pupils are equal, round, and reactive to light.   Cardiovascular:      Rate and Rhythm: Normal rate and regular rhythm.   Pulmonary:      Effort: Pulmonary effort is normal. No respiratory distress.      Breath sounds: No wheezing, rhonchi or rales.   Abdominal:      General: Bowel sounds are normal. There is no distension.      Palpations: Abdomen is soft.      Tenderness: There is no abdominal tenderness.   Musculoskeletal:      Cervical back: Normal range of motion and neck supple. No rigidity or tenderness.      Right lower leg: No edema.      Left lower leg: No edema.   Feet:      Comments: Peeling skin plantar feet bilaterally  Skin:     General: Skin is warm.      Findings: No rash.   Neurological:      General: No focal deficit present.      Mental Status: She is alert and oriented to person, place, and time.   Psychiatric:         Mood and Affect: Mood normal.         Behavior: Behavior normal.          ASSESSMENT/PLAN:  1. Type 2 diabetes mellitus with other specified complication, with long-term current use of insulin  -     Hemoglobin A1C; Future; Expected date: 09/25/2023  -     Vitamin B12; Future; Expected date: 09/25/2023  -     flash glucose sensor (FREESTYLE ABDULKADIR 14 DAY SENSOR) Kit; Check sugars at least 3 times daily  Dispense: 2 " kit; Refill: 2  -     flash glucose scanning reader (FREESTYLE ABDULKADIR 2 READER) Misc; Check sugars at least 3 times a day  Dispense: 2 each; Refill: 2  -     Ambulatory referral/consult to Diabetes Education; Future; Expected date: 10/02/2023    2. Primary hypertension    3. Tinea pedis of both feet  -     Ambulatory referral/consult to Dermatology; Future; Expected date: 10/02/2023  -     clotrimazole-betamethasone 1-0.05% (LOTRISONE) cream; Apply topically 2 (two) times daily.  Dispense: 45 g; Refill: 1    4. Dry skin  -     Ambulatory referral/consult to Dermatology; Future; Expected date: 10/02/2023  -     Vitamin B12; Future; Expected date: 09/25/2023    5. Adrenal nodule  -     Ambulatory referral/consult to Endocrinology; Future; Expected date: 10/02/2023    6. Hirsutism  -     Ambulatory referral/consult to Endocrinology; Future; Expected date: 10/02/2023    7. Hypomagnesemia  -     Magnesium; Future; Expected date: 09/25/2023    8. Hyperlipidemia LDL goal <70  -     Lipid Panel; Future; Expected date: 09/25/2023         Plan  -no updated hemoglobin A1c, but given reported sugars, advised patient to Decrease Lantus to 30 UI nightly. Continue with 35 UI in the morning. Continue with Trulicity 1.5 mg weekly.  Referred to diabetes education and trial of freestyle Abdulkadir.  Patient would benefit a lot given reported hypoglycemic episodes and being on insulin.  - well-controlled hypertension.  Continue with Lisinopril 20 mg daily, Lopressor 25 mg b.i.d  -Lotrisone for the tinea pedis.  Refer to dermatology per patient's request, because patient states she has dry skin in general.  -regarding adrenal nodule and hirsutism, refer to endocrinology.  -recheck magnesium level.  Recheck lipid panel.  Continue with atorvastatin 40 mg daily for hyperlipidemia and having underlying diabetes.      Previous medical history/lab work/radiology reviewed and considered during medical management decisions.   Medication list reviewed  and medication reconciliation performed.  Patient was provided  and care about his/her current diagnosis (es) and medications including risk/benefit and side effects/adverse events, over the counter medication uses/doses, home self-care and contact precautions,  and red flags and indications for when to seek immediate medical attention.   Patient was advised to continue compliance with current medication list and medical recommendations.  Recommended/ Advised continued compliance with recommended eating habits/ diets for medical conditions and exercise 150 minutes/ week (if possible) for medical condition (s).        RESULTS:  Recent Results (from the past 1008 hour(s))   Comprehensive Metabolic Panel    Collection Time: 09/20/23  3:01 PM   Result Value Ref Range    Sodium Level 138 136 - 145 mmol/L    Potassium Level 4.6 3.5 - 5.1 mmol/L    Chloride 105 98 - 107 mmol/L    Carbon Dioxide 23 22 - 29 mmol/L    Glucose Level 248 (H) 74 - 100 mg/dL    Blood Urea Nitrogen 9.1 (L) 9.8 - 20.1 mg/dL    Creatinine 0.72 0.55 - 1.02 mg/dL    Calcium Level Total 9.2 8.4 - 10.2 mg/dL    Protein Total 6.6 6.4 - 8.3 gm/dL    Albumin Level 3.6 3.5 - 5.0 g/dL    Globulin 3.0 2.4 - 3.5 gm/dL    Albumin/Globulin Ratio 1.2 1.1 - 2.0 ratio    Bilirubin Total 0.3 <=1.5 mg/dL    Alkaline Phosphatase 81 40 - 150 unit/L    Alanine Aminotransferase 17 0 - 55 unit/L    Aspartate Aminotransferase 15 5 - 34 unit/L    eGFR >60 mls/min/1.73/m2   CBC with Differential    Collection Time: 09/20/23  3:01 PM   Result Value Ref Range    WBC 11.58 (H) 4.50 - 11.50 x10(3)/mcL    RBC 4.37 4.20 - 5.40 x10(6)/mcL    Hgb 11.6 (L) 12.0 - 16.0 g/dL    Hct 37.2 37.0 - 47.0 %    MCV 85.1 80.0 - 94.0 fL    MCH 26.5 (L) 27.0 - 31.0 pg    MCHC 31.2 (L) 33.0 - 36.0 g/dL    RDW 18.6 (H) 11.5 - 17.0 %    Platelet 600 (H) 130 - 400 x10(3)/mcL    MPV 10.4 7.4 - 10.4 fL    Neut % 71.4 %    Lymph % 15.8 %    Mono % 6.2 %    Eos % 4.4 %    Basophil % 0.6 %    Lymph  # 1.83 0.6 - 4.6 x10(3)/mcL    Neut # 8.26 2.1 - 9.2 x10(3)/mcL    Mono # 0.72 0.1 - 1.3 x10(3)/mcL    Eos # 0.51 0 - 0.9 x10(3)/mcL    Baso # 0.07 <=0.2 x10(3)/mcL    IG# 0.19 (H) 0 - 0.04 x10(3)/mcL    IG% 1.6 %   Ferritin    Collection Time: 09/20/23  3:01 PM   Result Value Ref Range    Ferritin Level 30.30 4.63 - 204.00 ng/mL   Iron and TIBC    Collection Time: 09/20/23  3:01 PM   Result Value Ref Range    Iron Binding Capacity Unsaturated 276 70 - 310 ug/dL    Iron Level 26 (L) 50 - 170 ug/dL    Transferrin 278 180 - 382 mg/dL    Iron Binding Capacity Total 302 250 - 450 ug/dL    Iron Saturation 9 (L) 20 - 50 %   Urinalysis    Collection Time: 09/25/23  1:20 PM   Result Value Ref Range    Color, UA Nicolle Yellow, Light-Yellow, Dark Yellow, Nicolle, Straw    Appearance, UA Clear Clear    Specific Gravity, UA >=1.030 1.005 - 1.030    pH, UA 5.5 5.0 - 8.5    Protein, UA 30 (A) Negative    Glucose, UA Negative Negative, Normal    Ketones, UA Trace (A) Negative    Blood, UA Trace-Intact (A) Negative    Bilirubin, UA Small (A) Negative    Urobilinogen, UA 0.2 0.2, 1.0, Normal    Nitrites, UA Positive (A) Negative    Leukocyte Esterase, UA Small (A) Negative   Urine Culture High Risk    Collection Time: 09/25/23  1:20 PM    Specimen: Urine, Clean Catch   Result Value Ref Range    Urine Culture (A)      75,000-99,000 colonies/ml Klebsiella pneumoniae ssp pneumoniae       Susceptibility    Klebsiella pneumoniae ssp pneumoniae -  (no method available)     Amox/K Clav <=2 Sensitive      Ampicillin >=32 Resistant      Cefepime <=0.12 Sensitive      Ceftriaxone <=0.25 Sensitive      Cefuroxime <=1 Sensitive      Ciprofloxacin <=0.25 Sensitive      Gentamicin <=1 Sensitive      Levofloxacin <=0.12 Sensitive      Meropenem <=0.25 Sensitive      Nitrofurantoin 32 Sensitive      Piperacillin/Tazobactam <=4 Sensitive      Trimethoprim/Sulfamethoxazole <=20 Sensitive      Tetracycline <=1 Sensitive      Tobramycin <=1 Sensitive     Urinalysis, Microscopic    Collection Time: 09/25/23  1:20 PM   Result Value Ref Range    Bacteria, UA Moderate (A) None Seen, Rare, Occasional /HPF    RBC, UA 0-2 None Seen, 0-2, 3-5, 0-5 /HPF    WBC, UA 11-20 (A) None Seen, 0-2, 3-5, 0-5 /HPF    Squamous Epithelial Cells, UA None Seen None Seen, Rare, Occasional, Occ /HPF   POCT glucose    Collection Time: 09/26/23  8:14 AM   Result Value Ref Range    POCT Glucose 165 (H) 70 - 110 mg/dL   Specimen to Pathology    Collection Time: 09/26/23  9:27 AM   Result Value Ref Range    Case Report       [facility] Surgical [facility]ology                            Case: [id number]                                 Authorizing Provider:  Deepti Chappell MD       Collected:           09/26/2023 09:27 AM          Ordering Location:     [facility] -       Received:            09/26/2023 11:54 AM                                 [facility]                                                              Pathologist:           Shabana Milner MD                                                        Specimen:    Endometrium, endometrial currettings                                                       Final Diagnosis         Endometrial biopsy:  - Scant fragments of benign endometrial tissue with predominantly mucus.    - Insufficient tissue for further diagnostic evaluation.         Clinical Information       Procedure:  HYSTEROSCOPY, WITH DILATION AND CURETTAGE OF UTERUS  Pre-op Diagnosis:  N95.0 - PMB (postmenopausal bleeding) [ICD-10-CM]  Post-op Diagnosis:  N95.0 - PMB (postmenopausal bleeding) [ICD-10-CM]      Microscopic Description       A microscopic examination was performed and the diagnosis reflects the findings.          Gross Description       1. Endometrium, endometrial currettings:   Received in 10% buffered neutral formalin are strips of blood clot and tan soft tissue measuring in aggregate 10 mm.  Entirely submitted.         Report Footnotes       Unless the  "case is a "gross only" or additional testing only, the final diagnosis for each specimen is based on a microscopic examination of appropriate tissue sections.     POCT glucose    Collection Time: 09/26/23 10:03 AM   Result Value Ref Range    POCT Glucose 167 (H) 70 - 110 mg/dL         Follow Up:  Follow up in about 4 weeks (around 10/23/2023) for diabetes.     [unfilled]    This note was created with the assistance of a voice recognition software or phone dictation. There may be transcription errors as a result of using this technology however minimal. Effort has been made to assure accuracy of transcription but any obvious errors or omissions should be clarified with the author of the document    "

## 2023-09-25 NOTE — PATIENT INSTRUCTIONS
Decrease Lantus to 30 UI nightly  Continue with 35 UI in the morning.  Continue with Trulicity 1.5 mg weekly.

## 2023-09-26 ENCOUNTER — PATIENT MESSAGE (OUTPATIENT)
Dept: ADMINISTRATIVE | Facility: OTHER | Age: 58
End: 2023-09-26
Payer: MEDICAID

## 2023-09-26 ENCOUNTER — ANESTHESIA (OUTPATIENT)
Dept: SURGERY | Facility: HOSPITAL | Age: 58
End: 2023-09-26
Payer: MEDICAID

## 2023-09-26 ENCOUNTER — HOSPITAL ENCOUNTER (OUTPATIENT)
Facility: HOSPITAL | Age: 58
Discharge: HOME | End: 2023-09-26
Attending: OBSTETRICS & GYNECOLOGY | Admitting: OBSTETRICS & GYNECOLOGY
Payer: MEDICAID

## 2023-09-26 VITALS
TEMPERATURE: 100 F | RESPIRATION RATE: 20 BRPM | HEART RATE: 80 BPM | OXYGEN SATURATION: 99 % | SYSTOLIC BLOOD PRESSURE: 101 MMHG | BODY MASS INDEX: 30.64 KG/M2 | DIASTOLIC BLOOD PRESSURE: 77 MMHG | WEIGHT: 195.63 LBS

## 2023-09-26 DIAGNOSIS — N95.0 POST-MENOPAUSAL BLEEDING: ICD-10-CM

## 2023-09-26 DIAGNOSIS — N95.0 PMB (POSTMENOPAUSAL BLEEDING): Primary | ICD-10-CM

## 2023-09-26 LAB
POCT GLUCOSE: 165 MG/DL (ref 70–110)
POCT GLUCOSE: 167 MG/DL (ref 70–110)

## 2023-09-26 PROCEDURE — 71000015 HC POSTOP RECOV 1ST HR: Performed by: OBSTETRICS & GYNECOLOGY

## 2023-09-26 PROCEDURE — 25000003 PHARM REV CODE 250: Performed by: STUDENT IN AN ORGANIZED HEALTH CARE EDUCATION/TRAINING PROGRAM

## 2023-09-26 PROCEDURE — 63600175 PHARM REV CODE 636 W HCPCS: Performed by: NURSE ANESTHETIST, CERTIFIED REGISTERED

## 2023-09-26 PROCEDURE — 25000003 PHARM REV CODE 250: Performed by: SPECIALIST

## 2023-09-26 PROCEDURE — D9220A PRA ANESTHESIA: ICD-10-PCS | Mod: CRNA,,, | Performed by: NURSE ANESTHETIST, CERTIFIED REGISTERED

## 2023-09-26 PROCEDURE — 71000033 HC RECOVERY, INTIAL HOUR: Performed by: OBSTETRICS & GYNECOLOGY

## 2023-09-26 PROCEDURE — D9220A PRA ANESTHESIA: Mod: CRNA,,, | Performed by: NURSE ANESTHETIST, CERTIFIED REGISTERED

## 2023-09-26 PROCEDURE — 37000008 HC ANESTHESIA 1ST 15 MINUTES: Performed by: OBSTETRICS & GYNECOLOGY

## 2023-09-26 PROCEDURE — 36000707: Performed by: OBSTETRICS & GYNECOLOGY

## 2023-09-26 PROCEDURE — 25000003 PHARM REV CODE 250: Performed by: NURSE ANESTHETIST, CERTIFIED REGISTERED

## 2023-09-26 PROCEDURE — 71000016 HC POSTOP RECOV ADDL HR: Performed by: OBSTETRICS & GYNECOLOGY

## 2023-09-26 PROCEDURE — D9220A PRA ANESTHESIA: ICD-10-PCS | Mod: ANES,,, | Performed by: SPECIALIST

## 2023-09-26 PROCEDURE — 88305 TISSUE EXAM BY PATHOLOGIST: CPT | Mod: TC | Performed by: OBSTETRICS & GYNECOLOGY

## 2023-09-26 PROCEDURE — D9220A PRA ANESTHESIA: Mod: ANES,,, | Performed by: SPECIALIST

## 2023-09-26 PROCEDURE — 36000706: Performed by: OBSTETRICS & GYNECOLOGY

## 2023-09-26 PROCEDURE — 37000009 HC ANESTHESIA EA ADD 15 MINS: Performed by: OBSTETRICS & GYNECOLOGY

## 2023-09-26 PROCEDURE — 63600175 PHARM REV CODE 636 W HCPCS: Performed by: SPECIALIST

## 2023-09-26 PROCEDURE — 27201423 OPTIME MED/SURG SUP & DEVICES STERILE SUPPLY: Performed by: OBSTETRICS & GYNECOLOGY

## 2023-09-26 RX ORDER — DEXAMETHASONE SODIUM PHOSPHATE 4 MG/ML
INJECTION, SOLUTION INTRA-ARTICULAR; INTRALESIONAL; INTRAMUSCULAR; INTRAVENOUS; SOFT TISSUE
Status: DISCONTINUED | OUTPATIENT
Start: 2023-09-26 | End: 2023-09-26

## 2023-09-26 RX ORDER — PROCHLORPERAZINE EDISYLATE 5 MG/ML
5 INJECTION INTRAMUSCULAR; INTRAVENOUS EVERY 6 HOURS PRN
Status: DISCONTINUED | OUTPATIENT
Start: 2023-09-26 | End: 2023-09-26 | Stop reason: HOSPADM

## 2023-09-26 RX ORDER — GABAPENTIN 300 MG/1
600 CAPSULE ORAL
Status: COMPLETED | OUTPATIENT
Start: 2023-09-26 | End: 2023-09-26

## 2023-09-26 RX ORDER — INSULIN ASPART 100 [IU]/ML
2-9 INJECTION, SOLUTION INTRAVENOUS; SUBCUTANEOUS
Status: ACTIVE | OUTPATIENT
Start: 2023-09-26

## 2023-09-26 RX ORDER — DIPHENHYDRAMINE HYDROCHLORIDE 50 MG/ML
25 INJECTION INTRAMUSCULAR; INTRAVENOUS EVERY 4 HOURS PRN
Status: DISCONTINUED | OUTPATIENT
Start: 2023-09-26 | End: 2023-09-26 | Stop reason: HOSPADM

## 2023-09-26 RX ORDER — HYDROMORPHONE HYDROCHLORIDE 1 MG/ML
0.2 INJECTION, SOLUTION INTRAMUSCULAR; INTRAVENOUS; SUBCUTANEOUS EVERY 5 MIN PRN
Status: ACTIVE | OUTPATIENT
Start: 2023-09-26

## 2023-09-26 RX ORDER — ONDANSETRON 2 MG/ML
4 INJECTION INTRAMUSCULAR; INTRAVENOUS ONCE
Status: ACTIVE | OUTPATIENT
Start: 2023-09-26

## 2023-09-26 RX ORDER — MIDAZOLAM HYDROCHLORIDE 1 MG/ML
2 INJECTION INTRAMUSCULAR; INTRAVENOUS ONCE
Status: COMPLETED | OUTPATIENT
Start: 2023-09-26 | End: 2023-09-26

## 2023-09-26 RX ORDER — SUCCINYLCHOLINE CHLORIDE 20 MG/ML
INJECTION INTRAMUSCULAR; INTRAVENOUS
Status: DISCONTINUED | OUTPATIENT
Start: 2023-09-26 | End: 2023-09-26

## 2023-09-26 RX ORDER — ACETAMINOPHEN 500 MG
1000 TABLET ORAL
Status: COMPLETED | OUTPATIENT
Start: 2023-09-26 | End: 2023-09-26

## 2023-09-26 RX ORDER — OXYCODONE AND ACETAMINOPHEN 5; 325 MG/1; MG/1
2 TABLET ORAL ONCE
Status: ACTIVE | OUTPATIENT
Start: 2023-09-26

## 2023-09-26 RX ORDER — KETOROLAC TROMETHAMINE 30 MG/ML
INJECTION, SOLUTION INTRAMUSCULAR; INTRAVENOUS
Status: DISCONTINUED | OUTPATIENT
Start: 2023-09-26 | End: 2023-09-26

## 2023-09-26 RX ORDER — ROCURONIUM BROMIDE 10 MG/ML
INJECTION, SOLUTION INTRAVENOUS
Status: DISCONTINUED | OUTPATIENT
Start: 2023-09-26 | End: 2023-09-26

## 2023-09-26 RX ORDER — IBUPROFEN 600 MG/1
600 TABLET ORAL EVERY 6 HOURS
Qty: 56 TABLET | Refills: 0 | Status: SHIPPED | OUTPATIENT
Start: 2023-09-26 | End: 2023-10-10

## 2023-09-26 RX ORDER — DIPHENHYDRAMINE HCL 25 MG
25 CAPSULE ORAL EVERY 4 HOURS PRN
Status: DISCONTINUED | OUTPATIENT
Start: 2023-09-26 | End: 2023-09-26 | Stop reason: HOSPADM

## 2023-09-26 RX ORDER — PROCHLORPERAZINE EDISYLATE 5 MG/ML
5 INJECTION INTRAMUSCULAR; INTRAVENOUS ONCE AS NEEDED
Status: ACTIVE | OUTPATIENT
Start: 2023-09-26 | End: 2035-02-22

## 2023-09-26 RX ORDER — HYDROCODONE BITARTRATE AND ACETAMINOPHEN 5; 325 MG/1; MG/1
1 TABLET ORAL EVERY 4 HOURS PRN
Status: DISCONTINUED | OUTPATIENT
Start: 2023-09-26 | End: 2023-09-26 | Stop reason: HOSPADM

## 2023-09-26 RX ORDER — PROPOFOL 10 MG/ML
VIAL (ML) INTRAVENOUS
Status: DISCONTINUED | OUTPATIENT
Start: 2023-09-26 | End: 2023-09-26

## 2023-09-26 RX ORDER — MEPERIDINE HYDROCHLORIDE 25 MG/ML
12.5 INJECTION INTRAMUSCULAR; INTRAVENOUS; SUBCUTANEOUS ONCE
Status: ACTIVE | OUTPATIENT
Start: 2023-09-26 | End: 2023-09-27

## 2023-09-26 RX ORDER — ACETAMINOPHEN 325 MG/1
600 TABLET ORAL EVERY 6 HOURS
Qty: 112 TABLET | Refills: 0 | Status: SHIPPED | OUTPATIENT
Start: 2023-09-26 | End: 2023-10-10

## 2023-09-26 RX ORDER — LIDOCAINE HYDROCHLORIDE 10 MG/ML
1 INJECTION, SOLUTION EPIDURAL; INFILTRATION; INTRACAUDAL; PERINEURAL ONCE
Status: ACTIVE | OUTPATIENT
Start: 2023-09-26

## 2023-09-26 RX ORDER — SODIUM CHLORIDE 9 MG/ML
INJECTION, SOLUTION INTRAVENOUS CONTINUOUS
Status: ACTIVE | OUTPATIENT
Start: 2023-09-26

## 2023-09-26 RX ORDER — DIPHENHYDRAMINE HYDROCHLORIDE 50 MG/ML
25 INJECTION INTRAMUSCULAR; INTRAVENOUS ONCE AS NEEDED
Status: ACTIVE | OUTPATIENT
Start: 2023-09-26 | End: 2035-02-22

## 2023-09-26 RX ORDER — ONDANSETRON 4 MG/1
8 TABLET, ORALLY DISINTEGRATING ORAL EVERY 8 HOURS PRN
Status: DISCONTINUED | OUTPATIENT
Start: 2023-09-26 | End: 2023-09-26 | Stop reason: HOSPADM

## 2023-09-26 RX ORDER — IPRATROPIUM BROMIDE AND ALBUTEROL SULFATE 2.5; .5 MG/3ML; MG/3ML
3 SOLUTION RESPIRATORY (INHALATION) ONCE AS NEEDED
Status: ACTIVE | OUTPATIENT
Start: 2023-09-26 | End: 2035-02-22

## 2023-09-26 RX ORDER — INSULIN ASPART 100 [IU]/ML
4-12 INJECTION, SOLUTION INTRAVENOUS; SUBCUTANEOUS
Status: ACTIVE | OUTPATIENT
Start: 2023-09-26

## 2023-09-26 RX ORDER — LIDOCAINE HYDROCHLORIDE 20 MG/ML
INJECTION INTRAVENOUS
Status: DISCONTINUED | OUTPATIENT
Start: 2023-09-26 | End: 2023-09-26

## 2023-09-26 RX ORDER — FENTANYL CITRATE 50 UG/ML
INJECTION, SOLUTION INTRAMUSCULAR; INTRAVENOUS
Status: DISCONTINUED | OUTPATIENT
Start: 2023-09-26 | End: 2023-09-26

## 2023-09-26 RX ORDER — FAMOTIDINE 20 MG/1
20 TABLET, FILM COATED ORAL
Status: COMPLETED | OUTPATIENT
Start: 2023-09-26 | End: 2023-09-26

## 2023-09-26 RX ORDER — TRAMADOL HYDROCHLORIDE 50 MG/1
50 TABLET ORAL
Status: COMPLETED | OUTPATIENT
Start: 2023-09-26 | End: 2023-09-26

## 2023-09-26 RX ORDER — ONDANSETRON 2 MG/ML
INJECTION INTRAMUSCULAR; INTRAVENOUS
Status: DISCONTINUED | OUTPATIENT
Start: 2023-09-26 | End: 2023-09-26

## 2023-09-26 RX ORDER — SODIUM CHLORIDE, SODIUM LACTATE, POTASSIUM CHLORIDE, CALCIUM CHLORIDE 600; 310; 30; 20 MG/100ML; MG/100ML; MG/100ML; MG/100ML
INJECTION, SOLUTION INTRAVENOUS CONTINUOUS
Status: ACTIVE | OUTPATIENT
Start: 2023-09-26

## 2023-09-26 RX ORDER — HYDROMORPHONE HYDROCHLORIDE 1 MG/ML
0.5 INJECTION, SOLUTION INTRAMUSCULAR; INTRAVENOUS; SUBCUTANEOUS EVERY 5 MIN PRN
Status: ACTIVE | OUTPATIENT
Start: 2023-09-26

## 2023-09-26 RX ORDER — PHENYLEPHRINE HYDROCHLORIDE 10 MG/ML
INJECTION INTRAVENOUS
Status: DISCONTINUED | OUTPATIENT
Start: 2023-09-26 | End: 2023-09-26

## 2023-09-26 RX ORDER — MISOPROSTOL 100 UG/1
200 TABLET ORAL
Status: COMPLETED | OUTPATIENT
Start: 2023-09-26 | End: 2023-09-26

## 2023-09-26 RX ADMIN — PHENYLEPHRINE HYDROCHLORIDE 100 MCG: 10 INJECTION INTRAVENOUS at 09:09

## 2023-09-26 RX ADMIN — LIDOCAINE HYDROCHLORIDE 50 MG: 20 INJECTION INTRAVENOUS at 09:09

## 2023-09-26 RX ADMIN — FENTANYL CITRATE 25 MCG: 50 INJECTION INTRAMUSCULAR; INTRAVENOUS at 09:09

## 2023-09-26 RX ADMIN — SODIUM CHLORIDE, POTASSIUM CHLORIDE, SODIUM LACTATE AND CALCIUM CHLORIDE: 600; 310; 30; 20 INJECTION, SOLUTION INTRAVENOUS at 08:09

## 2023-09-26 RX ADMIN — SUCCINYLCHOLINE CHLORIDE 120 MG: 20 INJECTION, SOLUTION INTRAMUSCULAR; INTRAVENOUS at 09:09

## 2023-09-26 RX ADMIN — ONDANSETRON 4 MG: 2 INJECTION INTRAMUSCULAR; INTRAVENOUS at 09:09

## 2023-09-26 RX ADMIN — FAMOTIDINE 20 MG: 20 TABLET, FILM COATED ORAL at 08:09

## 2023-09-26 RX ADMIN — KETOROLAC TROMETHAMINE 30 MG: 30 INJECTION, SOLUTION INTRAMUSCULAR at 09:09

## 2023-09-26 RX ADMIN — PROPOFOL 200 MG: 10 INJECTION, EMULSION INTRAVENOUS at 09:09

## 2023-09-26 RX ADMIN — MIDAZOLAM HYDROCHLORIDE 2 MG: 1 INJECTION, SOLUTION INTRAMUSCULAR; INTRAVENOUS at 08:09

## 2023-09-26 RX ADMIN — ROCURONIUM BROMIDE 5 MG: 10 INJECTION INTRAVENOUS at 09:09

## 2023-09-26 RX ADMIN — GABAPENTIN 600 MG: 300 CAPSULE ORAL at 08:09

## 2023-09-26 RX ADMIN — MISOPROSTOL 200 MCG: 100 TABLET ORAL at 08:09

## 2023-09-26 RX ADMIN — TRAMADOL HYDROCHLORIDE 50 MG: 50 TABLET, COATED ORAL at 08:09

## 2023-09-26 RX ADMIN — FENTANYL CITRATE 50 MCG: 50 INJECTION INTRAMUSCULAR; INTRAVENOUS at 08:09

## 2023-09-26 RX ADMIN — ACETAMINOPHEN 1000 MG: 500 TABLET, FILM COATED ORAL at 08:09

## 2023-09-26 RX ADMIN — DEXAMETHASONE SODIUM PHOSPHATE 8 MG: 4 INJECTION, SOLUTION INTRA-ARTICULAR; INTRALESIONAL; INTRAMUSCULAR; INTRAVENOUS; SOFT TISSUE at 09:09

## 2023-09-26 NOTE — ANESTHESIA POSTPROCEDURE EVALUATION
Anesthesia Post Evaluation    Patient: Jessica Walker    Procedure(s) Performed: Procedure(s) (LRB):  HYSTEROSCOPY, WITH DILATION AND CURETTAGE OF UTERUS (N/A)    Final Anesthesia Type: general      Patient location during evaluation: PACU  Patient participation: Yes- Able to Participate  Level of consciousness: awake and responds to stimulation  Post-procedure vital signs: reviewed and stable  Pain management: adequate  Airway patency: patent    PONV status at discharge: No PONV  Anesthetic complications: no      Cardiovascular status: blood pressure returned to baseline  Respiratory status: unassisted  Hydration status: euvolemic  Follow-up not needed.          Vitals Value Taken Time   /77 09/26/23 1115   Temp 37.9 °C (100.2 °F) 09/26/23 1115   Pulse 80 09/26/23 1115   Resp 20 09/26/23 1115   SpO2 99 % 09/26/23 1115         Event Time   Out of Recovery 10:10:00         Pain/Srinivasa Score: Pain Rating Prior to Med Admin: 0 (9/26/2023  8:25 AM)  Srinivasa Score: 9 (9/26/2023 10:15 AM)

## 2023-09-26 NOTE — H&P
H&P Interval Update    Patient seen and evaluated by myself and the staff attending this morning. There have been no changes since her preop visit with Dr. Chappell and Dr. Bahena (see below). The patient is able to express in her own words the procedure undergoing on today and wishes to proceed. No changes in PMH/ED visits/Hospital admissions since last visit (H&P noted below). NPO since 11PM yesterday. She is here today with her friend Clifton, who can be reached at [phone]. Her preferred pharmacy is as listed.    To the OR for [facility] [place] D&C.    Kandace Barahona MD  [employer] [facility] PGY-2  2023 8:37 AM       [facility] Preoperative Visit History and Physical    HPI:   57 y.o.  with a history of PMB. History below:   PMB originally started as vaginal spotting intermittently from -2023. Saw GNY NP in March, noted to have UA without rbc, and colonoscopy completed in  with return recommended for 10 years. 2023 Pap NILM, HRHPV negative. GCCT negative.  Reports no spotting since polyp was removed. TVUS demonstrated 6mm EMS.    Given risk factors of HTN, DM, obesity, and age, decision was made for EMB 2023.  During EMB procedure, a  polyp was noted at cervic, was grasped with ring forceps and removed.      EMB pathology: Scant fragments of proliferative endometrium with stromal breakdown. No evidence of malignancy.   Endocervical polyp, polypectomy: Endometrial polyp. No evidence of malignancy.      Reports no further spotting since polyp was removed in April.    Today, patient has had no issues with bleeding since office polypectomy. No complaints or updates to health     Has been seen by IM and risk stratified as low risk RCRI 0. Cardiology has also seen her and deemed her low to moderate risk    OB History    Para Term  AB Living   2 2       2   SAB IAB Ectopic Multiple Live Births           2      # Outcome Date GA Lbr Ankur/2nd Weight Sex Delivery Anes PTL Lv   2 Para       "Vag-Spont   [place]   1 Para      Vag-Spont   [place]   BB 6lbs 15 oz    Denies h/o STD  Denies abnormal pap  LMP 2015 at age 50    Past Medical History:   Diagnosis Date    Anemia     Depression     Diabetes mellitus     Hypertension      Past Surgical History:   Procedure Laterality Date    CHOLECYSTECTOMY      COLONOSCOPY  10/2021    ESOPHAGOGASTRODUODENOSCOPY N/A 06/26/2023    Procedure: EGD (ESOPHAGOGASTRODUODENOSCOPY);  Surgeon: Kita Larose MD;  Location: [facility] ENDOSCOPY;  Service: Gastroenterology;  Laterality: N/A;    EYE SURGERY  7/6/93    LK and RK    INTRALUMINAL GASTROINTESTINAL TRACT IMAGING VIA CAPSULE N/A 06/29/2023    Procedure: IMAGING PROCEDURE, GI TRACT, INTRALUMINAL, VIA CAPSULE;  Surgeon: Kita Larose MD;  Location: [facility] ENDOSCOPY;  Service: Gastroenterology;  Laterality: N/A;    Removal of Goiter      UPPER GASTROINTESTINAL ENDOSCOPY       Prior to Admission medications    Medication Sig Start Date End Date Taking? Authorizing Provider   aspirin (ECOTRIN) 81 MG EC tablet Take 81 mg by mouth once daily. 3/22/22   Provider, Historical   atorvastatin (LIPITOR) 40 MG tablet Take 1 tablet (40 mg total) by mouth every evening. 8/17/23   Alla Yuan PA-C   BD ULTRA-FINE SHORT PEN NEEDLE 31 gauge x 5/16" Ndle Inject 1 each into the skin 3 (three) times daily. 8/15/23   Provider, Historical   clindamycin (CLEOCIN) 300 MG capsule  9/6/23   Provider, Historical   dextroamphetamine-amphetamine 5 mg Tab Take 1 tablet by mouth 2 (two) times daily. 7/24/23   Provider, Historical   ferrous sulfate 220 mg (44 mg iron)/5 mL solution Take 5 mLs (220 mg total) by mouth once daily.  Patient not taking: Reported on 9/8/2023 5/2/23   Sade Mata FNP   ferrous sulfate 324 mg (65 mg iron) TbEC Take 1 tablet (324 mg total) by mouth every Mon, Wed, Fri. 8/21/23   Sade Mata FNP   hydrOXYzine pamoate (VISTARIL) 25 MG Cap Take 25 mg by mouth nightly as needed. 7/24/23   Provider, Historical "   hydrOXYzine pamoate (VISTARIL) 25 MG Cap 1 capsule at bedtime as needed Orally Once a day for 30 day(s) 7/24/23   Provider, Historical   ibuprofen (ADVIL,MOTRIN) 800 MG tablet Take 800 mg by mouth. 3/2/22   Provider, Historical   LANTUS SOLOSTAR U-100 INSULIN glargine 100 units/mL SubQ pen INJECT 70 UNITS SUBCUTANEOUSLY EVERY DAY AT BEDTIME 3/27/23   Provider, Historical   lisinopriL (PRINIVIL,ZESTRIL) 20 MG tablet Take 1 tablet (20 mg total) by mouth once daily. 8/17/23 8/16/24  Alla Yuan PA-C   metFORMIN (GLUCOPHAGE) 1000 MG tablet TAKE ONE TABLET BY MOUTH TWICE A DAY 7/12/22   January Darnell, ROBINP   metoprolol tartrate (LOPRESSOR) 25 MG tablet Take 1 tablet (25 mg total) by mouth 2 (two) times daily. 8/17/23 8/16/24  Alla Yuan PA-C   mirtazapine (REMERON) 15 MG tablet Take 15 mg by mouth every evening. 7/24/23   Provider, Historical   mupirocin (BACTROBAN) 2 % ointment  9/6/23   Provider, Historical   nitroGLYCERIN (NITROSTAT) 0.4 MG SL tablet Place 1 tablet (0.4 mg total) under the tongue every 5 (five) minutes as needed for Chest pain. 10/19/22 10/19/23  Shahida Francisco, DIDIER   pantoprazole (PROTONIX) 40 MG tablet Take 1 tablet (40 mg total) by mouth once daily. 7/11/23   Bridgette Mckeon, ROBINP   TRUE METRIX GLUCOSE TEST STRIP Strp USE 1 STRIP TO CHECK GLUCOSE ONCE DAILY 5/13/22   Provider, Historical   TRULICITY 1.5 mg/0.5 mL pen injector Inject 1.5 mg into the skin every 7 days. 11/6/22   Provider, Historical   varenicline (CHANTIX STARTING MONTH BOX) 0.5 mg (11)- 1 mg (42) tablet Take one 0.5mg tab by mouth once daily X3 days,then increase to one 0.5mg tab twice daily X4 days,then increase to one 1mg tab twice daily  Patient not taking: Reported on 9/8/2023 8/17/23   DauterAlla sarmiento, JOSE   XIERIKA 5 % Dpet  4/9/23   Provider, Historical     Review of patient's allergies indicates:  No Known Allergies  Social History     Socioeconomic History    Marital status: Single    Tobacco Use    Smoking status: Every Day     Current packs/day: 1.00     Average packs/day: 1 pack/day for 41.0 years (41.0 ttl pk-yrs)     Types: Cigarettes    Smokeless tobacco: Never   Substance and Sexual Activity    Alcohol use: Not Currently    Drug use: Not Currently    Sexual activity: Not Currently     Family History   Problem Relation Age of Onset    Diabetes Mother     Coronary artery disease Mother     Alcohol abuse Mother     Arthritis Mother     Heart disease Mother         Had 13 stents in her heart    Hyperlipidemia Mother     Diabetes Father     Cervical cancer Sister     Arthritis Sister     Cancer Sister         Breast cancer    COPD Sister     Diabetes Sister     Fibromyalgia Sister     Breast cancer Sister     Cancer Sister          from cervical cancer at 55    Early death Sister     Cancer Maternal Grandfather          from Leukemia    Cancer Maternal Grandmother          from throat cancer at 64    Cancer Maternal Uncle          from cancer       Review of Systems: As stated in HPI.      Objective:     There were no vitals filed for this visit.  There is no height or weight on file to calculate BMI.    PHYSICAL EXAM  GEN: NAD, A&O x3  CV: RRR   LUNGS: CTABL  ABDOMEN: soft, NTND, no masses    Exam per Shabana NP  VAGINA: Mucosa normal,no discharge; no lesions.  CERVIX:  no CMT, NO discharge; bright red friable polyp visualized at OS  BIMANUAL EXAM: reveals a 8-week-sized uterus. The uterus is mobile, nontender, no palpable masses. Emerson adnexa reveal no evidence of masses; no fullness       Relevant Labs:   3/23/2023 Pap NILM HPV negative  EMB 2023 Scant fragments of proliferative endometrium with stromal breakdown.  No evidence of malignancy. Endocervical polyp, polypectomy: Endometrial polyp.  No evidence of malignancy.     Mammogram: 2022 BIRADS1    Relevant Imaging:  Pelvic US 3/23/2023  FINDINGS:  Uterus is anteverted measuring 8.0 x 4.0 x 5.5 cm.  Myometrium  demonstrates multiple fibroids which are in the myometrium without evidence for submucosal component.  Largest measures 2.4 x 2.4 x 2.0 cm.  Endometrial complex measures 6 mm without mass effect or effacement.     Right ovary is not identified.  Left ovary measures 1.8 x 1.4 x 1.5 cm.  Normal arterial inflow and venous outflow waveforms are identified.  No evidence for adnexal mass.     Impression:     Myomatous changes of the uterus without evidence for submucosal component.  Nonvisualization of the right ovary      Assessment:      57 y.o.  with PMB presents for for surgical management.  No diagnosis found.    Plan:     Counseling: Alternatives to this planned procedure were explained to the patient including expectant, medical and other types of surgical management. Counseled patient that removal of endometrial polyp and or fibroids will likely decrease bleeding but amenorrhea cannot be guaranteed. Discussed alternative hormonal options to reduce abnormal bleeding. This procedure and its risks, reasons, and benefits were were reviewed in detail.    Alternatives to this planned procedure were explained to the patient including expectant management or serial ultrasounds with risk of disease progression to malignancy, in office EMB. This procedure and its risks, reasons, benefits and complications (including perforation, subsequent injury to bowel, air/gas embolism, fluid overload, major blood vessel,hemorrhage, possibility of transfusion, infection, scarring, dyspareunia, further surgery, failure of the procedure) were reviewed in detail. Complication rate less than 1% overall.   We have reviewed the typical perioperative course with hospital stay, and post-operative precautions and restrictions have been reviewed.    Transfusion of blood and blood products discussed. Patient was consented for blood transfusion in the case of an emergency.  She understands the possibility of blood transfusion reaction and  the attendant risk of transmission of HIV & Hepatitis C to be 1 in 2 million and the risk of Hepatitis B to be 1 in 200,000.  She is aware of the possibility of transfusion reaction. All questions were answered.   Patient understands we are affiliated with a teaching institution and residents and medical students will be involved in her care.  She has consented to an exam under anesthesia and understands that medical students participate in this portion of the procedure.  All questions were answered.    Preop testing ordered.  Surgery case scheduled. Surgical consents signed.  Instructions reviewed, including NPO after midnight.    Current contraception: postmenopausal    To OR for [facility] D&C with Dr. Chappell   Scheduled on 9/26/23    Discussed with Dr. Chappell

## 2023-09-26 NOTE — TRANSFER OF CARE
Anesthesia Transfer of Care Note    Patient: Jessica Walker    Procedure(s) Performed: Procedure(s) (LRB):  HYSTEROSCOPY, WITH DILATION AND CURETTAGE OF UTERUS (N/A)    Patient location: PACU    Anesthesia Type: general    Transport from OR: Transported from OR on room air with adequate spontaneous ventilation    Post pain: adequate analgesia    Post assessment: no apparent anesthetic complications and tolerated procedure well    Post vital signs: stable    Level of consciousness: sedated    Nausea/Vomiting: no nausea/vomiting    Complications: none    Transfer of care protocol was followed

## 2023-09-26 NOTE — TRANSFER OF CARE
Anesthesia Transfer of Care Note    Patient: Jessica Walker    Procedure(s) Performed: Procedure(s) (LRB):  HYSTEROSCOPY, WITH DILATION AND CURETTAGE OF UTERUS (N/A)    Patient location: PACU    Anesthesia Type: general    Transport from OR: Transported from OR on room air with adequate spontaneous ventilation    Post pain: adequate analgesia    Post assessment: no apparent anesthetic complications    Post vital signs: stable    Level of consciousness: sedated    Nausea/Vomiting: no nausea/vomiting    Complications: none    Transfer of care protocol was followed      Last vitals:

## 2023-09-26 NOTE — BRIEF OP NOTE
[facility]  Brief Operative Note    Surgery Date: 9/26/2023     Surgeon(s) and Role:  Deepti Chappell MD - Primary  Marielos Bahena MD - Resident - Assistant   Kandace Barahona MD - Resident - Assistant     Pre-op Diagnosis:    Postmenopausal Bleeding     Post-op Diagnosis:    Postmenopausal Bleeding     Procedure(s) (LRB):  HYSTEROSCOPY, WITH DILATION AND CURETTAGE OF UTERUS (N/A)    Anesthesia: General     Operative Findings:   EUA: Normal external genitalia without lesion. Uterus 9week size, mobile. No adnexal masses or fullness. 3-4 width below the pubic arch, 8cm between ischial spines. Good descent, 2-3 cm from introitus with fundal pressure. Vaginal vault without lesion, discharge, or bleeding. Cervix visualized without lesion or erythema.     Hysteroscopy: Cervical canal normal without polyps or lesion. Atrophic endometrium visualized globally, without lesion or polyps. Tubal ostia visualized without lesion.                   Estimated Blood Loss: 5 mL    Drain: 5mL of urine     Fluid Deficit: 60mL    Fluids: 300mL          Specimens:   Specimen (24h ago, onward)       Start     Ordered    09/26/23 0928  Specimen to Pathology  RELEASE UPON ORDERING        References:    Click here for ordering Quick Tip   Question:  Release to patient  Answer:  Immediate    09/26/23 0928                      Discharge Note    OUTCOME: Patient tolerated treatment/procedure well without complication and is now ready for discharge.    DISPOSITION: Home or Self Care    FINAL DIAGNOSIS: Pre operative diagnosis: Postmenopausal bleeding     FOLLOWUP: In clinic    DISCHARGE INSTRUCTIONS:    Discharge Procedure Orders   Diet general     Pelvic Rest     Call MD for:  temperature >100.4     Call MD for:  persistent nausea and vomiting     Call MD for:  severe uncontrolled pain     Call MD for:  difficulty breathing, headache or visual disturbances     Call MD for:  redness, tenderness, or signs of infection  (pain, swelling, redness, odor or green/yellow discharge around incision site)     Call MD for:  hives     Call MD for:  persistent dizziness or light-headedness     Call MD for:  extreme fatigue     Kandace Barahona MD  [facility] PGY-2  09/26/2023 10:01 AM

## 2023-09-26 NOTE — OP NOTE
[facility]  Surgery Department  Operative Note    SUMMARY     Date of Procedure: 9/26/2023     Procedure(s) (LRB):  HYSTEROSCOPY, WITH DILATION AND CURETTAGE OF UTERUS (N/A)     Surgeon(s) and Role:  Deepti Chappell MD - Primary  Marielos Bahena MD - Resident - Assistant   Kandace Barahona MD - Resident - Assistant     Pre-Operative Diagnosis:   Postmenopausal bleeding    Post-Operative Diagnosis:   Postmenopausal Bleeding     Anesthesia: General    Operative Findings (including complications, if any):   EUA: Normal external genitalia without lesion. Uterus 9week size, mobile. No adnexal masses or fullness. 3-4 width below the pubic arch, 8cm between ischial spines. Good descent, 2-3 cm from introitus with fundal pressure. Vaginal vault without lesion, discharge, or bleeding. Cervix visualized without lesion or erythema.      Hysteroscopy: Cervical canal normal without polyps or lesion. Atrophic endometrium visualized globally, without lesion or polyps. Tubal ostia visualized without lesion.                 Description of Technical Procedures:   The patient was taken to the operating room with IVF running. SCDs were placed on bilateral lower extremities for DVT prophylaxis. General anesthesia was obtained without difficulty and found to be adequate. She was placed in the dorsal lithotomy position with legs in Barry type stirrups.  Exam under anesthesia revealed the findings as above. She was prepped and draped in the normal sterile fashion. A straight catheter was placed to drain the bladder. A weighted speculum was placed into posterior aspect of the vagina. A right angle retractor was placed into the anterior aspect of the vagina. The anterior lip of the cervix was grasped with a single-toothed tenaculum. A Myosure hysteroscope was introduced into the cervix with hydrodistension. The above findings were then noted. Photographs were taken of the endometrial cavity.The hysteroscope was  then removed. The uterus was curetted in a clockwise fashion until a gritty feeling was noted in all aspects of the uterus. The endometrial scrapings were sent to pathology. The tenaculum was removed from the cervix and good hemostasis was noted at puncture sites after application of ringed forceps at tenaculum site for 2 minutes.     The patient tolerated the procedure well. The instrument and sponge counts were correct times two. The patient awaked from anesthesia and was taken to recovery in stable condition.    Dr. Chappell was present for the entire procedure.    Estimated Blood Loss: 5 mL     Drain: 5mL of urine      Fluid Deficit: 60mL     Fluids: 300mL     Specimens:   Specimen (24h ago, onward)       Start     Ordered    09/26/23 0928  Specimen to Pathology  RELEASE UPON ORDERING        References:    Click here for ordering Quick Tip   Question:  Release to patient  Answer:  Immediate    09/26/23 0928                            Condition: Good    Disposition: PACU - hemodynamically stable.    Kandace Barahona MD  [employer] PGY-2  09/26/2023 5:30 PM

## 2023-09-26 NOTE — DISCHARGE INSTRUCTIONS
Keep  follow up appointment ___ with [facility].        *  Infusion clinic was contacted for you. Waiting for insurance approval. Unable to schedule appt at this time; You  can contact the infusion clinic @ [phone] to follow up                                   · Pelvic rest for _4 weeks___ (NO baths, NO soaking, tampons, douches or sex).    · No heavy lifting/straining ( nothing over 10 pounds)  for _2 weeks____.    · · Take pain medication as prescribed --- Alternate tylenol with ibuprofen (every 3 hours) . If you are experiencing severe pain even with your pain medications, please call the Womens clinic at [phone] to notify your doctor.    · Take over the counter laxatives such as Miralax or Colace for constipation. Do not strain to have a bowel movement.    · Brace belly with pillow when coughing/sneezing/deep breathing.    · No driving or consuming alcohol for the next 24 hours.    · Some bleeding/spotting is to be expected for several days.    · Notify MD of bleeding more than 1 pad per hour, any moderate to severe pain unrelieved by pain medicine or for any signs of infection including fever above 100.4, yellow/green foul-smelling drainage, nausea or vomiting. Clinics number: [phone], or after business hours or emergency call [phone].    ·  Thanks for choosing [facility]! Have a smooth recovery!

## 2023-09-26 NOTE — ANESTHESIA PROCEDURE NOTES
Intubation    Date/Time: 9/26/2023 9:05 AM    Performed by: Laura Varela CRNA  Authorized by: Marisa Ribera MD    Intubation:     Induction:  Intravenous    Intubated:  Postinduction    Mask Ventilation:  Not attempted    Attempts:  1    Attempted By:  Student    Method of Intubation:  Direct    Blade:  Spencer 4    Laryngeal View Grade: Grade I - full view of cords      Difficult Airway Encountered?: No      Complications:  None    Airway Device:  Oral endotracheal tube    Airway Device Size:  7.5    Style/Cuff Inflation:  Cuffed (inflated to minimal occlusive pressure)    Tube secured:  22    Secured at:  The lips    Placement Verified By:  Capnometry    Complicating Factors:  None    Findings Post-Intubation:  BS equal bilateral and atraumatic/condition of teeth unchanged

## 2023-09-27 LAB
BACTERIA UR CULT: ABNORMAL
ESTROGEN SERPL-MCNC: NORMAL PG/ML
INSULIN SERPL-ACNC: NORMAL U[IU]/ML
LAB AP CLINICAL INFORMATION: NORMAL
LAB AP GROSS DESCRIPTION: NORMAL
LAB AP REPORT FOOTNOTES: NORMAL
T3RU NFR SERPL: NORMAL %

## 2023-10-02 PROBLEM — Z79.4 TYPE 2 DIABETES MELLITUS, WITH LONG-TERM CURRENT USE OF INSULIN: Status: ACTIVE | Noted: 2022-05-17

## 2023-10-08 ENCOUNTER — DOCUMENTATION ONLY (OUTPATIENT)
Dept: GYNECOLOGY | Facility: CLINIC | Age: 58
End: 2023-10-08
Payer: MEDICAID

## 2023-10-08 NOTE — PROGRESS NOTES
Pathology Conference     Surgery Date: 9/26/2023   Patient: Jessica Walker   Pre-Op Diagnosis: Post menopausal bleeding    Procedure:  Hysteroscopy D&C     Path: Endometrial biopsy: Scant fragments of benign endometrial tissue with predominantly mucus.      - Insufficient tissue for further diagnostic evaluation.     Plan:   Routine follow up      Kandace Barahona MD  [employer] PGY-2  10/08/2023 4:17 PM

## 2023-10-09 ENCOUNTER — INFUSION (OUTPATIENT)
Dept: INFUSION THERAPY | Facility: HOSPITAL | Age: 58
End: 2023-10-09
Attending: INTERNAL MEDICINE
Payer: MEDICAID

## 2023-10-09 VITALS — SYSTOLIC BLOOD PRESSURE: 116 MMHG | RESPIRATION RATE: 24 BRPM | DIASTOLIC BLOOD PRESSURE: 75 MMHG | HEART RATE: 85 BPM

## 2023-10-09 DIAGNOSIS — D50.9 IRON DEFICIENCY ANEMIA, UNSPECIFIED IRON DEFICIENCY ANEMIA TYPE: ICD-10-CM

## 2023-10-09 DIAGNOSIS — E61.1 IRON DEFICIENCY: Primary | ICD-10-CM

## 2023-10-09 LAB — POCT GLUCOSE: 195 MG/DL (ref 70–110)

## 2023-10-09 PROCEDURE — 25000003 PHARM REV CODE 250: Performed by: NURSE PRACTITIONER

## 2023-10-09 PROCEDURE — 25000003 PHARM REV CODE 250: Performed by: INTERNAL MEDICINE

## 2023-10-09 PROCEDURE — 63600175 PHARM REV CODE 636 W HCPCS: Mod: JZ,JG | Performed by: NURSE PRACTITIONER

## 2023-10-09 PROCEDURE — 96361 HYDRATE IV INFUSION ADD-ON: CPT

## 2023-10-09 PROCEDURE — 96365 THER/PROPH/DIAG IV INF INIT: CPT

## 2023-10-09 RX ORDER — EPINEPHRINE 0.3 MG/.3ML
0.3 INJECTION SUBCUTANEOUS ONCE AS NEEDED
Status: CANCELLED | OUTPATIENT
Start: 2023-10-09

## 2023-10-09 RX ORDER — HEPARIN 100 UNIT/ML
500 SYRINGE INTRAVENOUS
Status: DISCONTINUED | OUTPATIENT
Start: 2023-10-09 | End: 2023-10-09 | Stop reason: HOSPADM

## 2023-10-09 RX ORDER — DIPHENHYDRAMINE HYDROCHLORIDE 50 MG/ML
50 INJECTION INTRAMUSCULAR; INTRAVENOUS ONCE AS NEEDED
Status: DISCONTINUED | OUTPATIENT
Start: 2023-10-09 | End: 2023-10-09 | Stop reason: HOSPADM

## 2023-10-09 RX ORDER — DIPHENHYDRAMINE HYDROCHLORIDE 50 MG/ML
50 INJECTION INTRAMUSCULAR; INTRAVENOUS ONCE AS NEEDED
Status: CANCELLED | OUTPATIENT
Start: 2023-10-09

## 2023-10-09 RX ORDER — HEPARIN 100 UNIT/ML
500 SYRINGE INTRAVENOUS
Status: CANCELLED | OUTPATIENT
Start: 2023-10-09

## 2023-10-09 RX ORDER — SODIUM CHLORIDE 0.9 % (FLUSH) 0.9 %
10 SYRINGE (ML) INJECTION
OUTPATIENT
Start: 2023-10-09

## 2023-10-09 RX ORDER — SODIUM CHLORIDE 0.9 % (FLUSH) 0.9 %
10 SYRINGE (ML) INJECTION
Status: CANCELLED | OUTPATIENT
Start: 2023-10-09

## 2023-10-09 RX ORDER — HEPARIN 100 UNIT/ML
500 SYRINGE INTRAVENOUS
OUTPATIENT
Start: 2023-10-09

## 2023-10-09 RX ADMIN — FERUMOXYTOL 510 MG: 510 INJECTION INTRAVENOUS at 02:10

## 2023-10-09 RX ADMIN — SODIUM CHLORIDE 1000 ML: 9 INJECTION, SOLUTION INTRAVENOUS at 02:10

## 2023-10-09 NOTE — NURSING
1333  Pt arrived for #1/2 feraheme.  Pt is accomp by her .  States she requested a WC but it took too long so she walked up.  Pt denies any pain but states she has been nauseated, weak, and fatigue.  States she felt like she might pass out.  B/p 85/59.  Contacted DEYSI Mancuso NP and 1 L NS was ordered.  1512  After fluids and lunch, pt stated she felt much better. B/P 116/75.

## 2023-10-11 ENCOUNTER — CLINICAL SUPPORT (OUTPATIENT)
Dept: GYNECOLOGY | Facility: CLINIC | Age: 58
End: 2023-10-11
Payer: MEDICAID

## 2023-10-11 DIAGNOSIS — Z98.890 POST-OPERATIVE STATE: Primary | ICD-10-CM

## 2023-10-11 NOTE — PROGRESS NOTES
[facility] OB/GYN TELEHEALTH VISIT  [facility]  [address] [place] [address]  Phone: [phone]  Fax: [phone]    Postoperative Follow-Up  The patient location is: Home  The chief complaint leading to consultation is: Post operative follow up  Visit type: Virtual visit with audio only (telephone)  Total time spent with patient: 10 mins     The reason for the audio only service rather than synchronous audio and video virtual visit was related to technical difficulties or patient preference/necessity.     Each patient to whom I provide medical services by telemedicine is:  (1) informed of the relationship between the physician and patient and the respective role of any other health care provider with respect to management of the patient; and (2) notified that they may decline to receive medical services by telemedicine and may withdraw from such care at any time. Patient verbally consented to receive this service via voice-only telephone call.  Subjective:   Jessica Walker is a 58 y.o.  s/p hysteroscopy D&C 2/2 postmenopausal bleeding on 2023 who presents via telehealth for 2 weeks post-op visit.     Eating a regular diet without difficulty with normal bowel movements. Reports that she had one episode of spotting. Patient is not complaining of pain currently. No episodes of vaginal bleeding.    Patient's medications, allergies, past medical, surgical, social and family histories were reviewed and updated as appropriate.    Post Op Study Questions:  1. Do you have more pain in your abdomen (lower belly) today than you did yesterday?no  2. Are the drugs you are taking for pain keeping the pain under control?no  3. Do you have pain in your kidney area (i.e. around your lower back ribs)? Yes, chronic   4. Are you having any chest pain, upper back pain, or pain in your shoulder tips?no  5. Are you short of breath or having any difficulties breathing? no  6. Do you have a cough?no  7. Do you have  any swelling in either one--or both--of your legs?no  8. Do you have a fever, chills, or night sweats?no  9. Are you lightheaded or dizzy, or have you fainted?no  10. Are you having any problems with your incision? For example, is it red, painful, or swollen? Oozing any pus? Opening up? N/A  11. Are you having heavy vaginal bleeding? For example, are you soaking a pad every hour? no  12. Is there excessive or foul discharge from your vagina?no  13. Have you thrown up in the last 24 h?no  14. Are you passing gas rectally?yes  15. Have you had a bowel movement in the last 24 h?yes  16. Are you having any diarrhea, or frequent, loose bowel movements?no  17. Is there urine or feces (stool) leaking from your vagina?no   18. Are you having any problems when you empty your bladder?no  19. Since your surgery, have you had any problems related to your medication?no  20. How many opioids have you taken? no  21. Day #1 meds?none Day #2 meds?none Day#3 meds?none  22. Access to reliable immediate internet?yes      Operative Findings:  EUA: Normal external genitalia without lesion. Uterus 9week size, mobile. No adnexal masses or fullness. 3-4 width below the pubic arch, 8cm between ischial spines. Good descent, 2-3 cm from introitus with fundal pressure. Vaginal vault without lesion, discharge, or bleeding. Cervix visualized without lesion or erythema.      Hysteroscopy: Cervical canal normal without polyps or lesion. Atrophic endometrium visualized globally, without lesion or polyps. Tubal ostia visualized without lesion.     Pathology:  Endometrial biopsy:  - Scant fragments of benign endometrial tissue with predominantly mucus.    - Insufficient tissue for further diagnostic evaluation.     Review of Systems  Denies fevers, chills, headache, blurry vision, nausea, vomiting, dizziness, or syncope.   Denies chest pain, shortness of breath, RUQ pain, or calf pain.    Assessment:   58 y.o.  s/p hysteroscopy D&C   postmenopausal bleeding on 9/26/2023. Doing well post-operatively.  Operative findings again reviewed. Pathology report discussed.     Plan:     Problem List Items Addressed This Visit          Palliative Care    Post-operative state - Primary     Continue any current medications.  Activity restrictions: None  Anticipated return to work: Now  Follow up: in 4 weeks for 6wk post op visit             Patient and plan were discussed with Dr. Chappell.    This service was not originating from a related E/M service provided within the previous 7 days nor will  to an E/M service or procedure within the next 24 hours or my soonest available appointment.  Prevailing standard of care was able to be met in this audio-only visit.      Kandace Barahona MD  [employer] [facility] PGY-2  10/11/2023 2:30 PM

## 2023-10-12 NOTE — ASSESSMENT & PLAN NOTE
1. Continue any current medications.  2. Activity restrictions: None  3. Anticipated return to work: Now  4. Follow up: in 4 weeks for 6wk post op visit

## 2023-10-16 ENCOUNTER — DOCUMENTATION ONLY (OUTPATIENT)
Dept: INFUSION THERAPY | Facility: HOSPITAL | Age: 58
End: 2023-10-16
Payer: MEDICAID

## 2023-10-24 ENCOUNTER — INFUSION (OUTPATIENT)
Dept: INFUSION THERAPY | Facility: HOSPITAL | Age: 58
End: 2023-10-24
Attending: INTERNAL MEDICINE
Payer: MEDICAID

## 2023-10-24 ENCOUNTER — CLINICAL SUPPORT (OUTPATIENT)
Dept: DIABETES | Facility: CLINIC | Age: 58
End: 2023-10-24
Payer: MEDICAID

## 2023-10-24 VITALS
SYSTOLIC BLOOD PRESSURE: 124 MMHG | HEART RATE: 109 BPM | BODY MASS INDEX: 31.39 KG/M2 | TEMPERATURE: 98 F | OXYGEN SATURATION: 99 % | WEIGHT: 200.38 LBS | DIASTOLIC BLOOD PRESSURE: 81 MMHG

## 2023-10-24 VITALS — BODY MASS INDEX: 31.83 KG/M2 | WEIGHT: 202.81 LBS | HEIGHT: 67 IN

## 2023-10-24 DIAGNOSIS — E61.1 IRON DEFICIENCY: Primary | ICD-10-CM

## 2023-10-24 DIAGNOSIS — E11.69 TYPE 2 DIABETES MELLITUS WITH OTHER SPECIFIED COMPLICATION, WITH LONG-TERM CURRENT USE OF INSULIN: ICD-10-CM

## 2023-10-24 DIAGNOSIS — Z79.4 TYPE 2 DIABETES MELLITUS WITH OTHER SPECIFIED COMPLICATION, WITH LONG-TERM CURRENT USE OF INSULIN: ICD-10-CM

## 2023-10-24 PROCEDURE — 63600175 PHARM REV CODE 636 W HCPCS: Mod: JZ,JG | Performed by: NURSE PRACTITIONER

## 2023-10-24 PROCEDURE — 25000003 PHARM REV CODE 250: Performed by: NURSE PRACTITIONER

## 2023-10-24 PROCEDURE — 99212 OFFICE O/P EST SF 10 MIN: CPT | Mod: PBBFAC,25

## 2023-10-24 PROCEDURE — 96365 THER/PROPH/DIAG IV INF INIT: CPT

## 2023-10-24 PROCEDURE — G0108 DIAB MANAGE TRN  PER INDIV: HCPCS | Mod: PBBFAC

## 2023-10-24 RX ADMIN — FERUMOXYTOL 510 MG: 510 INJECTION INTRAVENOUS at 12:10

## 2023-10-24 NOTE — PROGRESS NOTES
"Diabetes Care Specialist Progress Note  Author: Mary Castelan RD  Date: 10/24/2023    Program Intake  Reason for Diabetes Program Visit:: Initial Diabetes Assessment  Current diabetes risk level:: moderate (risk score 1)    Lab Results   Component Value Date    HGBA1C 7.5 (H) 04/12/2023       Clinical    Weight: 92 kg (202 lb 12.8 oz)   Height: 5' 7" (170.2 cm)   Body mass index is 31.76 kg/m².    Clinical Assessment  Current Diabetes Treatment: Injectable, Insulin (Trulicity 1.5mg q7d (Sunday), Lantus 40 units qAM & 30 units qPM)  Have you ever experienced hypoglycemia (low blood sugar)?: yes  In the last month, how often have you experienced low blood sugar?: more than once a week  Are you able to tell when your blood sugar is low?: Yes  What symptoms do you experience?: Shaky, Rapid heartbeat  How do you treat hypoglycemia (low blood sugar)?: other (fruit punch, ice cream, honey)  Have you ever experienced hyperglycemia (high blood sugar)?: yes  Are you able to tell when your blood sugar is high?: Yes  What are your symptoms?: blurry vision, dizziness, fatigue, frequent urination, nausea, rapid breathing, thirst    Labs  Do you have regular lab work to monitor your medications?: Yes  Type of Regular Lab Work: A1c, Cholesterol, CBC, BMP  Where do you get your labs drawn?: [facility]  Lab Compliance Barriers: No    Nutritional Status  Diet: Regular  Meal Plan 24 Hour Recall: Breakfast, Lunch, Dinner  Meal Plan 24 Hour Recall - Breakfast: skips  Meal Plan 24 Hour Recall - Lunch: skips  Meal Plan 24 Hour Recall - Dinner: ribeye steak with gravy, 1/2 cup potatoes, cauliflower  Change in appetite?: Yes (decrease in appetite lately)  Recent Changes in Weight: Weight Loss (from UBW ~ 211#)  Was weight loss intentional or unintentional?: Intentional  Current nutritional status an area of need that is impacting patient's ability to self-manage diabetes?: Yes    Additional Social History    Support  Does anyone support you with " your diabetes care?: yes  Who supports you?: self, significant other  Who takes you to your medical appointments?: family member  Does the current support meet the patient's needs?: Yes  Is Support an area impacting ability to self-manage diabetes?: No    Access to Mass Media & Technology  Does the patient have access to any of the following devices or technologies?: Smart phone  Media or technology needs impacting ability to self-manage diabetes?: No    Cognitive/Behavioral Health  Alert and Oriented: Yes  Difficulty Thinking: No  Requires Prompting: No  Requires assistance for routine expression?: No  Cognitive or behavioral barriers impacting ability to self-manage diabetes?: No    Culture/Worship  Culture or Restorationism beliefs that may impact ability to access healthcare: No    Communication  Language preference: English  Hearing Problems: No  Communication needs impacting ability to self-manage diabetes?: No    Health Literacy  Preferred Learning Method: Face to Face, Reading Materials  Health literacy needs impacting ability to self-manage diabetes?: No      Diabetes Self-Management Skills Assessment    Diabetes Disease Process/Treatment Options  Diabetes Disease Process/Treatment Options: Skills Assessment Completed: No  Deferred due to:: Time  Area of need?: Deferred    Nutrition/Healthy Eating  Challenges to healthy eating:: lack of will power, snacking between meals and at night (snacking on sugary desserts regularly)  Method of carbohydrate measurement:: eyeballing/guessing, carb counting/reading labels  Patient can identify foods that impact blood sugar.: yes  Patient-identified foods:: soda, starchy vegetables (corn, peas, beans), starches (bread, pasta, rice, cereal), sweets, fruit/fruit juice  Nutrition/Healthy Eating Skills Assessment Completed:: Yes  Assessment indicates:: Instruction Needed  Area of need?: Yes    Physical Activity/Exercise  Patient's daily activity level:: sedentary  Patient  formally exercises outside of work.: no  Reasons for not exercising:: other (see comments) (depression/lack of motivation)  Physical Activity/Exercise Skills Assessment Completed: : No  Deffered due to:: Time  Area of need?: Deferred    Medications  Patient is able to describe current diabetes management routine.: yes  Diabetes management routine:: injectable medications, insulin  Patient is able to identify current diabetes medications, dosages, and appropriate timing of medications.: yes (somewhat)  Patient understands the purpose of the medications taken for diabetes.: yes (somewhat)  Medication Skills Assessment Completed:: Yes  Assessment indicates:: Instruction Needed  Area of need?: Yes    Home Blood Glucose Monitoring  Patient states that blood sugar is checked at home daily.: yes  Monitoring Method:: home glucometer  Home glucometer meter type:: True Metrix  How often do you check your blood sugar?: Twice a day (2-3x/d)  When do you check your blood sugar?: Before breakfast, 2 hours after meal, Other (with symptoms, 2 hours after supper meal)  When you check what is your typical blood sugar range? : 60s in AM, up to 200s in HS, 104 this AM, 178 last night  Blood glucose logs:: no, encouraged to keep logs, encouraged to bring logs to provider visits (provided logs)  Blood glucose logs reviewed today?: no  Home Blood Glucose Monitoring Skills Assessment Completed: : Yes  Assessment indicates:: Adequate understanding  Area of need?: Yes    Acute Complications  Patient is able to identify types of acute complications: No  Acute Complications Skills Assessment Completed: : Yes  Assessment indicates:: Instruction Needed  Area of need?: Yes    Chronic Complications  Chronic Complications Skills Assessment Completed: : No  Deferred due to:: Time  Area of need?: Deferred    Psychosocial/Coping  Patient can identify ways of coping with chronic disease.: yes  Patient-stated ways of coping with chronic disease:: other  (see comments) (spending time with family (likes to go to sister's house for holidays))  Psychosocial/Coping Skills Assessment Completed: : Yes  Assessment indicates:: Adequate understanding  Area of need?: No      Assessment Summary and Plan    Based on today's diabetes care assessment, the following areas of need were identified:          10/24/2023    12:02 AM   Social   Support No   Access to Mass Media/Tech No   Cognitive/Behavioral Health No   Culture/Yazdanism No   Communication No   Health Literacy No            10/24/2023    12:02 AM   Clinical   Lab Compliance No   Nutritional Status Yes - skipped meals resulting in hypoglycemia            10/24/2023    12:02 AM   Diabetes Self-Management Skills   Diabetes Disease Process/Treatment Options Deferred   Nutrition/Healthy Eating Yes - see care plan   Physical Activity/Exercise Deferred - Pt mentions that she is not active at all & stays in bed most of the day due to depression.  Pt does see a provider for depression but does not feel like medications have worked for her in the past.  Will review benefits of exercise for mood & diabetes at next visit as time permits.   Medication Yes - Briefly reviewed long acting insulin mechanism of action & importance of taking at consistent time daily.  Pt takes Lantus BID & self-adjusts dose based on blood sugar.  She takes at 8-10am (but sometimes as late as noon if she sleeps late) & 7-9PM.  Explained to pt that when she takes an AM dose hours late & then takes at regular time the next day, she potentially has double the dose on board which can cause AM lows.  Pt does not think this is what is causing her lows.   Home Blood Glucose Monitoring Yes - see care plan   Acute Complications Yes - Reviewed hyperglycemia/hypoglycemia, s/s, & treatment.    Chronic Complications Deferred   Psychosocial/Coping No          Today's interventions were provided through individual discussion, instruction, and written materials were  provided.      Patient verbalized understanding of instruction and written materials.  Pt was able to return back demonstration of instructions today. Patient understood key points, needs reinforcement and further instruction.     Diabetes Self-Management Care Plan:    Today's Diabetes Self-Management Care Plan was developed with Jessica's input. Jessica has agreed to work toward the following goal(s) to improve his/her overall diabetes control.      Care Plan: Diabetes Management   Updates made since 9/24/2023 12:00 AM        Problem: Healthy Eating         Goal: Patient will add meal substitutions such as shakes or snacks for missed breakfast & lunch meals to help prevent hypoglycemia.    Start Date: 10/24/2023   Expected End Date: 12/6/2023   Priority: High   Barriers: No Barriers Identified   Note:    10/24/23: Reviewed sources of carbohydrate & appropriate portion sizes.  Discussed plate method for diabetes as well as carbohydrate counting.  Encouraged pt to read nutrition facts labels for serving size & grams of total carbohydrate.  Gave goal of </= 30-45g carbohydrate per meal & </= 15g carbohydrate per snack.  Pt reports eating only 1 meal daily (supper) & c/o lows in AM upon waking ~ 8-10AM.  Discussed use of meal substitutions such as Glucerna (samples & coupons provided) or snack type foods to help prevent hypoglycemia.  Pt denies consumption of sugary drinks except to treat lows & drinks soda only ~ 3-4 times per year.  Pt does, however, regularly eat cakes/cupcakes/pies in place of or in addition to meals.  Pt does report that she tries to limit portion of these sweets when she eats them (ex. 2 cookies).        Task: Reviewed the sources and role of Carbohydrate, Protein, and Fat and how each nutrient impacts blood sugar. Completed 10/24/2023        Task: Provided visual examples using dry measuring cups, food models, and other familiar objects such as computer mouse, deck or cards, tennis ball etc. to  help with visualization of portions. Completed 10/24/2023        Task: Explained how to count carbohydrates using the food label and the use of dry measuring cups for accurate carb counting. Completed 10/24/2023     Task: Review the importance of balancing carbohydrates with each meal using portion control techniques to count servings of carbohydrate and label reading to identify serving size and amount of total carbs per serving. Completed 10/24/2023        Task: Provided Sample plate method and reviewed the use of the plate to estimate amounts of carbohydrate per meal. Completed 10/24/2023        Problem: Blood Glucose Self-Monitoring         Goal: Patient agrees to check and record blood sugars 2-3 times per day.    Start Date: 10/25/2023   Expected End Date: 12/6/2023   Priority: Medium   Barriers: Lack of Motivation to Change   Note:    10/24/23: Pt reports that she checks blood sugar 2-3x/d but does not keep a log.  Pt presents to visit without her glucometer.  Provided pt with blood sugar logs x2 & encouraged her to keep daily logs & bring to next visit so that we can determine if hypoglycemia frequency is improving with added AM snacks/meal replacements.         Follow Up Plan     Follow up in about 6 weeks (around 12/6/2023) for review of BG logs, health rating, distress scale, disease process, exercise, & chronic complications.    Today's care plan and follow up schedule was discussed with patient.  Jessica verbalized understanding of the care plan, goals, and agrees to follow up plan.        The patient was encouraged to communicate with his/her health care provider/physician and care team regarding his/her condition(s) and treatment.  I provided the patient with my contact information today and encouraged to contact me via phone or [facility]'s Patient Portal as needed.     Length of Visit   Total Time: 60 Minutes

## 2023-11-08 ENCOUNTER — CLINICAL SUPPORT (OUTPATIENT)
Dept: GYNECOLOGY | Facility: CLINIC | Age: 58
End: 2023-11-08
Payer: MEDICAID

## 2023-11-08 ENCOUNTER — TELEPHONE (OUTPATIENT)
Dept: GYNECOLOGY | Facility: CLINIC | Age: 58
End: 2023-11-08
Payer: MEDICAID

## 2023-11-08 DIAGNOSIS — Z12.31 SCREENING MAMMOGRAM FOR BREAST CANCER: ICD-10-CM

## 2023-11-08 DIAGNOSIS — Z98.890 POST-OPERATIVE STATE: Primary | ICD-10-CM

## 2023-11-08 NOTE — PROGRESS NOTES
[facility] Progress Note    Primary Site: [facility]    Chief Complaint    Follow-up s/p hysteroscopy D&C    HPI  Jessica Walker joined me via our telemedicine platform.    Today she reports feeling well post-op. Eating a regular diet without difficulty with normal bowel movements. Patient is not complaining of pain currently. No episodes of vaginal bleeding.    I have reviewed family history, social history, medications and allergies as documented in the patient's electronic medical record.    Reports, labs, outside records, and images have been reviewed with pertinent interpretations below. See telemedicine notes records for intervening communications.    Assessment/Plan    S/p hysteroscopy for PMB:  - Doing well post-op, no bleeding or pain.  - No activity restrictions.  - MMG ordered as she is due for repeat.  - Study questions completed via [employer].  - RTC in 1 yr for well woman exam.      ICD-10-CM ICD-9-CM    1. Post-operative state  Z98.890 V45.89       2. Screening mammogram for breast cancer  Z12.31 V76.12 Mammo Digital Screening Bilat          See correspondence above for plan.   Patient's needs assessed and health education provided. Patient understands risks, benefits, and alternatives of treatment prescribed above. Patient verbalizes understanding and agrees to follow plan.    Patient's identity was confirmed and confidentiality/privacy confirmed prior to visit. I certify that this visit was done via secure two-way transmission with informed consent of the patient and/or guardian.      Video conferencing was utilized at this visit.    All of the time was counseling or coordinating care.    Pedro Jeffery MD  [facility]etric[facility] Gyne[facility], PGY-2

## 2023-11-08 NOTE — TELEPHONE ENCOUNTER
Patient's virtual appointment was completed today. Per Dr. Jeffery to schedule patient for an annual with Shabana one year and a day from today. Thank you!

## 2023-11-10 DIAGNOSIS — E11.69 TYPE 2 DIABETES MELLITUS WITH OTHER SPECIFIED COMPLICATION, WITH LONG-TERM CURRENT USE OF INSULIN: Primary | ICD-10-CM

## 2023-11-10 DIAGNOSIS — Z79.4 TYPE 2 DIABETES MELLITUS WITH OTHER SPECIFIED COMPLICATION, WITH LONG-TERM CURRENT USE OF INSULIN: Primary | ICD-10-CM

## 2023-12-06 ENCOUNTER — LAB VISIT (OUTPATIENT)
Dept: LAB | Facility: HOSPITAL | Age: 58
End: 2023-12-06
Attending: NURSE PRACTITIONER
Payer: MEDICAID

## 2023-12-06 ENCOUNTER — OFFICE VISIT (OUTPATIENT)
Dept: HEMATOLOGY/ONCOLOGY | Facility: CLINIC | Age: 58
End: 2023-12-06
Payer: MEDICAID

## 2023-12-06 ENCOUNTER — CLINICAL SUPPORT (OUTPATIENT)
Dept: DIABETES | Facility: CLINIC | Age: 58
End: 2023-12-06
Payer: MEDICAID

## 2023-12-06 VITALS — WEIGHT: 195.81 LBS | HEIGHT: 67 IN | BODY MASS INDEX: 30.73 KG/M2

## 2023-12-06 DIAGNOSIS — E11.69 TYPE 2 DIABETES MELLITUS WITH OTHER SPECIFIED COMPLICATION, WITH LONG-TERM CURRENT USE OF INSULIN: Primary | ICD-10-CM

## 2023-12-06 DIAGNOSIS — D47.3 ESSENTIAL THROMBOCYTHEMIA: Primary | ICD-10-CM

## 2023-12-06 DIAGNOSIS — D50.0 IRON DEFICIENCY ANEMIA DUE TO CHRONIC BLOOD LOSS: ICD-10-CM

## 2023-12-06 DIAGNOSIS — D75.839 THROMBOCYTOSIS: ICD-10-CM

## 2023-12-06 DIAGNOSIS — Z79.4 TYPE 2 DIABETES MELLITUS WITH OTHER SPECIFIED COMPLICATION, WITH LONG-TERM CURRENT USE OF INSULIN: Primary | ICD-10-CM

## 2023-12-06 DIAGNOSIS — D50.9 IRON DEFICIENCY ANEMIA, UNSPECIFIED: ICD-10-CM

## 2023-12-06 DIAGNOSIS — E61.1 IRON DEFICIENCY: Primary | ICD-10-CM

## 2023-12-06 LAB
ALBUMIN SERPL-MCNC: 3.9 G/DL (ref 3.5–5)
ALBUMIN/GLOB SERPL: 1.2 RATIO (ref 1.1–2)
ALP SERPL-CCNC: 118 UNIT/L (ref 40–150)
ALT SERPL-CCNC: 11 UNIT/L (ref 0–55)
AST SERPL-CCNC: 10 UNIT/L (ref 5–34)
BASOPHILS # BLD AUTO: 0.08 X10(3)/MCL
BASOPHILS NFR BLD AUTO: 0.6 %
BILIRUB SERPL-MCNC: 0.2 MG/DL
BUN SERPL-MCNC: 11.1 MG/DL (ref 9.8–20.1)
CALCIUM SERPL-MCNC: 9.7 MG/DL (ref 8.4–10.2)
CHLORIDE SERPL-SCNC: 102 MMOL/L (ref 98–107)
CO2 SERPL-SCNC: 25 MMOL/L (ref 22–29)
CREAT SERPL-MCNC: 0.77 MG/DL (ref 0.55–1.02)
EOSINOPHIL # BLD AUTO: 0.38 X10(3)/MCL (ref 0–0.9)
EOSINOPHIL NFR BLD AUTO: 2.8 %
ERYTHROCYTE [DISTWIDTH] IN BLOOD BY AUTOMATED COUNT: 18.7 % (ref 11.5–17)
FERRITIN SERPL-MCNC: 14.6 NG/ML (ref 4.63–204)
GFR SERPLBLD CREATININE-BSD FMLA CKD-EPI: >60 MLS/MIN/1.73/M2
GLOBULIN SER-MCNC: 3.2 GM/DL (ref 2.4–3.5)
GLUCOSE SERPL-MCNC: 193 MG/DL (ref 74–100)
HCT VFR BLD AUTO: 35.8 % (ref 37–47)
HGB BLD-MCNC: 10.8 G/DL (ref 12–16)
IMM GRANULOCYTES # BLD AUTO: 0.23 X10(3)/MCL (ref 0–0.04)
IMM GRANULOCYTES NFR BLD AUTO: 1.7 %
IRON SATN MFR SERPL: 4 % (ref 20–50)
IRON SERPL-MCNC: 13 UG/DL (ref 50–170)
LYMPHOCYTES # BLD AUTO: 2.56 X10(3)/MCL (ref 0.6–4.6)
LYMPHOCYTES NFR BLD AUTO: 19.1 %
MCH RBC QN AUTO: 24 PG (ref 27–31)
MCHC RBC AUTO-ENTMCNC: 30.2 G/DL (ref 33–36)
MCV RBC AUTO: 79.6 FL (ref 80–94)
MONOCYTES # BLD AUTO: 0.93 X10(3)/MCL (ref 0.1–1.3)
MONOCYTES NFR BLD AUTO: 6.9 %
NEUTROPHILS # BLD AUTO: 9.25 X10(3)/MCL (ref 2.1–9.2)
NEUTROPHILS NFR BLD AUTO: 68.9 %
PLATELET # BLD AUTO: 678 X10(3)/MCL (ref 130–400)
PMV BLD AUTO: 10.3 FL (ref 7.4–10.4)
POTASSIUM SERPL-SCNC: 4.8 MMOL/L (ref 3.5–5.1)
PROT SERPL-MCNC: 7.1 GM/DL (ref 6.4–8.3)
RBC # BLD AUTO: 4.5 X10(6)/MCL (ref 4.2–5.4)
SODIUM SERPL-SCNC: 137 MMOL/L (ref 136–145)
TIBC SERPL-MCNC: 351 UG/DL (ref 70–310)
TIBC SERPL-MCNC: 364 UG/DL (ref 250–450)
TRANSFERRIN SERPL-MCNC: 352 MG/DL (ref 180–382)
WBC # SPEC AUTO: 13.43 X10(3)/MCL (ref 4.5–11.5)

## 2023-12-06 PROCEDURE — 82728 ASSAY OF FERRITIN: CPT

## 2023-12-06 PROCEDURE — 36415 COLL VENOUS BLD VENIPUNCTURE: CPT

## 2023-12-06 PROCEDURE — 99442 PR PHYSICIAN TELEPHONE EVALUATION 11-20 MIN: ICD-10-PCS | Mod: 95,,, | Performed by: NURSE PRACTITIONER

## 2023-12-06 PROCEDURE — 3051F HG A1C>EQUAL 7.0%<8.0%: CPT | Mod: CPTII,95,, | Performed by: NURSE PRACTITIONER

## 2023-12-06 PROCEDURE — 4010F PR ACE/ARB THEARPY RXD/TAKEN: ICD-10-PCS | Mod: CPTII,95,, | Performed by: NURSE PRACTITIONER

## 2023-12-06 PROCEDURE — 99212 OFFICE O/P EST SF 10 MIN: CPT | Mod: PBBFAC

## 2023-12-06 PROCEDURE — 99442 PR PHYSICIAN TELEPHONE EVALUATION 11-20 MIN: CPT | Mod: 95,,, | Performed by: NURSE PRACTITIONER

## 2023-12-06 PROCEDURE — 85025 COMPLETE CBC W/AUTO DIFF WBC: CPT

## 2023-12-06 PROCEDURE — G0108 DIAB MANAGE TRN  PER INDIV: HCPCS | Mod: PBBFAC

## 2023-12-06 PROCEDURE — 1160F RVW MEDS BY RX/DR IN RCRD: CPT | Mod: CPTII,95,, | Performed by: NURSE PRACTITIONER

## 2023-12-06 PROCEDURE — 1159F MED LIST DOCD IN RCRD: CPT | Mod: CPTII,95,, | Performed by: NURSE PRACTITIONER

## 2023-12-06 PROCEDURE — 83550 IRON BINDING TEST: CPT

## 2023-12-06 PROCEDURE — 1159F PR MEDICATION LIST DOCUMENTED IN MEDICAL RECORD: ICD-10-PCS | Mod: CPTII,95,, | Performed by: NURSE PRACTITIONER

## 2023-12-06 PROCEDURE — 3051F PR MOST RECENT HEMOGLOBIN A1C LEVEL 7.0 - < 8.0%: ICD-10-PCS | Mod: CPTII,95,, | Performed by: NURSE PRACTITIONER

## 2023-12-06 PROCEDURE — 80053 COMPREHEN METABOLIC PANEL: CPT

## 2023-12-06 PROCEDURE — 1160F PR REVIEW ALL MEDS BY PRESCRIBER/CLIN PHARMACIST DOCUMENTED: ICD-10-PCS | Mod: CPTII,95,, | Performed by: NURSE PRACTITIONER

## 2023-12-06 PROCEDURE — 4010F ACE/ARB THERAPY RXD/TAKEN: CPT | Mod: CPTII,95,, | Performed by: NURSE PRACTITIONER

## 2023-12-06 RX ORDER — SODIUM CHLORIDE 0.9 % (FLUSH) 0.9 %
10 SYRINGE (ML) INJECTION
Status: CANCELLED | OUTPATIENT
Start: 2023-12-11

## 2023-12-06 RX ORDER — HEPARIN 100 UNIT/ML
500 SYRINGE INTRAVENOUS
Status: CANCELLED | OUTPATIENT
Start: 2023-12-11

## 2023-12-06 NOTE — Clinical Note
Patient will return to clinic on 12/11/23 with plans on starting IV iron(Ferrlecit x 8 doses).  She will be seen in clinic in 5 weeks with NP for re-evaluation. CBC, CMP, iron panel and ferritin done prior  She will need cbc, serum iron and ferritin done weekly prior to each IV iron infusion.

## 2023-12-06 NOTE — PROGRESS NOTES
Reason for Follow-up:  recurrent severe iron-deficiency anemia; no definite GI source of bleeding  -low risk essential thrombocytosis    Current Treatment:  ASA 81mg po daily    Treatment History:  -S/P Feraheme 510 mg IV x2 (2023, 2023)    Past medical history: NIDDM.  Hypertension.  Dyslipidemia.  History of anemia.  Vitamin D deficiency.  Tobacco abuse.  Obesity.  Obstructive sleep apnea.  HFpEF. Leukocytosis.  Cholecystectomy.  Goiter surgery.  Social history: Single.  Lives in [place], [place].  Has 2 grown up sons.  Has been smoking 1-1/2 packs of cigarettes daily since age 10.  Has tried to quit smoking many times but does not get refills of Chantix, therefore, has been unable to.  Currently, down to 10 cigarettes daily.  Denies history of alcohol or illicit drug abuse.  Family history: Sister  from cervical cancer at age 55.  Health maintenance:   EGD 2018 (Dr. Koo in [place]: Esophageal dilation).    Colonoscopy 2018 (Dr. Koo in [place]), apparently to be repeated in 2 years.    Mammogram 2018: Negative for malignancy.   ThinPrep cervical Pap smear 2016: Negative for intraepithelial lesion or malignancy; negative for HPV high-risk types (type 16 and type 18/45)  Menstrual and OB/GYN history: Menopause in .  For last 3 months or so, has experienced vaginal spotting.  Apparently, had Pap smear done at [facility] 2 months back (probably 2018), which was apparently unremarkable. Pelvic ultrasound on 2019 showed normal endometrium; likely myomatous changes of uterus with a lower uterine segment lipoma leiomyoma.        History of Present Illness:   53-year-old lady referred from [facility] for evaluation of leukocytosis.    For details, please see my last note dated 2020.  Please also refer to assessment and plan section.    Interval History: 2023:  Patient presents today virtually  for scheduled follow up visit for iron deficiency anemia. She is taking oral iron 1 tablet M-W-F and liquid Geritol with iron. 1 tsp every couple days.   States feeling extremely weak, tired and exhausted.  SOB w/ exertion, not new and not worsening. Denies any chest pain, dizziness, headaches, heart palpitations. Denies any bleeding or unusual bruising.   She ran out of ASA 18 mg 4 days ago. Has not purchased a new bottle. States took every day prior to this.   No numbness or tinlging of hands/feet and  no skin discoloration or burning. Denies any symptoms of stroke.   States has not had surgery for a possible GI stomach deformity/disorder(?) noted in June of this year. States she called Dr. Larose's office yesterday and inquired about surgery. States she was told they tried to call her earlier this year to schedule. She states she never got message.   Surgical referral was sent again yesterday. States she does have a hernia, but does not think that is what Dr. Stevenson is talking about. Denies any abdominal pain, N/V, diarrhea or constipation.   States she underwent  hysteroscopy and D&C on 09/26/2023 to evaluate abnormal bleeding. States no cancer was identified. She denies further vaginal bleeding. States she only has some mild spotting twice prior to procedure.     This is a telemedicine note. Patient was treated using telemedicine, real time audio and video, according to [facility] protocols. KARLA, distant provider, conducted the visit from location identified below. The patient participated in the visit at a non-[facility] location selected by the patient (or patient's representative), identified below. I am licensed in the state where the patient stated they are located. The patient (or patient's representative) stated that they understood and accepted the privacy and security risks to their information at their location. Patient was located at her/his residence in , [place]. I, distant provider, was located at  [facility].    Face to face time spent with patient exceeds 15 minutes, over 50% of which was used for education and counseling regarding medical conditions, current medications including risk/benefit and side effects/adverse events, over the counter medications-uses/doses. The patient is receptive, expresses understanding and is agreeable to plan. All questions answered.    Review of Systems:   All systems reviewed and found to be negative except for the symptoms detailed above    Lab Results   Component Value Date    WBC 13.43 (H) 12/06/2023    RBC 4.50 12/06/2023    HGB 10.8 (L) 12/06/2023    HCT 35.8 (L) 12/06/2023    MCV 79.6 (L) 12/06/2023    MCH 24.0 (L) 12/06/2023    MCHC 30.2 (L) 12/06/2023    RDW 18.7 (H) 12/06/2023     (H) 12/06/2023    MPV 10.3 12/06/2023     CMP  Sodium   Date Value Ref Range Status   08/04/2020 140 135 - 146 mmol/L Final     Sodium Level   Date Value Ref Range Status   12/06/2023 137 136 - 145 mmol/L Final     Potassium   Date Value Ref Range Status   08/04/2020 4.0 3.6 - 5.2 mmol/L Final     Potassium Level   Date Value Ref Range Status   12/06/2023 4.8 3.5 - 5.1 mmol/L Final     Carbon Dioxide   Date Value Ref Range Status   12/06/2023 25 22 - 29 mmol/L Final   08/04/2020 27 24 - 32 mmol/L Final     BUN   Date Value Ref Range Status   08/04/2020 10.0 7.0 - 25.0 mg/dL Final     Blood Urea Nitrogen   Date Value Ref Range Status   12/06/2023 11.1 9.8 - 20.1 mg/dL Final     Creatinine   Date Value Ref Range Status   12/06/2023 0.77 0.55 - 1.02 mg/dL Final   08/04/2020 0.63 0.50 - 1.10 mg/dL Final     Calcium   Date Value Ref Range Status   08/04/2020 9.3 8.4 - 10.3 mg/dL Final     Calcium Level Total   Date Value Ref Range Status   12/06/2023 9.7 8.4 - 10.2 mg/dL Final     Albumin Level   Date Value Ref Range Status   12/06/2023 3.9 3.5 - 5.0 g/dL Final     Bilirubin Total   Date Value Ref Range Status   12/06/2023 0.2 <=1.5 mg/dL Final     Alkaline Phosphatase   Date Value Ref Range  Status   12/06/2023 118 40 - 150 unit/L Final     Aspartate Aminotransferase   Date Value Ref Range Status   12/06/2023 10 5 - 34 unit/L Final     Alanine Aminotransferase   Date Value Ref Range Status   12/06/2023 11 0 - 55 unit/L Final     eGFR   Date Value Ref Range Status   12/06/2023 >60 mls/min/1.73/m2 Final     Lab Results   Component Value Date    UIBC 351 (H) 12/06/2023    IRON 13 (L) 12/06/2023    TRANS 352 12/06/2023    TIBC 364 12/06/2023    LABIRON 4 (L) 12/06/2023      Lab Results   Component Value Date    FERRITIN 14.60 12/06/2023     Physical Examination: Physical Exam was not completed today as this was a telemedicine visit.    Assessment:  Severe iron deficiency anemia:   Presented as moderate anemia with severe microcytosis    EGD, colonoscopy negative for any bleeders (02/2018)   Severe iron deficiency    Feraheme (03/2019)   Hemoglobin dropped from 14.8 (06/2019) to 9.5 (08/28/2019) due to iron deficiency   Feraheme x2 (09/2019) --> improvement in iron stores and normalization of hemoglobin (9.5 --> 13.7)   CT enterogram (09/11/2019): Hiatal hernia; 1.7 cm left adrenal nodule   -03/10/2020: Remains iron deficient (transferrin saturation 13.2%, ferritin 19.2)   -Did not present for Feraheme shots in a timely manner   -04/27/2020: Ferritin 4.9 (down from 19.2 on 03/10/2020); however, hemoglobin 12.4, MCV 83.0   (Iron deficiency erythropoiesis)   -Feraheme 510 mg IV x2 (04/27/2020; 05/04/2020)   -06/01/2020: Iron deficiency resolved with parenteral iron therapy   -However, as of 06/01/2020, severe recurrent iron deficiency remains unexplained   -07/01/2020: Iron stores dropping again within a month; hemoglobin remains normal   -09/02/2020: Iron stores normal/stable (ferritin 21, transferrin saturation 15.8%); hemoglobin 14.8, normal   -09/08/2020: Feels better and more energetic than before   -11/12/2020: Iron deficient; hemoglobin 15.1  -08/04/2020:  [facility]:  Insurance denied capsule endoscopy.   Upper single balloon enteroscopy performed.  1. Duodenum biopsy:  Minimal focal acute inflammation with reactive changes; no celiac sprue  2. Duodenal bulb:  Biopsy showed no significant histopathologic findings; no evidence of celiac sprue  3. Stomach biopsy:  Chemical/reactive gastropathy; H pylori stain negative  -S/P Feraheme 510 mg IV x2 (05/17/2021, 05/24/2021)  -S/P Feraheme 510 mg IV x2 (07/09/2021, 07/19/2021)  -10/27/2021: Colonoscopy (iron-deficiency anemia):  Diffuse diverticulosis; otherwise unremarkable  -10/27/2021:  EGD with APC fulguration and biopsy (iron-deficiency anemia): unctate AVMs within the stomach.  A directed fulguration with APC with good result achieved, superficial ulcerations within the duodenal bulb, possibly secondary to daily aspirin use.  Biopsy taken in the antrum to rule out Helicobacter pylori by pathology  -S/P Feraheme 510 mg IV x2 (08/29/2022, 09/08/2022)  -11/11/2022:  WBC 12.7.  Hemoglobin 12.8.  Platelets 543 K. ferritin 251.70 (was 93.95 on 08/29/2022, 24.88 on 06/29/2022) (transferrin saturation was 19% on 08/29/2022, 7% on 06/29/2022)  -01/11/2023: Labs reviewed.  Hemoglobin 11.2 (hemoglobin was 15.9 on 08/29/2022).  MCV 85.0.  Platelets 562 K, stable.  Ferritin 9.02, down from 251.7 on 11/11/2022.  Transferrin saturation is 6%, low.  CMP unremarkable except glucose 188 mg/dL.  -S/P Feraheme 510 mg IV x2 (01/11/2023, 01/18/2023)  To summarize:   -Recurrent severe iron deficiency anemia, unexplained   -EGD, colonoscopy negative (02/2018)   -CT enterogram negative (09/11/2019)   -has required multiple rounds of parenteral iron therapy over past 4-5 years  -no source of bleeding, H pylori gastritis, or celiac sprue on multiple GI endoscopic procedures including EGD, colonoscopy, CT enteroscopy, and upper single balloon enteroscopy   [ferritin level has dropped:  9.02 (01/11/2023), down from 251.7 (11/11/2022)]  [Hemoglobin: 11.2 on 01/11/2023; down from 15.9 on  08/29/2022)  -treated with Feraheme 510 mg IV x2 (01/11/2023, 01/18/2023)  -02/27/2023:  Hemoglobin 14.6, remarkably improved with Feraheme.  Transferrin saturation 17%, improved but remains low. Ferritin 55.55 (was 9.02 on 01/11/2023)  -09/21/2023: Hgb 11.6, iron 26, iron sat 9 %, TIBC 276, Ferritin 30.30     Thrombocytosis (low risk IPSET-thrombosis score ET)    (age < 60 years; JAK2 V617F mutation positive)   GAGANDEEP-2 V617F mutation positive (03/2019)   Bone marrow consistent (06/2019)   Low risk IPSET-thrombosis score ET   Cardiovascular risk factors: NIDDM, hypertension, tobacco abuse   -04/27/2020: Platelets 500K, stable   -06/01/2020: Platelets 561K, more or less stable   -09/02/2020: Platelets 544K, stable   -11/12/2020: Platelets 567,000/mm³, stable  -11/11/2022:  Platelets 543 K, stable   -02/27/2023:  Platelets 623 K  -09/21/2023:  Platelets 600k, stable.     Chronic, very mild, benign-appearing, intermittent leukocytosis:   Secondary to smoking or underlying myeloproliferative disorder   GAGANDEEP-2 V617F mutation positive (03/2019)   BCR-ABL 1 fusion transcripts negative (03/2019)   Underlying essential thrombocytosis    12/6/2023: SARAH. Patient received IV iron (Feraheme 510 mg) on 10/9/2023 and 10/24/2023. Continues to report feeling extremely weak, tired and exhausted. SOB with exertion, unchanged.   Review of labs show: worsening iron stores. Hgb 10.8, Hct 35.8, MCV 79.6, MCH 24.0, MCHC 30.2(microcytic indices), , ANC 9.25. Serum iron 13, TIBC 364, Ferritin 14.60, Sat % 4.   Recommend starting Ferrlecit 125 mg x 8 doses. Check CBC, serum iron and ferritin prior to each dose.       GI disorder per patient report: Have tried to locate Dr. Larose's note to see what type of stomach disorder patient has that is requiring surgery. Unable to locate. Will ask MA to request most recent note for review.   Has strongly encouraged patient to keep close follow up with her office regarding referral for recommended  surgery.     Postmenopausal bleeding and proliferative endometrium. Per patient post hysteroscopy and D&C on 09/26/2023. No evidence of cancer. Denies any further episodes of vaginal bleeding.     Plan:  -Recurrent severe iron deficiency anemia, unexplained  -EGD, colonoscopy negative (02/2018)  -CT enterogram negative (09/11/2019)  -has required multiple rounds of parenteral iron therapy over past 4-5 years  -no source of bleeding, H pylori gastritis, or celiac sprue on multiple GI endoscopic procedures including EGD, colonoscopy, CT enteroscopy, and upper single balloon enteroscopy  >>>  [ferritin level has dropped:  9.02 (01/11/2023), down from 251.7 (11/11/2022)]  [Hemoglobin: 11.2 on 01/11/2023; down from 15.9 on 08/29/2022)  -treated with Feraheme 510 mg IV x2 (01/11/2023, 01/18/2023)  -02/27/2023:  Hemoglobin 14.6, remarkably improved with Feraheme.  Transferrin saturation 17%, improved but remains low. Ferritin 55.55 (was 9.02 on 01/11/2023)  -8/2/2023: Hgb 14.8, Hct 47.4, Plt 633, iron 40, iron sat 15% (improved)  -requiring multiple rounds of parenteral iron therapy in the past, essentially with negative exhaustive GI workup    -Have ordred IV  Ferrlecit 125 mg x 8 doses. Tentative start date of 12/11/2023.   - Continue monthly lab work (cbc,cmp)   - Stop oral iron and liquid geritol with iron, due to BROOKE.   - return to clinic in 5 weeks  with NP for reevaluation with CBC, CMP, iron panel and ferritin done prior.     - Low risk essential thrombocytosis  - no history of Thrombosis. Age < 60  No indication of myelosuppressive therapy   Advised her to  a bottle of OTC ASA 81 mg tablet and resume. Take it daily. Do not miss any doses.   Advised her elevated platelets can be secondary to anemia as well.   Medical management of NIDDM and hypertension which are cardiovascular risk factors.  Deferred to PCP.     Reviewed symptoms that would require immediate ER evaluation with patient. She verbalized her  understanding.     Above discussed at length with her.  All questions answered.    She understands and agrees with this plan.      Answers submitted by the patient for this visit:  Review of Systems Questionnaire (Submitted on 12/6/2023)  appetite change : No  unexpected weight change: No  mouth sores: No  visual disturbance: No  cough: No  shortness of breath: No  chest pain: No  abdominal pain: No  diarrhea: No  frequency: No  back pain: Yes  rash: No  headaches: No  adenopathy: No  nervous/ anxious: Yes

## 2023-12-06 NOTE — PROGRESS NOTES
"Diabetes Care Specialist Follow-up Note  Author: Mary Castelan RD  Date: 12/6/2023    Program Intake  Reason for Diabetes Program Visit:: Intervention  Type of Intervention:: Individual  Individual: Education  Education: Self-Management Skill Review  Current diabetes risk level:: moderate (risk score 1)    Lab Results   Component Value Date    HGBA1C 7.5 (H) 04/12/2023     A1c Pre Diabetes Care Specialist Intervention:  7.5% (no new)    Clinical    Weight: 88.8 kg (195 lb 12.8 oz)   Height: 5' 7" (170.2 cm)   Body mass index is 30.67 kg/m².  Wt loss of 7 lbs since last visit on 10/24/23    Medications:  - Trulicity 1.5mg qSunday  - Lantus 45 units qAM & 25 units qPM (doses adjusted by pt but total daily dose still the same, pt still reports giving AM dose at noon vs 8-10AM 1-2x/wk, again encouraged consistency in timing)  - Metformin 1000mg BID    SMBG:    the other PM per pt, did not check this AM, reports fasting blood sugars in 100s lately      Diabetes Self-Management Skills Assessment     Physical Activity/Exercise  Physical Activity/Exercise Skills Assessment Completed: : Yes  Assessment indicates:: Instruction Needed  Area of need?: Yes    During today's follow-up visit,  the following areas required further assessment and content was provided/reviewed.    Based on today's diabetes care assessment, the following areas of need were identified:             12/6/2023    12:03 AM   Diabetes Self-Management Skills   Physical Activity/Exercise Yes - Discussed exercise & effects on mood & blood sugar.  Pt not interested in increasing physical activity level at this time.        Today's interventions were provided through individual discussion.    Patient verbalized understanding of instruction.  Pt was able to return back demonstration of instructions today. Patient understood key points, needs reinforcement and further instruction.     Diabetes Self-Management Care Plan Review and Evaluation of " Progress:    During today's follow-up Jessica's Diabetes Self-Management Care Plan progress was reviewed and progress was evaluated including his/her input. Jessica has agreed to continue his/her journey to improve/maintain overall diabetes control by continuing to set health goals. See care plan progress below.      Care Plan: Diabetes Management   Updates made since 11/6/2023 12:00 AM        Problem: Healthy Eating         Goal: Patient will add meal substitutions such as shakes or snacks for missed breakfast & lunch meals to help prevent hypoglycemia.    Start Date: 10/24/2023   Expected End Date: 1/5/2024   This Visit's Progress: No change   Priority: High   Barriers: No Barriers Identified   Note:    10/24/23: Reviewed sources of carbohydrate & appropriate portion sizes.  Discussed plate method for diabetes as well as carbohydrate counting.  Encouraged pt to read nutrition facts labels for serving size & grams of total carbohydrate.  Gave goal of </= 30-45g carbohydrate per meal & </= 15g carbohydrate per snack.  Pt reports eating only 1 meal daily (supper) & c/o lows in AM upon waking ~ 8-10AM.  Discussed use of meal substitutions such as Glucerna (samples & coupons provided) or snack type foods to help prevent hypoglycemia.  Pt denies consumption of sugary drinks except to treat lows & drinks soda only ~ 3-4 times per year.  Pt does, however, regularly eat cakes/cupcakes/pies in place of or in addition to meals.  Pt does report that she tries to limit portion of these sweets when she eats them (ex. 2 cookies).     12/6/23: Pt reports trying Glucerna samples provided but did not get around to purchasing supplements for daily use as meal replacement.  Pt still consuming only 1 meal/d in evening on most days.  24h food recall as follows: breakfast = coffee w/ 1 tsp sugar & 4-5 tsp liquid creamer; lunch = pear; dinner = 4 mini crab cakes, pear.  Pt again agrees to purchase Glucerna or other brand such as Equate  nutrition drinks & use at least once daily as meal replacement for skipped meal.  Pt continues with decreased appetite.  Pt also interested in receiving prepared meals via insurance or community programs if available which she states may increase daily meals due to no preparation work on her part.  Will consult outpatient case management for assistance.       Problem: Blood Glucose Self-Monitoring         Goal: Patient agrees to check and record blood sugars 2-3 times per day.    Start Date: 10/25/2023   Expected End Date: 1/5/2024   This Visit's Progress: No change   Priority: Medium   Barriers: Lack of Motivation to Change   Note:    10/24/23: Pt reports that she checks blood sugar 2-3x/d but does not keep a log.  Pt presents to visit without her glucometer.  Provided pt with blood sugar logs x2 & encouraged her to keep daily logs & bring to next visit so that we can determine if hypoglycemia frequency is improving with added AM snacks/meal replacements.    12/6/23: Pt reports still checking blood sugar 2-3x/d but did not start keeping log & did not bring glucometer to visit.  Pt reports that she still has logs provided at last visit & will again try to keep logs to bring to next visit.  Pt will also bring glucometer to next visit for assistance figuring out how to review glucose logs in meter.         Follow Up Plan     Pt reports need for Lantus & test strip refills.  Discussed with PCP's VONDA Martin).  Follow up in about 30 days (around 1/5/2024) for review of glucose logs/meter, disease process, chronic complications, & goals.    Today's care plan and follow up schedule was discussed with patient.  Jessica verbalized understanding of the care plan, goals, and agrees to follow up plan.        The patient was encouraged to communicate with his/her health care provider/physician and care team regarding his/her condition(s) and treatment.  I provided the patient with my contact information today and encouraged  to contact me via phone or [facility] Patient Portal as needed.     Length of Visit   Total Time: 60 Minutes

## 2023-12-07 RX ORDER — PEN NEEDLE, DIABETIC 31 GX5/16"
1 NEEDLE, DISPOSABLE MISCELLANEOUS 3 TIMES DAILY
Qty: 100 EACH | Refills: 1 | Status: SHIPPED | OUTPATIENT
Start: 2023-12-07

## 2023-12-07 RX ORDER — CALCIUM CITRATE/VITAMIN D3 200MG-6.25
TABLET ORAL
Qty: 100 EACH | Refills: 1 | Status: SHIPPED | OUTPATIENT
Start: 2023-12-07 | End: 2023-12-08 | Stop reason: SDUPTHER

## 2023-12-07 RX ORDER — INSULIN GLARGINE 100 [IU]/ML
INJECTION, SOLUTION SUBCUTANEOUS
Qty: 10 EACH | Refills: 1 | Status: SHIPPED | OUTPATIENT
Start: 2023-12-07 | End: 2024-02-19 | Stop reason: SDUPTHER

## 2023-12-07 RX ORDER — DULAGLUTIDE 1.5 MG/.5ML
1.5 INJECTION, SOLUTION SUBCUTANEOUS
Qty: 4 PEN | Refills: 1 | Status: SHIPPED | OUTPATIENT
Start: 2023-12-07 | End: 2024-03-08 | Stop reason: SDUPTHER

## 2023-12-08 ENCOUNTER — INFUSION (OUTPATIENT)
Dept: INFUSION THERAPY | Facility: HOSPITAL | Age: 58
End: 2023-12-08
Attending: INTERNAL MEDICINE
Payer: MEDICAID

## 2023-12-08 VITALS
WEIGHT: 196 LBS | SYSTOLIC BLOOD PRESSURE: 117 MMHG | OXYGEN SATURATION: 98 % | BODY MASS INDEX: 30.76 KG/M2 | DIASTOLIC BLOOD PRESSURE: 78 MMHG | RESPIRATION RATE: 20 BRPM | HEIGHT: 67 IN | HEART RATE: 91 BPM | TEMPERATURE: 98 F

## 2023-12-08 DIAGNOSIS — Z79.4 TYPE 2 DIABETES MELLITUS WITH OTHER SPECIFIED COMPLICATION, WITH LONG-TERM CURRENT USE OF INSULIN: Primary | ICD-10-CM

## 2023-12-08 DIAGNOSIS — E11.69 TYPE 2 DIABETES MELLITUS WITH OTHER SPECIFIED COMPLICATION, WITH LONG-TERM CURRENT USE OF INSULIN: Primary | ICD-10-CM

## 2023-12-08 DIAGNOSIS — E61.1 IRON DEFICIENCY: Primary | ICD-10-CM

## 2023-12-08 PROCEDURE — 96365 THER/PROPH/DIAG IV INF INIT: CPT

## 2023-12-08 PROCEDURE — 63600175 PHARM REV CODE 636 W HCPCS: Performed by: NURSE PRACTITIONER

## 2023-12-08 PROCEDURE — 25000003 PHARM REV CODE 250: Performed by: NURSE PRACTITIONER

## 2023-12-08 RX ORDER — SODIUM CHLORIDE 0.9 % (FLUSH) 0.9 %
10 SYRINGE (ML) INJECTION
Status: DISCONTINUED | OUTPATIENT
Start: 2023-12-08 | End: 2023-12-08 | Stop reason: HOSPADM

## 2023-12-08 RX ORDER — HEPARIN 100 UNIT/ML
500 SYRINGE INTRAVENOUS
Status: DISCONTINUED | OUTPATIENT
Start: 2023-12-08 | End: 2023-12-08 | Stop reason: HOSPADM

## 2023-12-08 RX ADMIN — SODIUM CHLORIDE 125 MG: 9 INJECTION, SOLUTION INTRAVENOUS at 09:12

## 2023-12-08 RX ADMIN — SODIUM CHLORIDE: 9 INJECTION, SOLUTION INTRAVENOUS at 09:12

## 2023-12-11 RX ORDER — CALCIUM CITRATE/VITAMIN D3 200MG-6.25
TABLET ORAL
Qty: 100 EACH | Refills: 1 | Status: SHIPPED | OUTPATIENT
Start: 2023-12-11

## 2023-12-14 RX ORDER — SODIUM CHLORIDE 0.9 % (FLUSH) 0.9 %
10 SYRINGE (ML) INJECTION
Status: CANCELLED | OUTPATIENT
Start: 2023-12-18

## 2023-12-14 RX ORDER — HEPARIN 100 UNIT/ML
500 SYRINGE INTRAVENOUS
Status: CANCELLED | OUTPATIENT
Start: 2023-12-18

## 2023-12-15 ENCOUNTER — INFUSION (OUTPATIENT)
Dept: INFUSION THERAPY | Facility: HOSPITAL | Age: 58
End: 2023-12-15
Attending: INTERNAL MEDICINE
Payer: MEDICAID

## 2023-12-15 VITALS
DIASTOLIC BLOOD PRESSURE: 79 MMHG | TEMPERATURE: 98 F | WEIGHT: 200.38 LBS | HEIGHT: 67 IN | HEART RATE: 86 BPM | RESPIRATION RATE: 20 BRPM | SYSTOLIC BLOOD PRESSURE: 129 MMHG | BODY MASS INDEX: 31.45 KG/M2

## 2023-12-15 DIAGNOSIS — E61.1 IRON DEFICIENCY: Primary | ICD-10-CM

## 2023-12-15 PROCEDURE — A4216 STERILE WATER/SALINE, 10 ML: HCPCS | Performed by: NURSE PRACTITIONER

## 2023-12-15 PROCEDURE — 96365 THER/PROPH/DIAG IV INF INIT: CPT

## 2023-12-15 PROCEDURE — 25000003 PHARM REV CODE 250: Performed by: NURSE PRACTITIONER

## 2023-12-15 PROCEDURE — 63600175 PHARM REV CODE 636 W HCPCS: Performed by: NURSE PRACTITIONER

## 2023-12-15 RX ORDER — HEPARIN 100 UNIT/ML
500 SYRINGE INTRAVENOUS
Status: DISCONTINUED | OUTPATIENT
Start: 2023-12-15 | End: 2023-12-15 | Stop reason: HOSPADM

## 2023-12-15 RX ORDER — SODIUM CHLORIDE 0.9 % (FLUSH) 0.9 %
10 SYRINGE (ML) INJECTION
Status: DISCONTINUED | OUTPATIENT
Start: 2023-12-15 | End: 2023-12-15 | Stop reason: HOSPADM

## 2023-12-15 RX ADMIN — SODIUM CHLORIDE 125 MG: 9 INJECTION, SOLUTION INTRAVENOUS at 10:12

## 2023-12-15 RX ADMIN — Medication 10 ML: at 11:12

## 2023-12-19 ENCOUNTER — OFFICE VISIT (OUTPATIENT)
Dept: SURGERY | Facility: CLINIC | Age: 58
End: 2023-12-19
Payer: MEDICAID

## 2023-12-19 VITALS
TEMPERATURE: 98 F | BODY MASS INDEX: 31.02 KG/M2 | OXYGEN SATURATION: 100 % | WEIGHT: 197.63 LBS | HEART RATE: 89 BPM | SYSTOLIC BLOOD PRESSURE: 108 MMHG | HEIGHT: 67 IN | DIASTOLIC BLOOD PRESSURE: 74 MMHG | RESPIRATION RATE: 18 BRPM

## 2023-12-19 DIAGNOSIS — K44.9 HIATAL HERNIA: Primary | ICD-10-CM

## 2023-12-19 PROCEDURE — 99215 OFFICE O/P EST HI 40 MIN: CPT | Mod: PBBFAC

## 2023-12-19 NOTE — PROGRESS NOTES
Patient presented to clinic with no complaints of pain. Patient seen by Dr. Lyric Carter. Will refer out for sx.

## 2023-12-19 NOTE — PROGRESS NOTES
"[facility] General Surgery Clinic Note    HPI: Jessica Walker is a 58F w/ PMH HTN, CAD, GERD, DM, JAK2 mutation with thrombocytosis, presenting to surgery for hiatal hernia evaluation. Pt of note also has a ventral hernia for years. Pt noted to have hiatal hernia on recent EGD with Dr. Larose. Pt has intermittent GERD which she takes pantoprazole. Pt denies nausea/vomiting/fevers/chills/diarrhea/constipation.     PMH:   Past Medical History:   Diagnosis Date    Anemia     Depression     Diabetes mellitus     Hypertension       Meds:   Current Outpatient Medications:     aspirin (ECOTRIN) 81 MG EC tablet, Take 81 mg by mouth once daily., Disp: , Rfl:     atorvastatin (LIPITOR) 40 MG tablet, Take 1 tablet (40 mg total) by mouth every evening., Disp: 90 tablet, Rfl: 3    BD ULTRA-FINE SHORT PEN NEEDLE 31 gauge x 5/16" Ndle, Inject 1 each into the skin 3 (three) times daily., Disp: 100 each, Rfl: 1    clotrimazole-betamethasone 1-0.05% (LOTRISONE) cream, Apply topically 2 (two) times daily., Disp: 45 g, Rfl: 1    ferrous sulfate 324 mg (65 mg iron) TbEC, Take 1 tablet (324 mg total) by mouth every Mon, Wed, Fri., Disp: 24 tablet, Rfl: 3    hydrOXYzine pamoate (VISTARIL) 25 MG Cap, Take 25 mg by mouth nightly as needed., Disp: , Rfl:     ibuprofen (ADVIL,MOTRIN) 800 MG tablet, Take 800 mg by mouth., Disp: , Rfl:     LANTUS SOLOSTAR U-100 INSULIN glargine 100 units/mL SubQ pen, INJECT 70 UNITS SUBCUTANEOUSLY EVERY DAY AT BEDTIME, Disp: 10 each, Rfl: 1    lisinopriL (PRINIVIL,ZESTRIL) 20 MG tablet, Take 1 tablet (20 mg total) by mouth once daily., Disp: 90 tablet, Rfl: 3    metFORMIN (GLUCOPHAGE) 1000 MG tablet, TAKE ONE TABLET BY MOUTH TWICE A DAY, Disp: 60 tablet, Rfl: 6    metoprolol tartrate (LOPRESSOR) 25 MG tablet, Take 1 tablet (25 mg total) by mouth 2 (two) times daily., Disp: 180 tablet, Rfl: 3    mirtazapine (REMERON) 15 MG tablet, Take 15 mg by mouth every evening., Disp: , Rfl:     mupirocin (BACTROBAN) 2 % " ointment, , Disp: , Rfl:     pantoprazole (PROTONIX) 40 MG tablet, Take 1 tablet (40 mg total) by mouth once daily., Disp: 30 tablet, Rfl: 5    TRUE METRIX GLUCOSE TEST STRIP Strp, USE 1 STRIP TO CHECK GLUCOSE ONCE DAILY, Disp: 100 each, Rfl: 1    TRULICITY 1.5 mg/0.5 mL pen injector, Inject 1.5 mg into the skin every 7 days., Disp: 4 pen , Rfl: 1    clindamycin (CLEOCIN) 300 MG capsule, , Disp: , Rfl:     flash glucose scanning reader (FREESTYLE ABDULKADIR 2 READER) Misc, Check sugars at least 3 times a day, Disp: 2 each, Rfl: 2    flash glucose sensor (FREESTYLE ABDULKADIR 14 DAY SENSOR) Kit, Check sugars at least 3 times daily, Disp: 2 kit, Rfl: 2    nitroGLYCERIN (NITROSTAT) 0.4 MG SL tablet, Place 1 tablet (0.4 mg total) under the tongue every 5 (five) minutes as needed for Chest pain., Disp: 25 tablet, Rfl: 6    XIIDRA 5 % Dpet, , Disp: , Rfl:   No current facility-administered medications for this visit.    Facility-Administered Medications Ordered in Other Visits:     0.9%  NaCl infusion, , Intravenous, Continuous, Marielos Bahena MD    albuterol-ipratropium 2.5 mg-0.5 mg/3 mL nebulizer solution 3 mL, 3 mL, Nebulization, Once PRN, Marisa Ribera MD    dextrose 10% bolus 125 mL 125 mL, 12.5 g, Intravenous, PRN, Katherine Bernal S, FNP    dextrose 10% bolus 125 mL 125 mL, 12.5 g, Intravenous, PRN, Katherine Bernal S, FNP    diphenhydrAMINE injection 25 mg, 25 mg, Intravenous, Once PRN, Marisa Ribera MD    HYDROmorphone injection 0.2 mg, 0.2 mg, Intravenous, Q5 Min PRN, Marisa Ribera MD    HYDROmorphone injection 0.5 mg, 0.5 mg, Intravenous, Q5 Min PRN, Marisa Ribera MD    insulin aspart U-100 injection 2-9 Units, 2-9 Units, Subcutaneous, PRN, Katherine Bernal, ANAHI    insulin aspart U-100 injection 4-12 Units, 4-12 Units, Subcutaneous, PRN, Katherine Bernal, ANAHI    lactated ringers infusion, , Intravenous, Continuous, Marisa Ribera MD, Last Rate: 125 mL/hr at 09/26/23 0856,  Rate Change at 23 0856    LIDOcaine (PF) 10 mg/ml (1%) injection 10 mg, 1 mL, Intradermal, Once, Katherine Bernal FNP    ondansetron injection 4 mg, 4 mg, Intravenous, Once, Marisa Ribera MD    oxyCODONE-acetaminophen 5-325 mg per tablet 2 tablet, 2 tablet, Oral, Once, Marisa Ribera MD    prochlorperazine injection Soln 5 mg, 5 mg, Intravenous, Once PRN, Marisa Ribera MD  Allergies:   Review of patient's allergies indicates:   Allergen Reactions    Ivp dye [iodinated contrast media] Itching     Social History:   Social History     Tobacco Use    Smoking status: Every Day     Current packs/day: 1.00     Average packs/day: 1 pack/day for 41.0 years (41.0 ttl pk-yrs)     Types: Cigarettes    Smokeless tobacco: Never   Substance Use Topics    Alcohol use: Never    Drug use: Not Currently     Family History:   Family History   Problem Relation Age of Onset    Diabetes Mother     Coronary artery disease Mother     Alcohol abuse Mother     Arthritis Mother     Heart disease Mother         Had 13 stents in her heart    Hyperlipidemia Mother     Diabetes Father     Cervical cancer Sister     Arthritis Sister     Cancer Sister         Breast cancer    COPD Sister     Diabetes Sister     Fibromyalgia Sister     Breast cancer Sister     Cancer Sister          from cervical cancer at 55    Early death Sister     Cancer Maternal Grandfather          from Leukemia    Cancer Maternal Grandmother          from throat cancer at 64    Cancer Maternal Uncle          from cancer     Surgical History:   Past Surgical History:   Procedure Laterality Date    CHOLECYSTECTOMY      COLONOSCOPY  10/2021    ESOPHAGOGASTRODUODENOSCOPY N/A 2023    Procedure: EGD (ESOPHAGOGASTRODUODENOSCOPY);  Surgeon: Kita Larose MD;  Location: [facility] ENDOSCOPY;  Service: Gastroenterology;  Laterality: N/A;    EYE SURGERY  89    LK and RK    HYSTEROSCOPY WITH DILATION AND CURETTAGE OF UTERUS N/A  9/26/2023    Procedure: HYSTEROSCOPY, WITH DILATION AND CURETTAGE OF UTERUS;  Surgeon: Deepti Chappell MD;  Location: [facility] OR;  Service: OB/GYN;  Laterality: N/A;  myosure    INTRALUMINAL GASTROINTESTINAL TRACT IMAGING VIA CAPSULE N/A 06/29/2023    Procedure: IMAGING PROCEDURE, GI TRACT, INTRALUMINAL, VIA CAPSULE;  Surgeon: Kita Larose MD;  Location: [facility] ENDOSCOPY;  Service: Gastroenterology;  Laterality: N/A;    Removal of Goiter      UPPER GASTROINTESTINAL ENDOSCOPY       Review of Systems:  Skin: No rashes or itching.  Head: Denies headache or recent trauma.  Eyes: Denies eye pain or double vision.  Neck: Denies swelling or hoarseness of voice.  Respiratory: Denies shortness of breath or chest pain  Cardiac: Denies palpitations or swelling in hands/feet.  Gastrointestinal: Denies nausea, denies vomiting.   Urinary: Denies dysuria or hematuria.  Vascular: Denies claudication or leg swelling.  Neuro: Denies motor deficits. Denies weakness.  Endocrine: Denies excessive sweating or cold intolerance.  Psych: Denies memory problems. Denies anxiety.    Objective:    Vitals:  Vitals:    12/19/23 1322   BP: 108/74   Pulse: 89   Resp: 18   Temp: 98.1 °F (36.7 °C)        Physical Exam:  Gen: NAD  Neuro: awake, alert, answering questions appropriately  CV: RRR  Resp: non-labored breathing, DEEDEE  Abd: soft, ND, NT, small ventral hernia RUQ, well healed abdominal scars  Ext: moves all 4 spontaneously and purposefully  Skin: warm, well perfused    Pertinent Labs:  None new    Imaging:  CTAP 8/18:   Impression:     Left adrenal nodule measuring 1.9 cm, slightly larger compared to the prior from 2016, likely a lipid poor adrenal adenoma.     Large hiatal hernia.     Otherwise no evidence of malignancy to the chest abdomen or pelvis.    Micro/Path/Other:  None new    Assessment/Plan:  Jessica Walker is a 58F w/ PMH HTN, CAD, GERD, DM, JAK2 mutation with thrombocytosis, presenting to surgery for hiatal hernia  evaluation.  Discussed with patient that we do not perform hiatal hernia repairs here at [facility] but would be happy to provide a referral to her. Pt stated understanding and would like referral.     - referral to Dr. Farrar for hiatal hernia repair evaluation    Lyric Carter MD  [facility] General Surgery PGY1    12/19/2023 2:56 PM

## 2023-12-20 ENCOUNTER — OFFICE VISIT (OUTPATIENT)
Dept: GASTROENTEROLOGY | Facility: CLINIC | Age: 58
End: 2023-12-20
Payer: MEDICAID

## 2023-12-20 VITALS
HEART RATE: 89 BPM | HEIGHT: 68 IN | TEMPERATURE: 99 F | OXYGEN SATURATION: 99 % | SYSTOLIC BLOOD PRESSURE: 107 MMHG | WEIGHT: 198.38 LBS | DIASTOLIC BLOOD PRESSURE: 76 MMHG | BODY MASS INDEX: 30.06 KG/M2

## 2023-12-20 DIAGNOSIS — K44.9 HIATAL HERNIA: ICD-10-CM

## 2023-12-20 DIAGNOSIS — Z86.010 PERSONAL HISTORY OF COLONIC POLYPS: ICD-10-CM

## 2023-12-20 DIAGNOSIS — Z72.0 TOBACCO USER: ICD-10-CM

## 2023-12-20 DIAGNOSIS — K31.82 DIEULAFOY LESION OF STOMACH: ICD-10-CM

## 2023-12-20 DIAGNOSIS — D50.9 IRON DEFICIENCY ANEMIA, UNSPECIFIED IRON DEFICIENCY ANEMIA TYPE: Primary | ICD-10-CM

## 2023-12-20 PROBLEM — R10.13 EPIGASTRIC ABDOMINAL PAIN: Status: RESOLVED | Noted: 2022-11-29 | Resolved: 2023-12-20

## 2023-12-20 PROCEDURE — 4010F PR ACE/ARB THEARPY RXD/TAKEN: ICD-10-PCS | Mod: CPTII,,, | Performed by: NURSE PRACTITIONER

## 2023-12-20 PROCEDURE — 3008F BODY MASS INDEX DOCD: CPT | Mod: CPTII,,, | Performed by: NURSE PRACTITIONER

## 2023-12-20 PROCEDURE — 3051F PR MOST RECENT HEMOGLOBIN A1C LEVEL 7.0 - < 8.0%: ICD-10-PCS | Mod: CPTII,,, | Performed by: NURSE PRACTITIONER

## 2023-12-20 PROCEDURE — 1159F PR MEDICATION LIST DOCUMENTED IN MEDICAL RECORD: ICD-10-PCS | Mod: CPTII,,, | Performed by: NURSE PRACTITIONER

## 2023-12-20 PROCEDURE — 4010F ACE/ARB THERAPY RXD/TAKEN: CPT | Mod: CPTII,,, | Performed by: NURSE PRACTITIONER

## 2023-12-20 PROCEDURE — 3078F PR MOST RECENT DIASTOLIC BLOOD PRESSURE < 80 MM HG: ICD-10-PCS | Mod: CPTII,,, | Performed by: NURSE PRACTITIONER

## 2023-12-20 PROCEDURE — 99214 PR OFFICE/OUTPT VISIT, EST, LEVL IV, 30-39 MIN: ICD-10-PCS | Mod: S$PBB,,, | Performed by: NURSE PRACTITIONER

## 2023-12-20 PROCEDURE — 3074F PR MOST RECENT SYSTOLIC BLOOD PRESSURE < 130 MM HG: ICD-10-PCS | Mod: CPTII,,, | Performed by: NURSE PRACTITIONER

## 2023-12-20 PROCEDURE — 1159F MED LIST DOCD IN RCRD: CPT | Mod: CPTII,,, | Performed by: NURSE PRACTITIONER

## 2023-12-20 PROCEDURE — 3074F SYST BP LT 130 MM HG: CPT | Mod: CPTII,,, | Performed by: NURSE PRACTITIONER

## 2023-12-20 PROCEDURE — 3078F DIAST BP <80 MM HG: CPT | Mod: CPTII,,, | Performed by: NURSE PRACTITIONER

## 2023-12-20 PROCEDURE — 1160F RVW MEDS BY RX/DR IN RCRD: CPT | Mod: CPTII,,, | Performed by: NURSE PRACTITIONER

## 2023-12-20 PROCEDURE — 3008F PR BODY MASS INDEX (BMI) DOCUMENTED: ICD-10-PCS | Mod: CPTII,,, | Performed by: NURSE PRACTITIONER

## 2023-12-20 PROCEDURE — 99215 OFFICE O/P EST HI 40 MIN: CPT | Mod: PBBFAC | Performed by: NURSE PRACTITIONER

## 2023-12-20 PROCEDURE — 99214 OFFICE O/P EST MOD 30 MIN: CPT | Mod: S$PBB,,, | Performed by: NURSE PRACTITIONER

## 2023-12-20 PROCEDURE — 1160F PR REVIEW ALL MEDS BY PRESCRIBER/CLIN PHARMACIST DOCUMENTED: ICD-10-PCS | Mod: CPTII,,, | Performed by: NURSE PRACTITIONER

## 2023-12-20 PROCEDURE — 3051F HG A1C>EQUAL 7.0%<8.0%: CPT | Mod: CPTII,,, | Performed by: NURSE PRACTITIONER

## 2023-12-20 RX ORDER — CYCLOSPORINE 0.5 MG/ML
1 EMULSION OPHTHALMIC 2 TIMES DAILY
COMMUNITY

## 2023-12-20 NOTE — ASSESSMENT & PLAN NOTE
She underwent EGD and colonoscopy with Dr. Wong October 27, 2021.  Colonoscopy with findings of diffuse diverticulosis and EGD with findings of large hiatal hernia and gastric punctate AVM fulgurated with APC, small superficial duodenal ulcerations.  Pathology was benign.  She underwent EGD June 26, 2023 with findings of 7 cm type III paraesophageal hiatal hernia with Buddy ulcers, small Dieulafoy lesion with mild oozing seen in the gastric body treated with APC, normal examined duodenum, no specimens collected.  She was referred to surgeon to discuss antireflux surgery options and iron deficiency was thought likely due to large hernia with Buddy ulcerations.  Video capsule endoscopy was normal June 29, 2023.  Iron infusions were received January 11, 2023, January 18, 2023, May 2, 2023, May 9, 2023, June 30, 2023, July 7, 2023, October 9, 2023, October 24, 2023, December 8, 2023, December 15, 2023.  She has upcoming iron infusion scheduled December 22, 2023, December 29, 2023, January 5, 2024, January 12, 2024, January 19, 2024, January 26, 2024.  She was evaluated by surgery clinic yesterday and a referral was placed to Dr. Farrar for hiatal hernia repair evaluation.  CBC, iron profile, ferritin  We will follow laboratory results once drawn January 10, 2024  Follow-up with Dr. Farrar at scheduled time and date  Continue pantoprazole 40 mg daily   Continue iron infusions as directed  Recommend tobacco cessation  Call with updates  ER precautions provided  Follow-up clinic visit with NP in 4-6 months

## 2023-12-20 NOTE — PROGRESS NOTES
Subjective:       Patient ID: Jessica Walker is a 58 y.o. female.    Chief Complaint: SARAH, hiatal hernia    This 58-year-old  female with anemia, depression, diabetes mellitus, hypertension, and cholecystectomy presents unaccompanied for a follow-up visit.  She was initially referred for anemia and seen November 29, 2022.  She reported intermittent epigastric abdominal pain described as sharp and aching and nonradiating for approximately one year.  She was unable to identify specific triggers and reported relief with pantoprazole 40 mg daily.  She had occasional nausea without vomiting.  She had frequent regurgitation of acid and burning noted at the back of her throat.  Her appetite was fair and she denied unintentional weight loss.  She denied nocturnal symptoms, fever, chills, hematemesis, odynophagia, dysphagia, or early satiety.  Bowel habits were described as formed stools once daily without melena, hematochezia, fecal urgency, fecal incontinence, or pain with defecation.  She received iron infusions in the past and denied currently taking oral iron supplementation.     Gastric emptying study was within normal limits September 1, 2022.    She underwent EGD and colonoscopy with Dr. Wong October 27, 2021.  Colonoscopy with findings of diffuse diverticulosis and EGD with findings of large hiatal hernia and gastric punctate AVM fulgurated with APC, small superficial duodenal ulcerations.  Pathology was benign.    She underwent EGD June 26, 2023 with findings of 7 cm type III paraesophageal hiatal hernia with Buddy ulcers, small Dieulafoy lesion with mild oozing seen in the gastric body treated with APC, normal examined duodenum, no specimens collected.  She was referred to surgeon to discuss antireflux surgery options and iron deficiency was thought likely due to large hernia with Buddy ulcerations.    Video capsule endoscopy was normal June 29, 2023.    Iron infusions were received January  11, 2023, January 18, 2023, May 2, 2023, May 9, 2023, June 30, 2023, July 7, 2023, October 9, 2023, October 24, 2023, December 8, 2023, December 15, 2023.    She has upcoming iron infusion scheduled December 22, 2023, December 29, 2023, January 5, 2024, January 12, 2024, January 19, 2024, January 26, 2024.    She was evaluated by surgery clinic yesterday and a referral was placed to Dr. Farrar for hiatal hernia repair evaluation.    Today, she presents for a follow-up visit.  She reports fatigue with minimal energy for the past several years.  She otherwise feels well and denies nocturnal symptoms, appetite changes, fever, chills, nausea, vomiting, hematemesis, odynophagia, dysphagia, acid reflux, pyrosis, belching, bloating, early satiety, or abdominal pain.  Bowel habits are described as formed stools once daily without melena, hematochezia, fecal urgency, fecal incontinence, or pain with defecation.  She is requesting to have CBC and iron studies checked at this time.  Her next lab work is due January 10, 2024.  She is not currently on oral iron supplementation at this time.     She underwent colonoscopy with Dr. Koo February 26, 2018 with findings of diverticulosis in the left side of the colon, sigmoid colon and ascending colon, normal mucosa in the right side of the colon, and tubular adenomatous polyp in the ascending colon with a recommended recall of 5 years.  She takes aspirin 81 mg daily.  She smokes 1 pack of cigarettes daily and denies alcohol use.  She denies a family history of IBD, colon polyps, or colon cancer.      Review of patient's allergies indicates:   Allergen Reactions    Ivp dye [iodinated contrast media] Itching     Past Medical History:   Diagnosis Date    Anemia     Depression     Diabetes mellitus     Hypertension      Past Surgical History:   Procedure Laterality Date    CHOLECYSTECTOMY      COLONOSCOPY  10/2021    ESOPHAGOGASTRODUODENOSCOPY N/A 06/26/2023    Procedure: EGD  (ESOPHAGOGASTRODUODENOSCOPY);  Surgeon: Kita Larose MD;  Location: [facility] ENDOSCOPY;  Service: Gastroenterology;  Laterality: N/A;    EYE SURGERY  7/6/93    LK and RK    HYSTEROSCOPY WITH DILATION AND CURETTAGE OF UTERUS N/A 9/26/2023    Procedure: HYSTEROSCOPY, WITH DILATION AND CURETTAGE OF UTERUS;  Surgeon: Deepti Chappell MD;  Location: [facility] OR;  Service: OB/GYN;  Laterality: N/A;  myosure    INTRALUMINAL GASTROINTESTINAL TRACT IMAGING VIA CAPSULE N/A 06/29/2023    Procedure: IMAGING PROCEDURE, GI TRACT, INTRALUMINAL, VIA CAPSULE;  Surgeon: Kita Larose MD;  Location: [facility] ENDOSCOPY;  Service: Gastroenterology;  Laterality: N/A;    Removal of Goiter      UPPER GASTROINTESTINAL ENDOSCOPY       Family History:   family history includes Alcohol abuse in her mother; Arthritis in her mother and sister; Breast cancer in her sister; COPD in her sister; Cancer in her maternal grandfather, maternal grandmother, maternal uncle, sister, and sister; Cervical cancer in her sister; Coronary artery disease in her mother; Diabetes in her father, mother, and sister; Early death in her sister; Fibromyalgia in her sister; Heart disease in her mother; Hyperlipidemia in her mother.    Social History:    reports that she has been smoking cigarettes. She has a 41.0 pack-year smoking history. She has never used smokeless tobacco. She reports that she does not currently use drugs. She reports that she does not drink alcohol.    Review of Systems  Negative except as noted in the HPI.      Objective:      Physical Exam  Constitutional:       Appearance: Normal appearance.   HENT:      Head: Normocephalic.      Mouth/Throat:      Mouth: Mucous membranes are moist.   Eyes:      Extraocular Movements: Extraocular movements intact.      Conjunctiva/sclera: Conjunctivae normal.      Pupils: Pupils are equal, round, and reactive to light.   Cardiovascular:      Rate and Rhythm: Normal rate and regular rhythm.      Pulses:  "Normal pulses.      Heart sounds: Normal heart sounds.   Pulmonary:      Effort: Pulmonary effort is normal.      Breath sounds: Normal breath sounds.   Abdominal:      General: Bowel sounds are normal.      Palpations: Abdomen is soft.   Musculoskeletal:         General: Normal range of motion.      Cervical back: Normal range of motion and neck supple.   Skin:     General: Skin is warm and dry.   Neurological:      General: No focal deficit present.      Mental Status: She is alert and oriented to person, place, and time.   Psychiatric:         Mood and Affect: Mood normal.         Behavior: Behavior normal.         Thought Content: Thought content normal.         Judgment: Judgment normal.         Home Medications:     Current Outpatient Medications   Medication Sig    aspirin (ECOTRIN) 81 MG EC tablet Take 81 mg by mouth once daily.    atorvastatin (LIPITOR) 40 MG tablet Take 1 tablet (40 mg total) by mouth every evening.    BD ULTRA-FINE SHORT PEN NEEDLE 31 gauge x 5/16" Ndle Inject 1 each into the skin 3 (three) times daily.    clotrimazole-betamethasone 1-0.05% (LOTRISONE) cream Apply topically 2 (two) times daily.    ferrous sulfate 324 mg (65 mg iron) TbEC Take 1 tablet (324 mg total) by mouth every Mon, Wed, Fri.    hydrOXYzine pamoate (VISTARIL) 25 MG Cap Take 25 mg by mouth nightly as needed.    ibuprofen (ADVIL,MOTRIN) 800 MG tablet Take 800 mg by mouth.    LANTUS SOLOSTAR U-100 INSULIN glargine 100 units/mL SubQ pen INJECT 70 UNITS SUBCUTANEOUSLY EVERY DAY AT BEDTIME    lisinopriL (PRINIVIL,ZESTRIL) 20 MG tablet Take 1 tablet (20 mg total) by mouth once daily.    metFORMIN (GLUCOPHAGE) 1000 MG tablet TAKE ONE TABLET BY MOUTH TWICE A DAY    metoprolol tartrate (LOPRESSOR) 25 MG tablet Take 1 tablet (25 mg total) by mouth 2 (two) times daily.    mirtazapine (REMERON) 15 MG tablet Take 15 mg by mouth every evening.    mupirocin (BACTROBAN) 2 % ointment     nitroGLYCERIN (NITROSTAT) 0.4 MG SL tablet Place " 1 tablet (0.4 mg total) under the tongue every 5 (five) minutes as needed for Chest pain.    pantoprazole (PROTONIX) 40 MG tablet Take 1 tablet (40 mg total) by mouth once daily.    TRUE METRIX GLUCOSE TEST STRIP Strp USE 1 STRIP TO CHECK GLUCOSE ONCE DAILY    TRULICITY 1.5 mg/0.5 mL pen injector Inject 1.5 mg into the skin every 7 days.    clindamycin (CLEOCIN) 300 MG capsule     flash glucose scanning reader (FREESTYLE ABDULKADIR 2 READER) Misc Check sugars at least 3 times a day    flash glucose sensor (FREESTYLE ABDULKADIR 14 DAY SENSOR) Kit Check sugars at least 3 times daily    XIIDRA 5 % Dpet      Facility-Administered Medications Ordered in Other Visits   Medication Frequency    0.9%  NaCl infusion Continuous    albuterol-ipratropium 2.5 mg-0.5 mg/3 mL nebulizer solution 3 mL Once PRN    dextrose 10% bolus 125 mL 125 mL PRN    dextrose 10% bolus 125 mL 125 mL PRN    diphenhydrAMINE injection 25 mg Once PRN    HYDROmorphone injection 0.2 mg Q5 Min PRN    HYDROmorphone injection 0.5 mg Q5 Min PRN    insulin aspart U-100 injection 2-9 Units PRN    insulin aspart U-100 injection 4-12 Units PRN    lactated ringers infusion Continuous    LIDOcaine (PF) 10 mg/ml (1%) injection 10 mg Once    ondansetron injection 4 mg Once    oxyCODONE-acetaminophen 5-325 mg per tablet 2 tablet Once    prochlorperazine injection Soln 5 mg Once PRN     Laboratory Results:     Recent Results (from the past 2016 hour(s))   POCT glucose    Collection Time: 10/09/23  2:13 PM   Result Value Ref Range    POCT Glucose 195 (H) 70 - 110 mg/dL   Comprehensive Metabolic Panel    Collection Time: 12/06/23 11:48 AM   Result Value Ref Range    Sodium Level 137 136 - 145 mmol/L    Potassium Level 4.8 3.5 - 5.1 mmol/L    Chloride 102 98 - 107 mmol/L    Carbon Dioxide 25 22 - 29 mmol/L    Glucose Level 193 (H) 74 - 100 mg/dL    Blood Urea Nitrogen 11.1 9.8 - 20.1 mg/dL    Creatinine 0.77 0.55 - 1.02 mg/dL    Calcium Level Total 9.7 8.4 - 10.2 mg/dL    Protein  Total 7.1 6.4 - 8.3 gm/dL    Albumin Level 3.9 3.5 - 5.0 g/dL    Globulin 3.2 2.4 - 3.5 gm/dL    Albumin/Globulin Ratio 1.2 1.1 - 2.0 ratio    Bilirubin Total 0.2 <=1.5 mg/dL    Alkaline Phosphatase 118 40 - 150 unit/L    Alanine Aminotransferase 11 0 - 55 unit/L    Aspartate Aminotransferase 10 5 - 34 unit/L    eGFR >60 mls/min/1.73/m2   CBC with Differential    Collection Time: 12/06/23 11:48 AM   Result Value Ref Range    WBC 13.43 (H) 4.50 - 11.50 x10(3)/mcL    RBC 4.50 4.20 - 5.40 x10(6)/mcL    Hgb 10.8 (L) 12.0 - 16.0 g/dL    Hct 35.8 (L) 37.0 - 47.0 %    MCV 79.6 (L) 80.0 - 94.0 fL    MCH 24.0 (L) 27.0 - 31.0 pg    MCHC 30.2 (L) 33.0 - 36.0 g/dL    RDW 18.7 (H) 11.5 - 17.0 %    Platelet 678 (H) 130 - 400 x10(3)/mcL    MPV 10.3 7.4 - 10.4 fL    Neut % 68.9 %    Lymph % 19.1 %    Mono % 6.9 %    Eos % 2.8 %    Basophil % 0.6 %    Lymph # 2.56 0.6 - 4.6 x10(3)/mcL    Neut # 9.25 (H) 2.1 - 9.2 x10(3)/mcL    Mono # 0.93 0.1 - 1.3 x10(3)/mcL    Eos # 0.38 0 - 0.9 x10(3)/mcL    Baso # 0.08 <=0.2 x10(3)/mcL    IG# 0.23 (H) 0 - 0.04 x10(3)/mcL    IG% 1.7 %   Iron and TIBC    Collection Time: 12/06/23 11:48 AM   Result Value Ref Range    Iron Binding Capacity Unsaturated 351 (H) 70 - 310 ug/dL    Iron Level 13 (L) 50 - 170 ug/dL    Transferrin 352 180 - 382 mg/dL    Iron Binding Capacity Total 364 250 - 450 ug/dL    Iron Saturation 4 (L) 20 - 50 %   Ferritin    Collection Time: 12/06/23 11:48 AM   Result Value Ref Range    Ferritin Level 14.60 4.63 - 204.00 ng/mL     Imaging Results:     Narrative & Impression  EXAMINATION:  CT CHEST ABDOMEN PELVIS WITH CONTRAST (XPD)     CLINICAL HISTORY:  unintentional weight loss, fatigue, weakness, leukocytosis; Abnormal weight loss     TECHNIQUE:  Low dose axial images, sagittal and coronal reformations were obtained from the thoracic inlet to the pubic symphysis following the IV administration of 100 mL of Omnipaque 350 .  Oral contrast not given.  .  Automated exposure  control used.     COMPARISON:  None     FINDINGS:  Chest: Heart, pericardium, trachea normal.  No mediastinal mass, lymphadenopathy, hilar enlargement.  There is a large hiatal hernia containing the gastric fundus and portion of the body.     Soft tissues of the surrounding chest wall are normal.  Right thyroid lobe removed.     Lungs clear without infiltrate, consolidation, pleural fluid, or pulmonary nodule.     Abdomen and pelvis: Liver demonstrates normal enhancement.  No focal lesion.     Gallbladder removed.  Biliary ductal system normal.     Pancreas appears partially removed.  Large hiatal hernia.  Spleen normal.  Left adrenal nodule 1.9 cm with nonspecific density characteristics.  Right adrenal gland normal.     Kidneys demonstrate mild renal cortical thinning, normal enhancement, no hydronephrosis.     Aorta tapers normally.     No bowel obstruction, ascites, inflammatory change, lymphadenopathy.     Uterus, adnexa, bladder, rectum normal.     Musculoskeletal: No abnormal bony sclerosis or lucency to indicate metastatic disease.     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     Large hiatal hernia.     Otherwise no evidence of malignancy to the chest abdomen or pelvis.      Electronically signed by: Jacy Ribera MD  Date:                                            08/18/2023  Time:                                           21:23    Assessment/Plan:     Problem List Items Addressed This Visit          Oncology    Iron deficiency anemia - Primary     She underwent EGD and colonoscopy with Dr. Wong October 27, 2021.  Colonoscopy with findings of diffuse diverticulosis and EGD with findings of large hiatal hernia and gastric punctate AVM fulgurated with APC, small superficial duodenal ulcerations.  Pathology was benign.  She underwent EGD June 26, 2023 with findings of 7 cm type III paraesophageal hiatal  hernia with Buddy ulcers, small Dieulafoy lesion with mild oozing seen in the gastric body treated with APC, normal examined duodenum, no specimens collected.  She was referred to surgeon to discuss antireflux surgery options and iron deficiency was thought likely due to large hernia with Buddy ulcerations.  Video capsule endoscopy was normal June 29, 2023.  Iron infusions were received January 11, 2023, January 18, 2023, May 2, 2023, May 9, 2023, June 30, 2023, July 7, 2023, October 9, 2023, October 24, 2023, December 8, 2023, December 15, 2023.  She has upcoming iron infusion scheduled December 22, 2023, December 29, 2023, January 5, 2024, January 12, 2024, January 19, 2024, January 26, 2024.  She was evaluated by surgery clinic yesterday and a referral was placed to Dr. Farrar for hiatal hernia repair evaluation.  CBC, iron profile, ferritin  We will follow laboratory results once drawn January 10, 2024  Follow-up with Dr. Farrar at scheduled time and date  Continue pantoprazole 40 mg daily   Continue iron infusions as directed  Recommend tobacco cessation  Call with updates  ER precautions provided  Follow-up clinic visit with NP in 4-6 months         Relevant Orders    CBC Auto Differential    Ferritin    Iron and TIBC       GI    Hiatal hernia     See above         Relevant Orders    CBC Auto Differential    Ferritin    Iron and TIBC    Dieulafoy lesion of stomach     See above         Relevant Orders    CBC Auto Differential    Ferritin    Iron and TIBC    Personal history of colonic polyps     See above            Other    Tobacco user     Recommend tobacco cessation.         Relevant Orders    Ambulatory referral/consult to Smoking Cessation Program

## 2023-12-22 ENCOUNTER — INFUSION (OUTPATIENT)
Dept: INFUSION THERAPY | Facility: HOSPITAL | Age: 58
End: 2023-12-22
Attending: INTERNAL MEDICINE
Payer: MEDICAID

## 2023-12-22 VITALS
BODY MASS INDEX: 30.14 KG/M2 | WEIGHT: 198.88 LBS | RESPIRATION RATE: 20 BRPM | SYSTOLIC BLOOD PRESSURE: 104 MMHG | OXYGEN SATURATION: 98 % | DIASTOLIC BLOOD PRESSURE: 79 MMHG | HEIGHT: 68 IN | TEMPERATURE: 98 F

## 2023-12-22 DIAGNOSIS — E61.1 IRON DEFICIENCY: Primary | ICD-10-CM

## 2023-12-22 PROCEDURE — A4216 STERILE WATER/SALINE, 10 ML: HCPCS | Performed by: INTERNAL MEDICINE

## 2023-12-22 PROCEDURE — 25000003 PHARM REV CODE 250: Performed by: INTERNAL MEDICINE

## 2023-12-22 PROCEDURE — 96365 THER/PROPH/DIAG IV INF INIT: CPT

## 2023-12-22 PROCEDURE — 63600175 PHARM REV CODE 636 W HCPCS: Performed by: INTERNAL MEDICINE

## 2023-12-22 RX ORDER — HEPARIN 100 UNIT/ML
500 SYRINGE INTRAVENOUS
Status: CANCELLED | OUTPATIENT
Start: 2024-01-01

## 2023-12-22 RX ORDER — SODIUM CHLORIDE 0.9 % (FLUSH) 0.9 %
10 SYRINGE (ML) INJECTION
Status: CANCELLED | OUTPATIENT
Start: 2023-12-25

## 2023-12-22 RX ORDER — SODIUM CHLORIDE 0.9 % (FLUSH) 0.9 %
10 SYRINGE (ML) INJECTION
Status: DISCONTINUED | OUTPATIENT
Start: 2023-12-22 | End: 2023-12-22 | Stop reason: HOSPADM

## 2023-12-22 RX ORDER — SODIUM CHLORIDE 0.9 % (FLUSH) 0.9 %
10 SYRINGE (ML) INJECTION
Status: CANCELLED | OUTPATIENT
Start: 2024-01-01

## 2023-12-22 RX ORDER — HEPARIN 100 UNIT/ML
500 SYRINGE INTRAVENOUS
Status: CANCELLED | OUTPATIENT
Start: 2023-12-25

## 2023-12-22 RX ORDER — HEPARIN 100 UNIT/ML
500 SYRINGE INTRAVENOUS
Status: DISCONTINUED | OUTPATIENT
Start: 2023-12-22 | End: 2023-12-22 | Stop reason: HOSPADM

## 2023-12-22 RX ADMIN — Medication 10 ML: at 01:12

## 2023-12-22 RX ADMIN — SODIUM CHLORIDE 125 MG: 9 INJECTION, SOLUTION INTRAVENOUS at 11:12

## 2023-12-29 ENCOUNTER — TELEPHONE (OUTPATIENT)
Dept: INFUSION THERAPY | Facility: HOSPITAL | Age: 58
End: 2023-12-29
Payer: MEDICAID

## 2024-01-04 ENCOUNTER — PATIENT OUTREACH (OUTPATIENT)
Dept: ADMINISTRATIVE | Facility: OTHER | Age: 59
End: 2024-01-04
Payer: MEDICAID

## 2024-01-04 NOTE — PROGRESS NOTES
LPN spoke to patient/caregiver as per OPCM referral for: Food    Does the patient consent to completing the assessment/enrollment: Yes  Does the patient consent for LPN to speak to a caregiver? No    Health Insurance Coverage:     Does the patient have adequate health insurance coverage? Yes  Education provided: Yes    PCP Follow-Up Appointments:    Does the patient have a primary care provider? yes - Dr Leni Presley  Date of last PCP appointment? 12/06/23  Next PCP appointment: 04/11/24  Was patient provided with education surrounding PCP services/creating a medical home? yes - Benefits of PCP    Specialist Appointments:     Does the patient have a pending specialist referral? yes - General Surgery  Does the patient have an upcoming specialist appointment? yes - Cardiology [facility]  Is the patient pregnant? No  Does the patient have a mental health provider? no       Home Medications:     Reviewed medication list with patient? No  Is the patient able to afford their medications? Yes    Care Gaps:     Does the patient have any care gaps that are due? YES --Hep C,A1c,HIV,Foot exam,Lung screen,Flu vaccine,COVID, Pneumococcal vaccine      Recent lab results:  Blood Sugar:    Provided education: Yes--daily checks  Blood Pressure:   Provided education: Yes--monitor daily        Social Determinants of Health (SDOH)    Patient's identified areas of need:    Food  Education/Resources provided:    Yes--food resources mailed and contact info given for emergency food box      Home Health/DME:    Current Home Health: No  Patient has all healthcare equipment/supplies indicated: yes  Current DME: glucometer    Additional Documentation:  Called and spoke with patient who was referred by her PCP for prepared food meals. Informed patient due to her age she does not qualify for Meals on wheels  services. She did mentioned it is hard to fix her meals ,she is tired and has weakness due to low iron. She does have SNAP  benefits which she says not much. Offered to send food bank resources and reach out to a local food bank for an emergency box to be delivered to her home x1 visit. Afterwards she can go to the food vital for boxes available.  Advised to check with family members for possible assistance they can help her with.        Follow up:   Patient agrees to scheduled follow up call. Yes

## 2024-01-04 NOTE — PATIENT INSTRUCTIONS
"Why is it important to have healthy food to eat?    Not having enough healthy food for 3 meals will lower your body's power to fight against illness.  It also lowers your body's power to help keep you well if you have asthma, COPD, heart disease and diabetes.           What is the difference between not having enough food to eat and being hungry?    Not having enough food:  Running out of food in your house before you have enough money to buy more  Cutting the size of meals, skipping meals, or not eating for a whole day because of not having enough money for food  Eating less than you feel you should because you want to share or save the food for another meal/day  Hungry is when a person feels ill, weak or pain because they have not eaten in days            Assistance with Food  ·     [facility]  [address]  [phone]  Mon-Fri 7:30am - 4:00pm  [facility]. Pantry  [address]  [phone]  Mon-Fri 8:00am - 4:30pm  [employer]  [address]  [phone]  Daily 7-9am; 11:30am-1:30pm  Food net [facility] [place]: [phone]  · [facility] Outreach: [phone]  · Meals on Wheels: [phone]  · Community Pantry (dry goods/ shelf stable food pantry) -[address]  · Community Fr[facility] - [address] (dry goods/shelf stable food pantry)  · [employer]" on [address] of [facility] (dry goods/shelf  stable food pantry)  Community food pantry in [place].   It is located under the pavilion directly behind [facility], [facility] Food Bank  [address]  [phone]  [facility]  [address], [place]  [phone]  [address], [place]  [phone]  Tuesday 8am-10am    ·       [facility]  [place] Food Pantry  [address], [place]  [phone]  [facility]: [phone]        [place] [address] [place]  [employer]  [address], [place]  [phone]  Mon/Thurs 9-11:45am   [employer]  [facility] [address], [address]  [phone]  Open Monday, Wednesday, Friday 9am-12pm    [facility]  [employer]  [address] [place], [place]  [phone]  [facility]  [address] [place], [place]  [phone]  Tu/Thurs 8:30am-11:30am  [facility] on [facility]  [address] [place], [place]  [phone]- Meal delivery, monthly commodities, available for seniors aged 60 and up. Open for lunch Monday- Friday 11am-12pm.  [facility]  [address] [place]  [phone]  Mon/Tues/Thurs 9-3pm                           School Age Children  Any family with school age kids (K-12) can sign up for a weekly food box to be delivered to their home ( anyone is eligible)  (Includes things like shelf stable milk/apple sauce/hummus/juice boxes/processed cheese snacks/pretzels/cookies/vegetable cups/tuna/chicken pouches)  Home Delivery = meal kit box including 7 days of snack and supper all shelf-stable delivered weekly to the parent/guardian's home  [employer], [place], [place]  [email or web address].  Need Help Getting to Your Clinic Appointments      Get Your Medications Delivered to You          Getting to your doctors/nurse practitioner office is important in keeping you healthy and keeping your daily activities in life.  If you stay healthy, you miss less work, you can take time off for things you want to do.   ASSISTANCE With Transportation:    [place] on [phone]  [place]:  [phone]    Delivery of Medications in [facility]              Non-Emergency Transportation: [employer]    Call [employer]:  [phone]

## 2024-01-05 ENCOUNTER — INFUSION (OUTPATIENT)
Dept: INFUSION THERAPY | Facility: HOSPITAL | Age: 59
End: 2024-01-05
Attending: INTERNAL MEDICINE
Payer: MEDICAID

## 2024-01-05 VITALS
DIASTOLIC BLOOD PRESSURE: 76 MMHG | RESPIRATION RATE: 15 BRPM | OXYGEN SATURATION: 98 % | TEMPERATURE: 98 F | WEIGHT: 193.69 LBS | SYSTOLIC BLOOD PRESSURE: 108 MMHG | HEART RATE: 95 BPM | BODY MASS INDEX: 29.12 KG/M2

## 2024-01-05 DIAGNOSIS — E61.1 IRON DEFICIENCY: Primary | ICD-10-CM

## 2024-01-05 PROCEDURE — 96365 THER/PROPH/DIAG IV INF INIT: CPT

## 2024-01-05 PROCEDURE — 25000003 PHARM REV CODE 250: Performed by: INTERNAL MEDICINE

## 2024-01-05 PROCEDURE — 63600175 PHARM REV CODE 636 W HCPCS: Performed by: INTERNAL MEDICINE

## 2024-01-05 RX ORDER — SODIUM CHLORIDE 0.9 % (FLUSH) 0.9 %
10 SYRINGE (ML) INJECTION
Status: CANCELLED | OUTPATIENT
Start: 2024-01-20

## 2024-01-05 RX ORDER — HEPARIN 100 UNIT/ML
500 SYRINGE INTRAVENOUS
Status: CANCELLED | OUTPATIENT
Start: 2024-01-27

## 2024-01-05 RX ORDER — SODIUM CHLORIDE 0.9 % (FLUSH) 0.9 %
10 SYRINGE (ML) INJECTION
Status: CANCELLED | OUTPATIENT
Start: 2024-01-27

## 2024-01-05 RX ORDER — SODIUM CHLORIDE 0.9 % (FLUSH) 0.9 %
10 SYRINGE (ML) INJECTION
Status: CANCELLED | OUTPATIENT
Start: 2024-01-08

## 2024-01-05 RX ORDER — SODIUM CHLORIDE 0.9 % (FLUSH) 0.9 %
10 SYRINGE (ML) INJECTION
Status: DISCONTINUED | OUTPATIENT
Start: 2024-01-05 | End: 2024-01-05 | Stop reason: HOSPADM

## 2024-01-05 RX ORDER — SODIUM CHLORIDE 0.9 % (FLUSH) 0.9 %
10 SYRINGE (ML) INJECTION
Status: CANCELLED | OUTPATIENT
Start: 2024-02-03

## 2024-01-05 RX ORDER — HEPARIN 100 UNIT/ML
500 SYRINGE INTRAVENOUS
Status: CANCELLED | OUTPATIENT
Start: 2024-01-08

## 2024-01-05 RX ORDER — HEPARIN 100 UNIT/ML
500 SYRINGE INTRAVENOUS
Status: CANCELLED | OUTPATIENT
Start: 2024-01-20

## 2024-01-05 RX ORDER — HEPARIN 100 UNIT/ML
500 SYRINGE INTRAVENOUS
Status: CANCELLED | OUTPATIENT
Start: 2024-02-03

## 2024-01-05 RX ADMIN — SODIUM CHLORIDE 125 MG: 9 INJECTION, SOLUTION INTRAVENOUS at 11:01

## 2024-01-08 NOTE — PROGRESS NOTES
I have reviewed the notes, assessments, and/or procedures performed by the resident, I concur with her/his documentation of Jessica Walker.     Ritika Mcdowell MD

## 2024-01-09 ENCOUNTER — TELEPHONE (OUTPATIENT)
Dept: HEMATOLOGY/ONCOLOGY | Facility: CLINIC | Age: 59
End: 2024-01-09
Payer: MEDICAID

## 2024-01-09 NOTE — TELEPHONE ENCOUNTER
Called Patient to confirm Telemed appointment with ANAHI Walker for 1/10/24 at 11:00 am. Patient requested to be reschedule to next Wednesday.

## 2024-01-12 ENCOUNTER — DOCUMENTATION ONLY (OUTPATIENT)
Dept: INFUSION THERAPY | Facility: HOSPITAL | Age: 59
End: 2024-01-12
Payer: MEDICAID

## 2024-01-12 NOTE — PROGRESS NOTES
1521 Scheduled for C5 D1 Ferrlecit patient did not attend has a future appointment scheduled for 1/19/24.

## 2024-01-18 ENCOUNTER — PATIENT OUTREACH (OUTPATIENT)
Dept: ADMINISTRATIVE | Facility: OTHER | Age: 59
End: 2024-01-18
Payer: MEDICAID

## 2024-01-18 NOTE — PROGRESS NOTES
CHW - Follow Up    This Community Health Worker/ LPN completed a follow up visit with patient via telephone today.  Pt/Caregiver reported: She received basket from [facility]  Community Health Worker/LPN provided: Contact info on the General Surgery Dr Bossman Ribera referral placed 12/19/23 by [facility].  Patient has not heard anything regarding an appointment for her hernia.  Follow up required: Yes  Follow-up Outreach - Due: 2/7/2024

## 2024-01-19 ENCOUNTER — CLINICAL SUPPORT (OUTPATIENT)
Dept: DIABETES | Facility: CLINIC | Age: 59
End: 2024-01-19
Payer: MEDICAID

## 2024-01-19 ENCOUNTER — INFUSION (OUTPATIENT)
Dept: INFUSION THERAPY | Facility: HOSPITAL | Age: 59
End: 2024-01-19
Attending: INTERNAL MEDICINE
Payer: MEDICAID

## 2024-01-19 VITALS
DIASTOLIC BLOOD PRESSURE: 70 MMHG | OXYGEN SATURATION: 99 % | RESPIRATION RATE: 20 BRPM | BODY MASS INDEX: 29.7 KG/M2 | TEMPERATURE: 98 F | SYSTOLIC BLOOD PRESSURE: 126 MMHG | WEIGHT: 196 LBS | HEIGHT: 68 IN | HEART RATE: 100 BPM

## 2024-01-19 DIAGNOSIS — E11.69 TYPE 2 DIABETES MELLITUS WITH OTHER SPECIFIED COMPLICATION, WITH LONG-TERM CURRENT USE OF INSULIN: Primary | ICD-10-CM

## 2024-01-19 DIAGNOSIS — Z79.4 TYPE 2 DIABETES MELLITUS WITH OTHER SPECIFIED COMPLICATION, WITH LONG-TERM CURRENT USE OF INSULIN: Primary | ICD-10-CM

## 2024-01-19 DIAGNOSIS — E61.1 IRON DEFICIENCY: Primary | ICD-10-CM

## 2024-01-19 PROCEDURE — 63600175 PHARM REV CODE 636 W HCPCS: Performed by: INTERNAL MEDICINE

## 2024-01-19 PROCEDURE — A4216 STERILE WATER/SALINE, 10 ML: HCPCS | Performed by: INTERNAL MEDICINE

## 2024-01-19 PROCEDURE — 96365 THER/PROPH/DIAG IV INF INIT: CPT

## 2024-01-19 PROCEDURE — 99212 OFFICE O/P EST SF 10 MIN: CPT | Mod: PBBFAC,25

## 2024-01-19 PROCEDURE — G0108 DIAB MANAGE TRN  PER INDIV: HCPCS | Mod: PBBFAC

## 2024-01-19 PROCEDURE — 25000003 PHARM REV CODE 250: Performed by: INTERNAL MEDICINE

## 2024-01-19 RX ORDER — SODIUM CHLORIDE 0.9 % (FLUSH) 0.9 %
10 SYRINGE (ML) INJECTION
Status: DISCONTINUED | OUTPATIENT
Start: 2024-01-19 | End: 2024-01-19 | Stop reason: HOSPADM

## 2024-01-19 RX ORDER — HEPARIN 100 UNIT/ML
500 SYRINGE INTRAVENOUS
Status: DISCONTINUED | OUTPATIENT
Start: 2024-01-19 | End: 2024-01-19 | Stop reason: HOSPADM

## 2024-01-19 RX ADMIN — SODIUM CHLORIDE 125 MG: 9 INJECTION, SOLUTION INTRAVENOUS at 11:01

## 2024-01-19 RX ADMIN — Medication 10 ML: at 11:01

## 2024-01-19 RX ADMIN — SODIUM CHLORIDE: 9 INJECTION, SOLUTION INTRAVENOUS at 11:01

## 2024-01-19 NOTE — Clinical Note
Patient interested in Dexcom G7 CGM.  Orders for Dexcom G7 sensors &  entered awaiting signature.  Also put in orders for Gvoke glucagon pen for reported hypoglycemia < 54.

## 2024-01-19 NOTE — NURSING
11:00 Arrive for C5 D1 Ferrlecit q wk c/o of mild back pain uses over the counter medications for relief.

## 2024-01-23 ENCOUNTER — LAB VISIT (OUTPATIENT)
Dept: LAB | Facility: HOSPITAL | Age: 59
End: 2024-01-23
Attending: NURSE PRACTITIONER
Payer: MEDICAID

## 2024-01-23 DIAGNOSIS — E11.69 TYPE 2 DIABETES MELLITUS WITH OTHER SPECIFIED COMPLICATION, WITH LONG-TERM CURRENT USE OF INSULIN: Primary | ICD-10-CM

## 2024-01-23 DIAGNOSIS — E83.42 HYPOMAGNESEMIA: ICD-10-CM

## 2024-01-23 DIAGNOSIS — E78.5 HYPERLIPIDEMIA LDL GOAL <70: ICD-10-CM

## 2024-01-23 DIAGNOSIS — E11.69 TYPE 2 DIABETES MELLITUS WITH OTHER SPECIFIED COMPLICATION, WITH LONG-TERM CURRENT USE OF INSULIN: ICD-10-CM

## 2024-01-23 DIAGNOSIS — E27.8 ADRENAL NODULE: ICD-10-CM

## 2024-01-23 DIAGNOSIS — L68.0 HIRSUTISM: ICD-10-CM

## 2024-01-23 DIAGNOSIS — L85.3 DRY SKIN: ICD-10-CM

## 2024-01-23 DIAGNOSIS — Z79.4 TYPE 2 DIABETES MELLITUS WITH OTHER SPECIFIED COMPLICATION, WITH LONG-TERM CURRENT USE OF INSULIN: ICD-10-CM

## 2024-01-23 DIAGNOSIS — Z79.4 TYPE 2 DIABETES MELLITUS WITH OTHER SPECIFIED COMPLICATION, WITH LONG-TERM CURRENT USE OF INSULIN: Primary | ICD-10-CM

## 2024-01-23 DIAGNOSIS — H04.129 DRY EYE: ICD-10-CM

## 2024-01-23 DIAGNOSIS — D50.0 IRON DEFICIENCY ANEMIA DUE TO CHRONIC BLOOD LOSS: ICD-10-CM

## 2024-01-23 LAB
ALBUMIN SERPL-MCNC: 3.4 G/DL (ref 3.5–5)
ALBUMIN/GLOB SERPL: 0.9 RATIO (ref 1.1–2)
ALP SERPL-CCNC: 122 UNIT/L (ref 40–150)
ALT SERPL-CCNC: 14 UNIT/L (ref 0–55)
AST SERPL-CCNC: 14 UNIT/L (ref 5–34)
BASOPHILS # BLD AUTO: 0.07 X10(3)/MCL
BASOPHILS NFR BLD AUTO: 0.7 %
BILIRUB SERPL-MCNC: 0.3 MG/DL
BUN SERPL-MCNC: 11.3 MG/DL (ref 9.8–20.1)
CALCIUM SERPL-MCNC: 9.6 MG/DL (ref 8.4–10.2)
CHLORIDE SERPL-SCNC: 104 MMOL/L (ref 98–107)
CHOLEST SERPL-MCNC: 137 MG/DL
CHOLEST/HDLC SERPL: 3 {RATIO} (ref 0–5)
CO2 SERPL-SCNC: 23 MMOL/L (ref 22–29)
CREAT SERPL-MCNC: 0.67 MG/DL (ref 0.55–1.02)
EOSINOPHIL # BLD AUTO: 0.69 X10(3)/MCL (ref 0–0.9)
EOSINOPHIL NFR BLD AUTO: 6.5 %
ERYTHROCYTE [DISTWIDTH] IN BLOOD BY AUTOMATED COUNT: 19.7 % (ref 11.5–17)
EST. AVERAGE GLUCOSE BLD GHB EST-MCNC: 157.1 MG/DL
GFR SERPLBLD CREATININE-BSD FMLA CKD-EPI: >60 MLS/MIN/1.73/M2
GLOBULIN SER-MCNC: 3.7 GM/DL (ref 2.4–3.5)
GLUCOSE SERPL-MCNC: 198 MG/DL (ref 74–100)
HBA1C MFR BLD: 7.1 %
HCT VFR BLD AUTO: 34.9 % (ref 37–47)
HDLC SERPL-MCNC: 47 MG/DL (ref 35–60)
HGB BLD-MCNC: 10.3 G/DL (ref 12–16)
IMM GRANULOCYTES # BLD AUTO: 0.16 X10(3)/MCL (ref 0–0.04)
IMM GRANULOCYTES NFR BLD AUTO: 1.5 %
LDLC SERPL CALC-MCNC: 68 MG/DL (ref 50–140)
LYMPHOCYTES # BLD AUTO: 2.1 X10(3)/MCL (ref 0.6–4.6)
LYMPHOCYTES NFR BLD AUTO: 19.7 %
MAGNESIUM SERPL-MCNC: 1.5 MG/DL (ref 1.6–2.6)
MCH RBC QN AUTO: 21.6 PG (ref 27–31)
MCHC RBC AUTO-ENTMCNC: 29.5 G/DL (ref 33–36)
MCV RBC AUTO: 73.2 FL (ref 80–94)
MONOCYTES # BLD AUTO: 0.72 X10(3)/MCL (ref 0.1–1.3)
MONOCYTES NFR BLD AUTO: 6.8 %
NEUTROPHILS # BLD AUTO: 6.92 X10(3)/MCL (ref 2.1–9.2)
NEUTROPHILS NFR BLD AUTO: 64.8 %
PLATELET # BLD AUTO: 633 X10(3)/MCL (ref 130–400)
PMV BLD AUTO: 10.1 FL (ref 7.4–10.4)
POTASSIUM SERPL-SCNC: 4.5 MMOL/L (ref 3.5–5.1)
PROT SERPL-MCNC: 7.1 GM/DL (ref 6.4–8.3)
RBC # BLD AUTO: 4.77 X10(6)/MCL (ref 4.2–5.4)
SODIUM SERPL-SCNC: 138 MMOL/L (ref 136–145)
TRIGL SERPL-MCNC: 109 MG/DL (ref 37–140)
VIT B12 SERPL-MCNC: 1225 PG/ML (ref 213–816)
VLDLC SERPL CALC-MCNC: 22 MG/DL
WBC # SPEC AUTO: 10.66 X10(3)/MCL (ref 4.5–11.5)

## 2024-01-23 PROCEDURE — 80053 COMPREHEN METABOLIC PANEL: CPT

## 2024-01-23 PROCEDURE — 83735 ASSAY OF MAGNESIUM: CPT

## 2024-01-23 PROCEDURE — 82607 VITAMIN B-12: CPT

## 2024-01-23 PROCEDURE — 85025 COMPLETE CBC W/AUTO DIFF WBC: CPT

## 2024-01-23 PROCEDURE — 83036 HEMOGLOBIN GLYCOSYLATED A1C: CPT

## 2024-01-23 PROCEDURE — 80061 LIPID PANEL: CPT

## 2024-01-23 PROCEDURE — 36415 COLL VENOUS BLD VENIPUNCTURE: CPT

## 2024-01-23 PROCEDURE — 82728 ASSAY OF FERRITIN: CPT

## 2024-01-23 PROCEDURE — 83540 ASSAY OF IRON: CPT

## 2024-01-24 ENCOUNTER — OFFICE VISIT (OUTPATIENT)
Dept: HEMATOLOGY/ONCOLOGY | Facility: CLINIC | Age: 59
End: 2024-01-24
Payer: MEDICAID

## 2024-01-24 VITALS — HEIGHT: 68 IN | BODY MASS INDEX: 29.68 KG/M2 | WEIGHT: 195.81 LBS

## 2024-01-24 DIAGNOSIS — D75.839 THROMBOCYTOSIS: ICD-10-CM

## 2024-01-24 DIAGNOSIS — E61.1 IRON DEFICIENCY: ICD-10-CM

## 2024-01-24 DIAGNOSIS — E83.42 HYPOMAGNESEMIA: Primary | ICD-10-CM

## 2024-01-24 DIAGNOSIS — D50.9 IRON DEFICIENCY ANEMIA, UNSPECIFIED: ICD-10-CM

## 2024-01-24 DIAGNOSIS — D50.0 IRON DEFICIENCY ANEMIA DUE TO CHRONIC BLOOD LOSS: ICD-10-CM

## 2024-01-24 LAB
FERRITIN SERPL-MCNC: 40.7 NG/ML (ref 4.63–204)
IRON SATN MFR SERPL: 5 % (ref 20–50)
IRON SERPL-MCNC: 16 UG/DL (ref 50–170)
TIBC SERPL-MCNC: 302 UG/DL (ref 70–310)
TIBC SERPL-MCNC: 318 UG/DL (ref 250–450)
TRANSFERRIN SERPL-MCNC: 296 MG/DL (ref 180–382)

## 2024-01-24 PROCEDURE — 1159F MED LIST DOCD IN RCRD: CPT | Mod: CPTII,95,,

## 2024-01-24 PROCEDURE — 3051F HG A1C>EQUAL 7.0%<8.0%: CPT | Mod: CPTII,95,,

## 2024-01-24 PROCEDURE — 4010F ACE/ARB THERAPY RXD/TAKEN: CPT | Mod: CPTII,95,,

## 2024-01-24 PROCEDURE — 99214 OFFICE O/P EST MOD 30 MIN: CPT | Mod: 95,,,

## 2024-01-24 RX ORDER — LANOLIN ALCOHOL/MO/W.PET/CERES
400 CREAM (GRAM) TOPICAL 2 TIMES DAILY
Qty: 28 TABLET | Refills: 0 | Status: SHIPPED | OUTPATIENT
Start: 2024-01-24 | End: 2024-02-07

## 2024-01-24 NOTE — PROGRESS NOTES
"  Diabetes Care Specialist Follow-up Note  Author: Mary Castelan RD  Date: 01/19/2024    Program Intake  Reason for Diabetes Program Visit:: Intervention  Type of Intervention:: Individual  Individual: Education  Education: Self-Management Skill Review  Current diabetes risk level:: moderate (risk score 1)    Lab Results   Component Value Date    HGBA1C 7.5 (H) 04/12/2023     A1c Pre Diabetes Care Specialist Intervention:  7.5% (4/12/23)    Clinical    Weight: 88.8 kg (195 lb 12.8 oz)   Height: 5' 8" (172.7 cm)   Body mass index is 29.77 kg/m².  Wt stable since last visit on 12/6/23      Medications:  - trulicity 1.5mg qSunday (has been on backorder for a couple of months per pt)  - Lantus 50 units qAM & 20 units qPM (misses dose every other day)  - Metformin 1000mg BID (misses dose every other day)    SMBG:   Patient reports blood sugar higher than usual recently due to not being able to get Trulicity.  Prior to this patient was having fasting AM hypoglycemia in 50s & 60s as documented per her logs.  Pt with documented level 3 hypoglycemia (glucose < 54 mg/d).  During these hypoglycemia episodes, pt reports that it is difficult for her to move & she requires assistance from a family member to treat.  Average blood sugars per meter are 173-174 over the past month.              Diabetes Self-Management Skills Assessment     Diabetes Disease Process/Treatment Options  Patient/caregiver able to state what happens when someone has diabetes.: no  Patient/caregiver knows what type of diabetes they have.: yes  Diabetes Type : Type II  Diabetes Disease Process/Treatment Options: Skills Assessment Completed: Yes  Assessment indicates:: Instruction Needed  Area of need?: Yes    Chronic Complications  Chronic Complications Skills Assessment Completed: : Yes  Assessment indicates:: Instruction Needed  Area of need?: Yes    During today's follow-up visit,  the following areas required further assessment and content was " provided/reviewed.    Based on today's diabetes care assessment, the following areas of need were identified:            1/19/2024    12:02 AM   Diabetes Self-Management Skills   Diabetes Disease Process/Treatment Options Yes - Discussed types of diabetes & pathophysiology.     Chronic Complications Yes - Discussed chronic complications of uncontrolled diabetes.  Discussed current A1C & A1C goal.  Explained that A1C < 7% decreases risk of complications.         Today's interventions were provided through individual discussion.    Patient verbalized understanding of instruction.  Pt was able to return back demonstration of instructions today. Patient understood key points, needs reinforcement and further instruction.     Diabetes Self-Management Care Plan Review and Evaluation of Progress:    During today's follow-up Jessica's Diabetes Self-Management Care Plan progress was reviewed and progress was evaluated including his/her input. Jessica has agreed to continue his/her journey to improve/maintain overall diabetes control by continuing to set health goals. See care plan progress below.      Care Plan: Diabetes Management   Updates made since 12/25/2023 12:00 AM        Problem: Healthy Eating         Goal: Patient will add meal substitutions such as shakes or snacks for missed breakfast & lunch meals to help prevent hypoglycemia.    Start Date: 10/24/2023   Expected End Date: 4/30/2024   This Visit's Progress: On track   Recent Progress: No change   Priority: High   Barriers: No Barriers Identified   Note:    10/24/23: Reviewed sources of carbohydrate & appropriate portion sizes.  Discussed plate method for diabetes as well as carbohydrate counting.  Encouraged pt to read nutrition facts labels for serving size & grams of total carbohydrate.  Gave goal of </= 30-45g carbohydrate per meal & </= 15g carbohydrate per snack.  Pt reports eating only 1 meal daily (supper) & c/o lows in AM upon waking ~ 8-10AM.  Discussed  use of meal substitutions such as Glucerna (samples & coupons provided) or snack type foods to help prevent hypoglycemia.  Pt denies consumption of sugary drinks except to treat lows & drinks soda only ~ 3-4 times per year.  Pt does, however, regularly eat cakes/cupcakes/pies in place of or in addition to meals.  Pt does report that she tries to limit portion of these sweets when she eats them (ex. 2 cookies).     12/6/23: Pt reports trying Glucerna samples provided but did not get around to purchasing supplements for daily use as meal replacement.  Pt still consuming only 1 meal/d in evening on most days.  24h food recall as follows: breakfast = coffee w/ 1 tsp sugar & 4-5 tsp liquid creamer; lunch = pear; dinner = 4 mini crab cakes, pear.  Pt again agrees to purchase Glucerna or other brand such as Equate nutrition drinks & use at least once daily as meal replacement for skipped meal.  Pt continues with decreased appetite.  Pt also interested in receiving prepared meals via insurance or community programs if available which she states may increase daily meals due to no preparation work on her part.  Will consult outpatient case management for assistance.    1/19/24: Pt still missing meals.  She reports she often eats 2 cookies or cupcakes as only food consumed all day vs actual meals.  Pt has purchased Equate nutrition drinks but only uses these if she is experiencing hypoglycemia in AM.  Noted outpatient case management did reach out to patient on 1/4 & 1/18 to assist with finding food resources.       Problem: Blood Glucose Self-Monitoring         Goal: Patient agrees to check and record blood sugars 2-3 times per day. Completed 1/19/2024   Start Date: 10/25/2023   Expected End Date: 1/5/2024   This Visit's Progress: Not met   Recent Progress: No change   Priority: Medium   Barriers: Lack of Motivation to Change   Note:    10/24/23: Pt reports that she checks blood sugar 2-3x/d but does not keep a log.  Pt  presents to visit without her glucometer.  Provided pt with blood sugar logs x2 & encouraged her to keep daily logs & bring to next visit so that we can determine if hypoglycemia frequency is improving with added AM snacks/meal replacements.    12/6/23: Pt reports still checking blood sugar 2-3x/d but did not start keeping log & did not bring glucometer to visit.  Pt reports that she still has logs provided at last visit & will again try to keep logs to bring to next visit.  Pt will also bring glucometer to next visit for assistance figuring out how to review glucose logs in meter.    1/19/24: Pt admits that she has not been checking blood sugar regularly.  Pt does have a few blood sugar readings recorded on log sheet & does bring glucometer to appointment.  Went over how to review blood sugar history in meter as well as averages.  Pt interested in continuous glucose monitor (Dexcom G7) & would benefit from use of one with low alerts due to documented level 3 hypoglycemia (glucose < 54 mg/d).  During these hypoglycemia episodes, pt reports that it is difficult for her to move & she requires assistance from a family member to treat.       Problem: Medications         Goal: Patient agrees to consistently take all diabetes medications daily/weekly as ordered.    Start Date: 1/19/2024   Expected End Date: 4/30/2024   Priority: High   Barriers: Lack of Motivation to Change   Note:    1/19/24: Pt admits to missing Lantus & metformin doses ~ every other day.  Discussed importance of taking 100% of the time for best results.         Follow Up Plan     Due to reported level 3 hypoglycemia requiring assistance from a family member, pt would benefit from a glucagon emergency pen.  Demonstrated use of glucagon emergency kit in office but pt feels her family members would not be able to follow these directions & would likely benefit from Gvoke pen instead.  Follow up if patient receives Dexcom G7, for Dexcom G7 training, health  rating, & goal progress.  Patient to call with questions as needed.    Today's care plan and follow up schedule was discussed with patient.  Jessica verbalized understanding of the care plan, goals, and agrees to follow up plan.        The patient was encouraged to communicate with his/her health care provider/physician and care team regarding his/her condition(s) and treatment.  I provided the patient with my contact information today and encouraged to contact me via phone or [facility] Patient Portal as needed.     Length of Visit   Total Time: 60 Minutes

## 2024-01-24 NOTE — PROGRESS NOTES
Established Patient - Audio Only Telehealth Visit     The patient location is: [place], [place]  The chief complaint leading to consultation is: Iron Def anemia  Visit type: Virtual visit with audio only (telephone)  Total time spent with patient: 15 mins         Reason for Follow-up:  recurrent severe iron-deficiency anemia; no definite GI source of bleeding  -low risk essential thrombocytosis     Current Treatment:  ASA 81mg po daily     Treatment History:  -S/P Feraheme 510 mg IV x2 (2023, 2023)     Past medical history: NIDDM.  Hypertension.  Dyslipidemia.  History of anemia.  Vitamin D deficiency.  Tobacco abuse.  Obesity.  Obstructive sleep apnea.  HFpEF. Leukocytosis.  Cholecystectomy.  Goiter surgery.  Social history: Single.  Lives in [place], [place].  Has 2 grown up sons.  Has been smoking 1-1/2 packs of cigarettes daily since age 10.  Has tried to quit smoking many times but does not get refills of Chantix, therefore, has been unable to.  Currently, down to 10 cigarettes daily.  Denies history of alcohol or illicit drug abuse.  Family history: Sister  from cervical cancer at age 55.  Health maintenance:   EGD 2018 (Dr. Koo in [place]: Esophageal dilation).    Colonoscopy 2018 (Dr. Koo in [place]), apparently to be repeated in 2 years.    Mammogram 2018: Negative for malignancy.   ThinPrep cervical Pap smear 2016: Negative for intraepithelial lesion or malignancy; negative for HPV high-risk types (type 16 and type 18/45)  Menstrual and OB/GYN history: Menopause in .  For last 3 months or so, has experienced vaginal spotting.  Apparently, had Pap smear done at [facility] 2 months back (probably 2018), which was apparently unremarkable. Pelvic ultrasound on 2019 showed normal endometrium; likely myomatous changes of uterus with a lower uterine segment lipoma leiomyoma.         History of Present Illness:  "  53-year-old lady referred from [facility] for evaluation of leukocytosis.     For details, please see my last note dated 09/03/2020.  Please also refer to assessment and plan section.     Interval History: 1/24/24:  Patient was scheduled in the clinic today for a virtual follow-up regarding her iron-deficiency anemia.  She has only completed 5 of her IV iron doses.  She reports not taking her oral iron tablets anymore.  She states that she continues to feel weak, tired and exhausted.  She reports taking her aspirin 81 mg approximately 3 times a week.  Denies any chest pain, dizziness, headache, heart palpitations.  Denies any active bleeding or unusual bruising.  She states that she was referred to Dr. Farrar's office for stomach surgery " lab work was reviewed with the patient.  All future appointments were discussed.       This is a telemedicine note. Patient was treated using telemedicine, real time audio and video, according to [facility] protocols. KARLA, distant provider, conducted the visit from location identified below. The patient participated in the visit at a non-[facility] location selected by the patient (or patient's representative), identified below. I am licensed in the state where the patient stated they are located. The patient (or patient's representative) stated that they understood and accepted the privacy and security risks to their information at their location. Patient was located at her/his residence in , [place]. KARLA distant provider, was located at [facility].    Face to face time spent with patient exceeds 15 minutes, over 50% of which was used for education and counseling regarding medical conditions, current medications including risk/benefit and side effects/adverse events, over the counter medications-uses/doses. The patient is receptive, expresses understanding and is agreeable to plan. All questions answered.        Physical Examination: Physical Exam was not completed today as " this was a telemedicine visit.     Assessment:  Severe iron deficiency anemia:   Presented as moderate anemia with severe microcytosis    EGD, colonoscopy negative for any bleeders (02/2018)   Severe iron deficiency    Feraheme (03/2019)   Hemoglobin dropped from 14.8 (06/2019) to 9.5 (08/28/2019) due to iron deficiency   Feraheme x2 (09/2019) --> improvement in iron stores and normalization of hemoglobin (9.5 --> 13.7)   CT enterogram (09/11/2019): Hiatal hernia; 1.7 cm left adrenal nodule   -03/10/2020: Remains iron deficient (transferrin saturation 13.2%, ferritin 19.2)   -Did not present for Feraheme shots in a timely manner   -04/27/2020: Ferritin 4.9 (down from 19.2 on 03/10/2020); however, hemoglobin 12.4, MCV 83.0   (Iron deficiency erythropoiesis)   -Feraheme 510 mg IV x2 (04/27/2020; 05/04/2020)   -06/01/2020: Iron deficiency resolved with parenteral iron therapy   -However, as of 06/01/2020, severe recurrent iron deficiency remains unexplained   -07/01/2020: Iron stores dropping again within a month; hemoglobin remains normal   -09/02/2020: Iron stores normal/stable (ferritin 21, transferrin saturation 15.8%); hemoglobin 14.8, normal   -09/08/2020: Feels better and more energetic than before   -11/12/2020: Iron deficient; hemoglobin 15.1  -08/04/2020:  [facility]:  Insurance denied capsule endoscopy.  Upper single balloon enteroscopy performed.  1. Duodenum biopsy:  Minimal focal acute inflammation with reactive changes; no celiac sprue  2. Duodenal bulb:  Biopsy showed no significant histopathologic findings; no evidence of celiac sprue  3. Stomach biopsy:  Chemical/reactive gastropathy; H pylori stain negative  -S/P Feraheme 510 mg IV x2 (05/17/2021, 05/24/2021)  -S/P Feraheme 510 mg IV x2 (07/09/2021, 07/19/2021)  -10/27/2021: Colonoscopy (iron-deficiency anemia):  Diffuse diverticulosis; otherwise unremarkable  -10/27/2021:  EGD with APC fulguration and biopsy (iron-deficiency anemia): unctate AVMs within  the stomach.  A directed fulguration with APC with good result achieved, superficial ulcerations within the duodenal bulb, possibly secondary to daily aspirin use.  Biopsy taken in the antrum to rule out Helicobacter pylori by pathology  -S/P Feraheme 510 mg IV x2 (08/29/2022, 09/08/2022)  -11/11/2022:  WBC 12.7.  Hemoglobin 12.8.  Platelets 543 K. ferritin 251.70 (was 93.95 on 08/29/2022, 24.88 on 06/29/2022) (transferrin saturation was 19% on 08/29/2022, 7% on 06/29/2022)  -01/11/2023: Labs reviewed.  Hemoglobin 11.2 (hemoglobin was 15.9 on 08/29/2022).  MCV 85.0.  Platelets 562 K, stable.  Ferritin 9.02, down from 251.7 on 11/11/2022.  Transferrin saturation is 6%, low.  CMP unremarkable except glucose 188 mg/dL.  -S/P Feraheme 510 mg IV x2 (01/11/2023, 01/18/2023)  To summarize:   -Recurrent severe iron deficiency anemia, unexplained   -EGD, colonoscopy negative (02/2018)   -CT enterogram negative (09/11/2019)   -has required multiple rounds of parenteral iron therapy over past 4-5 years  -no source of bleeding, H pylori gastritis, or celiac sprue on multiple GI endoscopic procedures including EGD, colonoscopy, CT enteroscopy, and upper single balloon enteroscopy   [ferritin level has dropped:  9.02 (01/11/2023), down from 251.7 (11/11/2022)]  [Hemoglobin: 11.2 on 01/11/2023; down from 15.9 on 08/29/2022)  -treated with Feraheme 510 mg IV x2 (01/11/2023, 01/18/2023)  -02/27/2023:  Hemoglobin 14.6, remarkably improved with Feraheme.  Transferrin saturation 17%, improved but remains low. 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      Chronic, very mild, benign-appearing, intermittent leukocytosis:   Secondary to smoking or underlying myeloproliferative disorder   GAGANDEEP-2 V617F mutation positive (03/2019)   BCR-ABL 1 fusion transcripts negative (03/2019)   Underlying essential thrombocytosis     Plan:  -Recurrent severe iron deficiency anemia, unexplained  -EGD, colonoscopy negative (02/2018)  -CT enterogram negative (09/11/2019)  -has required multiple rounds of parenteral iron therapy over past 4-5 years  -no source of bleeding, H pylori gastritis, or celiac sprue on multiple GI endoscopic procedures including EGD, colonoscopy, CT enteroscopy, and upper single balloon enteroscopy  >>>  [ferritin level has dropped:  9.02 (01/11/2023), down from 251.7 (11/11/2022)]  [Hemoglobin: 11.2 on 01/11/2023; down from 15.9 on 08/29/2022)  -treated with Feraheme 510 mg IV x2 (01/11/2023, 01/18/2023)  -02/27/2023:  Hemoglobin 14.6, remarkably improved with Feraheme.  Transferrin saturation 17%, improved but remains low. Ferritin 55.55 (was 9.02 on 01/11/2023)  -8/2/2023: Hgb 14.8, Hct 47.4, Plt 633, iron 40, iron sat 15% (improved)  -requiring multiple rounds of parenteral iron therapy in the past, essentially with negative exhaustive GI workup     Iron Deficiency Anemia:   -Continue  IV  Ferrlecit 125 mg x 8 doses. Has approximately 3 doses left    - Continue monthly lab work (cbc,cmp)   -RTC in 2 months via telemed with labwork completed before CBC/CMP/Serum iron/TIBC/Ferrtin Level      - Low risk essential thrombocytosis    -No history of Thrombosis. Age < 60  -start OTC ASA 81 mg tablet daily. Do not miss any doses.   -Advised her elevated platelets can be secondary to anemia as well.     Hypomagnesium:    -Start Magnesium Oxide x 2 weeks     Above discussed at length with her.  All questions answered.    She understands and agrees with this plan.      Follow up in about 2 months (around 3/24/2024) for With JACKIE Santos, Virtual Visit-Labs in [place] .                This service was not originating from a related E/M service provided within the previous 7 days nor will  to an E/M service or procedure within the next 24 hours or my soonest available appointment.  Prevailing standard of care was able to be met in this audio-only visit.

## 2024-01-25 ENCOUNTER — TELEPHONE (OUTPATIENT)
Dept: ENDOCRINOLOGY | Facility: CLINIC | Age: 59
End: 2024-01-25
Payer: MEDICAID

## 2024-01-25 ENCOUNTER — TELEPHONE (OUTPATIENT)
Dept: HEMATOLOGY/ONCOLOGY | Facility: CLINIC | Age: 59
End: 2024-01-25
Payer: MEDICAID

## 2024-01-25 NOTE — TELEPHONE ENCOUNTER
Called Patient Per ANAHI Walker.  Patient iron levels are low and needs to complete all 8 doses of her Ferrlecit iron infusion. Patient has 3 more doses left. Informed patient next infusion date is 1/26/24 at 11:00 am. We will check iron levels in 6 weeks. Patient verbalized understanding.

## 2024-01-25 NOTE — TELEPHONE ENCOUNTER
Patient notified of APPT., verbalized understanding.      ----- Message from Pedro Angeles MD sent at 1/23/2024  2:24 PM CST -----  A referral ?      ----- Message -----  From: Cheryl Gant MA  Sent: 1/23/2024   1:49 PM CST  To: Pedro Angeles MD

## 2024-01-26 ENCOUNTER — INFUSION (OUTPATIENT)
Dept: INFUSION THERAPY | Facility: HOSPITAL | Age: 59
End: 2024-01-26
Attending: INTERNAL MEDICINE
Payer: MEDICAID

## 2024-01-26 VITALS
HEIGHT: 68 IN | BODY MASS INDEX: 29.97 KG/M2 | RESPIRATION RATE: 20 BRPM | OXYGEN SATURATION: 100 % | TEMPERATURE: 98 F | DIASTOLIC BLOOD PRESSURE: 70 MMHG | WEIGHT: 197.75 LBS | SYSTOLIC BLOOD PRESSURE: 114 MMHG

## 2024-01-26 DIAGNOSIS — E61.1 IRON DEFICIENCY: Primary | ICD-10-CM

## 2024-01-26 DIAGNOSIS — E11.69 TYPE 2 DIABETES MELLITUS WITH OTHER SPECIFIED COMPLICATION, WITH LONG-TERM CURRENT USE OF INSULIN: Primary | ICD-10-CM

## 2024-01-26 DIAGNOSIS — Z79.4 TYPE 2 DIABETES MELLITUS WITH OTHER SPECIFIED COMPLICATION, WITH LONG-TERM CURRENT USE OF INSULIN: Primary | ICD-10-CM

## 2024-01-26 PROCEDURE — 96365 THER/PROPH/DIAG IV INF INIT: CPT

## 2024-01-26 PROCEDURE — 63600175 PHARM REV CODE 636 W HCPCS: Performed by: INTERNAL MEDICINE

## 2024-01-26 PROCEDURE — 25000003 PHARM REV CODE 250: Performed by: INTERNAL MEDICINE

## 2024-01-26 RX ORDER — GLUCAGON INJECTION, SOLUTION 1 MG/.2ML
0.2 INJECTION, SOLUTION SUBCUTANEOUS
Qty: 0.2 ML | Refills: 1 | Status: SHIPPED | OUTPATIENT
Start: 2024-01-26 | End: 2024-04-12 | Stop reason: SDUPTHER

## 2024-01-26 RX ORDER — BLOOD-GLUCOSE SENSOR
EACH MISCELLANEOUS
Qty: 3 EACH | Refills: 11 | Status: SHIPPED | OUTPATIENT
Start: 2024-01-26

## 2024-01-26 RX ORDER — BLOOD-GLUCOSE SENSOR
1 EACH MISCELLANEOUS
Qty: 5 EACH | Refills: 0 | Status: SHIPPED | OUTPATIENT
Start: 2024-01-26 | End: 2024-01-26

## 2024-01-26 RX ORDER — GLUCAGON INJECTION, SOLUTION 1 MG/.2ML
1 INJECTION, SOLUTION SUBCUTANEOUS ONCE AS NEEDED
Qty: 1 EACH | Refills: 3 | Status: SHIPPED | OUTPATIENT
Start: 2024-01-26 | End: 2024-01-26

## 2024-01-26 RX ORDER — HEPARIN 100 UNIT/ML
500 SYRINGE INTRAVENOUS
Status: DISCONTINUED | OUTPATIENT
Start: 2024-01-26 | End: 2024-01-26 | Stop reason: HOSPADM

## 2024-01-26 RX ORDER — SODIUM CHLORIDE 0.9 % (FLUSH) 0.9 %
10 SYRINGE (ML) INJECTION
Status: DISCONTINUED | OUTPATIENT
Start: 2024-01-26 | End: 2024-01-26 | Stop reason: HOSPADM

## 2024-01-26 RX ADMIN — SODIUM CHLORIDE: 9 INJECTION, SOLUTION INTRAVENOUS at 11:01

## 2024-01-26 RX ADMIN — SODIUM CHLORIDE 125 MG: 9 INJECTION, SOLUTION INTRAVENOUS at 11:01

## 2024-01-26 NOTE — NURSING
Pt received Ferrlecit infusion # 6/8 without incident; stated she is tired and not feeling much different after the previous 5 infusions.

## 2024-02-02 ENCOUNTER — INFUSION (OUTPATIENT)
Dept: INFUSION THERAPY | Facility: HOSPITAL | Age: 59
End: 2024-02-02
Attending: INTERNAL MEDICINE
Payer: MEDICAID

## 2024-02-02 VITALS
DIASTOLIC BLOOD PRESSURE: 75 MMHG | TEMPERATURE: 98 F | HEART RATE: 99 BPM | OXYGEN SATURATION: 99 % | SYSTOLIC BLOOD PRESSURE: 122 MMHG | RESPIRATION RATE: 20 BRPM

## 2024-02-02 DIAGNOSIS — E61.1 IRON DEFICIENCY: Primary | ICD-10-CM

## 2024-02-02 PROCEDURE — 96365 THER/PROPH/DIAG IV INF INIT: CPT

## 2024-02-02 PROCEDURE — 25000003 PHARM REV CODE 250: Performed by: INTERNAL MEDICINE

## 2024-02-02 PROCEDURE — 63600175 PHARM REV CODE 636 W HCPCS: Performed by: INTERNAL MEDICINE

## 2024-02-02 RX ORDER — HEPARIN 100 UNIT/ML
500 SYRINGE INTRAVENOUS
Status: DISCONTINUED | OUTPATIENT
Start: 2024-02-02 | End: 2024-02-02 | Stop reason: HOSPADM

## 2024-02-02 RX ORDER — SODIUM CHLORIDE 0.9 % (FLUSH) 0.9 %
10 SYRINGE (ML) INJECTION
Status: DISCONTINUED | OUTPATIENT
Start: 2024-02-02 | End: 2024-02-02 | Stop reason: HOSPADM

## 2024-02-02 RX ADMIN — SODIUM CHLORIDE: 9 INJECTION, SOLUTION INTRAVENOUS at 11:02

## 2024-02-02 RX ADMIN — SODIUM CHLORIDE 125 MG: 9 INJECTION, SOLUTION INTRAVENOUS at 11:02

## 2024-02-19 ENCOUNTER — OFFICE VISIT (OUTPATIENT)
Dept: CARDIOLOGY | Facility: CLINIC | Age: 59
End: 2024-02-19
Payer: MEDICAID

## 2024-02-19 ENCOUNTER — HOSPITAL ENCOUNTER (OUTPATIENT)
Dept: RADIOLOGY | Facility: HOSPITAL | Age: 59
Discharge: HOME | End: 2024-02-19
Attending: STUDENT IN AN ORGANIZED HEALTH CARE EDUCATION/TRAINING PROGRAM
Payer: MEDICAID

## 2024-02-19 VITALS
TEMPERATURE: 98 F | HEIGHT: 67 IN | BODY MASS INDEX: 30.92 KG/M2 | DIASTOLIC BLOOD PRESSURE: 87 MMHG | RESPIRATION RATE: 20 BRPM | SYSTOLIC BLOOD PRESSURE: 118 MMHG | WEIGHT: 197 LBS | HEART RATE: 90 BPM | OXYGEN SATURATION: 100 %

## 2024-02-19 DIAGNOSIS — R06.09 DOE (DYSPNEA ON EXERTION): ICD-10-CM

## 2024-02-19 DIAGNOSIS — H04.123 DRY EYES, BILATERAL: Primary | ICD-10-CM

## 2024-02-19 DIAGNOSIS — E78.5 HYPERLIPIDEMIA LDL GOAL <70: ICD-10-CM

## 2024-02-19 DIAGNOSIS — I10 PRIMARY HYPERTENSION: ICD-10-CM

## 2024-02-19 DIAGNOSIS — E11.9 DIABETES MELLITUS WITHOUT COMPLICATION: ICD-10-CM

## 2024-02-19 DIAGNOSIS — Z72.0 TOBACCO USER: ICD-10-CM

## 2024-02-19 DIAGNOSIS — F17.200 SMOKING: ICD-10-CM

## 2024-02-19 DIAGNOSIS — Z12.31 SCREENING MAMMOGRAM FOR BREAST CANCER: ICD-10-CM

## 2024-02-19 DIAGNOSIS — I25.10 MILD CAD: ICD-10-CM

## 2024-02-19 PROCEDURE — 77063 BREAST TOMOSYNTHESIS BI: CPT | Mod: TC

## 2024-02-19 PROCEDURE — 77067 SCR MAMMO BI INCL CAD: CPT | Mod: 26,,, | Performed by: RADIOLOGY

## 2024-02-19 PROCEDURE — 77063 BREAST TOMOSYNTHESIS BI: CPT | Mod: 26,,, | Performed by: RADIOLOGY

## 2024-02-19 PROCEDURE — 99215 OFFICE O/P EST HI 40 MIN: CPT | Mod: PBBFAC | Performed by: INTERNAL MEDICINE

## 2024-02-19 RX ORDER — METFORMIN HYDROCHLORIDE 1000 MG/1
1000 TABLET ORAL 2 TIMES DAILY
Qty: 180 TABLET | Refills: 3 | Status: SHIPPED | OUTPATIENT
Start: 2024-02-19

## 2024-02-19 RX ORDER — VARENICLINE TARTRATE 1 MG/1
1 TABLET, FILM COATED ORAL 2 TIMES DAILY
Qty: 60 TABLET | Refills: 5 | Status: SHIPPED | OUTPATIENT
Start: 2024-02-19

## 2024-02-19 RX ORDER — INSULIN GLARGINE 100 [IU]/ML
INJECTION, SOLUTION SUBCUTANEOUS
Qty: 30 EACH | Refills: 1 | Status: SHIPPED | OUTPATIENT
Start: 2024-02-19 | End: 2024-05-29

## 2024-02-19 NOTE — PROGRESS NOTES
CHIEF COMPLAINT:   Chief Complaint   Patient presents with    6 month follow up     C/o weakness attributed to anemia                   Review of patient's allergies indicates:   Allergen Reactions    Ivp dye [iodinated contrast media] Itching                                          HPI:  Jessica Walker 58 y.o. female with Mild CAD, JAK2 Gene mutation, Thrombocytosis, JIN on CPAP, tobacco abuse, DM II, HTN, HLD, and GERD who presents for routine follow up and ongoing care. The patient completed a Dobutamine Stress Echocardiogram  on 6.21.22 in which stress echo portion of the study was negative for myocardial ischemia.  Patient completed PFT testing in February of 2022 which revealed normal PFT except for mild reduction in diffusion capacity.  She completed an Echocardiogram on June 4, 2021 which revealed an ejection fraction of approximately 50 to 55%, mild MR, and mild TR.  She completed a Dobutamine Stress Echocardiogram in July 2020 which was negative for ischemia.  At last appointment patient endorsed shortness of breath with exertion, was prescribed Chantix for smoking cessation.    Today she reports ongoing shortness of breath with exertion that she states is stable from last appointment.  She denies any chest pain, lightheadedness, dizziness, passing out, peripheral edema, PND, orthopnea. She is able to complete her ADLs without any issues or ischemic symptoms. She states that she is prescribed a CPAP for sleep apnea however she is not compliant due to her mask not fitting properly and causing her have dry eyes, requires new prescription. Optometrist has told patient she needs to be worked up for Sjogren's. She endorses compliance with all medications, but has skipped medication 1-2 times per month. She continues to smoke 1-1.5 PPD and states that she would like to quit, stated Chantix has worked in the past. She also requires refills on metformin and lantus however does not have PCP appointment  till 4/2024. Patient also endorses long-standing depression which has worsened since beginning of this year. Reports fatigue, loss of concentration, and lack of motivation. Does endorse suicidal ideation but denies means and plan. Denies manic symptoms. Patient is supposed to be following up with SWLA therapy however has not been evaluated since 10/2023.                                        CARDIAC TESTING                                                                                                                          Dobutamine Stress Echocardiogram 6.21.22:  The stress echo portion of this study is negative for myocardial ischemia.  The ECG portion of this study is positive for myocardial ischemia.  There were no arrhythmias during stress.                                                                         Patient Active Problem List   Diagnosis    Iron deficiency anemia    POSEY (dyspnea on exertion)    Primary hypertension    Mild CAD    Hyperlipidemia LDL goal <70    Type 2 diabetes mellitus, with long-term current use of insulin    Obesity (BMI 30-39.9)    Tobacco user    Thrombocytosis    Iron deficiency    Personal history of colonic polyps    Essential thrombocytosis    JAK2 gene mutation    Preop examination    PMB (postmenopausal bleeding)    Post-operative state    Hiatal hernia    Dieulafoy lesion of stomach     Past Surgical History:   Procedure Laterality Date    CHOLECYSTECTOMY      COLONOSCOPY  10/2021    ESOPHAGOGASTRODUODENOSCOPY N/A 06/26/2023    Procedure: EGD (ESOPHAGOGASTRODUODENOSCOPY);  Surgeon: Kita Larose MD;  Location: [facility] ENDOSCOPY;  Service: Gastroenterology;  Laterality: N/A;    EYE SURGERY  7/6/93    LK and RK    HYSTEROSCOPY WITH DILATION AND CURETTAGE OF UTERUS N/A 9/26/2023    Procedure: HYSTEROSCOPY, WITH DILATION AND CURETTAGE OF UTERUS;  Surgeon: Deepti Chappell MD;  Location: [facility] OR;  Service: OB/GYN;  Laterality: N/A;  myosure    INTRALUMINAL  GASTROINTESTINAL TRACT IMAGING VIA CAPSULE N/A 06/29/2023    Procedure: IMAGING PROCEDURE, GI TRACT, INTRALUMINAL, VIA CAPSULE;  Surgeon: Kita Larose MD;  Location: [facility] ENDOSCOPY;  Service: Gastroenterology;  Laterality: N/A;    Removal of Goiter      UPPER GASTROINTESTINAL ENDOSCOPY       Social History     Socioeconomic History    Marital status: Single   Tobacco Use    Smoking status: Every Day     Current packs/day: 1.00     Average packs/day: 1 pack/day for 41.0 years (41.0 ttl pk-yrs)     Types: Cigarettes    Smokeless tobacco: Never   Substance and Sexual Activity    Alcohol use: Never    Drug use: Not Currently    Sexual activity: Not Currently     Social Determinants of Health     Financial Resource Strain: Medium Risk (1/4/2024)    Overall Financial Resource Strain (CARDIA)     Difficulty of Paying Living Expenses: Somewhat hard   Food Insecurity: Food Insecurity Present (1/4/2024)    Hunger Vital Sign     Worried About Running Out of Food in the Last Year: Sometimes true     Ran Out of Food in the Last Year: Sometimes true   Transportation Needs: No Transportation Needs (1/4/2024)    PRAPARE - Transportation     Lack of Transportation (Medical): No     Lack of Transportation (Non-Medical): No   Physical Activity: Inactive (1/4/2024)    Exercise Vital Sign     Days of Exercise per Week: 0 days     Minutes of Exercise per Session: 0 min   Stress: Stress Concern Present (1/4/2024)    [employer] - Occupational Stress Questionnaire     Feeling of Stress : To some extent   Social Connections: Socially Isolated (1/4/2024)    Social Connection and Isolation Panel [NHANES]     Frequency of Communication with Friends and Family: Three times a week     Frequency of Social Gatherings with Friends and Family: Three times a week     Attends Taoist Services: Never     Active Member of Clubs or Organizations: No     Attends Club or Organization Meetings: Never     Marital  "Status: Never    Housing Stability: Low Risk  (2024)    Housing Stability Vital Sign     Unable to Pay for Housing in the Last Year: No     Number of Places Lived in the Last Year: 1     Unstable Housing in the Last Year: No        Family History   Problem Relation Age of Onset    Diabetes Mother     Coronary artery disease Mother     Alcohol abuse Mother     Arthritis Mother     Heart disease Mother         Had 13 stents in her heart    Hyperlipidemia Mother     Diabetes Father     Cervical cancer Sister     Arthritis Sister     Cancer Sister         Breast cancer    COPD Sister     Diabetes Sister     Fibromyalgia Sister     Breast cancer Sister     Cancer Sister          from cervical cancer at 55    Early death Sister     Cancer Maternal Grandfather          from Leukemia    Cancer Maternal Grandmother          from throat cancer at 64    Cancer Maternal Uncle          from cancer         Current Outpatient Medications:     aspirin (ECOTRIN) 81 MG EC tablet, Take 81 mg by mouth once daily., Disp: , Rfl:     atorvastatin (LIPITOR) 40 MG tablet, Take 1 tablet (40 mg total) by mouth every evening., Disp: 90 tablet, Rfl: 3    BD ULTRA-FINE SHORT PEN NEEDLE 31 gauge x 5/16" Ndle, Inject 1 each into the skin 3 (three) times daily., Disp: 100 each, Rfl: 1    blood-glucose meter,continuous (DEXCOM ) Misc, Dexcom G7 .  Use as directed with Dexcom G7 sensors., Disp: 1 each, Rfl: 0    cycloSPORINE (RESTASIS) 0.05 % ophthalmic emulsion, 1 drop 2 (two) times daily., Disp: , Rfl:     DEXCOM G7 SENSOR Amy, Dexcom G7 sensors.  Use every 10 days as directed., Disp: 3 each, Rfl: 11    ferrous sulfate 324 mg (65 mg iron) TbEC, Take 1 tablet (324 mg total) by mouth every Mon, Wed, Fri., Disp: 24 tablet, Rfl: 3    GVOKE HYPOPEN 2-PACK 1 mg/0.2 mL AtIn, Inject 0.2 mLs into the skin as needed (for hypoglycemia)., Disp: 0.2 mL, Rfl: 1    ibuprofen (ADVIL,MOTRIN) 800 MG tablet, Take 800 mg by " mouth., Disp: , Rfl:     lisinopriL (PRINIVIL,ZESTRIL) 20 MG tablet, Take 1 tablet (20 mg total) by mouth once daily., Disp: 90 tablet, Rfl: 3    metoprolol tartrate (LOPRESSOR) 25 MG tablet, Take 1 tablet (25 mg total) by mouth 2 (two) times daily., Disp: 180 tablet, Rfl: 3    nitroGLYCERIN (NITROSTAT) 0.4 MG SL tablet, Place 1 tablet (0.4 mg total) under the tongue every 5 (five) minutes as needed for Chest pain., Disp: 25 tablet, Rfl: 6    pantoprazole (PROTONIX) 40 MG tablet, Take 1 tablet (40 mg total) by mouth once daily., Disp: 30 tablet, Rfl: 5    TRUE METRIX GLUCOSE TEST STRIP Strp, USE 1 STRIP TO CHECK GLUCOSE ONCE DAILY, Disp: 100 each, Rfl: 1    TRULICITY 1.5 mg/0.5 mL pen injector, Inject 1.5 mg into the skin every 7 days., Disp: 4 pen , Rfl: 1    clotrimazole-betamethasone 1-0.05% (LOTRISONE) cream, Apply topically 2 (two) times daily. (Patient not taking: Reported on 2/19/2024), Disp: 45 g, Rfl: 1    hydrOXYzine pamoate (VISTARIL) 25 MG Cap, Take 25 mg by mouth nightly as needed., Disp: , Rfl:     LANTUS SOLOSTAR U-100 INSULIN glargine 100 units/mL SubQ pen, INJECT 70 UNITS SUBCUTANEOUSLY EVERY DAY AT BEDTIME, Disp: 30 each, Rfl: 1    metFORMIN (GLUCOPHAGE) 1000 MG tablet, Take 1 tablet (1,000 mg total) by mouth 2 (two) times daily., Disp: 180 tablet, Rfl: 3    mirtazapine (REMERON) 15 MG tablet, Take 15 mg by mouth every evening., Disp: , Rfl:     mupirocin (BACTROBAN) 2 % ointment, , Disp: , Rfl:     varenicline (CHANTIX) 1 mg Tab, Take 1 tablet (1 mg total) by mouth 2 (two) times daily., Disp: 60 tablet, Rfl: 5  No current facility-administered medications for this visit.    Facility-Administered Medications Ordered in Other Visits:     0.9%  NaCl infusion, , Intravenous, Continuous, Marielos Bahena MD    albuterol-ipratropium 2.5 mg-0.5 mg/3 mL nebulizer solution 3 mL, 3 mL, Nebulization, Once PRN, Marisa Ribera MD    dextrose 10% bolus 125 mL 125 mL, 12.5 g, Intravenous, PRN, Gabe,  ANAHI Bush    dextrose 10% bolus 125 mL 125 mL, 12.5 g, Intravenous, PRN, Katherine Bernal S, FNP    diphenhydrAMINE injection 25 mg, 25 mg, Intravenous, Once PRN, Marisa Ribera MD    HYDROmorphone injection 0.2 mg, 0.2 mg, Intravenous, Q5 Min PRN, Marisa Ribera MD    HYDROmorphone injection 0.5 mg, 0.5 mg, Intravenous, Q5 Min PRN, Mraisa Ribera MD    insulin aspart U-100 injection 2-9 Units, 2-9 Units, Subcutaneous, PRN, Katherine Bernal S, FNP    insulin aspart U-100 injection 4-12 Units, 4-12 Units, Subcutaneous, PRN, Katherine Bernal S, ROBINP    lactated ringers infusion, , Intravenous, Continuous, Marisa Ribera MD, Last Rate: 125 mL/hr at 09/26/23 0856, Rate Change at 09/26/23 0856    LIDOcaine (PF) 10 mg/ml (1%) injection 10 mg, 1 mL, Intradermal, Once, Katherine Bernal FNP    ondansetron injection 4 mg, 4 mg, Intravenous, Once, Marisa Ribera MD    oxyCODONE-acetaminophen 5-325 mg per tablet 2 tablet, 2 tablet, Oral, Once, Marisa Ribera MD    prochlorperazine injection Soln 5 mg, 5 mg, Intravenous, Once PRN, Marisa Ribera MD     ROS:                                                                                                                                                                             Review of Systems   Constitutional:  Positive for malaise/fatigue. Negative for chills, diaphoresis, fever and weight loss.        Weakness   Respiratory:  Positive for shortness of breath and wheezing. Negative for cough, hemoptysis and sputum production.    Cardiovascular:  Negative for chest pain, palpitations (Occasional), orthopnea, claudication, leg swelling and PND.   Gastrointestinal: Negative.    Musculoskeletal:  Negative for falls.   Skin: Negative.    Neurological:  Positive for dizziness. Negative for loss of consciousness and headaches.   Psychiatric/Behavioral:  Positive for depression and memory loss. Negative for substance abuse. The  "patient has insomnia. The patient is not nervous/anxious.         Blood pressure 118/87, pulse 90, temperature 97.9 °F (36.6 °C), temperature source Oral, resp. rate 20, height 5' 7" (1.702 m), weight 89.4 kg (197 lb), SpO2 100 %.   PE:  Physical Exam  Constitutional:       Appearance: Normal appearance.   HENT:      Head: Normocephalic and atraumatic.   Eyes:      Extraocular Movements: Extraocular movements intact.      Pupils: Pupils are equal, round, and reactive to light.   Cardiovascular:      Rate and Rhythm: Normal rate and regular rhythm.      Pulses: Normal pulses.      Heart sounds: Normal heart sounds. No murmur heard.  Pulmonary:      Effort: Pulmonary effort is normal.      Breath sounds: Normal breath sounds.   Abdominal:      Palpations: Abdomen is soft.   Musculoskeletal:         General: Normal range of motion.      Cervical back: Normal range of motion.   Skin:     General: Skin is warm and dry.   Neurological:      General: No focal deficit present.      Mental Status: She is alert and oriented to person, place, and time.   Psychiatric:         Mood and Affect: Mood normal.         Behavior: Behavior normal.       ASSESSMENT/PLAN:  Mild CAD per [facility] Sept. 2016  - Denies any chest pain. Does endorse occasional shortness of breath that is stable  - Dobutamine Stress Echocardiogram June 2022:  The stress echo portion of this study is negative for myocardial ischemia. The ECG portion of this study is positive for myocardial ischemia.  - Treadmill Stress Test Sept. 2019 - [facility] treadmill score indicated moderate risk.  - Left heart cath September 2016 with predominantly normal epicardial coronary arteries, normal LVEF 65%  - EF 50-55% per Echo 6.4.21    POSEY - Reports stable POSEY  - Reports ongoing stable POSEY/SOB that has not worsened since last appointment  - EF 50-55% per Echo 6.4.21  - PFTs Feb. 2022 - normal PFTs except for mild reduction in diffusion capacity  - Dobutamine Stress Echocardiogram " 6.21.22: The stress echo portion of this study is negative for myocardial ischemia. The ECG portion of this study is positive for myocardial ischemia.    Palpitations   - No current reports, she states that they occur with Vyvanse use  - 48 hour Holter monitor Aug. 2019 - see results under HPI  - Continue Metoprolol Tartrate 25mg BID    HTN  - BP at goal today 118/87  - Continue Metoprolol Tartrate 25mg BID and Lisinopril 20mg daily  - Counseled on low sodium diet and exercise as tolerated    HLD (hyperlipidemia)  - LDL 68 (Jan 2024)- at goal   - Continue Atorvastatin 40mg daily at bedtime  - Encouraged low-cholesterol, heart healthy diet and exercise as tolerated    DM (diabetes mellitus)  - Continue management per PCP  -Refilled Metformin and Lantus 70u qhs     Tobacco abuse  - Counseled importance of smoking cessation  - Continues to smoke about 1-1.5 PPD and is interested in quitting  - Will prescribe Chantix today     JIN on CPAP  - Reports noncompliance with CPAP due to discomfort  - Will notify Sleep lab that patient requires new prescription    - Recommend nightly CPAP use       Plan:   Will prescribe Chantix for smoking cessation   Refilled Lantus and Metformin  Ordered autoimmune work-up for dry eyes, will route follow-up to PCP   Will notify Sleep lab that patient requires new prescription   Follow-up in general cardiology clinic in 6 months or sooner if needed  Follow up with PCP as directed    Patient case and plan of care discussed with Dr. Lori Carr MD   [facility] Internal Medicine HO-1

## 2024-02-19 NOTE — PROGRESS NOTES
Cardiology attending addendum  Patient was seen and examined with Dr. Jazmine Carr MD. Agree with plan as outlined above.  Patient presents for routine follow-up.  She has no cardiovascular complaints however she has been dealing with depression for long time.  Patient had tried multiple antidepressants in the past however she never continued them more than 2 weeks as she felt very sleepy while taking them. I urged her to follow up with her psychiatrist and psychotherapist.  Patient also reports being told she potentially has Sjogren's due to very dry eyes.  Will order rheumatologic panel per her request as her current PCP is on maternity leave.  Otherwise patient is BP and lipids are within normal limits.  Patient is interested in retrying Chantix and will prescribe it.  Patient also has a history of sleep apnea.  She is compliant with CPAP and she is benefitting from its use.  She will benefit from continued use.    Lori Rodrigez MD  Cardiology-[facility]

## 2024-02-21 ENCOUNTER — INFUSION (OUTPATIENT)
Dept: INFUSION THERAPY | Facility: HOSPITAL | Age: 59
End: 2024-02-21
Attending: INTERNAL MEDICINE
Payer: MEDICAID

## 2024-02-21 VITALS
HEIGHT: 68 IN | WEIGHT: 197.56 LBS | BODY MASS INDEX: 29.94 KG/M2 | HEART RATE: 96 BPM | DIASTOLIC BLOOD PRESSURE: 73 MMHG | OXYGEN SATURATION: 99 % | SYSTOLIC BLOOD PRESSURE: 104 MMHG | TEMPERATURE: 98 F | RESPIRATION RATE: 20 BRPM

## 2024-02-21 DIAGNOSIS — E61.1 IRON DEFICIENCY: Primary | ICD-10-CM

## 2024-02-21 PROCEDURE — 96374 THER/PROPH/DIAG INJ IV PUSH: CPT

## 2024-02-21 PROCEDURE — 63600175 PHARM REV CODE 636 W HCPCS: Performed by: INTERNAL MEDICINE

## 2024-02-21 PROCEDURE — 25000003 PHARM REV CODE 250: Performed by: INTERNAL MEDICINE

## 2024-02-21 RX ORDER — HEPARIN 100 UNIT/ML
500 SYRINGE INTRAVENOUS
Status: DISCONTINUED | OUTPATIENT
Start: 2024-02-21 | End: 2024-04-10

## 2024-02-21 RX ORDER — SODIUM CHLORIDE 0.9 % (FLUSH) 0.9 %
10 SYRINGE (ML) INJECTION
Status: DISCONTINUED | OUTPATIENT
Start: 2024-02-21 | End: 2024-04-10

## 2024-02-21 RX ADMIN — SODIUM CHLORIDE 125 MG: 9 INJECTION, SOLUTION INTRAVENOUS at 11:02

## 2024-02-21 RX ADMIN — SODIUM CHLORIDE: 9 INJECTION, SOLUTION INTRAVENOUS at 11:02

## 2024-02-26 DIAGNOSIS — E11.65 TYPE 2 DIABETES MELLITUS WITH HYPERGLYCEMIA, UNSPECIFIED WHETHER LONG TERM INSULIN USE: Primary | ICD-10-CM

## 2024-03-08 DIAGNOSIS — Z79.4 TYPE 2 DIABETES MELLITUS WITH OTHER SPECIFIED COMPLICATION, WITH LONG-TERM CURRENT USE OF INSULIN: Primary | ICD-10-CM

## 2024-03-08 DIAGNOSIS — E11.69 TYPE 2 DIABETES MELLITUS WITH OTHER SPECIFIED COMPLICATION, WITH LONG-TERM CURRENT USE OF INSULIN: Primary | ICD-10-CM

## 2024-03-08 DIAGNOSIS — Z79.4 TYPE 2 DIABETES MELLITUS WITH OTHER SPECIFIED COMPLICATION, WITH LONG-TERM CURRENT USE OF INSULIN: ICD-10-CM

## 2024-03-08 DIAGNOSIS — E11.69 TYPE 2 DIABETES MELLITUS WITH OTHER SPECIFIED COMPLICATION, WITH LONG-TERM CURRENT USE OF INSULIN: ICD-10-CM

## 2024-03-08 RX ORDER — DULAGLUTIDE 1.5 MG/.5ML
INJECTION, SOLUTION SUBCUTANEOUS
Qty: 4 PEN | Refills: 0 | Status: SHIPPED | OUTPATIENT
Start: 2024-03-08 | End: 2024-04-11

## 2024-03-08 RX ORDER — DULAGLUTIDE 1.5 MG/.5ML
1.5 INJECTION, SOLUTION SUBCUTANEOUS
Qty: 4 PEN | Refills: 0 | Status: SHIPPED | OUTPATIENT
Start: 2024-03-08 | End: 2024-03-08

## 2024-03-08 RX ORDER — DULAGLUTIDE 1.5 MG/.5ML
INJECTION, SOLUTION SUBCUTANEOUS
Qty: 4 PEN | Refills: 0 | Status: SHIPPED | OUTPATIENT
Start: 2024-03-08 | End: 2024-03-08

## 2024-03-21 ENCOUNTER — LAB VISIT (OUTPATIENT)
Dept: LAB | Facility: HOSPITAL | Age: 59
End: 2024-03-21
Payer: MEDICAID

## 2024-03-21 DIAGNOSIS — D50.0 IRON DEFICIENCY ANEMIA DUE TO CHRONIC BLOOD LOSS: ICD-10-CM

## 2024-03-21 DIAGNOSIS — D50.9 IRON DEFICIENCY ANEMIA: ICD-10-CM

## 2024-03-21 DIAGNOSIS — D47.3 THROMBOCYTHEMIA, ESSENTIAL: Primary | ICD-10-CM

## 2024-03-21 LAB
ABS NEUT CALC (OHS): 11.28 X10(3)/MCL (ref 2.1–9.2)
ALBUMIN SERPL-MCNC: 3.3 G/DL (ref 3.5–5)
ALBUMIN/GLOB SERPL: 0.8 RATIO (ref 1.1–2)
ALP SERPL-CCNC: 171 UNIT/L (ref 40–150)
ALT SERPL-CCNC: 49 UNIT/L (ref 0–55)
AST SERPL-CCNC: 32 UNIT/L (ref 5–34)
BILIRUB SERPL-MCNC: 0.2 MG/DL
BUN SERPL-MCNC: 8.2 MG/DL (ref 9.8–20.1)
CALCIUM SERPL-MCNC: 9.6 MG/DL (ref 8.4–10.2)
CHLORIDE SERPL-SCNC: 106 MMOL/L (ref 98–107)
CO2 SERPL-SCNC: 20 MMOL/L (ref 22–29)
CREAT SERPL-MCNC: 0.71 MG/DL (ref 0.55–1.02)
EOSINOPHIL NFR BLD MANUAL: 0.52 X10(3)/MCL (ref 0–0.9)
EOSINOPHIL NFR BLD MANUAL: 3 % (ref 0–8)
ERYTHROCYTE [DISTWIDTH] IN BLOOD BY AUTOMATED COUNT: 20.9 % (ref 11.5–17)
FERRITIN SERPL-MCNC: 20.7 NG/ML (ref 4.63–204)
GFR SERPLBLD CREATININE-BSD FMLA CKD-EPI: >60 MLS/MIN/1.73/M2
GIANT PLATELETS: ABNORMAL
GLOBULIN SER-MCNC: 3.9 GM/DL (ref 2.4–3.5)
GLUCOSE SERPL-MCNC: 193 MG/DL (ref 74–100)
HCT VFR BLD AUTO: 37.4 % (ref 37–47)
HGB BLD-MCNC: 11.2 G/DL (ref 12–16)
IRON SATN MFR SERPL: 4 % (ref 20–50)
IRON SERPL-MCNC: 15 UG/DL (ref 50–170)
LYMPH ABN # BLD MANUAL: 10 %
LYMPHOCYTES NFR BLD MANUAL: 16 % (ref 13–40)
LYMPHOCYTES NFR BLD MANUAL: 2.78 X10(3)/MCL
MCH RBC QN AUTO: 21.1 PG (ref 27–31)
MCHC RBC AUTO-ENTMCNC: 29.9 G/DL (ref 33–36)
MCV RBC AUTO: 70.6 FL (ref 80–94)
MONOCYTES NFR BLD MANUAL: 1.04 X10(3)/MCL (ref 0.1–1.3)
MONOCYTES NFR BLD MANUAL: 6 % (ref 2–11)
NEUTROPHILS NFR BLD MANUAL: 65 % (ref 47–80)
PLATELET # BLD AUTO: 649 X10(3)/MCL (ref 130–400)
PLATELET # BLD EST: ABNORMAL 10*3/UL
PMV BLD AUTO: 10.2 FL (ref 7.4–10.4)
POTASSIUM SERPL-SCNC: 3.7 MMOL/L (ref 3.5–5.1)
PROT SERPL-MCNC: 7.2 GM/DL (ref 6.4–8.3)
RBC # BLD AUTO: 5.3 X10(6)/MCL (ref 4.2–5.4)
RBC MORPH BLD: NORMAL
SODIUM SERPL-SCNC: 141 MMOL/L (ref 136–145)
TIBC SERPL-MCNC: 336 UG/DL (ref 70–310)
TIBC SERPL-MCNC: 351 UG/DL (ref 250–450)
TRANSFERRIN SERPL-MCNC: 326 MG/DL (ref 180–382)
WBC # SPEC AUTO: 17.35 X10(3)/MCL (ref 4.5–11.5)

## 2024-03-21 PROCEDURE — 36415 COLL VENOUS BLD VENIPUNCTURE: CPT

## 2024-03-21 PROCEDURE — 80053 COMPREHEN METABOLIC PANEL: CPT

## 2024-03-21 PROCEDURE — 83540 ASSAY OF IRON: CPT

## 2024-03-21 PROCEDURE — 85007 BL SMEAR W/DIFF WBC COUNT: CPT

## 2024-03-21 PROCEDURE — 82728 ASSAY OF FERRITIN: CPT

## 2024-04-09 ENCOUNTER — LAB VISIT (OUTPATIENT)
Dept: LAB | Facility: HOSPITAL | Age: 59
End: 2024-04-09
Payer: MEDICAID

## 2024-04-09 DIAGNOSIS — D47.3 THROMBOCYTHEMIA, ESSENTIAL: Primary | ICD-10-CM

## 2024-04-09 LAB
ALBUMIN SERPL-MCNC: 3.6 G/DL (ref 3.5–5)
ALBUMIN/GLOB SERPL: 0.9 RATIO (ref 1.1–2)
ALP SERPL-CCNC: 119 UNIT/L (ref 40–150)
ALT SERPL-CCNC: 11 UNIT/L (ref 0–55)
AST SERPL-CCNC: 12 UNIT/L (ref 5–34)
BASOPHILS # BLD AUTO: 0.06 X10(3)/MCL
BASOPHILS NFR BLD AUTO: 0.4 %
BILIRUB SERPL-MCNC: 0.3 MG/DL
BUN SERPL-MCNC: 10.5 MG/DL (ref 9.8–20.1)
CALCIUM SERPL-MCNC: 9.7 MG/DL (ref 8.4–10.2)
CHLORIDE SERPL-SCNC: 104 MMOL/L (ref 98–107)
CO2 SERPL-SCNC: 24 MMOL/L (ref 22–29)
CREAT SERPL-MCNC: 0.69 MG/DL (ref 0.55–1.02)
EOSINOPHIL # BLD AUTO: 0.59 X10(3)/MCL (ref 0–0.9)
EOSINOPHIL NFR BLD AUTO: 3.9 %
ERYTHROCYTE [DISTWIDTH] IN BLOOD BY AUTOMATED COUNT: 19.4 % (ref 11.5–17)
FERRITIN SERPL-MCNC: 5.1 NG/ML (ref 4.63–204)
GFR SERPLBLD CREATININE-BSD FMLA CKD-EPI: >60 MLS/MIN/1.73/M2
GLOBULIN SER-MCNC: 3.9 GM/DL (ref 2.4–3.5)
GLUCOSE SERPL-MCNC: 113 MG/DL (ref 74–100)
HCT VFR BLD AUTO: 32 % (ref 37–47)
HGB BLD-MCNC: 9.4 G/DL (ref 12–16)
IMM GRANULOCYTES # BLD AUTO: 0.37 X10(3)/MCL (ref 0–0.04)
IMM GRANULOCYTES NFR BLD AUTO: 2.4 %
IRON SATN MFR SERPL: 2 % (ref 20–50)
IRON SERPL-MCNC: 9 UG/DL (ref 50–170)
LYMPHOCYTES # BLD AUTO: 3.37 X10(3)/MCL (ref 0.6–4.6)
LYMPHOCYTES NFR BLD AUTO: 22 %
MCH RBC QN AUTO: 20.3 PG (ref 27–31)
MCHC RBC AUTO-ENTMCNC: 29.4 G/DL (ref 33–36)
MCV RBC AUTO: 69.3 FL (ref 80–94)
MONOCYTES # BLD AUTO: 1.19 X10(3)/MCL (ref 0.1–1.3)
MONOCYTES NFR BLD AUTO: 7.8 %
NEUTROPHILS # BLD AUTO: 9.74 X10(3)/MCL (ref 2.1–9.2)
NEUTROPHILS NFR BLD AUTO: 63.5 %
PLATELET # BLD AUTO: 715 X10(3)/MCL (ref 130–400)
PMV BLD AUTO: 9.7 FL (ref 7.4–10.4)
POTASSIUM SERPL-SCNC: 4.1 MMOL/L (ref 3.5–5.1)
PROT SERPL-MCNC: 7.5 GM/DL (ref 6.4–8.3)
RBC # BLD AUTO: 4.62 X10(6)/MCL (ref 4.2–5.4)
SODIUM SERPL-SCNC: 141 MMOL/L (ref 136–145)
TIBC SERPL-MCNC: 387 UG/DL (ref 70–310)
TIBC SERPL-MCNC: 396 UG/DL (ref 250–450)
TRANSFERRIN SERPL-MCNC: 358 MG/DL (ref 180–382)
WBC # SPEC AUTO: 15.32 X10(3)/MCL (ref 4.5–11.5)

## 2024-04-09 PROCEDURE — 36415 COLL VENOUS BLD VENIPUNCTURE: CPT

## 2024-04-09 PROCEDURE — 80053 COMPREHEN METABOLIC PANEL: CPT

## 2024-04-09 PROCEDURE — 85025 COMPLETE CBC W/AUTO DIFF WBC: CPT

## 2024-04-09 PROCEDURE — 82728 ASSAY OF FERRITIN: CPT

## 2024-04-09 PROCEDURE — 83540 ASSAY OF IRON: CPT

## 2024-04-10 ENCOUNTER — OFFICE VISIT (OUTPATIENT)
Dept: HEMATOLOGY/ONCOLOGY | Facility: CLINIC | Age: 59
End: 2024-04-10
Payer: MEDICAID

## 2024-04-10 DIAGNOSIS — D50.9 IRON DEFICIENCY ANEMIA, UNSPECIFIED IRON DEFICIENCY ANEMIA TYPE: Primary | ICD-10-CM

## 2024-04-10 DIAGNOSIS — Z72.0 TOBACCO USE: ICD-10-CM

## 2024-04-10 DIAGNOSIS — D75.839 THROMBOCYTOSIS: ICD-10-CM

## 2024-04-10 PROCEDURE — 1159F MED LIST DOCD IN RCRD: CPT | Mod: CPTII,95,,

## 2024-04-10 PROCEDURE — 99214 OFFICE O/P EST MOD 30 MIN: CPT | Mod: 95,,,

## 2024-04-10 PROCEDURE — 3044F HG A1C LEVEL LT 7.0%: CPT | Mod: CPTII,95,,

## 2024-04-10 PROCEDURE — 4010F ACE/ARB THERAPY RXD/TAKEN: CPT | Mod: CPTII,95,,

## 2024-04-10 PROCEDURE — 1160F RVW MEDS BY RX/DR IN RCRD: CPT | Mod: CPTII,95,,

## 2024-04-10 RX ORDER — SODIUM CHLORIDE 0.9 % (FLUSH) 0.9 %
10 SYRINGE (ML) INJECTION
Status: CANCELLED | OUTPATIENT
Start: 2024-05-15

## 2024-04-10 RX ORDER — HEPARIN 100 UNIT/ML
500 SYRINGE INTRAVENOUS
Status: CANCELLED | OUTPATIENT
Start: 2024-05-01

## 2024-04-10 RX ORDER — DIPHENHYDRAMINE HYDROCHLORIDE 50 MG/ML
50 INJECTION INTRAMUSCULAR; INTRAVENOUS ONCE AS NEEDED
Status: CANCELLED | OUTPATIENT
Start: 2024-04-17

## 2024-04-10 RX ORDER — HEPARIN 100 UNIT/ML
500 SYRINGE INTRAVENOUS
Status: CANCELLED | OUTPATIENT
Start: 2024-04-17

## 2024-04-10 RX ORDER — HEPARIN 100 UNIT/ML
500 SYRINGE INTRAVENOUS
Status: CANCELLED | OUTPATIENT
Start: 2024-05-15

## 2024-04-10 RX ORDER — EPINEPHRINE 0.3 MG/.3ML
0.3 INJECTION SUBCUTANEOUS ONCE AS NEEDED
Status: CANCELLED | OUTPATIENT
Start: 2024-05-29

## 2024-04-10 RX ORDER — HEPARIN 100 UNIT/ML
500 SYRINGE INTRAVENOUS
Status: CANCELLED | OUTPATIENT
Start: 2024-04-10

## 2024-04-10 RX ORDER — HEPARIN 100 UNIT/ML
500 SYRINGE INTRAVENOUS
Status: CANCELLED | OUTPATIENT
Start: 2024-04-24

## 2024-04-10 RX ORDER — EPINEPHRINE 0.3 MG/.3ML
0.3 INJECTION SUBCUTANEOUS ONCE AS NEEDED
Status: CANCELLED | OUTPATIENT
Start: 2024-04-17

## 2024-04-10 RX ORDER — DIPHENHYDRAMINE HYDROCHLORIDE 50 MG/ML
50 INJECTION INTRAMUSCULAR; INTRAVENOUS ONCE AS NEEDED
Status: CANCELLED | OUTPATIENT
Start: 2024-05-22

## 2024-04-10 RX ORDER — DIPHENHYDRAMINE HYDROCHLORIDE 50 MG/ML
50 INJECTION INTRAMUSCULAR; INTRAVENOUS ONCE AS NEEDED
Status: CANCELLED | OUTPATIENT
Start: 2024-04-24

## 2024-04-10 RX ORDER — SODIUM CHLORIDE 0.9 % (FLUSH) 0.9 %
10 SYRINGE (ML) INJECTION
Status: CANCELLED | OUTPATIENT
Start: 2024-05-22

## 2024-04-10 RX ORDER — SODIUM CHLORIDE 0.9 % (FLUSH) 0.9 %
10 SYRINGE (ML) INJECTION
Status: CANCELLED | OUTPATIENT
Start: 2024-05-08

## 2024-04-10 RX ORDER — EPINEPHRINE 0.3 MG/.3ML
0.3 INJECTION SUBCUTANEOUS ONCE AS NEEDED
Status: CANCELLED | OUTPATIENT
Start: 2024-04-24

## 2024-04-10 RX ORDER — SODIUM CHLORIDE 0.9 % (FLUSH) 0.9 %
10 SYRINGE (ML) INJECTION
Status: CANCELLED | OUTPATIENT
Start: 2024-04-24

## 2024-04-10 RX ORDER — DIPHENHYDRAMINE HYDROCHLORIDE 50 MG/ML
50 INJECTION INTRAMUSCULAR; INTRAVENOUS ONCE AS NEEDED
Status: CANCELLED | OUTPATIENT
Start: 2024-05-01

## 2024-04-10 RX ORDER — DIPHENHYDRAMINE HYDROCHLORIDE 50 MG/ML
50 INJECTION INTRAMUSCULAR; INTRAVENOUS ONCE AS NEEDED
Status: CANCELLED | OUTPATIENT
Start: 2024-05-15

## 2024-04-10 RX ORDER — HEPARIN 100 UNIT/ML
500 SYRINGE INTRAVENOUS
Status: CANCELLED | OUTPATIENT
Start: 2024-05-29

## 2024-04-10 RX ORDER — SODIUM CHLORIDE 0.9 % (FLUSH) 0.9 %
10 SYRINGE (ML) INJECTION
Status: CANCELLED | OUTPATIENT
Start: 2024-04-10

## 2024-04-10 RX ORDER — DIPHENHYDRAMINE HYDROCHLORIDE 50 MG/ML
50 INJECTION INTRAMUSCULAR; INTRAVENOUS ONCE AS NEEDED
Status: CANCELLED | OUTPATIENT
Start: 2024-05-08

## 2024-04-10 RX ORDER — SODIUM CHLORIDE 0.9 % (FLUSH) 0.9 %
10 SYRINGE (ML) INJECTION
Status: CANCELLED | OUTPATIENT
Start: 2024-04-17

## 2024-04-10 RX ORDER — EPINEPHRINE 0.3 MG/.3ML
0.3 INJECTION SUBCUTANEOUS ONCE AS NEEDED
Status: CANCELLED | OUTPATIENT
Start: 2024-05-08

## 2024-04-10 RX ORDER — DIPHENHYDRAMINE HYDROCHLORIDE 50 MG/ML
50 INJECTION INTRAMUSCULAR; INTRAVENOUS ONCE AS NEEDED
Status: CANCELLED | OUTPATIENT
Start: 2024-05-29

## 2024-04-10 RX ORDER — HEPARIN 100 UNIT/ML
500 SYRINGE INTRAVENOUS
Status: CANCELLED | OUTPATIENT
Start: 2024-05-22

## 2024-04-10 RX ORDER — EPINEPHRINE 0.3 MG/.3ML
0.3 INJECTION SUBCUTANEOUS ONCE AS NEEDED
Status: CANCELLED | OUTPATIENT
Start: 2024-05-01

## 2024-04-10 RX ORDER — DIPHENHYDRAMINE HYDROCHLORIDE 50 MG/ML
50 INJECTION INTRAMUSCULAR; INTRAVENOUS ONCE AS NEEDED
Status: CANCELLED | OUTPATIENT
Start: 2024-04-10

## 2024-04-10 RX ORDER — HEPARIN 100 UNIT/ML
500 SYRINGE INTRAVENOUS
Status: CANCELLED | OUTPATIENT
Start: 2024-05-08

## 2024-04-10 RX ORDER — EPINEPHRINE 0.3 MG/.3ML
0.3 INJECTION SUBCUTANEOUS ONCE AS NEEDED
Status: CANCELLED | OUTPATIENT
Start: 2024-04-10

## 2024-04-10 RX ORDER — EPINEPHRINE 0.3 MG/.3ML
0.3 INJECTION SUBCUTANEOUS ONCE AS NEEDED
Status: CANCELLED | OUTPATIENT
Start: 2024-05-22

## 2024-04-10 RX ORDER — SODIUM CHLORIDE 0.9 % (FLUSH) 0.9 %
10 SYRINGE (ML) INJECTION
Status: CANCELLED | OUTPATIENT
Start: 2024-05-01

## 2024-04-10 RX ORDER — SODIUM CHLORIDE 0.9 % (FLUSH) 0.9 %
10 SYRINGE (ML) INJECTION
Status: CANCELLED | OUTPATIENT
Start: 2024-05-29

## 2024-04-10 RX ORDER — EPINEPHRINE 0.3 MG/.3ML
0.3 INJECTION SUBCUTANEOUS ONCE AS NEEDED
Status: CANCELLED | OUTPATIENT
Start: 2024-05-15

## 2024-04-10 NOTE — PROGRESS NOTES
Established Patient - Audio Only Telehealth Visit     The patient location is: [place], [place]   The chief complaint leading to consultation is: FU on Iron def anemia  Visit type: Virtual visit with audio only (telephone)  Total time spent with patient:10 minutes        The reason for the audio only service rather than synchronous audio and video virtual visit was related to technical difficulties or patient preference/necessity.     Each patient to whom I provide medical services by telemedicine is:  (1) informed of the relationship between the physician and patient and the respective role of any other health care provider with respect to management of the patient; and (2) notified that they may decline to receive medical services by telemedicine and may withdraw from such care at any time. Patient verbally consented to receive this service via voice-only telephone call.       Reason for Follow-up:  recurrent severe iron-deficiency anemia; no definite GI source of bleeding  -low risk essential thrombocytosis     Current Treatment:  ASA 81mg po daily     Treatment History:  -S/P Feraheme 510 mg IV x2 (2023, 2023)  -S/P Ferrlecit 125mg IV x 8  (,,,,,)     Past medical history: NIDDM.  Hypertension.  Dyslipidemia.  History of anemia.  Vitamin D deficiency.  Tobacco abuse.  Obesity.  Obstructive sleep apnea.  HFpEF. Leukocytosis.  Cholecystectomy.  Goiter surgery.  Social history: Single.  Lives in [place], [place].  Has 2 grown up sons.  Has been smoking 1-1/2 packs of cigarettes daily since age 10.  Has tried to quit smoking many times but does not get refills of Chantix, therefore, has been unable to.  Currently, down to 10 cigarettes daily.  Denies history of alcohol or illicit drug abuse.  Family history: Sister  from cervical cancer at age 55.  Health maintenance:   EGD 2018 (Dr. Koo in [place]: Esophageal dilation).    Colonoscopy 2018 (  [facility] in [place]), apparently to be repeated in 2 years.    Mammogram 05/14/2018: Negative for malignancy.   ThinPrep cervical Pap smear 04/14/2016: Negative for intraepithelial lesion or malignancy; negative for HPV high-risk types (type 16 and type 18/45)  Menstrual and OB/GYN history: Menopause in 2015.  For last 3 months or so, has experienced vaginal spotting.  Apparently, had Pap smear done at [facility] 2 months back (probably December 2018), which was apparently unremarkable. Pelvic ultrasound on 02/08/2019 showed normal endometrium; likely myomatous changes of uterus with a lower uterine segment lipoma leiomyoma.         History of Present Illness:   53-year-old lady referred from [facility] for evaluation of leukocytosis.     For details, please see my last note dated 09/03/2020.  Please also refer to assessment and plan section.     Interval History: 4/10/24:  Patient was scheduled in the clinic today for a virtual follow-up regarding her iron-deficiency anemia.  She has only completed 8 of her IV iron doses.   She states that she continues to feel weak, tired and exhausted more than normal.  She states that it is difficult for her to do her activities of daily living such as showering and brushing her teeth by herself.  She reports taking her aspirin 81 mg approximately 3 times a week.  Denies any chest pain, dizziness, headache, heart palpitations.  Denies any active bleeding or unusual bruising.  All future appointments were discussed.        This is a telemedicine note. Patient was treated using telemedicine, real time audio and video, according to [facility] protocols. I, distant provider, conducted the visit from location identified below. The patient participated in the visit at a non-[facility] location selected by the patient (or patient's representative), identified below. I am licensed in the state where the patient stated they are located. The patient (or patient's  representative) stated that they understood and accepted the privacy and security risks to their information at their location. Patient was located at her/his residence in , [place]. I, distant provider, was located at [facility].    Face to face time spent with patient exceeds 15 minutes, over 50% of which was used for education and counseling regarding medical conditions, current medications including risk/benefit and side effects/adverse events, over the counter medications-uses/doses. The patient is receptive, expresses understanding and is agreeable to plan. All questions answered.         Physical Examination: Physical Exam was not completed today as this was a telemedicine visit.     Assessment:  Severe iron deficiency anemia:   Presented as moderate anemia with severe microcytosis    EGD, colonoscopy negative for any bleeders (02/2018)   Severe iron deficiency    Feraheme (03/2019)   Hemoglobin dropped from 14.8 (06/2019) to 9.5 (08/28/2019) due to iron deficiency   Feraheme x2 (09/2019) --> improvement in iron stores and normalization of hemoglobin (9.5 --> 13.7)   CT enterogram (09/11/2019): Hiatal hernia; 1.7 cm left adrenal nodule   -03/10/2020: Remains iron deficient (transferrin saturation 13.2%, ferritin 19.2)   -Did not present for Feraheme shots in a timely manner   -04/27/2020: Ferritin 4.9 (down from 19.2 on 03/10/2020); however, hemoglobin 12.4, MCV 83.0   (Iron deficiency erythropoiesis)   -Feraheme 510 mg IV x2 (04/27/2020; 05/04/2020)   -06/01/2020: Iron deficiency resolved with parenteral iron therapy   -However, as of 06/01/2020, severe recurrent iron deficiency remains unexplained   -07/01/2020: Iron stores dropping again within a month; hemoglobin remains normal   -09/02/2020: Iron stores normal/stable (ferritin 21, transferrin saturation 15.8%); hemoglobin 14.8, normal   -09/08/2020: Feels better and more energetic than before   -11/12/2020: Iron deficient; hemoglobin 15.1  -08/04/2020:  [facility]  GI:  Insurance denied capsule endoscopy.  Upper single balloon enteroscopy performed.  1. Duodenum biopsy:  Minimal focal acute inflammation with reactive changes; no celiac sprue  2. Duodenal bulb:  Biopsy showed no significant histopathologic findings; no evidence of celiac sprue  3. Stomach biopsy:  Chemical/reactive gastropathy; H pylori stain negative  -S/P Feraheme 510 mg IV x2 (05/17/2021, 05/24/2021)  -S/P Feraheme 510 mg IV x2 (07/09/2021, 07/19/2021)  -10/27/2021: Colonoscopy (iron-deficiency anemia):  Diffuse diverticulosis; otherwise unremarkable  -10/27/2021:  EGD with APC fulguration and biopsy (iron-deficiency anemia): unctate AVMs within the stomach.  A directed fulguration with APC with good result achieved, superficial ulcerations within the duodenal bulb, possibly secondary to daily aspirin use.  Biopsy taken in the antrum to rule out Helicobacter pylori by pathology  -S/P Feraheme 510 mg IV x2 (08/29/2022, 09/08/2022)  -11/11/2022:  WBC 12.7.  Hemoglobin 12.8.  Platelets 543 K. ferritin 251.70 (was 93.95 on 08/29/2022, 24.88 on 06/29/2022) (transferrin saturation was 19% on 08/29/2022, 7% on 06/29/2022)  -01/11/2023: Labs reviewed.  Hemoglobin 11.2 (hemoglobin was 15.9 on 08/29/2022).  MCV 85.0.  Platelets 562 K, stable.  Ferritin 9.02, down from 251.7 on 11/11/2022.  Transferrin saturation is 6%, low.  CMP unremarkable except glucose 188 mg/dL.  -S/P Feraheme 510 mg IV x2 (01/11/2023, 01/18/2023)  To summarize:   -Recurrent severe iron deficiency anemia, unexplained   -EGD, colonoscopy negative (02/2018)   -CT enterogram negative (09/11/2019)   -has required multiple rounds of parenteral iron therapy over past 4-5 years  -no source of bleeding, H pylori gastritis, or celiac sprue on multiple GI endoscopic procedures including EGD, colonoscopy, CT enteroscopy, and upper single balloon enteroscopy   [ferritin level has dropped:  9.02 (01/11/2023), down from 251.7 (11/11/2022)]  [Hemoglobin: 11.2  on 01/11/2023; down from 15.9 on 08/29/2022)  -treated with Feraheme 510 mg IV x2 (01/11/2023, 01/18/2023)  -02/27/2023:  Hemoglobin 14.6, remarkably improved with Feraheme.  Transferrin saturation 17%, improved but remains low. Ferritin 55.55 (was 9.02 on 01/11/2023)  -09/21/2023: Hgb 11.6, iron 26, iron sat 9 %, TIBC 276, Ferritin 30.30     Thrombocytosis (low risk IPSET-thrombosis score ET)    (age < 60 years; JAK2 V617F mutation positive)   GAGANDEEP-2 V617F mutation positive (03/2019)   Bone marrow consistent (06/2019)   Low risk IPSET-thrombosis score ET   Cardiovascular risk factors: NIDDM, hypertension, tobacco abuse   -04/27/2020: Platelets 500K, stable   -06/01/2020: Platelets 561K, more or less stable   -09/02/2020: Platelets 544K, stable   -11/12/2020: Platelets 567,000/mm³, stable  -11/11/2022:  Platelets 543 K, stable   -02/27/2023:  Platelets 623 K  -09/21/2023:  Platelets 600k, stable.      Chronic, very mild, benign-appearing, intermittent leukocytosis:   Secondary to smoking or underlying myeloproliferative disorder   GAGANDEEP-2 V617F mutation positive (03/2019)   BCR-ABL 1 fusion transcripts negative (03/2019)   Underlying essential thrombocytosis     Plan:  -Recurrent severe iron deficiency anemia, unexplained  -EGD, colonoscopy negative (02/2018)  -CT enterogram negative (09/11/2019)  -has required multiple rounds of parenteral iron therapy over past 4-5 years  -no source of bleeding, H pylori gastritis, or celiac sprue on multiple GI endoscopic procedures including EGD, colonoscopy, CT enteroscopy, and upper single balloon enteroscopy  >>>  [ferritin level has dropped:  9.02 (01/11/2023), down from 251.7 (11/11/2022)]  [Hemoglobin: 11.2 on 01/11/2023; down from 15.9 on 08/29/2022)  -treated with Feraheme 510 mg IV x2 (01/11/2023, 01/18/2023)  -02/27/2023:  Hemoglobin 14.6, remarkably improved with Feraheme.  Transferrin saturation 17%, improved but remains low. Ferritin 55.55 (was 9.02 on  01/11/2023)  -8/2/2023: Hgb 14.8, Hct 47.4, Plt 633, iron 40, iron sat 15% (improved)  -requiring multiple rounds of parenteral iron therapy in the past, essentially with negative exhaustive GI workup     Iron Deficiency Anemia:   - Will change to iron infusions to Injectafer x 2 doses (due to patients compliance with completing all of iron infusions.) -will order today  -Educated patient on compliance with infusions  -RTC in 6 weeks after the completion of the last dose of Venofer (week of May 29th) via telemed with labwork completed before CBC/CMP/Serum iron/TIBC/Ferrtin Level      Low risk essential thrombocytosis    -No history of Thrombosis. Age < 60  -Continue OTC ASA 81 mg tablet daily. Do not miss any doses.   -Advised her elevated platelets can be secondary to anemia as well.      Smoker:   - Instructed the patient on smoking cessation    Above discussed at length with her.  All questions answered.    She understands and agrees with this plan.        Follow up in about 2 months (around 3/24/2024) for With Michelle Arriaga Visit-Labs in [place] .        This service was not originating from a related E/M service provided within the previous 7 days nor will  to an E/M service or procedure within the next 24 hours or my soonest available appointment.  Prevailing standard of care was able to be met in this audio-only visit.          Follow up in about 10 weeks (around 6/19/2024) for Labs, With Micehlle Arriaga-labs completed before .

## 2024-04-11 ENCOUNTER — OFFICE VISIT (OUTPATIENT)
Dept: FAMILY MEDICINE | Facility: CLINIC | Age: 59
End: 2024-04-11
Payer: MEDICAID

## 2024-04-11 VITALS
WEIGHT: 198.19 LBS | BODY MASS INDEX: 31.11 KG/M2 | OXYGEN SATURATION: 100 % | HEART RATE: 82 BPM | RESPIRATION RATE: 18 BRPM | HEIGHT: 67 IN | SYSTOLIC BLOOD PRESSURE: 110 MMHG | TEMPERATURE: 98 F | DIASTOLIC BLOOD PRESSURE: 61 MMHG

## 2024-04-11 DIAGNOSIS — E61.2 MAGNESIUM DEFICIENCY: ICD-10-CM

## 2024-04-11 DIAGNOSIS — E11.69 TYPE 2 DIABETES MELLITUS WITH OTHER SPECIFIED COMPLICATION, WITH LONG-TERM CURRENT USE OF INSULIN: Primary | ICD-10-CM

## 2024-04-11 DIAGNOSIS — I10 PRIMARY HYPERTENSION: ICD-10-CM

## 2024-04-11 DIAGNOSIS — Z79.4 TYPE 2 DIABETES MELLITUS WITH OTHER SPECIFIED COMPLICATION, WITH LONG-TERM CURRENT USE OF INSULIN: Primary | ICD-10-CM

## 2024-04-11 DIAGNOSIS — R68.2 DRY MOUTH AND EYES: ICD-10-CM

## 2024-04-11 DIAGNOSIS — K12.1 MOUTH ULCER: ICD-10-CM

## 2024-04-11 DIAGNOSIS — H04.123 DRY MOUTH AND EYES: ICD-10-CM

## 2024-04-11 DIAGNOSIS — R76.8 ANA POSITIVE: ICD-10-CM

## 2024-04-11 PROCEDURE — 99214 OFFICE O/P EST MOD 30 MIN: CPT | Mod: S$PBB,,, | Performed by: FAMILY MEDICINE

## 2024-04-11 PROCEDURE — 3078F DIAST BP <80 MM HG: CPT | Mod: CPTII,,, | Performed by: FAMILY MEDICINE

## 2024-04-11 PROCEDURE — 1160F RVW MEDS BY RX/DR IN RCRD: CPT | Mod: CPTII,,, | Performed by: FAMILY MEDICINE

## 2024-04-11 PROCEDURE — 1159F MED LIST DOCD IN RCRD: CPT | Mod: CPTII,,, | Performed by: FAMILY MEDICINE

## 2024-04-11 PROCEDURE — 3044F HG A1C LEVEL LT 7.0%: CPT | Mod: CPTII,,, | Performed by: FAMILY MEDICINE

## 2024-04-11 PROCEDURE — 3008F BODY MASS INDEX DOCD: CPT | Mod: CPTII,,, | Performed by: FAMILY MEDICINE

## 2024-04-11 PROCEDURE — 3074F SYST BP LT 130 MM HG: CPT | Mod: CPTII,,, | Performed by: FAMILY MEDICINE

## 2024-04-11 PROCEDURE — 99215 OFFICE O/P EST HI 40 MIN: CPT | Mod: PBBFAC | Performed by: FAMILY MEDICINE

## 2024-04-11 PROCEDURE — 4010F ACE/ARB THERAPY RXD/TAKEN: CPT | Mod: CPTII,,, | Performed by: FAMILY MEDICINE

## 2024-04-11 RX ORDER — VENLAFAXINE HYDROCHLORIDE 75 MG/1
CAPSULE, EXTENDED RELEASE ORAL
COMMUNITY
Start: 2024-02-20

## 2024-04-11 RX ORDER — TIRZEPATIDE 2.5 MG/.5ML
2.5 INJECTION, SOLUTION SUBCUTANEOUS
Qty: 12 PEN | Refills: 0 | Status: SHIPPED | OUTPATIENT
Start: 2024-04-11 | End: 2024-05-02

## 2024-04-11 NOTE — PROGRESS NOTES
Patient Name: Jessica Walker   : 1965  MRN: [id number]     SUBJECTIVE:  Jessica Walker is a 58 y.o. female here for Diabetes (Patient complaining of dry mouth skin and eyes. Would like to change Trulicity to something else. Referral to Rheumatologist. Tongue has been sore with a white spot on tip of tongue for two months.)  .    HPI  Here for routine follow-up of diabetes.  Hemoglobin A1c 6.6% last month, had improved from 7.1%.  Taking lantus 55 UI in AM and 15 at night. Did this regimen on her own depending on her sugars. Sugars in am 191 today, mostly under 200 and has had multiple episodes of hypoglycemia. In need for continuous glucose monitoring.  Has not had the  Dexcom reciever yet, awaiting on prior authorization .  On Trulicity but has not seen any big difference in the appetite/weight.  Seeing endocrinology in 2 months.  Had an appointment today with another endocrinologist but canceled    Lisinopril 20 mg daily, Lopressor 25 mg b.i.d for hypertension.  Well-controlled but for the past few months has been lower them limit, mostly because she has progressively lost a little bit of weight.. Repeat 110/61 Also follows with cardiology for history of CAD.  Last visit with them in February she was ordered some rheumatologic markers because of dry eyes and was told in the past she might have had Sjogren's.  MELANY was positive.  Denies any joint pains.  Dry mouth and eyes. Teeth falling out.  Has developed a mouth ulcer over her tongue.    Follows with Heme-Onc for iron-deficiency anemia, thrombocytosis.  On baby aspirin and iron infusion.  Also follows with GI.  Follows with gynecology for history of heavy menses, had D&C last year.      ALLERGIES:   Review of patient's allergies indicates:   Allergen Reactions    Ivp dye [iodinated contrast media] Itching         ROS:  Review of Systems   Constitutional:  Negative for chills, fever and weight loss (not lately).   HENT:  Negative for  "congestion.    Respiratory:  Negative for cough and shortness of breath.    Cardiovascular:  Negative for chest pain, palpitations and leg swelling.   Gastrointestinal:  Negative for abdominal pain, blood in stool, diarrhea, nausea and vomiting.   Genitourinary:  Negative for dysuria and hematuria.   Neurological:  Negative for dizziness and headaches.         OBJECTIVE:  Vital signs  Vitals:    04/11/24 1250 04/11/24 1305   BP: 91/61 110/61   BP Location: Left arm    Patient Position: Sitting    BP Method: Large (Automatic)    Pulse: 82    Resp: 18    Temp: 97.8 °F (36.6 °C)    TempSrc: Oral    SpO2: 100%    Weight: 89.9 kg (198 lb 3.2 oz)    Height: 5' 7" (1.702 m)       Body mass index is 31.04 kg/m².    PHYSICAL EXAM:   Physical Exam  Vitals reviewed.   Constitutional:       General: She is not in acute distress.     Appearance: Normal appearance. She is obese. She is not ill-appearing.   HENT:      Head: Normocephalic and atraumatic.      Right Ear: External ear normal.      Left Ear: External ear normal.      Nose: Nose normal.      Mouth/Throat:      Mouth: Mucous membranes are moist.      Comments: Whitish oval ulcer over tip of the tongue. Multiple tooth caries  Eyes:      General: No scleral icterus.        Right eye: No discharge.         Left eye: No discharge.      Conjunctiva/sclera: Conjunctivae normal.      Pupils: Pupils are equal, round, and reactive to light.   Cardiovascular:      Rate and Rhythm: Normal rate and regular rhythm.   Pulmonary:      Effort: Pulmonary effort is normal. No respiratory distress.      Breath sounds: No wheezing, rhonchi or rales.   Abdominal:      General: Bowel sounds are normal. There is no distension.      Palpations: Abdomen is soft.      Tenderness: There is no abdominal tenderness.   Musculoskeletal:      Cervical back: Normal range of motion and neck supple. No rigidity or tenderness.      Right lower leg: No edema.      Left lower leg: No edema.   Skin:     " General: Skin is warm.      Findings: No rash.   Neurological:      General: No focal deficit present.      Mental Status: She is alert and oriented to person, place, and time.   Psychiatric:         Mood and Affect: Mood normal.         Behavior: Behavior normal.          ASSESSMENT/PLAN:  1. Type 2 diabetes mellitus with other specified complication, with long-term current use of insulin  Well-controlled, but not losing too much weight lately and hepatitis creeping up, so we will stop Trulicity and will switch to Mounjaro.  Start with 2.5 mg weekly.  Continue with insulin, but will decrease Lantus to 50 units every morning given reported hypoglycemic events.  Very close follow up in 3 weeks with a virtual visit.  Very important for patient to have a continuous glucose monitoring device because of frequent hypoglycemic episodes and being on insulin.  -     blood-glucose meter,continuous (DEXCOM ) Misc; Dexcom G7 .  Use as directed with Dexcom G7 sensors.  Dispense: 1 each; Refill: 0  -     tirzepatide (MOUNJARO) 2.5 mg/0.5 mL PnIj; Inject 2.5 mg into the skin every 7 days.  Dispense: 12 Pen; Refill: 0    2. Primary hypertension  Blood pressure has been lower this past few months, so will decrease lisinopril to 10 mg daily.  Continue with Lopressor.  Close follow up    3. MELANY positive  MELANY positive with dry mouth and eyes, will refer to rheumatology.  Antibody SSA and anti SSB were negative though.  -     Ambulatory referral/consult to Rheumatology; Future; Expected date: 04/18/2024    4. Dry mouth and eyes  -     Ambulatory referral/consult to Rheumatology; Future; Expected date: 04/18/2024    5. Mouth ulcer  Teeth are falling and multiple caries , needs good oral hygiene.  Magic mouthwash to help.  If not healing, might consider ENT referral to get it biopsied.  -     Discontinue: diphenhydrAMINE-aluminum-magnesium hydroxide-simethicone-LIDOcaine viscous HCl 2%; Swish and spit 15 mLs every 4 (four)  hours as needed (pain).  Dispense: 1 each; Refill: 0  -     Discontinue: diphenhydrAMINE-aluminum-magnesium hydroxide-simethicone-LIDOcaine viscous HCl 2%; Swish and spit 15 mLs every 4 (four) hours as needed (pain).  Dispense: 3 each; Refill: 0  -     Discontinue: diphenhydrAMINE-aluminum-magnesium hydroxide-simethicone-LIDOcaine viscous HCl 2%; Swish and spit 15 mLs every 4 (four) hours as needed (pain).  Dispense: 3 each; Refill: 0  -     diphenhydrAMINE-aluminum-magnesium hydroxide-simethicone-LIDOcaine viscous HCl 2%; Swish and spit 15 mLs every 4 (four) hours as needed (pain).  Dispense: 3 each; Refill: 0    6. Magnesium deficiency  -     Magnesium; Future; Expected date: 04/11/2024             Previous medical history/lab work/radiology reviewed and considered during medical management decisions.   Medication list reviewed and medication reconciliation performed.  Patient was provided  and care about his/her current diagnosis (es) and medications including risk/benefit and side effects/adverse events, over the counter medication uses/doses, home self-care and contact precautions,  and red flags and indications for when to seek immediate medical attention.   Patient was advised to continue compliance with current medication list and medical recommendations.  Recommended/ Advised continued compliance with recommended eating habits/ diets for medical conditions and exercise 150 minutes/ week (if possible) for medical condition (s).        RESULTS:  Recent Results (from the past 1008 hour(s))   Comprehensive Metabolic Panel    Collection Time: 03/21/24  5:35 PM   Result Value Ref Range    Sodium Level 141 136 - 145 mmol/L    Potassium Level 3.7 3.5 - 5.1 mmol/L    Chloride 106 98 - 107 mmol/L    Carbon Dioxide 20 (L) 22 - 29 mmol/L    Glucose Level 193 (H) 74 - 100 mg/dL    Blood Urea Nitrogen 8.2 (L) 9.8 - 20.1 mg/dL    Creatinine 0.71 0.55 - 1.02 mg/dL    Calcium Level Total 9.6 8.4 - 10.2 mg/dL     Protein Total 7.2 6.4 - 8.3 gm/dL    Albumin Level 3.3 (L) 3.5 - 5.0 g/dL    Globulin 3.9 (H) 2.4 - 3.5 gm/dL    Albumin/Globulin Ratio 0.8 (L) 1.1 - 2.0 ratio    Bilirubin Total 0.2 <=1.5 mg/dL    Alkaline Phosphatase 171 (H) 40 - 150 unit/L    Alanine Aminotransferase 49 0 - 55 unit/L    Aspartate Aminotransferase 32 5 - 34 unit/L    eGFR >60 mls/min/1.73/m2   Iron and TIBC    Collection Time: 03/21/24  5:35 PM   Result Value Ref Range    Iron Binding Capacity Unsaturated 336 (H) 70 - 310 ug/dL    Iron Level 15 (L) 50 - 170 ug/dL    Transferrin 326 180 - 382 mg/dL    Iron Binding Capacity Total 351 250 - 450 ug/dL    Iron Saturation 4 (L) 20 - 50 %   Ferritin    Collection Time: 03/21/24  5:35 PM   Result Value Ref Range    Ferritin Level 20.70 4.63 - 204.00 ng/mL   CBC with Differential    Collection Time: 03/21/24  5:35 PM   Result Value Ref Range    WBC 17.35 (H) 4.50 - 11.50 x10(3)/mcL    RBC 5.30 4.20 - 5.40 x10(6)/mcL    Hgb 11.2 (L) 12.0 - 16.0 g/dL    Hct 37.4 37.0 - 47.0 %    MCV 70.6 (L) 80.0 - 94.0 fL    MCH 21.1 (L) 27.0 - 31.0 pg    MCHC 29.9 (L) 33.0 - 36.0 g/dL    RDW 20.9 (H) 11.5 - 17.0 %    Platelet 649 (H) 130 - 400 x10(3)/mcL    MPV 10.2 7.4 - 10.4 fL   Manual Differential    Collection Time: 03/21/24  5:35 PM   Result Value Ref Range    Neutrophils % 65 47 - 80 %    Lymphs % 16 13 - 40 %    Monocytes % 6 2 - 11 %    Eosinophils % 3 0 - 8 %    Abnormal Lymphs % 10 %    Neutrophils Abs Calc 11.2775 (H) 2.1 - 9.2 x10(3)/mcL    Lymphs Abs 2.776 0.6 - 4.6 x10(3)/mcL    Eosinophils Abs 0.5205 0 - 0.9 x10(3)/mcL    Monocytes Abs 1.041 0.1 - 1.3 x10(3)/mcL    Platelets Increased (A) Normal, Adequate    RBC Morph Normal Normal    Giant Platelets 1+    Comprehensive Metabolic Panel    Collection Time: 04/09/24  5:33 PM   Result Value Ref Range    Sodium Level 141 136 - 145 mmol/L    Potassium Level 4.1 3.5 - 5.1 mmol/L    Chloride 104 98 - 107 mmol/L    Carbon Dioxide 24 22 - 29 mmol/L    Glucose Level  113 (H) 74 - 100 mg/dL    Blood Urea Nitrogen 10.5 9.8 - 20.1 mg/dL    Creatinine 0.69 0.55 - 1.02 mg/dL    Calcium Level Total 9.7 8.4 - 10.2 mg/dL    Protein Total 7.5 6.4 - 8.3 gm/dL    Albumin Level 3.6 3.5 - 5.0 g/dL    Globulin 3.9 (H) 2.4 - 3.5 gm/dL    Albumin/Globulin Ratio 0.9 (L) 1.1 - 2.0 ratio    Bilirubin Total 0.3 <=1.5 mg/dL    Alkaline Phosphatase 119 40 - 150 unit/L    Alanine Aminotransferase 11 0 - 55 unit/L    Aspartate Aminotransferase 12 5 - 34 unit/L    eGFR >60 mls/min/1.73/m2   Iron and TIBC    Collection Time: 04/09/24  5:33 PM   Result Value Ref Range    Iron Binding Capacity Unsaturated 387 (H) 70 - 310 ug/dL    Iron Level 9 (L) 50 - 170 ug/dL    Transferrin 358 180 - 382 mg/dL    Iron Binding Capacity Total 396 250 - 450 ug/dL    Iron Saturation 2 (L) 20 - 50 %   Ferritin    Collection Time: 04/09/24  5:33 PM   Result Value Ref Range    Ferritin Level 5.10 4.63 - 204.00 ng/mL   CBC with Differential    Collection Time: 04/09/24  5:33 PM   Result Value Ref Range    WBC 15.32 (H) 4.50 - 11.50 x10(3)/mcL    RBC 4.62 4.20 - 5.40 x10(6)/mcL    Hgb 9.4 (L) 12.0 - 16.0 g/dL    Hct 32.0 (L) 37.0 - 47.0 %    MCV 69.3 (L) 80.0 - 94.0 fL    MCH 20.3 (L) 27.0 - 31.0 pg    MCHC 29.4 (L) 33.0 - 36.0 g/dL    RDW 19.4 (H) 11.5 - 17.0 %    Platelet 715 (H) 130 - 400 x10(3)/mcL    MPV 9.7 7.4 - 10.4 fL    Neut % 63.5 %    Lymph % 22.0 %    Mono % 7.8 %    Eos % 3.9 %    Basophil % 0.4 %    Lymph # 3.37 0.6 - 4.6 x10(3)/mcL    Neut # 9.74 (H) 2.1 - 9.2 x10(3)/mcL    Mono # 1.19 0.1 - 1.3 x10(3)/mcL    Eos # 0.59 0 - 0.9 x10(3)/mcL    Baso # 0.06 <=0.2 x10(3)/mcL    IG# 0.37 (H) 0 - 0.04 x10(3)/mcL    IG% 2.4 %         Follow Up:  Follow up in about 3 weeks (around 5/2/2024) for virtual visit for diabetes.         This note was created with the assistance of a voice recognition software or phone dictation. There may be transcription errors as a result of using this technology however minimal. Effort has  been made to assure accuracy of transcription but any obvious errors or omissions should be clarified with the author of the document

## 2024-04-12 DIAGNOSIS — E11.69 TYPE 2 DIABETES MELLITUS WITH OTHER SPECIFIED COMPLICATION, WITH LONG-TERM CURRENT USE OF INSULIN: ICD-10-CM

## 2024-04-12 DIAGNOSIS — Z79.4 TYPE 2 DIABETES MELLITUS WITH OTHER SPECIFIED COMPLICATION, WITH LONG-TERM CURRENT USE OF INSULIN: ICD-10-CM

## 2024-04-12 RX ORDER — GLUCAGON INJECTION, SOLUTION 1 MG/.2ML
0.2 INJECTION, SOLUTION SUBCUTANEOUS
Qty: 0.2 ML | Refills: 1 | Status: SHIPPED | OUTPATIENT
Start: 2024-04-12 | End: 2024-05-29

## 2024-04-16 RX ORDER — DIPHENHYDRAMINE HYDROCHLORIDE 50 MG/ML
50 INJECTION INTRAMUSCULAR; INTRAVENOUS ONCE AS NEEDED
Status: CANCELLED | OUTPATIENT
Start: 2024-04-16

## 2024-04-16 RX ORDER — HEPARIN 100 UNIT/ML
500 SYRINGE INTRAVENOUS
Status: CANCELLED | OUTPATIENT
Start: 2024-04-16

## 2024-04-16 RX ORDER — EPINEPHRINE 0.3 MG/.3ML
0.3 INJECTION SUBCUTANEOUS ONCE AS NEEDED
Status: CANCELLED | OUTPATIENT
Start: 2024-04-16

## 2024-04-16 RX ORDER — SODIUM CHLORIDE 0.9 % (FLUSH) 0.9 %
10 SYRINGE (ML) INJECTION
Status: CANCELLED | OUTPATIENT
Start: 2024-04-16

## 2024-04-19 ENCOUNTER — INFUSION (OUTPATIENT)
Dept: INFUSION THERAPY | Facility: HOSPITAL | Age: 59
End: 2024-04-19
Attending: INTERNAL MEDICINE
Payer: MEDICAID

## 2024-04-19 VITALS
HEIGHT: 67 IN | TEMPERATURE: 98 F | DIASTOLIC BLOOD PRESSURE: 63 MMHG | SYSTOLIC BLOOD PRESSURE: 109 MMHG | WEIGHT: 199.5 LBS | OXYGEN SATURATION: 98 % | BODY MASS INDEX: 31.31 KG/M2 | HEART RATE: 79 BPM | RESPIRATION RATE: 20 BRPM

## 2024-04-19 DIAGNOSIS — D50.9 IRON DEFICIENCY ANEMIA, UNSPECIFIED IRON DEFICIENCY ANEMIA TYPE: ICD-10-CM

## 2024-04-19 DIAGNOSIS — E61.1 IRON DEFICIENCY: Primary | ICD-10-CM

## 2024-04-19 PROCEDURE — 63600175 PHARM REV CODE 636 W HCPCS

## 2024-04-19 PROCEDURE — 25000003 PHARM REV CODE 250

## 2024-04-19 PROCEDURE — 96365 THER/PROPH/DIAG IV INF INIT: CPT

## 2024-04-19 RX ORDER — DIPHENHYDRAMINE HYDROCHLORIDE 50 MG/ML
50 INJECTION INTRAMUSCULAR; INTRAVENOUS ONCE AS NEEDED
Status: CANCELLED | OUTPATIENT
Start: 2024-04-26

## 2024-04-19 RX ORDER — SODIUM CHLORIDE 0.9 % (FLUSH) 0.9 %
10 SYRINGE (ML) INJECTION
Status: DISCONTINUED | OUTPATIENT
Start: 2024-04-19 | End: 2024-04-19 | Stop reason: HOSPADM

## 2024-04-19 RX ORDER — HEPARIN 100 UNIT/ML
500 SYRINGE INTRAVENOUS
Status: DISCONTINUED | OUTPATIENT
Start: 2024-04-19 | End: 2024-04-19 | Stop reason: HOSPADM

## 2024-04-19 RX ORDER — EPINEPHRINE 0.3 MG/.3ML
0.3 INJECTION SUBCUTANEOUS ONCE AS NEEDED
Status: CANCELLED | OUTPATIENT
Start: 2024-04-26

## 2024-04-19 RX ORDER — DIPHENHYDRAMINE HYDROCHLORIDE 50 MG/ML
50 INJECTION INTRAMUSCULAR; INTRAVENOUS ONCE AS NEEDED
Status: DISCONTINUED | OUTPATIENT
Start: 2024-04-19 | End: 2024-04-19 | Stop reason: HOSPADM

## 2024-04-19 RX ORDER — SODIUM CHLORIDE 0.9 % (FLUSH) 0.9 %
10 SYRINGE (ML) INJECTION
Status: CANCELLED | OUTPATIENT
Start: 2024-04-26

## 2024-04-19 RX ORDER — EPINEPHRINE 0.3 MG/.3ML
0.3 INJECTION SUBCUTANEOUS ONCE AS NEEDED
Status: DISCONTINUED | OUTPATIENT
Start: 2024-04-19 | End: 2024-04-19 | Stop reason: HOSPADM

## 2024-04-19 RX ORDER — HEPARIN 100 UNIT/ML
500 SYRINGE INTRAVENOUS
Status: CANCELLED | OUTPATIENT
Start: 2024-04-26

## 2024-04-19 RX ADMIN — IRON SUCROSE 250 MG: 20 INJECTION, SOLUTION INTRAVENOUS at 11:04

## 2024-04-19 RX ADMIN — SODIUM CHLORIDE: 9 INJECTION, SOLUTION INTRAVENOUS at 11:04

## 2024-04-26 ENCOUNTER — INFUSION (OUTPATIENT)
Dept: INFUSION THERAPY | Facility: HOSPITAL | Age: 59
End: 2024-04-26
Attending: INTERNAL MEDICINE
Payer: MEDICAID

## 2024-04-26 VITALS
BODY MASS INDEX: 30.9 KG/M2 | RESPIRATION RATE: 20 BRPM | DIASTOLIC BLOOD PRESSURE: 74 MMHG | OXYGEN SATURATION: 98 % | TEMPERATURE: 98 F | HEIGHT: 67 IN | WEIGHT: 196.88 LBS | SYSTOLIC BLOOD PRESSURE: 112 MMHG | HEART RATE: 107 BPM

## 2024-04-26 DIAGNOSIS — D50.9 IRON DEFICIENCY ANEMIA, UNSPECIFIED IRON DEFICIENCY ANEMIA TYPE: ICD-10-CM

## 2024-04-26 DIAGNOSIS — E61.1 IRON DEFICIENCY: Primary | ICD-10-CM

## 2024-04-26 PROCEDURE — 25000003 PHARM REV CODE 250

## 2024-04-26 PROCEDURE — 96365 THER/PROPH/DIAG IV INF INIT: CPT

## 2024-04-26 PROCEDURE — 63600175 PHARM REV CODE 636 W HCPCS

## 2024-04-26 RX ORDER — HEPARIN 100 UNIT/ML
500 SYRINGE INTRAVENOUS
Status: CANCELLED | OUTPATIENT
Start: 2024-05-03

## 2024-04-26 RX ORDER — DIPHENHYDRAMINE HYDROCHLORIDE 50 MG/ML
50 INJECTION INTRAMUSCULAR; INTRAVENOUS ONCE AS NEEDED
Status: DISCONTINUED | OUTPATIENT
Start: 2024-04-26 | End: 2024-04-26 | Stop reason: HOSPADM

## 2024-04-26 RX ORDER — SODIUM CHLORIDE 0.9 % (FLUSH) 0.9 %
10 SYRINGE (ML) INJECTION
Status: CANCELLED | OUTPATIENT
Start: 2024-05-03

## 2024-04-26 RX ORDER — EPINEPHRINE 0.3 MG/.3ML
0.3 INJECTION SUBCUTANEOUS ONCE AS NEEDED
Status: DISCONTINUED | OUTPATIENT
Start: 2024-04-26 | End: 2024-04-26 | Stop reason: HOSPADM

## 2024-04-26 RX ORDER — HEPARIN 100 UNIT/ML
500 SYRINGE INTRAVENOUS
Status: DISCONTINUED | OUTPATIENT
Start: 2024-04-26 | End: 2024-04-26 | Stop reason: HOSPADM

## 2024-04-26 RX ORDER — SODIUM CHLORIDE 0.9 % (FLUSH) 0.9 %
10 SYRINGE (ML) INJECTION
Status: DISCONTINUED | OUTPATIENT
Start: 2024-04-26 | End: 2024-04-26 | Stop reason: HOSPADM

## 2024-04-26 RX ORDER — EPINEPHRINE 0.3 MG/.3ML
0.3 INJECTION SUBCUTANEOUS ONCE AS NEEDED
Status: CANCELLED | OUTPATIENT
Start: 2024-05-03

## 2024-04-26 RX ORDER — DIPHENHYDRAMINE HYDROCHLORIDE 50 MG/ML
50 INJECTION INTRAMUSCULAR; INTRAVENOUS ONCE AS NEEDED
Status: CANCELLED | OUTPATIENT
Start: 2024-05-03

## 2024-04-26 RX ADMIN — IRON SUCROSE 250 MG: 20 INJECTION, SOLUTION INTRAVENOUS at 11:04

## 2024-04-26 RX ADMIN — SODIUM CHLORIDE: 9 INJECTION, SOLUTION INTRAVENOUS at 11:04

## 2024-04-26 NOTE — NURSING
"1100  Pt arrived for #2 venofer.  Pt denies any pain.  Reports SOB, weakness, fatigue, and nausea.  States she actually feels worse after iron infusion.  She thinks it is because "they wait too long in between restarting the iron."  "

## 2024-04-29 ENCOUNTER — LAB VISIT (OUTPATIENT)
Dept: LAB | Facility: HOSPITAL | Age: 59
End: 2024-04-29
Attending: FAMILY MEDICINE
Payer: MEDICAID

## 2024-04-29 DIAGNOSIS — Z79.4 TYPE 2 DIABETES MELLITUS WITH OTHER SPECIFIED COMPLICATION, WITH LONG-TERM CURRENT USE OF INSULIN: ICD-10-CM

## 2024-04-29 DIAGNOSIS — E61.2 MAGNESIUM DEFICIENCY: ICD-10-CM

## 2024-04-29 DIAGNOSIS — E11.69 TYPE 2 DIABETES MELLITUS WITH OTHER SPECIFIED COMPLICATION, WITH LONG-TERM CURRENT USE OF INSULIN: ICD-10-CM

## 2024-04-29 LAB — MAGNESIUM SERPL-MCNC: 1.4 MG/DL (ref 1.6–2.6)

## 2024-04-29 PROCEDURE — 36415 COLL VENOUS BLD VENIPUNCTURE: CPT

## 2024-04-29 PROCEDURE — 83735 ASSAY OF MAGNESIUM: CPT

## 2024-04-29 PROCEDURE — 84443 ASSAY THYROID STIM HORMONE: CPT

## 2024-05-02 ENCOUNTER — OFFICE VISIT (OUTPATIENT)
Dept: FAMILY MEDICINE | Facility: CLINIC | Age: 59
End: 2024-05-02
Payer: MEDICAID

## 2024-05-02 DIAGNOSIS — E61.2 MAGNESIUM DEFICIENCY: ICD-10-CM

## 2024-05-02 DIAGNOSIS — Z79.4 TYPE 2 DIABETES MELLITUS WITH OTHER SPECIFIED COMPLICATION, WITH LONG-TERM CURRENT USE OF INSULIN: Primary | ICD-10-CM

## 2024-05-02 DIAGNOSIS — R53.83 FATIGUE, UNSPECIFIED TYPE: ICD-10-CM

## 2024-05-02 DIAGNOSIS — E11.69 TYPE 2 DIABETES MELLITUS WITH OTHER SPECIFIED COMPLICATION, WITH LONG-TERM CURRENT USE OF INSULIN: Primary | ICD-10-CM

## 2024-05-02 PROCEDURE — 3044F HG A1C LEVEL LT 7.0%: CPT | Mod: CPTII,95,, | Performed by: FAMILY MEDICINE

## 2024-05-02 PROCEDURE — 1160F RVW MEDS BY RX/DR IN RCRD: CPT | Mod: CPTII,95,, | Performed by: FAMILY MEDICINE

## 2024-05-02 PROCEDURE — 1159F MED LIST DOCD IN RCRD: CPT | Mod: CPTII,95,, | Performed by: FAMILY MEDICINE

## 2024-05-02 PROCEDURE — 4010F ACE/ARB THERAPY RXD/TAKEN: CPT | Mod: CPTII,95,, | Performed by: FAMILY MEDICINE

## 2024-05-02 PROCEDURE — 99214 OFFICE O/P EST MOD 30 MIN: CPT | Mod: 95,,, | Performed by: FAMILY MEDICINE

## 2024-05-02 RX ORDER — DULAGLUTIDE 3 MG/.5ML
3 INJECTION, SOLUTION SUBCUTANEOUS
Qty: 4 PEN | Refills: 2 | Status: SHIPPED | OUTPATIENT
Start: 2024-05-02 | End: 2024-05-02

## 2024-05-02 RX ORDER — MAGNESIUM CHLORIDE 64 MG
64 TABLET, DELAYED RELEASE (ENTERIC COATED) ORAL 2 TIMES DAILY
Qty: 14 TABLET | Refills: 0 | Status: SHIPPED | OUTPATIENT
Start: 2024-05-02

## 2024-05-02 RX ORDER — SEMAGLUTIDE 0.68 MG/ML
0.5 INJECTION, SOLUTION SUBCUTANEOUS
Qty: 6 ML | Refills: 0 | Status: SHIPPED | OUTPATIENT
Start: 2024-05-02 | End: 2024-05-29

## 2024-05-02 NOTE — PROGRESS NOTES
Virtual Telehealth Visit  The patient location is: [place]  Visit type: Virtual visit   Provider location:  [facility] AND [facility]    Total time spent with patient:  25 min including time spent talking to the patient about diabetes, labs, reviewing old record, preparing chart for call, med reconciliation and follow up plan of care.      Disclaimer:   Each patient to whom I provide medical services by telemedicine is:  (1) informed of the relationship between the physician and patient and the respective role of any other health care provider with respect to management of the patient; and (2) notified that they may decline to receive medical services by telemedicine and may withdraw from such care at any time. Patient verbally consented to receive this service via voice-only telephone call.   This service was not originating from a related E/M service provided within the previous 7 days nor will  to an E/M service or procedure within the next 24 hours or my soonest available appointment.  Prevailing standard of care was able to be met in this audio-only visit.       Subjective Data:        CHIEF COMPLAINT:  Diabetes      HPI:     HPI  Close follow up for her diabetes.  Patient last seen about 3 weeks ago in the office and because losing weight and hypoglycemic events, decreased Lantus to 50 units every morning.  We also switched from Trulicity to Mounjaro to help more with weight loss and because Trulicity 1.5 mg was not available in pharmacies.  Taking 55 UI in the morning instead. Sugars in the morning: during the day and in the morning under 200's.  No more hypoglycemic episodes  Finally had Dexcom  approved.  will also call diabetes education to schedule appointment.  Patient has been taking Trulicity instead of Mounjaro because it was not covered.  Has been losing some weight and appetite has decreased, but would like to be switched something else, agreeable with Ozempic.      Magnesium was  rechecked continues to be low  Hx of thyroid surgery, states she had a goiter.  Last tsh last year and normal. Tired. Does have anemia and gets infusion.  Follows with Hematology.  Would like to have TSH rechecked        Review of patient's allergies indicates:   Allergen Reactions    Ivp dye [iodinated contrast media] Itching       Review of Systems   Constitutional:  Positive for malaise/fatigue and weight loss. Negative for chills and fever.   HENT:  Negative for congestion.    Respiratory:  Negative for cough and shortness of breath.    Cardiovascular:  Negative for chest pain and leg swelling.   Psychiatric/Behavioral:  Negative for depression. The patient is not nervous/anxious.          Objective:     Vital signs  There were no vitals filed for this visit.  Not checked    Physical Exam  Vitals reviewed.   Constitutional:       General: She is not in acute distress.     Appearance: Normal appearance. She is obese. She is not ill-appearing.   HENT:      Head: Normocephalic and atraumatic.   Pulmonary:      Effort: Pulmonary effort is normal. No respiratory distress.   Musculoskeletal:      Cervical back: Normal range of motion.   Neurological:      Mental Status: She is alert and oriented to person, place, and time.   Psychiatric:         Mood and Affect: Mood normal.         Behavior: Behavior normal.         Assessment/Plan:     1. Type 2 diabetes mellitus with other specified complication, with long-term current use of insulin  Well-controlled.  Given reported sugars, continue with Lantus 55 units in the morning.  Switch from Trulicity to Ozempic to see if it is going to help more with weight loss.  Start with 0.25 mg weekly the 1st 2 weeks, then can increase to 0.5 mg weekly.  Has an appointment next month with endocrinology.  She will discuss with them for diabetic shoes or if Wound Care will see her earlier, she would prefer that.  -     Discontinue: dulaglutide (TRULICITY) 3 mg/0.5 mL pen injector;  Inject 3 mg into the skin every 7 days.  Dispense: 4 pen ; Refill: 2  -     Ambulatory referral/consult to Wound Clinic; Future; Expected date: 05/09/2024  -     TSH; Future; Expected date: 05/02/2024  -     semaglutide (OZEMPIC) 0.25 mg or 0.5 mg (2 mg/3 mL) pen injector; Inject 0.5 mg into the skin every 7 days. Start with 0.25 mg weekly the first 2 weeks, then increase to 0.5 mg weekly  Dispense: 6 mL; Refill: 0    2. Magnesium deficiency  -     magnesium chloride (MAG 64) 64 mg TbEC; Take 1 tablet (64 mg total) by mouth 2 (two) times a day.  Dispense: 14 tablet; Refill: 0    3. Fatigue, unspecified type  Likely from iron-deficiency anemia.  Continue to follow up with Rheumatology.  Check TSH.  Also sees Cardiology regularly.       Recent Results (from the past 1008 hour(s))   Comprehensive Metabolic Panel    Collection Time: 03/21/24  5:35 PM   Result Value Ref Range    Sodium Level 141 136 - 145 mmol/L    Potassium Level 3.7 3.5 - 5.1 mmol/L    Chloride 106 98 - 107 mmol/L    Carbon Dioxide 20 (L) 22 - 29 mmol/L    Glucose Level 193 (H) 74 - 100 mg/dL    Blood Urea Nitrogen 8.2 (L) 9.8 - 20.1 mg/dL    Creatinine 0.71 0.55 - 1.02 mg/dL    Calcium Level Total 9.6 8.4 - 10.2 mg/dL    Protein Total 7.2 6.4 - 8.3 gm/dL    Albumin Level 3.3 (L) 3.5 - 5.0 g/dL    Globulin 3.9 (H) 2.4 - 3.5 gm/dL    Albumin/Globulin Ratio 0.8 (L) 1.1 - 2.0 ratio    Bilirubin Total 0.2 <=1.5 mg/dL    Alkaline Phosphatase 171 (H) 40 - 150 unit/L    Alanine Aminotransferase 49 0 - 55 unit/L    Aspartate Aminotransferase 32 5 - 34 unit/L    eGFR >60 mls/min/1.73/m2   Iron and TIBC    Collection Time: 03/21/24  5:35 PM   Result Value Ref Range    Iron Binding Capacity Unsaturated 336 (H) 70 - 310 ug/dL    Iron Level 15 (L) 50 - 170 ug/dL    Transferrin 326 180 - 382 mg/dL    Iron Binding Capacity Total 351 250 - 450 ug/dL    Iron Saturation 4 (L) 20 - 50 %   Ferritin    Collection Time: 03/21/24  5:35 PM   Result Value Ref Range    Ferritin  Level 20.70 4.63 - 204.00 ng/mL   CBC with Differential    Collection Time: 03/21/24  5:35 PM   Result Value Ref Range    WBC 17.35 (H) 4.50 - 11.50 x10(3)/mcL    RBC 5.30 4.20 - 5.40 x10(6)/mcL    Hgb 11.2 (L) 12.0 - 16.0 g/dL    Hct 37.4 37.0 - 47.0 %    MCV 70.6 (L) 80.0 - 94.0 fL    MCH 21.1 (L) 27.0 - 31.0 pg    MCHC 29.9 (L) 33.0 - 36.0 g/dL    RDW 20.9 (H) 11.5 - 17.0 %    Platelet 649 (H) 130 - 400 x10(3)/mcL    MPV 10.2 7.4 - 10.4 fL   Manual Differential    Collection Time: 03/21/24  5:35 PM   Result Value Ref Range    Neutrophils % 65 47 - 80 %    Lymphs % 16 13 - 40 %    Monocytes % 6 2 - 11 %    Eosinophils % 3 0 - 8 %    Abnormal Lymphs % 10 %    Neutrophils Abs Calc 11.2775 (H) 2.1 - 9.2 x10(3)/mcL    Lymphs Abs 2.776 0.6 - 4.6 x10(3)/mcL    Eosinophils Abs 0.5205 0 - 0.9 x10(3)/mcL    Monocytes Abs 1.041 0.1 - 1.3 x10(3)/mcL    Platelets Increased (A) Normal, Adequate    RBC Morph Normal Normal    Giant Platelets 1+    Comprehensive Metabolic Panel    Collection Time: 04/09/24  5:33 PM   Result Value Ref Range    Sodium Level 141 136 - 145 mmol/L    Potassium Level 4.1 3.5 - 5.1 mmol/L    Chloride 104 98 - 107 mmol/L    Carbon Dioxide 24 22 - 29 mmol/L    Glucose Level 113 (H) 74 - 100 mg/dL    Blood Urea Nitrogen 10.5 9.8 - 20.1 mg/dL    Creatinine 0.69 0.55 - 1.02 mg/dL    Calcium Level Total 9.7 8.4 - 10.2 mg/dL    Protein Total 7.5 6.4 - 8.3 gm/dL    Albumin Level 3.6 3.5 - 5.0 g/dL    Globulin 3.9 (H) 2.4 - 3.5 gm/dL    Albumin/Globulin Ratio 0.9 (L) 1.1 - 2.0 ratio    Bilirubin Total 0.3 <=1.5 mg/dL    Alkaline Phosphatase 119 40 - 150 unit/L    Alanine Aminotransferase 11 0 - 55 unit/L    Aspartate Aminotransferase 12 5 - 34 unit/L    eGFR >60 mls/min/1.73/m2   Iron and TIBC    Collection Time: 04/09/24  5:33 PM   Result Value Ref Range    Iron Binding Capacity Unsaturated 387 (H) 70 - 310 ug/dL    Iron Level 9 (L) 50 - 170 ug/dL    Transferrin 358 180 - 382 mg/dL    Iron Binding Capacity  Total 396 250 - 450 ug/dL    Iron Saturation 2 (L) 20 - 50 %   Ferritin    Collection Time: 04/09/24  5:33 PM   Result Value Ref Range    Ferritin Level 5.10 4.63 - 204.00 ng/mL   CBC with Differential    Collection Time: 04/09/24  5:33 PM   Result Value Ref Range    WBC 15.32 (H) 4.50 - 11.50 x10(3)/mcL    RBC 4.62 4.20 - 5.40 x10(6)/mcL    Hgb 9.4 (L) 12.0 - 16.0 g/dL    Hct 32.0 (L) 37.0 - 47.0 %    MCV 69.3 (L) 80.0 - 94.0 fL    MCH 20.3 (L) 27.0 - 31.0 pg    MCHC 29.4 (L) 33.0 - 36.0 g/dL    RDW 19.4 (H) 11.5 - 17.0 %    Platelet 715 (H) 130 - 400 x10(3)/mcL    MPV 9.7 7.4 - 10.4 fL    Neut % 63.5 %    Lymph % 22.0 %    Mono % 7.8 %    Eos % 3.9 %    Basophil % 0.4 %    Lymph # 3.37 0.6 - 4.6 x10(3)/mcL    Neut # 9.74 (H) 2.1 - 9.2 x10(3)/mcL    Mono # 1.19 0.1 - 1.3 x10(3)/mcL    Eos # 0.59 0 - 0.9 x10(3)/mcL    Baso # 0.06 <=0.2 x10(3)/mcL    IG# 0.37 (H) 0 - 0.04 x10(3)/mcL    IG% 2.4 %   Magnesium    Collection Time: 04/29/24  2:05 PM   Result Value Ref Range    Magnesium Level 1.40 (L) 1.60 - 2.60 mg/dL         Follow-up:     Follow up in about 6 months (around 11/2/2024).       This note was created with the assistance of a voice recognition software or phone dictation. There may be transcription errors as a result of using this technology however minimal. Effort has been made to assure accuracy of transcription but any obvious errors or omissions should be clarified with the author of the document.

## 2024-05-03 ENCOUNTER — INFUSION (OUTPATIENT)
Dept: INFUSION THERAPY | Facility: HOSPITAL | Age: 59
End: 2024-05-03
Attending: INTERNAL MEDICINE
Payer: MEDICAID

## 2024-05-03 VITALS
DIASTOLIC BLOOD PRESSURE: 69 MMHG | OXYGEN SATURATION: 98 % | HEART RATE: 88 BPM | WEIGHT: 197.56 LBS | BODY MASS INDEX: 31.01 KG/M2 | TEMPERATURE: 98 F | SYSTOLIC BLOOD PRESSURE: 122 MMHG | RESPIRATION RATE: 20 BRPM | HEIGHT: 67 IN

## 2024-05-03 DIAGNOSIS — D50.9 IRON DEFICIENCY ANEMIA, UNSPECIFIED IRON DEFICIENCY ANEMIA TYPE: ICD-10-CM

## 2024-05-03 DIAGNOSIS — E61.1 IRON DEFICIENCY: Primary | ICD-10-CM

## 2024-05-03 LAB — TSH SERPL-ACNC: 3.35 UIU/ML (ref 0.35–4.94)

## 2024-05-03 PROCEDURE — 63600175 PHARM REV CODE 636 W HCPCS

## 2024-05-03 PROCEDURE — 25000003 PHARM REV CODE 250

## 2024-05-03 PROCEDURE — 96365 THER/PROPH/DIAG IV INF INIT: CPT

## 2024-05-03 RX ORDER — EPINEPHRINE 0.3 MG/.3ML
0.3 INJECTION SUBCUTANEOUS ONCE AS NEEDED
Status: DISCONTINUED | OUTPATIENT
Start: 2024-05-03 | End: 2024-05-03 | Stop reason: HOSPADM

## 2024-05-03 RX ORDER — SODIUM CHLORIDE 0.9 % (FLUSH) 0.9 %
10 SYRINGE (ML) INJECTION
Status: CANCELLED | OUTPATIENT
Start: 2024-05-10

## 2024-05-03 RX ORDER — HEPARIN 100 UNIT/ML
500 SYRINGE INTRAVENOUS
Status: CANCELLED | OUTPATIENT
Start: 2024-05-10

## 2024-05-03 RX ORDER — DIPHENHYDRAMINE HYDROCHLORIDE 50 MG/ML
50 INJECTION INTRAMUSCULAR; INTRAVENOUS ONCE AS NEEDED
Status: DISCONTINUED | OUTPATIENT
Start: 2024-05-03 | End: 2024-05-03 | Stop reason: HOSPADM

## 2024-05-03 RX ORDER — SODIUM CHLORIDE 0.9 % (FLUSH) 0.9 %
10 SYRINGE (ML) INJECTION
Status: DISCONTINUED | OUTPATIENT
Start: 2024-05-03 | End: 2024-05-03 | Stop reason: HOSPADM

## 2024-05-03 RX ORDER — DIPHENHYDRAMINE HYDROCHLORIDE 50 MG/ML
50 INJECTION INTRAMUSCULAR; INTRAVENOUS ONCE AS NEEDED
Status: CANCELLED | OUTPATIENT
Start: 2024-05-10

## 2024-05-03 RX ORDER — EPINEPHRINE 0.3 MG/.3ML
0.3 INJECTION SUBCUTANEOUS ONCE AS NEEDED
Status: CANCELLED | OUTPATIENT
Start: 2024-05-10

## 2024-05-03 RX ORDER — HEPARIN 100 UNIT/ML
500 SYRINGE INTRAVENOUS
Status: DISCONTINUED | OUTPATIENT
Start: 2024-05-03 | End: 2024-05-03 | Stop reason: HOSPADM

## 2024-05-03 RX ADMIN — SODIUM CHLORIDE: 9 INJECTION, SOLUTION INTRAVENOUS at 11:05

## 2024-05-03 RX ADMIN — IRON SUCROSE 250 MG: 20 INJECTION, SOLUTION INTRAVENOUS at 11:05

## 2024-05-03 NOTE — NURSING
Pt received Venofer infusion # 3 of 4 via a peripheral IV without incident; pt stated she is feeling better since her first 2 infusions; sent secure chats as follows to DEYA Phillips MA:    Pt requested to get her blood drawn at [facility]. She said she told [facility]. Is that possible? If so, can you can reorder her labs for that hospital & lab collect? Thanks!!     I'm going to let pt leave as labs work is not due until 6/20/24 - thanks

## 2024-05-03 NOTE — NURSING
DEYA Phillips MA responded as follows:  Yes patient gets labs drawn there. I will send a physician order with the labs that need to be drawn.    I called pt at [phone] and left a voice mail that DEYA Phillips MA (gina Regan NP's assistant) said it you do get your blood work done there and she will send lab orders to that facility.

## 2024-05-06 ENCOUNTER — TELEPHONE (OUTPATIENT)
Dept: FAMILY MEDICINE | Facility: CLINIC | Age: 59
End: 2024-05-06
Payer: MEDICAID

## 2024-05-06 NOTE — TELEPHONE ENCOUNTER
Informed patient that the thyroid level; was normal patient voiced understanding.    ----- Message from Leni Presley MD sent at 5/3/2024  4:14 PM CDT -----  Please let the patient know that the thyroid level we added into labs, was normal.  Thanks

## 2024-05-10 ENCOUNTER — INFUSION (OUTPATIENT)
Dept: INFUSION THERAPY | Facility: HOSPITAL | Age: 59
End: 2024-05-10
Attending: INTERNAL MEDICINE
Payer: MEDICAID

## 2024-05-10 ENCOUNTER — PATIENT MESSAGE (OUTPATIENT)
Dept: GASTROENTEROLOGY | Facility: CLINIC | Age: 59
End: 2024-05-10
Payer: MEDICAID

## 2024-05-10 VITALS
TEMPERATURE: 98 F | RESPIRATION RATE: 20 BRPM | BODY MASS INDEX: 31.2 KG/M2 | HEIGHT: 67 IN | OXYGEN SATURATION: 100 % | HEART RATE: 82 BPM | SYSTOLIC BLOOD PRESSURE: 124 MMHG | WEIGHT: 198.81 LBS | DIASTOLIC BLOOD PRESSURE: 71 MMHG

## 2024-05-10 DIAGNOSIS — K44.9 HIATAL HERNIA: Primary | ICD-10-CM

## 2024-05-10 DIAGNOSIS — D50.9 IRON DEFICIENCY ANEMIA, UNSPECIFIED IRON DEFICIENCY ANEMIA TYPE: ICD-10-CM

## 2024-05-10 DIAGNOSIS — E61.1 IRON DEFICIENCY: Primary | ICD-10-CM

## 2024-05-10 PROCEDURE — 63600175 PHARM REV CODE 636 W HCPCS

## 2024-05-10 PROCEDURE — 96365 THER/PROPH/DIAG IV INF INIT: CPT

## 2024-05-10 PROCEDURE — 25000003 PHARM REV CODE 250

## 2024-05-10 RX ORDER — EPINEPHRINE 0.3 MG/.3ML
0.3 INJECTION SUBCUTANEOUS ONCE AS NEEDED
Status: DISCONTINUED | OUTPATIENT
Start: 2024-05-10 | End: 2024-05-10 | Stop reason: HOSPADM

## 2024-05-10 RX ORDER — EPINEPHRINE 0.3 MG/.3ML
0.3 INJECTION SUBCUTANEOUS ONCE AS NEEDED
Status: CANCELLED | OUTPATIENT
Start: 2024-05-10

## 2024-05-10 RX ORDER — DIPHENHYDRAMINE HYDROCHLORIDE 50 MG/ML
50 INJECTION INTRAMUSCULAR; INTRAVENOUS ONCE AS NEEDED
Status: DISCONTINUED | OUTPATIENT
Start: 2024-05-10 | End: 2024-05-10 | Stop reason: HOSPADM

## 2024-05-10 RX ORDER — SODIUM CHLORIDE 0.9 % (FLUSH) 0.9 %
10 SYRINGE (ML) INJECTION
Status: DISCONTINUED | OUTPATIENT
Start: 2024-05-10 | End: 2024-05-10 | Stop reason: HOSPADM

## 2024-05-10 RX ORDER — HEPARIN 100 UNIT/ML
500 SYRINGE INTRAVENOUS
Status: DISCONTINUED | OUTPATIENT
Start: 2024-05-10 | End: 2024-05-10 | Stop reason: HOSPADM

## 2024-05-10 RX ORDER — HEPARIN 100 UNIT/ML
500 SYRINGE INTRAVENOUS
Status: CANCELLED | OUTPATIENT
Start: 2024-05-10

## 2024-05-10 RX ORDER — DIPHENHYDRAMINE HYDROCHLORIDE 50 MG/ML
50 INJECTION INTRAMUSCULAR; INTRAVENOUS ONCE AS NEEDED
Status: CANCELLED | OUTPATIENT
Start: 2024-05-10

## 2024-05-10 RX ORDER — SODIUM CHLORIDE 0.9 % (FLUSH) 0.9 %
10 SYRINGE (ML) INJECTION
Status: CANCELLED | OUTPATIENT
Start: 2024-05-10

## 2024-05-10 RX ADMIN — SODIUM CHLORIDE: 9 INJECTION, SOLUTION INTRAVENOUS at 11:05

## 2024-05-10 RX ADMIN — IRON SUCROSE 250 MG: 20 INJECTION, SOLUTION INTRAVENOUS at 11:05

## 2024-05-10 NOTE — NURSING
"Pt received Venofer # 4/4 via peripheral IV without incident; pt stated she feels weak but "not as weak as last week....the medicine must be working.  "

## 2024-05-22 ENCOUNTER — HOSPITAL ENCOUNTER (OUTPATIENT)
Dept: WOUND CARE | Facility: HOSPITAL | Age: 59
Discharge: HOME | End: 2024-05-22
Attending: INTERNAL MEDICINE
Payer: MEDICAID

## 2024-05-22 VITALS
OXYGEN SATURATION: 100 % | HEART RATE: 92 BPM | DIASTOLIC BLOOD PRESSURE: 69 MMHG | SYSTOLIC BLOOD PRESSURE: 96 MMHG | RESPIRATION RATE: 18 BRPM

## 2024-05-22 DIAGNOSIS — E11.9 ENCOUNTER FOR COMPREHENSIVE DIABETIC FOOT EXAMINATION, TYPE 2 DIABETES MELLITUS: Primary | ICD-10-CM

## 2024-05-22 DIAGNOSIS — L60.2 OVERGROWN TOENAILS: ICD-10-CM

## 2024-05-22 DIAGNOSIS — L85.3 XEROSIS OF SKIN: ICD-10-CM

## 2024-05-22 PROCEDURE — 27000999 HC MEDICAL RECORD PHOTO DOCUMENTATION

## 2024-05-22 PROCEDURE — 99211 OFF/OP EST MAY X REQ PHY/QHP: CPT

## 2024-05-22 PROCEDURE — 11719 TRIM NAIL(S) ANY NUMBER: CPT | Mod: ,,, | Performed by: NURSE PRACTITIONER

## 2024-05-22 PROCEDURE — 99499 UNLISTED E&M SERVICE: CPT | Mod: ,,, | Performed by: NURSE PRACTITIONER

## 2024-05-22 PROCEDURE — 11719 TRIM NAIL(S) ANY NUMBER: CPT

## 2024-05-22 RX ORDER — AMMONIUM LACTATE 12 G/100G
1 CREAM TOPICAL 2 TIMES DAILY
Qty: 385 G | Refills: 11 | Status: SHIPPED | OUTPATIENT
Start: 2024-05-22 | End: 2024-06-21

## 2024-05-22 NOTE — PROGRESS NOTES
CHIEF COMPLAINT:    Diabetic foot care      HISTORY OF  PRESENT ILLNESS:  58 y.o. White female being seen today for routine diabetic foot care.  Followed by Leni Presley MD for PCP.  Last visit with PCP was on 5/2/24.  Hx of heavy smoking.  50-55 pk/yr history.  Smoking, on average, 1/2 ppd now.  Began smoking at age of 10 years old.   No hx of diabetic foot ulcers or vascular interventions in BLE.  Receiving IV iron infusions for SARAH.  JAK2 gene mutation.  History includes:        Past Medical History:   Diagnosis Date    Anemia     Depression     Diabetes mellitus     Dieulafoy lesion of stomach 12/20/2023    Encounter for comprehensive diabetic foot examination, type 2 diabetes mellitus 05/29/2024    Essential thrombocytosis 01/10/2023    Hiatal hernia 12/20/2023    Hyperlipidemia LDL goal <70 05/17/2022    Hypertension     Iron deficiency 06/29/2022    JAK2 gene mutation 01/10/2023    Mild CAD 05/17/2022    Obesity (BMI 30-39.9) 05/17/2022    Overgrown toenails 05/29/2024    Personal history of colonic polyps 11/29/2022    Thrombocytosis 06/29/2022    Tobacco user 05/17/2022    Type 2 diabetes mellitus, with long-term current use of insulin 05/17/2022    Xerosis of skin 05/29/2024      Past Surgical History:   Procedure Laterality Date    CHOLECYSTECTOMY      COLONOSCOPY  10/2021    ESOPHAGOGASTRODUODENOSCOPY N/A 06/26/2023    Procedure: EGD (ESOPHAGOGASTRODUODENOSCOPY);  Surgeon: Kita Larose MD;  Location: [facility] ENDOSCOPY;  Service: Gastroenterology;  Laterality: N/A;    EYE SURGERY  7/6/93    LK and RK    HYSTEROSCOPY WITH DILATION AND CURETTAGE OF UTERUS N/A 9/26/2023    Procedure: HYSTEROSCOPY, WITH DILATION AND CURETTAGE OF UTERUS;  Surgeon: Deepti Chappell MD;  Location: [facility] OR;  Service: OB/GYN;  Laterality: N/A;  myosure    INTRALUMINAL GASTROINTESTINAL TRACT IMAGING VIA CAPSULE N/A 06/29/2023    Procedure: IMAGING PROCEDURE, GI TRACT, INTRALUMINAL, VIA CAPSULE;  Surgeon: Kita GALINDO  MD Lachelle;  Location: [facility] ENDOSCOPY;  Service: Gastroenterology;  Laterality: N/A;    Removal of Goiter      UPPER GASTROINTESTINAL ENDOSCOPY        Social History     Socioeconomic History    Marital status: Single   Tobacco Use    Smoking status: Every Day     Current packs/day: 1.00     Average packs/day: 1 pack/day for 43.0 years (43.0 ttl pk-yrs)     Types: Cigarettes    Smokeless tobacco: Never   Substance and Sexual Activity    Alcohol use: Not Currently    Drug use: Not Currently    Sexual activity: Not Currently     Birth control/protection: None     Comment: Havent been sexually active since 2006     Social Determinants of Health     Financial Resource Strain: Medium Risk (1/4/2024)    Overall Financial Resource Strain (CARDIA)     Difficulty of Paying Living Expenses: Somewhat hard   Food Insecurity: Food Insecurity Present (1/4/2024)    Hunger Vital Sign     Worried About Running Out of Food in the Last Year: Sometimes true     Ran Out of Food in the Last Year: Sometimes true   Transportation Needs: No Transportation Needs (1/4/2024)    PRAPARE - Transportation     Lack of Transportation (Medical): No     Lack of Transportation (Non-Medical): No   Physical Activity: Inactive (1/4/2024)    Exercise Vital Sign     Days of Exercise per Week: 0 days     Minutes of Exercise per Session: 0 min   Stress: Stress Concern Present (1/4/2024)    [employer] [place] [employer] - Occupational Stress Questionnaire     Feeling of Stress : To some extent   Housing Stability: Low Risk  (1/4/2024)    Housing Stability Vital Sign     Unable to Pay for Housing in the Last Year: No     Number of Places Lived in the Last Year: 1     Unstable Housing in the Last Year: No         Review of Most Recent Wound Care-Related Labs:  Lab Results   Component Value Date/Time    WBC 15.32 (H) 04/09/2024 05:33 PM    RBC 4.62 04/09/2024 05:33 PM    HGB 9.4 (L) 04/09/2024 05:33 PM    HCT 32.0 (L) 04/09/2024 05:33 PM    IRON 9  "(L) 04/09/2024 05:33 PM    FERRITIN 5.10 04/09/2024 05:33 PM    MCV 69.3 (L) 04/09/2024 05:33 PM    MCH 20.3 (L) 04/09/2024 05:33 PM    CREATININE 0.69 04/09/2024 05:33 PM    CREATININE 0.63 08/04/2020 10:43 AM    HGBA1C 6.6 02/19/2024 12:10 PM    CRP 1.80 02/19/2024 12:10 PM        Today 5/22/24:  Denies numbness, tingling, and pain in feet and toes.  No pain in calves or thighs with walking and laying down.  Not aware of feet feeling cold.  Denies swelling in ankles.   CBGs well-controlled, per self-report.  Last HgbA1C 6.6%.        REVIEW OF SYSTEMS:  Except as stated in HPI, all other 10 body systems normal.       Current Outpatient Medications   Medication Sig Dispense Refill    aspirin (ECOTRIN) 81 MG EC tablet Take 81 mg by mouth once daily.      atorvastatin (LIPITOR) 40 MG tablet Take 1 tablet (40 mg total) by mouth every evening. 90 tablet 3    BD ULTRA-FINE SHORT PEN NEEDLE 31 gauge x 5/16" Ndle Inject 1 each into the skin 3 (three) times daily. 100 each 1    blood-glucose meter,continuous (DEXCOM ) Misc Dexcom G7 .  Use as directed with Dexcom G7 sensors. 1 each 0    cycloSPORINE (RESTASIS) 0.05 % ophthalmic emulsion 1 drop 2 (two) times daily.      DEXCOM G7 SENSOR Amy Dexcom G7 sensors.  Use every 10 days as directed. 3 each 11    diphenhydrAMINE-aluminum-magnesium hydroxide-simethicone-LIDOcaine viscous HCl 2% Swish and spit 15 mLs every 4 (four) hours as needed (pain). 3 each 0    GVOKE HYPOPEN 2-PACK 1 mg/0.2 mL AtIn Inject 0.2 mLs into the skin as needed (for hypoglycemia). 0.2 mL 1    hydrOXYzine pamoate (VISTARIL) 25 MG Cap Take 25 mg by mouth nightly as needed.      ibuprofen (ADVIL,MOTRIN) 800 MG tablet Take 800 mg by mouth.      LANTUS SOLOSTAR U-100 INSULIN glargine 100 units/mL SubQ pen INJECT 70 UNITS SUBCUTANEOUSLY EVERY DAY AT BEDTIME 30 each 1    lisinopriL (PRINIVIL,ZESTRIL) 20 MG tablet Take 1 tablet (20 mg total) by mouth once daily. 90 tablet 3    magnesium chloride " (MAG 64) 64 mg TbEC Take 1 tablet (64 mg total) by mouth 2 (two) times a day. 14 tablet 0    metFORMIN (GLUCOPHAGE) 1000 MG tablet Take 1 tablet (1,000 mg total) by mouth 2 (two) times daily. 180 tablet 3    metoprolol tartrate (LOPRESSOR) 25 MG tablet Take 1 tablet (25 mg total) by mouth 2 (two) times daily. 180 tablet 3    mirtazapine (REMERON) 15 MG tablet Take 15 mg by mouth every evening.      pantoprazole (PROTONIX) 40 MG tablet Take 1 tablet (40 mg total) by mouth once daily. 30 tablet 5    semaglutide (OZEMPIC) 0.25 mg or 0.5 mg (2 mg/3 mL) pen injector Inject 0.5 mg into the skin every 7 days. Start with 0.25 mg weekly the first 2 weeks, then increase to 0.5 mg weekly 6 mL 0    TRUE METRIX GLUCOSE TEST STRIP Strp USE 1 STRIP TO CHECK GLUCOSE ONCE DAILY 100 each 1    varenicline (CHANTIX) 1 mg Tab Take 1 tablet (1 mg total) by mouth 2 (two) times daily. 60 tablet 5    venlafaxine (EFFEXOR XR) 75 MG 24 hr capsule 1 capsule with food Orally Once a day for 30 days      ammonium lactate 12 % Crea Apply 1 application  topically 2 (two) times a day. Apply thin layer of Vaseline over Lac-Hydrin twice a day.  NOTHING BETWEEN TOES. 385 g 11    ferrous sulfate 324 mg (65 mg iron) TbEC Take 1 tablet (324 mg total) by mouth every Mon, Wed, Fri. (Patient not taking: Reported on 5/22/2024) 24 tablet 3    mupirocin (BACTROBAN) 2 % ointment       nitroGLYCERIN (NITROSTAT) 0.4 MG SL tablet Place 1 tablet (0.4 mg total) under the tongue every 5 (five) minutes as needed for Chest pain. 25 tablet 6     No current facility-administered medications for this encounter.     Facility-Administered Medications Ordered in Other Encounters   Medication Dose Route Frequency Provider Last Rate Last Admin    0.9%  NaCl infusion   Intravenous Continuous Marielos Bahena MD        albuterol-ipratropium 2.5 mg-0.5 mg/3 mL nebulizer solution 3 mL  3 mL Nebulization Once PRN Marisa Ribera MD        dextrose 10% bolus 125 mL 125 mL  12.5 g  Intravenous PRN Katherine Bernal FNP        dextrose 10% bolus 125 mL 125 mL  12.5 g Intravenous PRN Katherine Bernal, ANAHI        diphenhydrAMINE injection 25 mg  25 mg Intravenous Once PRN Marisa Ribera MD        HYDROmorphone injection 0.2 mg  0.2 mg Intravenous Q5 Min PRN Marisa Ribera MD        HYDROmorphone injection 0.5 mg  0.5 mg Intravenous Q5 Min PRN Marisa Ribera MD        insulin aspart U-100 injection 2-9 Units  2-9 Units Subcutaneous PRN Katherine Bernal, ANAHI        insulin aspart U-100 injection 4-12 Units  4-12 Units Subcutaneous PRN Katherine Bernal FNP        lactated ringers infusion   Intravenous Continuous Marisa Ribera  mL/hr at 09/26/23 0856 Rate Change at 09/26/23 0856    LIDOcaine (PF) 10 mg/ml (1%) injection 10 mg  1 mL Intradermal Once Katherine Bernal FNP        ondansetron injection 4 mg  4 mg Intravenous Once Marisa Ribera MD        oxyCODONE-acetaminophen 5-325 mg per tablet 2 tablet  2 tablet Oral Once Marisa Ribera MD        prochlorperazine injection Soln 5 mg  5 mg Intravenous Once PRN Marisa Ribera MD             PHYSICAL EXAMINATION AND VITAL SIGNS:    Vitals:    05/22/24 1400   BP: 96/69   Pulse: 92   Resp: 18     There is no height or weight on file to calculate BMI.      General:   Hypotensive, asymptomatic with.  Obese, in no acute distress.   Respiratory:   Breathing even, quiet, and unlabored.  No coughing.  Cardiovascular:  Generalized pallor.   Mild non-pitting edema over bilateral medial/lateral ankles.  DP pulses palpable bilaterally.  PT pulses dopplered bilaterally.  No hemosiderin staining or varicosities.   Scattered hair distrubition over BLE and toes.  Toenails overgrown and thickened.  No mycotic changes.    Distal toes normal temp.    Musculoskeletal:    Full range of motion of all extremities.  Ambulates without assistive device.  Normal dorsiflexion and flexion of bilateral feet  and hallux toes.  Normal intrinsic muscle ROM bilaterally.  Bilateral 1st metatarsal head bunions, with lateral deviation positioning of bilateral hallux toes.   Neurologic:  A&O X 3.  Cranial nerves grossly intact.   Normal monofilament testing bilaterally.  No LOPS identified.  Psychiatric:  Calm, cooperative.  Mood and effect normal.  Responses appropriate.   Integumentary:      10 overgrown and thickened toenails.  No odors or mycotic changes.                               ASSESSMENT AND PLAN:    Encounter for diabetic foot care, non-insulin dependent:  Last visit with PCP:  5/2/24  Diabetes well-controlled per last HgbA1C.     Diabetic foot care teaching, with wound clinic and [facility] precautions for any wounds, nail, or skin issues.  Instructed prompt medical attention decreases risk of lower limb amputations.  Instructed to check feet/toes daily for skin changes and to reach out to wound clinic or [facility] promptly for any breaks in skin.  Instructed on risk of non-healing wounds, osteomyelitis, and amputations with diabetes, with focus towards impaired immune response with diabetes and how that affects wound healing.  Instructed not to cut own toenails; however, to use an [employer] board to file down any sharp nails that may be getting caught on socks, or causing pain to surrounding skin, with rationale.  Instructed not to soak feet in epsom salt water, with rationale.  Instructed not to put any lotions or creams between toes, unless prescribed by a provider, with rationale.   Handout given on diabetes and foot care.      Overgrown Toenails X 10:                                      PROCEDURE NOTE    Procedure Today:  Cutting and filing of 10 toenails.  Rationale:  Overgrown and dystrophic toenails, as patient presented today, can lead to diabetic foot/toe ulcers, osteomyelitis, and lower limb amputations.   Informed verbal consent obtained.  Using standard podiatry clippers and [employer] boards, I trimmed and filed 10  overgrown toenails.  No bleeding or discomfort during procedure.  Patient tolerated procedure without complications.      Xerosis of bilateral feet:  Teaching provided on underlying cause of condition, s/s of condition, complications, and treatment options of condition.    RX:  Lac-Hydrin 12%, followed by thin layer of Vaseline twice/day.    Teaching provided on new medication, expected outcomes of medication, how to administer medication, and possible s/e of medications.    Instructed not to put creams/lotions between toes, with rationale.                    Return to clinic on an as-needed basis for any future wound-related needs.  Teaching provided on s/s to call wound clinic for promptly.

## 2024-05-29 ENCOUNTER — PATIENT MESSAGE (OUTPATIENT)
Dept: GASTROENTEROLOGY | Facility: CLINIC | Age: 59
End: 2024-05-29

## 2024-05-29 ENCOUNTER — OFFICE VISIT (OUTPATIENT)
Dept: GASTROENTEROLOGY | Facility: CLINIC | Age: 59
End: 2024-05-29
Payer: MEDICAID

## 2024-05-29 VITALS
HEART RATE: 96 BPM | BODY MASS INDEX: 30.27 KG/M2 | WEIGHT: 192.88 LBS | SYSTOLIC BLOOD PRESSURE: 102 MMHG | HEIGHT: 67 IN | OXYGEN SATURATION: 98 % | DIASTOLIC BLOOD PRESSURE: 68 MMHG | TEMPERATURE: 98 F | RESPIRATION RATE: 16 BRPM

## 2024-05-29 DIAGNOSIS — D50.9 IRON DEFICIENCY ANEMIA, UNSPECIFIED IRON DEFICIENCY ANEMIA TYPE: Primary | ICD-10-CM

## 2024-05-29 DIAGNOSIS — Z72.0 TOBACCO USER: ICD-10-CM

## 2024-05-29 DIAGNOSIS — K44.9 HIATAL HERNIA: ICD-10-CM

## 2024-05-29 DIAGNOSIS — K31.82 DIEULAFOY LESION OF STOMACH: ICD-10-CM

## 2024-05-29 DIAGNOSIS — Z86.010 PERSONAL HISTORY OF COLONIC POLYPS: ICD-10-CM

## 2024-05-29 PROBLEM — L85.3 XEROSIS OF SKIN: Status: ACTIVE | Noted: 2024-05-29

## 2024-05-29 PROBLEM — L60.2 OVERGROWN TOENAILS: Status: ACTIVE | Noted: 2024-05-29

## 2024-05-29 PROBLEM — E11.9 ENCOUNTER FOR COMPREHENSIVE DIABETIC FOOT EXAMINATION, TYPE 2 DIABETES MELLITUS: Status: ACTIVE | Noted: 2024-05-29

## 2024-05-29 LAB
ANISOCYTOSIS BLD QL SMEAR: ABNORMAL
BASOPHILS # BLD AUTO: 0.08 X10(3)/MCL
BASOPHILS NFR BLD AUTO: 0.7 %
ELLIPTOCYTOSIS (OHS): ABNORMAL
EOSINOPHIL # BLD AUTO: 0.4 X10(3)/MCL (ref 0–0.9)
EOSINOPHIL NFR BLD AUTO: 3.5 %
ERYTHROCYTE [DISTWIDTH] IN BLOOD BY AUTOMATED COUNT: 22.4 % (ref 11.5–17)
FERRITIN SERPL-MCNC: 34.61 NG/ML (ref 4.63–204)
HCT VFR BLD AUTO: 36 % (ref 37–47)
HGB BLD-MCNC: 10.6 G/DL (ref 12–16)
HYPOCHROMIA BLD QL SMEAR: ABNORMAL
IMM GRANULOCYTES # BLD AUTO: 0.15 X10(3)/MCL (ref 0–0.04)
IMM GRANULOCYTES NFR BLD AUTO: 1.3 %
IRON SATN MFR SERPL: 4 % (ref 20–50)
IRON SERPL-MCNC: 14 UG/DL (ref 50–170)
LYMPHOCYTES # BLD AUTO: 2.41 X10(3)/MCL (ref 0.6–4.6)
LYMPHOCYTES NFR BLD AUTO: 20.8 %
MCH RBC QN AUTO: 20.6 PG (ref 27–31)
MCHC RBC AUTO-ENTMCNC: 29.4 G/DL (ref 33–36)
MCV RBC AUTO: 70 FL (ref 80–94)
MICROCYTES BLD QL SMEAR: ABNORMAL
MONOCYTES # BLD AUTO: 0.86 X10(3)/MCL (ref 0.1–1.3)
MONOCYTES NFR BLD AUTO: 7.4 %
NEUTROPHILS # BLD AUTO: 7.66 X10(3)/MCL (ref 2.1–9.2)
NEUTROPHILS NFR BLD AUTO: 66.3 %
NRBC BLD AUTO-RTO: 0 %
PLATELET # BLD AUTO: 715 X10(3)/MCL (ref 130–400)
PLATELET # BLD EST: ABNORMAL 10*3/UL
PMV BLD AUTO: 10 FL (ref 7.4–10.4)
POIKILOCYTOSIS BLD QL SMEAR: ABNORMAL
RBC # BLD AUTO: 5.14 X10(6)/MCL (ref 4.2–5.4)
RBC MORPH BLD: ABNORMAL
TEAR DROP CELL (OLG): SLIGHT
TIBC SERPL-MCNC: 322 UG/DL (ref 70–310)
TIBC SERPL-MCNC: 336 UG/DL (ref 250–450)
TRANSFERRIN SERPL-MCNC: 311 MG/DL (ref 180–382)
WBC # SPEC AUTO: 11.56 X10(3)/MCL (ref 4.5–11.5)

## 2024-05-29 PROCEDURE — 3008F BODY MASS INDEX DOCD: CPT | Mod: CPTII,,, | Performed by: NURSE PRACTITIONER

## 2024-05-29 PROCEDURE — 99215 OFFICE O/P EST HI 40 MIN: CPT | Mod: PBBFAC | Performed by: NURSE PRACTITIONER

## 2024-05-29 PROCEDURE — 1160F RVW MEDS BY RX/DR IN RCRD: CPT | Mod: CPTII,,, | Performed by: NURSE PRACTITIONER

## 2024-05-29 PROCEDURE — 3078F DIAST BP <80 MM HG: CPT | Mod: CPTII,,, | Performed by: NURSE PRACTITIONER

## 2024-05-29 PROCEDURE — 36415 COLL VENOUS BLD VENIPUNCTURE: CPT | Performed by: NURSE PRACTITIONER

## 2024-05-29 PROCEDURE — 99214 OFFICE O/P EST MOD 30 MIN: CPT | Mod: S$PBB,,, | Performed by: NURSE PRACTITIONER

## 2024-05-29 PROCEDURE — 3074F SYST BP LT 130 MM HG: CPT | Mod: CPTII,,, | Performed by: NURSE PRACTITIONER

## 2024-05-29 PROCEDURE — 3044F HG A1C LEVEL LT 7.0%: CPT | Mod: CPTII,,, | Performed by: NURSE PRACTITIONER

## 2024-05-29 PROCEDURE — 85025 COMPLETE CBC W/AUTO DIFF WBC: CPT | Performed by: NURSE PRACTITIONER

## 2024-05-29 PROCEDURE — 4010F ACE/ARB THERAPY RXD/TAKEN: CPT | Mod: CPTII,,, | Performed by: NURSE PRACTITIONER

## 2024-05-29 PROCEDURE — 83540 ASSAY OF IRON: CPT | Performed by: NURSE PRACTITIONER

## 2024-05-29 PROCEDURE — 82728 ASSAY OF FERRITIN: CPT | Performed by: NURSE PRACTITIONER

## 2024-05-29 PROCEDURE — 1159F MED LIST DOCD IN RCRD: CPT | Mod: CPTII,,, | Performed by: NURSE PRACTITIONER

## 2024-05-29 RX ORDER — DULAGLUTIDE 3 MG/.5ML
3 INJECTION, SOLUTION SUBCUTANEOUS
COMMUNITY
Start: 2024-05-20

## 2024-05-29 RX ORDER — INSULIN GLARGINE-YFGN 100 [IU]/ML
45 INJECTION, SOLUTION SUBCUTANEOUS DAILY
COMMUNITY
Start: 2024-05-21

## 2024-05-29 NOTE — PROGRESS NOTES
Subjective:       Patient ID: Jessica Walker is a 58 y.o. female.    Chief Complaint: Anemia    This 58-year-old  female with anemia, depression, diabetes mellitus, hypertension, and cholecystectomy presents unaccompanied for a follow-up visit.  She was initially referred for anemia and seen November 29, 2022.  She reported intermittent epigastric abdominal pain described as sharp and aching and nonradiating for approximately one year.  She was unable to identify specific triggers and reported relief with pantoprazole 40 mg daily.  She had occasional nausea without vomiting.  She had frequent regurgitation of acid and burning noted at the back of her throat.  Her appetite was fair and she denied unintentional weight loss.  She denied nocturnal symptoms, fever, chills, hematemesis, odynophagia, dysphagia, or early satiety.  Bowel habits were described as formed stools once daily without melena, hematochezia, fecal urgency, fecal incontinence, or pain with defecation.  She received iron infusions in the past and denied currently taking oral iron supplementation.     Gastric emptying study was within normal limits September 1, 2022.     She underwent EGD and colonoscopy with Dr. Wong October 27, 2021.  Colonoscopy with findings of diffuse diverticulosis and EGD with findings of large hiatal hernia and gastric punctate AVM fulgurated with APC, small superficial duodenal ulcerations.  Pathology was benign.     She underwent EGD June 26, 2023 with findings of 7 cm type III paraesophageal hiatal hernia with Buddy ulcers, small Dieulafoy lesion with mild oozing seen in the gastric body treated with APC, normal examined duodenum, no specimens collected.  She was referred to surgeon to discuss antireflux surgery options and iron deficiency was thought likely due to large hernia with Buddy ulcerations.     Video capsule endoscopy was normal June 29, 2023.     Iron infusions were received January 11,  2023, January 18, 2023, May 2, 2023, May 9, 2023, June 30, 2023, July 7, 2023, October 9, 2023, October 24, 2023, December 8, 2023, December 15, 2023.      She was evaluated by surgery clinic the previous day and a referral was placed to Dr. Farrar for hiatal hernia repair evaluation.     She was seen for a follow-up visit December 20, 2023.  She reported fatigue with minimal energy for the past several years.  She otherwise felt well and denied nocturnal symptoms, appetite changes, fever, chills, nausea, vomiting, hematemesis, odynophagia, dysphagia, acid reflux, pyrosis, belching, bloating, early satiety, or abdominal pain.  Bowel habits were described as formed stools once daily without melena, hematochezia, fecal urgency, fecal incontinence, or pain with defecation.  She was not on oral iron supplementation at that time.    She received iron infusions December 22, 2023, January 5, 2024, January 19, 2024, January 26, 2024, February 2, 2024, February 21, 2024, April 19, 2024, April 26, 2024, May 3, 2024, May 10, 2024.    Today, she presents for a follow-up visit.  She takes pantoprazole 40 mg daily.  Her appetite is fair and her weight fluctuates.  She denies nocturnal symptoms, fever, chills, nausea, vomiting, hematemesis, odynophagia, dysphagia, acid reflux, pyrosis, early satiety, or abdominal pain.  Bowel habits are described as formed to soft stools every 1-2 days without melena, hematochezia, fecal urgency, fecal incontinence, or pain with defecation.  She takes Linzess 145 mcg as needed with good relief noted when she does take the medication.  She reports occasional weakness and fatigue, as well as dyspnea with exertion.  She has lab work ordered to be completed with hematology June 20, 2024.  She is requesting to have lab work done today.  She is scheduled to see Dr. Camejo this week or next to discuss surgical intervention for large hernia with Buddy ulcerations.     She underwent colonoscopy with  Dr. Koo February 26, 2018 with findings of diverticulosis in the left side of the colon, sigmoid colon and ascending colon, normal mucosa in the right side of the colon, and tubular adenomatous polyp in the ascending colon with a recommended recall of 5 years.  She takes aspirin 81 mg daily.  She smokes 1 pack of cigarettes daily and denies alcohol use.  She denies a family history of IBD, colon polyps, or colon cancer.      Review of patient's allergies indicates:   Allergen Reactions    Ivp dye [iodinated contrast media] Itching     Past Medical History:   Diagnosis Date    Acute pancreatitis 1996    Had surgery for pancreatitis in June 1996    Anemia     Colon polyp 2021    When I had colonoscopy, I had one small polyp they removed    Depression     Diabetes mellitus     Dieulafoy lesion of stomach 12/20/2023    Diverticulosis 2019    I think they said when I had my first colonoscopy around 2019 that I have diverticulosis    Encounter for comprehensive diabetic foot examination, type 2 diabetes mellitus 05/29/2024    Essential thrombocytosis 01/10/2023    GERD (gastroesophageal reflux disease) 1996    Had surgery for pancreatitis in June 1996 and acid reflux since then    Hiatal hernia 12/20/2023    Hyperlipidemia LDL goal <70 05/17/2022    Hypertension     Iron deficiency 06/29/2022    JAK2 gene mutation 01/10/2023    Mild CAD 05/17/2022    Obesity (BMI 30-39.9) 05/17/2022    Overgrown toenails 05/29/2024    Personal history of colonic polyps 11/29/2022    Thrombocytosis 06/29/2022    Tobacco user 05/17/2022    Type 2 diabetes mellitus, with long-term current use of insulin 05/17/2022    Xerosis of skin 05/29/2024     Past Surgical History:   Procedure Laterality Date    CHOLECYSTECTOMY      COLONOSCOPY  10/2021    ESOPHAGOGASTRODUODENOSCOPY N/A 06/26/2023    Procedure: EGD (ESOPHAGOGASTRODUODENOSCOPY);  Surgeon: Kita Larose MD;  Location[facility] ENDOSCOPY;  Service: Gastroenterology;  Laterality:  N/A;    EYE SURGERY  7/6/93    LK and RK    HYSTEROSCOPY WITH DILATION AND CURETTAGE OF UTERUS N/A 09/26/2023    Procedure: HYSTEROSCOPY, WITH DILATION AND CURETTAGE OF UTERUS;  Surgeon: Deepti Chappell MD;  Location: [facility] OR;  Service: OB/GYN;  Laterality: N/A;  myosure    INTRALUMINAL GASTROINTESTINAL TRACT IMAGING VIA CAPSULE N/A 06/29/2023    Procedure: IMAGING PROCEDURE, GI TRACT, INTRALUMINAL, VIA CAPSULE;  Surgeon: Kita Larose MD;  Location: [facility] ENDOSCOPY;  Service: Gastroenterology;  Laterality: N/A;    Removal of Goiter      UPPER GASTROINTESTINAL ENDOSCOPY       Family History:   family history includes Alcohol abuse in her mother; Arthritis in her mother and sister; Breast cancer in her sister; COPD in her sister; Cancer in her maternal grandfather, maternal grandmother, maternal uncle, sister, and sister; Cervical cancer in her sister; Colon polyps in her sister; Coronary artery disease in her mother; Depression in her sister; Diabetes in her father, mother, and sister; Early death in her maternal grandmother and sister; Fibromyalgia in her sister; Heart disease in her mother; Hyperlipidemia in her mother; Hypertension in her mother; Miscarriages / Stillbirths in her sister; Skin cancer in her sister and sister.    Social History:    reports that she has been smoking cigarettes. She has a 43 pack-year smoking history. She has never used smokeless tobacco. She reports that she does not currently use alcohol. She reports that she does not currently use drugs.    Review of Systems  Negative except as noted in the HPI.      Objective:      Physical Exam  Constitutional:       Appearance: Normal appearance.   HENT:      Head: Normocephalic.      Mouth/Throat:      Mouth: Mucous membranes are moist.   Eyes:      Extraocular Movements: Extraocular movements intact.      Conjunctiva/sclera: Conjunctivae normal.      Pupils: Pupils are equal, round, and reactive to light.   Cardiovascular:      Rate  "and Rhythm: Normal rate and regular rhythm.      Pulses: Normal pulses.      Heart sounds: Normal heart sounds.   Pulmonary:      Effort: Pulmonary effort is normal.      Breath sounds: Normal breath sounds.   Abdominal:      General: Bowel sounds are normal.      Palpations: Abdomen is soft.   Musculoskeletal:         General: Normal range of motion.      Cervical back: Normal range of motion and neck supple.   Skin:     General: Skin is warm and dry.   Neurological:      General: No focal deficit present.      Mental Status: She is alert and oriented to person, place, and time.   Psychiatric:         Mood and Affect: Mood normal.         Behavior: Behavior normal.         Thought Content: Thought content normal.         Judgment: Judgment normal.         Home Medications:     Current Outpatient Medications   Medication Sig    ammonium lactate 12 % Crea Apply 1 application  topically 2 (two) times a day. Apply thin layer of Vaseline over Lac-Hydrin twice a day.  NOTHING BETWEEN TOES.    aspirin (ECOTRIN) 81 MG EC tablet Take 81 mg by mouth once daily.    atorvastatin (LIPITOR) 40 MG tablet Take 1 tablet (40 mg total) by mouth every evening.    BD ULTRA-FINE SHORT PEN NEEDLE 31 gauge x 5/16" Ndle Inject 1 each into the skin 3 (three) times daily.    blood-glucose meter,continuous (DEXCOM ) Misc Dexcom G7 .  Use as directed with Dexcom G7 sensors.    cycloSPORINE (RESTASIS) 0.05 % ophthalmic emulsion 1 drop 2 (two) times daily.    DEXCOM G7 SENSOR Amy Dexcom G7 sensors.  Use every 10 days as directed.    ibuprofen (ADVIL,MOTRIN) 800 MG tablet Take 800 mg by mouth.    lisinopriL (PRINIVIL,ZESTRIL) 20 MG tablet Take 1 tablet (20 mg total) by mouth once daily.    magnesium chloride (MAG 64) 64 mg TbEC Take 1 tablet (64 mg total) by mouth 2 (two) times a day.    metFORMIN (GLUCOPHAGE) 1000 MG tablet Take 1 tablet (1,000 mg total) by mouth 2 (two) times daily.    metoprolol tartrate (LOPRESSOR) 25 MG " tablet Take 1 tablet (25 mg total) by mouth 2 (two) times daily.    mirtazapine (REMERON) 15 MG tablet Take 15 mg by mouth every evening.    pantoprazole (PROTONIX) 40 MG tablet Take 1 tablet (40 mg total) by mouth once daily.    SEMGLEE,INSULIN GLARG-YFGN, unit/mL (3 mL) InPn Inject 45 Units into the skin once daily.    TRUE METRIX GLUCOSE TEST STRIP Strp USE 1 STRIP TO CHECK GLUCOSE ONCE DAILY    TRULICITY 3 mg/0.5 mL pen injector Inject 3 mg into the skin every 7 days.    ferrous sulfate 324 mg (65 mg iron) TbEC Take 1 tablet (324 mg total) by mouth every Mon, Wed, Fri. (Patient not taking: Reported on 5/22/2024)    hydrOXYzine pamoate (VISTARIL) 25 MG Cap Take 25 mg by mouth nightly as needed. (Patient not taking: Reported on 5/29/2024)    nitroGLYCERIN (NITROSTAT) 0.4 MG SL tablet Place 1 tablet (0.4 mg total) under the tongue every 5 (five) minutes as needed for Chest pain.    varenicline (CHANTIX) 1 mg Tab Take 1 tablet (1 mg total) by mouth 2 (two) times daily. (Patient not taking: Reported on 5/29/2024)    venlafaxine (EFFEXOR XR) 75 MG 24 hr capsule 1 capsule with food Orally Once a day for 30 days (Patient not taking: Reported on 5/29/2024)     No current facility-administered medications for this visit.     Facility-Administered Medications Ordered in Other Visits   Medication Frequency    0.9%  NaCl infusion Continuous    albuterol-ipratropium 2.5 mg-0.5 mg/3 mL nebulizer solution 3 mL Once PRN    dextrose 10% bolus 125 mL 125 mL PRN    dextrose 10% bolus 125 mL 125 mL PRN    diphenhydrAMINE injection 25 mg Once PRN    HYDROmorphone injection 0.2 mg Q5 Min PRN    HYDROmorphone injection 0.5 mg Q5 Min PRN    insulin aspart U-100 injection 2-9 Units PRN    insulin aspart U-100 injection 4-12 Units PRN    lactated ringers infusion Continuous    LIDOcaine (PF) 10 mg/ml (1%) injection 10 mg Once    ondansetron injection 4 mg Once    oxyCODONE-acetaminophen 5-325 mg per tablet 2 tablet Once     prochlorperazine injection Soln 5 mg Once PRN     Laboratory Results:     Recent Results (from the past 4032 hour(s))   Ferritin    Collection Time: 12/20/23 11:54 AM   Result Value Ref Range    Ferritin Level 91.49 4.63 - 204.00 ng/mL   Iron and TIBC    Collection Time: 12/20/23 11:54 AM   Result Value Ref Range    Iron Binding Capacity Unsaturated 334 (H) 70 - 310 ug/dL    Iron Level 21 (L) 50 - 170 ug/dL    Transferrin 349 180 - 382 mg/dL    Iron Binding Capacity Total 355 250 - 450 ug/dL    Iron Saturation 6 (L) 20 - 50 %   CBC with Differential    Collection Time: 12/20/23 11:54 AM   Result Value Ref Range    WBC 13.96 (H) 4.50 - 11.50 x10(3)/mcL    RBC 4.99 4.20 - 5.40 x10(6)/mcL    Hgb 11.4 (L) 12.0 - 16.0 g/dL    Hct 38.2 37.0 - 47.0 %    MCV 76.6 (L) 80.0 - 94.0 fL    MCH 22.8 (L) 27.0 - 31.0 pg    MCHC 29.8 (L) 33.0 - 36.0 g/dL    RDW 20.1 (H) 11.5 - 17.0 %    Platelet 706 (H) 130 - 400 x10(3)/mcL    MPV 10.3 7.4 - 10.4 fL    Neut % 73.2 %    Lymph % 15.5 %    Mono % 6.7 %    Eos % 2.2 %    Basophil % 0.6 %    Lymph # 2.16 0.6 - 4.6 x10(3)/mcL    Neut # 10.21 (H) 2.1 - 9.2 x10(3)/mcL    Mono # 0.94 0.1 - 1.3 x10(3)/mcL    Eos # 0.31 0 - 0.9 x10(3)/mcL    Baso # 0.09 <=0.2 x10(3)/mcL    IG# 0.25 (H) 0 - 0.04 x10(3)/mcL    IG% 1.8 %    NRBC% 0.0 %   Comprehensive Metabolic Panel    Collection Time: 01/23/24  3:27 PM   Result Value Ref Range    Sodium 138 136 - 145 mmol/L    Potassium 4.5 3.5 - 5.1 mmol/L    Chloride 104 98 - 107 mmol/L    CO2 23 22 - 29 mmol/L    Glucose 198 (H) 74 - 100 mg/dL    Blood Urea Nitrogen 11.3 9.8 - 20.1 mg/dL    Creatinine 0.67 0.55 - 1.02 mg/dL    Calcium 9.6 8.4 - 10.2 mg/dL    Protein Total 7.1 6.4 - 8.3 gm/dL    Albumin 3.4 (L) 3.5 - 5.0 g/dL    Globulin 3.7 (H) 2.4 - 3.5 gm/dL    Albumin/Globulin Ratio 0.9 (L) 1.1 - 2.0 ratio    Bilirubin Total 0.3 <=1.5 mg/dL     40 - 150 unit/L    ALT 14 0 - 55 unit/L    AST 14 5 - 34 unit/L    eGFR >60 mls/min/1.73/m2   Hemoglobin  A1C    Collection Time: 01/23/24  3:27 PM   Result Value Ref Range    Hemoglobin A1c 7.1 (H) <=7.0 %    Estimated Average Glucose 157.1 mg/dL   Magnesium    Collection Time: 01/23/24  3:27 PM   Result Value Ref Range    Magnesium Level 1.50 (L) 1.60 - 2.60 mg/dL   Lipid Panel    Collection Time: 01/23/24  3:27 PM   Result Value Ref Range    Cholesterol Total 137 <=200 mg/dL    HDL Cholesterol 47 35 - 60 mg/dL    Triglyceride 109 37 - 140 mg/dL    Cholesterol/HDL Ratio 3 0 - 5    Very Low Density Lipoprotein 22     LDL Cholesterol 68.00 50.00 - 140.00 mg/dL   Vitamin B12    Collection Time: 01/23/24  3:27 PM   Result Value Ref Range    Vitamin B12 1,225 (H) 213 - 816 pg/mL   CBC with Differential    Collection Time: 01/23/24  3:27 PM   Result Value Ref Range    WBC 10.66 4.50 - 11.50 x10(3)/mcL    RBC 4.77 4.20 - 5.40 x10(6)/mcL    Hgb 10.3 (L) 12.0 - 16.0 g/dL    Hct 34.9 (L) 37.0 - 47.0 %    MCV 73.2 (L) 80.0 - 94.0 fL    MCH 21.6 (L) 27.0 - 31.0 pg    MCHC 29.5 (L) 33.0 - 36.0 g/dL    RDW 19.7 (H) 11.5 - 17.0 %    Platelet 633 (H) 130 - 400 x10(3)/mcL    MPV 10.1 7.4 - 10.4 fL    Neut % 64.8 %    Lymph % 19.7 %    Mono % 6.8 %    Eos % 6.5 %    Basophil % 0.7 %    Lymph # 2.10 0.6 - 4.6 x10(3)/mcL    Neut # 6.92 2.1 - 9.2 x10(3)/mcL    Mono # 0.72 0.1 - 1.3 x10(3)/mcL    Eos # 0.69 0 - 0.9 x10(3)/mcL    Baso # 0.07 <=0.2 x10(3)/mcL    IG# 0.16 (H) 0 - 0.04 x10(3)/mcL    IG% 1.5 %   Iron and TIBC    Collection Time: 01/23/24  3:27 PM   Result Value Ref Range    Iron Binding Capacity Unsaturated 302 70 - 310 ug/dL    Iron Level 16 (L) 50 - 170 ug/dL    Transferrin 296 180 - 382 mg/dL    Iron Binding Capacity Total 318 250 - 450 ug/dL    Iron Saturation 5 (L) 20 - 50 %   Ferritin    Collection Time: 01/23/24  3:27 PM   Result Value Ref Range    Ferritin Level 40.70 4.63 - 204.00 ng/mL   Hemoglobin A1C    Collection Time: 02/19/24 12:10 PM   Result Value Ref Range    Hemoglobin A1c 6.6 <=7.0 %    Estimated Average  Glucose 142.7 mg/dL   Comprehensive Metabolic Panel    Collection Time: 02/19/24 12:10 PM   Result Value Ref Range    Sodium 139 136 - 145 mmol/L    Potassium 4.5 3.5 - 5.1 mmol/L    Chloride 103 98 - 107 mmol/L    CO2 24 22 - 29 mmol/L    Glucose 189 (H) 74 - 100 mg/dL    Blood Urea Nitrogen 12.8 9.8 - 20.1 mg/dL    Creatinine 0.76 0.55 - 1.02 mg/dL    Calcium 10.3 (H) 8.4 - 10.2 mg/dL    Protein Total 7.3 6.4 - 8.3 gm/dL    Albumin 3.7 3.5 - 5.0 g/dL    Globulin 3.6 (H) 2.4 - 3.5 gm/dL    Albumin/Globulin Ratio 1.0 (L) 1.1 - 2.0 ratio    Bilirubin Total 0.3 <=1.5 mg/dL     40 - 150 unit/L    ALT 11 0 - 55 unit/L    AST 11 5 - 34 unit/L    eGFR >60 mls/min/1.73/m2   C-Reactive Protein    Collection Time: 02/19/24 12:10 PM   Result Value Ref Range    CRP 1.80 <5.00 mg/L   Sedimentation rate    Collection Time: 02/19/24 12:10 PM   Result Value Ref Range    Sed Rate 14 0 - 20 mm/hr   C4 Complement    Collection Time: 02/19/24 12:10 PM   Result Value Ref Range    C4 Complement 44.0 13.0 - 46.0 mg/dL   C3 Complement    Collection Time: 02/19/24 12:10 PM   Result Value Ref Range    C3 Complement 143 80 - 173 mg/dL   MELANY IgG by IFA    Collection Time: 02/19/24 12:10 PM   Result Value Ref Range    Antinuclear Ab, HEp-2 Substrate Positive 1:160 (A) <1:80 (Negative)    MELANY Titer: 1:160     MELANY Pattern: Speckled    ANTI-SSA + ANTI-SSB    Collection Time: 02/19/24 12:10 PM   Result Value Ref Range    SS-A/Ro Ab, IgG <0.2 <1.0 (Negative) U    SS-B/La Ab, IgG <0.2 <1.0 (Negative) U   CBC with Differential    Collection Time: 02/19/24 12:10 PM   Result Value Ref Range    WBC 13.37 (H) 4.50 - 11.50 x10(3)/mcL    RBC 5.28 4.20 - 5.40 x10(6)/mcL    Hgb 11.0 (L) 12.0 - 16.0 g/dL    Hct 38.2 37.0 - 47.0 %    MCV 72.3 (L) 80.0 - 94.0 fL    MCH 20.8 (L) 27.0 - 31.0 pg    MCHC 28.8 (L) 33.0 - 36.0 g/dL    RDW 20.5 (H) 11.5 - 17.0 %    Platelet 806 (H) 130 - 400 x10(3)/mcL    MPV 10.1 7.4 - 10.4 fL    Neut % 64.0 %    Lymph % 23.0  %    Mono % 7.1 %    Eos % 4.1 %    Basophil % 0.5 %    Lymph # 3.08 0.6 - 4.6 x10(3)/mcL    Neut # 8.55 2.1 - 9.2 x10(3)/mcL    Mono # 0.95 0.1 - 1.3 x10(3)/mcL    Eos # 0.55 0 - 0.9 x10(3)/mcL    Baso # 0.07 <=0.2 x10(3)/mcL    IG# 0.17 (H) 0 - 0.04 x10(3)/mcL    IG% 1.3 %   SCLERODERMA (SCL-70S) ANTIBODY    Collection Time: 02/19/24 12:10 PM   Result Value Ref Range    SCL-70S Ab Quant <0.6 <7 U/mL   Comprehensive Metabolic Panel    Collection Time: 03/21/24  5:35 PM   Result Value Ref Range    Sodium 141 136 - 145 mmol/L    Potassium 3.7 3.5 - 5.1 mmol/L    Chloride 106 98 - 107 mmol/L    CO2 20 (L) 22 - 29 mmol/L    Glucose 193 (H) 74 - 100 mg/dL    Blood Urea Nitrogen 8.2 (L) 9.8 - 20.1 mg/dL    Creatinine 0.71 0.55 - 1.02 mg/dL    Calcium 9.6 8.4 - 10.2 mg/dL    Protein Total 7.2 6.4 - 8.3 gm/dL    Albumin 3.3 (L) 3.5 - 5.0 g/dL    Globulin 3.9 (H) 2.4 - 3.5 gm/dL    Albumin/Globulin Ratio 0.8 (L) 1.1 - 2.0 ratio    Bilirubin Total 0.2 <=1.5 mg/dL     (H) 40 - 150 unit/L    ALT 49 0 - 55 unit/L    AST 32 5 - 34 unit/L    eGFR >60 mls/min/1.73/m2   Iron and TIBC    Collection Time: 03/21/24  5:35 PM   Result Value Ref Range    Iron Binding Capacity Unsaturated 336 (H) 70 - 310 ug/dL    Iron Level 15 (L) 50 - 170 ug/dL    Transferrin 326 180 - 382 mg/dL    Iron Binding Capacity Total 351 250 - 450 ug/dL    Iron Saturation 4 (L) 20 - 50 %   Ferritin    Collection Time: 03/21/24  5:35 PM   Result Value Ref Range    Ferritin Level 20.70 4.63 - 204.00 ng/mL   CBC with Differential    Collection Time: 03/21/24  5:35 PM   Result Value Ref Range    WBC 17.35 (H) 4.50 - 11.50 x10(3)/mcL    RBC 5.30 4.20 - 5.40 x10(6)/mcL    Hgb 11.2 (L) 12.0 - 16.0 g/dL    Hct 37.4 37.0 - 47.0 %    MCV 70.6 (L) 80.0 - 94.0 fL    MCH 21.1 (L) 27.0 - 31.0 pg    MCHC 29.9 (L) 33.0 - 36.0 g/dL    RDW 20.9 (H) 11.5 - 17.0 %    Platelet 649 (H) 130 - 400 x10(3)/mcL    MPV 10.2 7.4 - 10.4 fL   Manual Differential    Collection Time:  03/21/24  5:35 PM   Result Value Ref Range    Neutrophils % 65 47 - 80 %    Lymphs % 16 13 - 40 %    Monocytes % 6 2 - 11 %    Eosinophils % 3 0 - 8 %    Abnormal Lymphs % 10 %    Neutrophils Abs Calc 11.2775 (H) 2.1 - 9.2 x10(3)/mcL    Lymphs Abs 2.776 0.6 - 4.6 x10(3)/mcL    Eosinophils Abs 0.5205 0 - 0.9 x10(3)/mcL    Monocytes Abs 1.041 0.1 - 1.3 x10(3)/mcL    Platelets Increased (A) Normal, Adequate    RBC Morph Normal Normal    Giant Platelets 1+    Comprehensive Metabolic Panel    Collection Time: 04/09/24  5:33 PM   Result Value Ref Range    Sodium 141 136 - 145 mmol/L    Potassium 4.1 3.5 - 5.1 mmol/L    Chloride 104 98 - 107 mmol/L    CO2 24 22 - 29 mmol/L    Glucose 113 (H) 74 - 100 mg/dL    Blood Urea Nitrogen 10.5 9.8 - 20.1 mg/dL    Creatinine 0.69 0.55 - 1.02 mg/dL    Calcium 9.7 8.4 - 10.2 mg/dL    Protein Total 7.5 6.4 - 8.3 gm/dL    Albumin 3.6 3.5 - 5.0 g/dL    Globulin 3.9 (H) 2.4 - 3.5 gm/dL    Albumin/Globulin Ratio 0.9 (L) 1.1 - 2.0 ratio    Bilirubin Total 0.3 <=1.5 mg/dL     40 - 150 unit/L    ALT 11 0 - 55 unit/L    AST 12 5 - 34 unit/L    eGFR >60 mls/min/1.73/m2   Iron and TIBC    Collection Time: 04/09/24  5:33 PM   Result Value Ref Range    Iron Binding Capacity Unsaturated 387 (H) 70 - 310 ug/dL    Iron Level 9 (L) 50 - 170 ug/dL    Transferrin 358 180 - 382 mg/dL    Iron Binding Capacity Total 396 250 - 450 ug/dL    Iron Saturation 2 (L) 20 - 50 %   Ferritin    Collection Time: 04/09/24  5:33 PM   Result Value Ref Range    Ferritin Level 5.10 4.63 - 204.00 ng/mL   CBC with Differential    Collection Time: 04/09/24  5:33 PM   Result Value Ref Range    WBC 15.32 (H) 4.50 - 11.50 x10(3)/mcL    RBC 4.62 4.20 - 5.40 x10(6)/mcL    Hgb 9.4 (L) 12.0 - 16.0 g/dL    Hct 32.0 (L) 37.0 - 47.0 %    MCV 69.3 (L) 80.0 - 94.0 fL    MCH 20.3 (L) 27.0 - 31.0 pg    MCHC 29.4 (L) 33.0 - 36.0 g/dL    RDW 19.4 (H) 11.5 - 17.0 %    Platelet 715 (H) 130 - 400 x10(3)/mcL    MPV 9.7 7.4 - 10.4 fL     Neut % 63.5 %    Lymph % 22.0 %    Mono % 7.8 %    Eos % 3.9 %    Basophil % 0.4 %    Lymph # 3.37 0.6 - 4.6 x10(3)/mcL    Neut # 9.74 (H) 2.1 - 9.2 x10(3)/mcL    Mono # 1.19 0.1 - 1.3 x10(3)/mcL    Eos # 0.59 0 - 0.9 x10(3)/mcL    Baso # 0.06 <=0.2 x10(3)/mcL    IG# 0.37 (H) 0 - 0.04 x10(3)/mcL    IG% 2.4 %   Magnesium    Collection Time: 04/29/24  2:05 PM   Result Value Ref Range    Magnesium Level 1.40 (L) 1.60 - 2.60 mg/dL   TSH    Collection Time: 04/29/24  2:05 PM   Result Value Ref Range    TSH 3.345 0.350 - 4.940 uIU/mL     Assessment/Plan:     Problem List Items Addressed This Visit          Oncology    Iron deficiency anemia - Primary     She underwent EGD and colonoscopy with Dr. Wong October 27, 2021.  Colonoscopy with findings of diffuse diverticulosis and EGD with findings of large hiatal hernia and gastric punctate AVM fulgurated with APC, small superficial duodenal ulcerations.  Pathology was benign.  She underwent EGD June 26, 2023 with findings of 7 cm type III paraesophageal hiatal hernia with Buddy ulcers, small Dieulafoy lesion with mild oozing seen in the gastric body treated with APC, normal examined duodenum, no specimens collected.    She was referred to surgeon to discuss antireflux surgery options and iron deficiency was thought likely due to large hernia with Buddy ulcerations.  Video capsule endoscopy was normal June 29, 2023.  Iron infusions were received January 11, 2023, January 18, 2023, May 2, 2023, May 9, 2023, June 30, 2023, July 7, 2023, October 9, 2023, October 24, 2023, December 8, 2023, December 15, 2023.  She was evaluated by surgery clinic and a referral was placed to Dr. Farrar for hiatal hernia repair evaluation.  She received iron infusions December 22, 2023, January 5, 2024, January 19, 2024, January 26, 2024, February 2, 2024, February 21, 2024, April 19, 2024, April 26, 2024, May 3, 2024, May 10, 2024.  She is scheduled to see Dr. Camejo this week or next to  discuss surgical intervention for large hiatal hernia with Buddy ulcerations.  CBC, iron profile, ferritin  We will follow laboratory results once drawn June 20, 2024  Follow-up with Dr. Camejo at scheduled time and date  Continue pantoprazole 40 mg daily   Recommend tobacco cessation  Call with updates  ER precautions provided  Follow-up clinic visit with NP in 6 months         Relevant Orders    CBC Auto Differential    Ferritin    Iron and TIBC       GI    Hiatal hernia     See above         Relevant Orders    CBC Auto Differential    Ferritin    Iron and TIBC    Dieulafoy lesion of stomach     See above         Relevant Orders    CBC Auto Differential    Ferritin    Iron and TIBC    Personal history of colonic polyps     She underwent colonoscopy with Dr. Koo February 26, 2018 with findings of diverticulosis in the left side of the colon, sigmoid colon and ascending colon, normal mucosa in the right side of the colon, and tubular adenomatous polyp in the ascending colon with a recommended recall of 5 years.  She underwent EGD and colonoscopy with Dr. Wong October 27, 2021.  Colonoscopy with findings of diffuse diverticulosis and EGD with findings of large hiatal hernia and gastric punctate AVM fulgurated with APC, small superficial duodenal ulcerations; pathology was benign.            Other    Tobacco user     Recommend tobacco cessation.         Relevant Orders    Ambulatory referral/consult to Smoking Cessation Program

## 2024-05-29 NOTE — ASSESSMENT & PLAN NOTE
She underwent EGD and colonoscopy with Dr. Wong October 27, 2021.  Colonoscopy with findings of diffuse diverticulosis and EGD with findings of large hiatal hernia and gastric punctate AVM fulgurated with APC, small superficial duodenal ulcerations.  Pathology was benign.  She underwent EGD June 26, 2023 with findings of 7 cm type III paraesophageal hiatal hernia with Buddy ulcers, small Dieulafoy lesion with mild oozing seen in the gastric body treated with APC, normal examined duodenum, no specimens collected.    She was referred to surgeon to discuss antireflux surgery options and iron deficiency was thought likely due to large hernia with Buddy ulcerations.  Video capsule endoscopy was normal June 29, 2023.  Iron infusions were received January 11, 2023, January 18, 2023, May 2, 2023, May 9, 2023, June 30, 2023, July 7, 2023, October 9, 2023, October 24, 2023, December 8, 2023, December 15, 2023.  She was evaluated by surgery clinic and a referral was placed to Dr. Farrar for hiatal hernia repair evaluation.  She received iron infusions December 22, 2023, January 5, 2024, January 19, 2024, January 26, 2024, February 2, 2024, February 21, 2024, April 19, 2024, April 26, 2024, May 3, 2024, May 10, 2024.  She is scheduled to see Dr. Camejo this week or next to discuss surgical intervention for large hiatal hernia with Buddy ulcerations.  CBC, iron profile, ferritin  We will follow laboratory results once drawn June 20, 2024  Follow-up with Dr. Camejo at scheduled time and date  Continue pantoprazole 40 mg daily   Recommend tobacco cessation  Call with updates  ER precautions provided  Follow-up clinic visit with NP in 6 months

## 2024-05-29 NOTE — ASSESSMENT & PLAN NOTE
She underwent colonoscopy with Dr. Koo February 26, 2018 with findings of diverticulosis in the left side of the colon, sigmoid colon and ascending colon, normal mucosa in the right side of the colon, and tubular adenomatous polyp in the ascending colon with a recommended recall of 5 years.  She underwent EGD and colonoscopy with Dr. Wong October 27, 2021.  Colonoscopy with findings of diffuse diverticulosis and EGD with findings of large hiatal hernia and gastric punctate AVM fulgurated with APC, small superficial duodenal ulcerations; pathology was benign.

## 2024-05-30 NOTE — PROGRESS NOTES
Please notify hemoglobin and hematocrit have improved from 1 month ago.  WBC count has improved.  Iron studies okay.  Have her repeat lab work as scheduled with Hematology as she we will likely see continued improvement from last iron infusion.  Please assure she is scheduled with a surgeon for hernia repair.  Thanks

## 2024-05-31 DIAGNOSIS — K44.9 DIAPHRAGMATIC HERNIA WITHOUT OBSTRUCTION OR GANGRENE: Primary | ICD-10-CM

## 2024-06-04 DIAGNOSIS — K44.9 DIAPHRAGMATIC HERNIA WITHOUT OBSTRUCTION OR GANGRENE: Primary | ICD-10-CM

## 2024-06-06 ENCOUNTER — CLINICAL SUPPORT (OUTPATIENT)
Dept: SMOKING CESSATION | Facility: CLINIC | Age: 59
End: 2024-06-06
Payer: COMMERCIAL

## 2024-06-06 DIAGNOSIS — F17.200 NICOTINE DEPENDENCE: Primary | ICD-10-CM

## 2024-06-06 PROCEDURE — 99404 PREV MED CNSL INDIV APPRX 60: CPT | Mod: 95,,,

## 2024-06-06 RX ORDER — DM/P-EPHED/ACETAMINOPH/DOXYLAM 30-7.5/3
2 LIQUID (ML) ORAL
Qty: 72 LOZENGE | Refills: 0 | Status: SHIPPED | OUTPATIENT
Start: 2024-06-06

## 2024-06-06 RX ORDER — IBUPROFEN 200 MG
1 TABLET ORAL DAILY
Qty: 14 PATCH | Refills: 0 | Status: SHIPPED | OUTPATIENT
Start: 2024-06-06

## 2024-06-06 NOTE — PROGRESS NOTES
Patient was seen virtually today and reports smoking a pack and a half of cigarettes per day. She will begin biweekly tobacco cessation sessions and a tobacco cessation medication regimen of 21mg nicotine patch and 2mg nicotine lozenges. Patient reports having a prescription of Chantix available that she will begin taking as well. Patient reports that she consistently took Chantix for over a year and was able to stay around 6-7 cigarettes per day. Encouraged patient to focus more on her habits so that she is able to quit smoking instead of using Chantix to cut down. Discussed tips and strategies to assist with quit. The patient will continue with  therapy sessions and medication monitoring by CTTS. Prescribed medication management will be by physician.

## 2024-06-13 ENCOUNTER — HOSPITAL ENCOUNTER (OUTPATIENT)
Dept: RADIOLOGY | Facility: HOSPITAL | Age: 59
Discharge: HOME | End: 2024-06-13
Attending: SURGERY
Payer: MEDICAID

## 2024-06-13 ENCOUNTER — TELEPHONE (OUTPATIENT)
Dept: HEMATOLOGY/ONCOLOGY | Facility: CLINIC | Age: 59
End: 2024-06-13
Payer: MEDICAID

## 2024-06-13 DIAGNOSIS — K44.9 DIAPHRAGMATIC HERNIA WITHOUT OBSTRUCTION OR GANGRENE: ICD-10-CM

## 2024-06-13 PROCEDURE — 74240 X-RAY XM UPR GI TRC 1CNTRST: CPT | Mod: TC

## 2024-06-13 PROCEDURE — 25500020 PHARM REV CODE 255: Performed by: SURGERY

## 2024-06-13 PROCEDURE — A9698 NON-RAD CONTRAST MATERIALNOC: HCPCS | Performed by: SURGERY

## 2024-06-13 RX ADMIN — Medication 176 G: at 10:06

## 2024-06-13 RX ADMIN — BARIUM SULFATE 71 ML: 980 POWDER, FOR SUSPENSION ORAL at 10:06

## 2024-06-13 NOTE — TELEPHONE ENCOUNTER
Patient called on 6/12/24 left voice message with questions about labs on 6/20 and virtual visit with Kleber Regan NP on 6/21 at 11:30. Called patient back.Patient stated she had an appointment on 6/13/24 at [facility]. Patient wanted to know if she can do labs for her appointment on 6/21. It will be 8 days instead of 7. Notified Kleber Regan NP. Sent physician orders to lab. Patient verbalized understanding.

## 2024-06-14 ENCOUNTER — TELEPHONE (OUTPATIENT)
Dept: HEMATOLOGY/ONCOLOGY | Facility: CLINIC | Age: 59
End: 2024-06-14
Payer: MEDICAID

## 2024-06-14 NOTE — TELEPHONE ENCOUNTER
Attempted to contact patient about rescheduling appointment from 6/21/24 to 6/17/24. Patient did not answer the phone left a voice message to call clinic back.

## 2024-06-17 ENCOUNTER — TELEPHONE (OUTPATIENT)
Dept: HEMATOLOGY/ONCOLOGY | Facility: CLINIC | Age: 59
End: 2024-06-17

## 2024-06-17 ENCOUNTER — OFFICE VISIT (OUTPATIENT)
Dept: HEMATOLOGY/ONCOLOGY | Facility: CLINIC | Age: 59
End: 2024-06-17
Payer: MEDICAID

## 2024-06-17 ENCOUNTER — HOSPITAL ENCOUNTER (OUTPATIENT)
Dept: RADIOLOGY | Facility: HOSPITAL | Age: 59
Discharge: HOME | End: 2024-06-17
Attending: SURGERY
Payer: MEDICAID

## 2024-06-17 DIAGNOSIS — D50.9 IRON DEFICIENCY ANEMIA, UNSPECIFIED IRON DEFICIENCY ANEMIA TYPE: Primary | ICD-10-CM

## 2024-06-17 DIAGNOSIS — K44.9 DIAPHRAGMATIC HERNIA WITHOUT OBSTRUCTION OR GANGRENE: ICD-10-CM

## 2024-06-17 DIAGNOSIS — Z72.0 TOBACCO USE: ICD-10-CM

## 2024-06-17 DIAGNOSIS — D75.839 THROMBOCYTOSIS: ICD-10-CM

## 2024-06-17 PROCEDURE — A9541 TC99M SULFUR COLLOID: HCPCS | Performed by: SURGERY

## 2024-06-17 PROCEDURE — 4010F ACE/ARB THERAPY RXD/TAKEN: CPT | Mod: CPTII,95,,

## 2024-06-17 PROCEDURE — 1160F RVW MEDS BY RX/DR IN RCRD: CPT | Mod: CPTII,95,,

## 2024-06-17 PROCEDURE — 1159F MED LIST DOCD IN RCRD: CPT | Mod: CPTII,95,,

## 2024-06-17 PROCEDURE — 99214 OFFICE O/P EST MOD 30 MIN: CPT | Mod: 95,,,

## 2024-06-17 PROCEDURE — 78264 GASTRIC EMPTYING IMG STUDY: CPT | Mod: TC

## 2024-06-17 PROCEDURE — 3044F HG A1C LEVEL LT 7.0%: CPT | Mod: CPTII,95,,

## 2024-06-17 RX ORDER — TECHNETIUM TC 99M SULFUR COLLOID 2 MG
2 KIT MISCELLANEOUS
Status: COMPLETED | OUTPATIENT
Start: 2024-06-17 | End: 2024-06-17

## 2024-06-17 RX ADMIN — TECHNETIUM TC 99M SULFUR COLLOID 2 MILLICURIE: KIT at 08:06

## 2024-06-17 NOTE — PROGRESS NOTES
Established Patient - Audio Only Telehealth Visit     The patient location is: [place], [place]   The chief complaint leading to consultation is: FU on Iron def anemia  Visit type: Virtual visit with audio only (Telemedicine visit)  Total time spent with patient: 20 minutes        The reason for the audio only service rather than synchronous audio and video virtual visit was related to technical difficulties or patient preference/necessity.     Each patient to whom I provide medical services by telemedicine is:  (1) informed of the relationship between the physician and patient and the respective role of any other health care provider with respect to management of the patient; and (2) notified that they may decline to receive medical services by telemedicine and may withdraw from such care at any time. Patient verbally consented to receive this service via voice-only telephone call.       Reason for Follow-up:  recurrent severe iron-deficiency anemia; no definite GI source of bleeding  -low risk essential thrombocytosis     Current Treatment:  ASA 81mg po daily     Treatment History:  -S/P Feraheme 510 mg IV x2 (2023, 2023)  -S/P Ferrlecit 125mg IV x 8  (,,,,,)  -S/P Venofer 250mg IV X 5 doses (,,5/3)     Past medical history: NIDDM.  Hypertension.  Dyslipidemia.  History of anemia.  Vitamin D deficiency.  Tobacco abuse.  Obesity.  Obstructive sleep apnea.  HFpEF. Leukocytosis.  Cholecystectomy.  Goiter surgery.  Social history: Single.  Lives in [place], [place].  Has 2 grown up sons.  Has been smoking 1-1/2 packs of cigarettes daily since age 10.  Has tried to quit smoking many times but does not get refills of Chantix, therefore, has been unable to.  Currently, down to 10 cigarettes daily.  Denies history of alcohol or illicit drug abuse.  Family history: Sister  from cervical cancer at age 55.  Health maintenance:   EGD 2018 (Dr. Koo in  [place]: Esophageal dilation).    Colonoscopy 02/28/2018 (Dr. Koo in [place]), apparently to be repeated in 2 years.    Mammogram 05/14/2018: Negative for malignancy.   ThinPrep cervical Pap smear 04/14/2016: Negative for intraepithelial lesion or malignancy; negative for HPV high-risk types (type 16 and type 18/45)  Menstrual and OB/GYN history: Menopause in 2015.  For last 3 months or so, has experienced vaginal spotting.  Apparently, had Pap smear done at [facility] 2 months back (probably December 2018), which was apparently unremarkable. Pelvic ultrasound on 02/08/2019 showed normal endometrium; likely myomatous changes of uterus with a lower uterine segment lipoma leiomyoma.         History of Present Illness:   53-year-old lady referred from [facility] for [facility] of leukocytosis.     For details, please see my last note dated 09/03/2020.  Please also refer to assessment and plan section.     Interval History: 6/17/24:  Patient was scheduled in the clinic today for a virtual follow-up regarding her iron-deficiency anemia.  She has only completed 3 of 5 doses of her IV iron doses.   She states that she continues to feel weak, tired and exhausted more than normal.  She states that it is difficult for her to do her activities of daily living such as showering and brushing her teeth by herself.  She reports taking her aspirin 81 mg approximately 3 times a week. She also reports that she will have Hernia surgery next week on 6/24.  Denies any chest pain, dizziness, headache, heart palpitations.  Denies any active bleeding or unusual bruising.  All future appointments were discussed.        This is a telemedicine note. Patient was treated using telemedicine, real time audio and video, according to [facility] protocols. I, distant provider, conducted the visit from location identified below. The patient participated in the visit at a non-[facility] location selected by the patient  (or patient's representative), identified below. I am licensed in the state where the patient stated they are located. The patient (or patient's representative) stated that they understood and accepted the privacy and security risks to their information at their location. Patient was located at her/his residence in , [place]. I, distant provider, was located at [facility].    Face to face time spent with patient exceeds 15 minutes, over 50% of which was used for education and counseling regarding medical conditions, current medications including risk/benefit and side effects/adverse events, over the counter medications-uses/doses. The patient is receptive, expresses understanding and is agreeable to plan. All questions answered.         Physical Examination: Physical Exam was not completed today as this was a telemedicine visit.     Assessment:  Severe iron deficiency anemia:   Presented as moderate anemia with severe microcytosis    EGD, colonoscopy negative for any bleeders (02/2018)   Severe iron deficiency    Feraheme (03/2019)   Hemoglobin dropped from 14.8 (06/2019) to 9.5 (08/28/2019) due to iron deficiency   Feraheme x2 (09/2019) --> improvement in iron stores and normalization of hemoglobin (9.5 --> 13.7)   CT enterogram (09/11/2019): Hiatal hernia; 1.7 cm left adrenal nodule   -03/10/2020: Remains iron deficient (transferrin saturation 13.2%, ferritin 19.2)   -Did not present for Feraheme shots in a timely manner   -04/27/2020: Ferritin 4.9 (down from 19.2 on 03/10/2020); however, hemoglobin 12.4, MCV 83.0   (Iron deficiency erythropoiesis)   -Feraheme 510 mg IV x2 (04/27/2020; 05/04/2020)   -06/01/2020: Iron deficiency resolved with parenteral iron therapy   -However, as of 06/01/2020, severe recurrent iron deficiency remains unexplained   -07/01/2020: Iron stores dropping again within a month; hemoglobin remains normal   -09/02/2020: Iron stores normal/stable (ferritin 21, transferrin saturation 15.8%);  hemoglobin 14.8, normal   -09/08/2020: Feels better and more energetic than before   -11/12/2020: Iron deficient; hemoglobin 15.1  -08/04/2020:  [facility]:  Insurance denied capsule endoscopy.  Upper single balloon enteroscopy performed.  1. Duodenum biopsy:  Minimal focal acute inflammation with reactive changes; no celiac sprue  2. Duodenal bulb:  Biopsy showed no significant histopathologic findings; no evidence of celiac sprue  3. Stomach biopsy:  Chemical/reactive gastropathy; H pylori stain negative  -S/P Feraheme 510 mg IV x2 (05/17/2021, 05/24/2021)  -S/P Feraheme 510 mg IV x2 (07/09/2021, 07/19/2021)  -10/27/2021: Colonoscopy (iron-deficiency anemia):  Diffuse diverticulosis; otherwise unremarkable  -10/27/2021:  EGD with APC fulguration and biopsy (iron-deficiency anemia): unctate AVMs within the stomach.  A directed fulguration with APC with good result achieved, superficial ulcerations within the duodenal bulb, possibly secondary to daily aspirin use.  Biopsy taken in the antrum to rule out Helicobacter pylori by pathology  -S/P Feraheme 510 mg IV x2 (08/29/2022, 09/08/2022)  -11/11/2022:  WBC 12.7.  Hemoglobin 12.8.  Platelets 543 K. ferritin 251.70 (was 93.95 on 08/29/2022, 24.88 on 06/29/2022) (transferrin saturation was 19% on 08/29/2022, 7% on 06/29/2022)  -01/11/2023: Labs reviewed.  Hemoglobin 11.2 (hemoglobin was 15.9 on 08/29/2022).  MCV 85.0.  Platelets 562 K, stable.  Ferritin 9.02, down from 251.7 on 11/11/2022.  Transferrin saturation is 6%, low.  CMP unremarkable except glucose 188 mg/dL.  -S/P Feraheme 510 mg IV x2 (01/11/2023, 01/18/2023)  To summarize:   -Recurrent severe iron deficiency anemia, unexplained   -EGD, colonoscopy negative (02/2018)   -CT enterogram negative (09/11/2019)   -has required multiple rounds of parenteral iron therapy over past 4-5 years  -no source of bleeding, H pylori gastritis, or celiac sprue on multiple GI endoscopic procedures including EGD, colonoscopy, CT  enteroscopy, and upper single balloon enteroscopy   [ferritin level has dropped:  9.02 (01/11/2023), down from 251.7 (11/11/2022)]  [Hemoglobin: 11.2 on 01/11/2023; down from 15.9 on 08/29/2022)  -treated with Feraheme 510 mg IV x2 (01/11/2023, 01/18/2023)  -02/27/2023:  Hemoglobin 14.6, remarkably improved with Feraheme.  Transferrin saturation 17%, improved but remains low. Ferritin 55.55 (was 9.02 on 01/11/2023)  -09/21/2023: Hgb 11.6, iron 26, iron sat 9 %, TIBC 276, Ferritin 30.30     Thrombocytosis (low risk IPSET-thrombosis score ET)    (age < 60 years; JAK2 V617F mutation positive)   GAGANDEEP-2 V617F mutation positive (03/2019)   Bone marrow consistent (06/2019)   Low risk IPSET-thrombosis score ET   Cardiovascular risk factors: NIDDM, hypertension, tobacco abuse   -04/27/2020: Platelets 500K, stable   -06/01/2020: Platelets 561K, more or less stable   -09/02/2020: Platelets 544K, stable   -11/12/2020: Platelets 567,000/mm³, stable  -11/11/2022:  Platelets 543 K, stable   -02/27/2023:  Platelets 623 K  -09/21/2023:  Platelets 600k, stable.      Chronic, very mild, benign-appearing, intermittent leukocytosis:   Secondary to smoking or underlying myeloproliferative disorder   GAGANDEEP-2 V617F mutation positive (03/2019)   BCR-ABL 1 fusion transcripts negative (03/2019)   Underlying essential thrombocytosis     Plan:  -Recurrent severe iron deficiency anemia, unexplained  -EGD, colonoscopy negative (02/2018)  -CT enterogram negative (09/11/2019)  -has required multiple rounds of parenteral iron therapy over past 4-5 years  -no source of bleeding, H pylori gastritis, or celiac sprue on multiple GI endoscopic procedures including EGD, colonoscopy, CT enteroscopy, and upper single balloon enteroscopy  >>>  [ferritin level has dropped:  9.02 (01/11/2023), down from 251.7 (11/11/2022)]  [Hemoglobin: 11.2 on 01/11/2023; down from 15.9 on 08/29/2022)  -treated with Feraheme 510 mg IV x2 (01/11/2023, 01/18/2023)  -02/27/2023:   Hemoglobin 14.6, remarkably improved with Feraheme.  Transferrin saturation 17%, improved but remains low. Ferritin 55.55 (was 9.02 on 01/11/2023)  -8/2/2023: Hgb 14.8, Hct 47.4, Plt 633, iron 40, iron sat 15% (improved)  -requiring multiple rounds of parenteral iron therapy in the past, essentially with negative exhaustive GI workup     Iron Deficiency Anemia:   - Give Venofer IV x 5 doses -will order today  -Educated patient on compliance with infusions  -RTC in 6 weeks after the completion of the last dose of Venofer (week of August 29th) via telemed with labwork completed before CBC/CMP/Serum iron/TIBC/Ferrtin Level      Low risk essential thrombocytosis    -No history of Thrombosis. Age < 60  -Continue OTC ASA 81 mg tablet daily. Do not miss any doses.   -Advised her elevated platelets can be secondary to anemia as well.      Smoker:   - Instructed the patient on smoking cessation    Above discussed at length with her.  All questions answered.    She understands and agrees with this plan.     This service was not originating from a related E/M service provided within the previous 7 days nor will  to an E/M service or procedure within the next 24 hours or my soonest available appointment.  Prevailing standard of care was able to be met in this audio-only visit.          Follow up See wrap up note.         Answers submitted by the patient for this visit:  Review of Systems Questionnaire (Submitted on 6/16/2024)  appetite change : Yes  unexpected weight change: No  mouth sores: No  visual disturbance: No  cough: No  shortness of breath: Yes  chest pain: No  abdominal pain: No  diarrhea: No  frequency: No  back pain: Yes  rash: No  headaches: No  adenopathy: No  nervous/ anxious: Yes

## 2024-06-17 NOTE — Clinical Note
Schedule iron infusion of Venofer weekly x 5 doses RTC with me 6 weeks after last dose of venofer (August 29th) with labs prior (CBC/CMP/TIBC/Iron/Ferrtin)via Telemedicine

## 2024-06-20 ENCOUNTER — CLINICAL SUPPORT (OUTPATIENT)
Dept: SMOKING CESSATION | Facility: CLINIC | Age: 59
End: 2024-06-20
Payer: COMMERCIAL

## 2024-06-20 ENCOUNTER — OFFICE VISIT (OUTPATIENT)
Dept: CARDIOLOGY | Facility: CLINIC | Age: 59
End: 2024-06-20
Payer: MEDICAID

## 2024-06-20 ENCOUNTER — CLINICAL SUPPORT (OUTPATIENT)
Dept: RESPIRATORY THERAPY | Facility: HOSPITAL | Age: 59
End: 2024-06-20
Attending: SURGERY
Payer: MEDICAID

## 2024-06-20 VITALS
WEIGHT: 187.19 LBS | OXYGEN SATURATION: 99 % | SYSTOLIC BLOOD PRESSURE: 92 MMHG | TEMPERATURE: 98 F | BODY MASS INDEX: 29.38 KG/M2 | DIASTOLIC BLOOD PRESSURE: 64 MMHG | HEIGHT: 67 IN | HEART RATE: 84 BPM | RESPIRATION RATE: 20 BRPM

## 2024-06-20 DIAGNOSIS — E61.1 IRON DEFICIENCY: ICD-10-CM

## 2024-06-20 DIAGNOSIS — Z79.4 TYPE 2 DIABETES MELLITUS WITHOUT COMPLICATION, WITH LONG-TERM CURRENT USE OF INSULIN: ICD-10-CM

## 2024-06-20 DIAGNOSIS — K44.9 HIATAL HERNIA: ICD-10-CM

## 2024-06-20 DIAGNOSIS — Z72.0 TOBACCO USER: ICD-10-CM

## 2024-06-20 DIAGNOSIS — Z01.810 PREOP CARDIOVASCULAR EXAM: ICD-10-CM

## 2024-06-20 DIAGNOSIS — D47.3 ESSENTIAL THROMBOCYTOSIS: ICD-10-CM

## 2024-06-20 DIAGNOSIS — R06.09 DOE (DYSPNEA ON EXERTION): ICD-10-CM

## 2024-06-20 DIAGNOSIS — R06.09 DYSPNEA ON EXERTION: Primary | ICD-10-CM

## 2024-06-20 DIAGNOSIS — I10 PRIMARY HYPERTENSION: ICD-10-CM

## 2024-06-20 DIAGNOSIS — F17.200 NICOTINE DEPENDENCE: Primary | ICD-10-CM

## 2024-06-20 DIAGNOSIS — K44.9 HIATAL HERNIA: Primary | ICD-10-CM

## 2024-06-20 DIAGNOSIS — E78.5 HYPERLIPIDEMIA LDL GOAL <70: ICD-10-CM

## 2024-06-20 DIAGNOSIS — I25.10 MILD CAD: ICD-10-CM

## 2024-06-20 DIAGNOSIS — K43.9 VENTRAL HERNIA WITHOUT OBSTRUCTION OR GANGRENE: Primary | ICD-10-CM

## 2024-06-20 DIAGNOSIS — Z15.89 JAK2 GENE MUTATION: ICD-10-CM

## 2024-06-20 DIAGNOSIS — E11.9 TYPE 2 DIABETES MELLITUS WITHOUT COMPLICATION, WITH LONG-TERM CURRENT USE OF INSULIN: ICD-10-CM

## 2024-06-20 LAB
OHS QRS DURATION: 76 MS
OHS QTC CALCULATION: 425 MS

## 2024-06-20 PROCEDURE — 99215 OFFICE O/P EST HI 40 MIN: CPT | Mod: PBBFAC,25 | Performed by: INTERNAL MEDICINE

## 2024-06-20 PROCEDURE — 99402 PREV MED CNSL INDIV APPRX 30: CPT | Mod: 95,,,

## 2024-06-20 PROCEDURE — 93005 ELECTROCARDIOGRAM TRACING: CPT

## 2024-06-20 PROCEDURE — 93010 ELECTROCARDIOGRAM REPORT: CPT | Mod: ,,, | Performed by: INTERNAL MEDICINE

## 2024-06-20 RX ORDER — LISINOPRIL 10 MG/1
10 TABLET ORAL DAILY
Qty: 90 TABLET | Refills: 3 | Status: SHIPPED | OUTPATIENT
Start: 2024-06-20 | End: 2025-06-20

## 2024-06-20 NOTE — PROGRESS NOTES
CHIEF COMPLAINT:   Chief Complaint   Patient presents with    f/u denies chest pain has SOB a lot needs clearance for her                   Review of patient's allergies indicates:   Allergen Reactions    Ivp dye [iodinated contrast media] Itching                                          HPI:  Jessica Walker 58 y.o. female with Mild CAD, JAK2 Gene mutation, Thrombocytosis, severe iron deficiency on iron infusion, JIN on CPAP, tobacco use, DM II, HTN, HLD, and GERD who presents for routine follow up and ongoing care.   Patient needs to undergo hernia repair and is planned for surgery tomorrow.  However she has been complaining of worsening shortness of breath on mild exertion including washing the dishes or talking. To note, pt had a normal dobutamine stress echo in 6/2022. She denies chest pain, PND, orthopnea or PND. She continues to be depressed and has tried remeron for about 4 months but it made her very sleepy. She continues to smoke but wants to quit and has nicotine patches.       CARDIAC TESTING                                                                                                                          Dobutamine Stress Echocardiogram 6.21.22:  The stress echo portion of this study is negative for myocardial ischemia.  The ECG portion of this study is positive for myocardial ischemia.  There were no arrhythmias during stress.    [facility] 9/2016  mLCx 20% stenosis                                                                     Patient Active Problem List   Diagnosis    Iron deficiency anemia    POSEY (dyspnea on exertion)    Primary hypertension    Mild CAD    Hyperlipidemia LDL goal <70    Type 2 diabetes mellitus, with long-term current use of insulin    Obesity (BMI 30-39.9)    Tobacco user    Thrombocytosis    Iron deficiency    Personal history of colonic polyps    Essential thrombocytosis    JAK2 gene mutation    Preop examination    PMB (postmenopausal bleeding)    Post-operative state     Hiatal hernia    Dieulafoy lesion of stomach    Encounter for comprehensive diabetic foot examination, type 2 diabetes mellitus    Overgrown toenails    Xerosis of skin     Past Surgical History:   Procedure Laterality Date    CHOLECYSTECTOMY      COLONOSCOPY  10/2021    ESOPHAGOGASTRODUODENOSCOPY N/A 06/26/2023    Procedure: EGD (ESOPHAGOGASTRODUODENOSCOPY);  Surgeon: Kita Larose MD;  Location: [facility] ENDOSCOPY;  Service: Gastroenterology;  Laterality: N/A;    EYE SURGERY  7/6/93    LK and RK    HYSTEROSCOPY WITH DILATION AND CURETTAGE OF UTERUS N/A 09/26/2023    Procedure: HYSTEROSCOPY, WITH DILATION AND CURETTAGE OF UTERUS;  Surgeon: Deepti Chappell MD;  Location: [facility] OR;  Service: OB/GYN;  Laterality: N/A;  myosure    INTRALUMINAL GASTROINTESTINAL TRACT IMAGING VIA CAPSULE N/A 06/29/2023    Procedure: IMAGING PROCEDURE, GI TRACT, INTRALUMINAL, VIA CAPSULE;  Surgeon: Kita Larose MD;  Location: [facility] ENDOSCOPY;  Service: Gastroenterology;  Laterality: N/A;    Removal of Goiter      UPPER GASTROINTESTINAL ENDOSCOPY       Social History     Socioeconomic History    Marital status: Single   Tobacco Use    Smoking status: Every Day     Current packs/day: 1.00     Average packs/day: 1 pack/day for 43.0 years (43.0 ttl pk-yrs)     Types: Cigarettes    Smokeless tobacco: Never    Tobacco comments:     Has chantix, has not started.   Substance and Sexual Activity    Alcohol use: Not Currently    Drug use: Not Currently    Sexual activity: Not Currently     Birth control/protection: None     Comment: Havent been sexually active since 2006     Social Determinants of Health     Financial Resource Strain: Medium Risk (1/4/2024)    Overall Financial Resource Strain (CARDIA)     Difficulty of Paying Living Expenses: Somewhat hard   Food Insecurity: Food Insecurity Present (1/4/2024)    Hunger Vital Sign     Worried About Running Out of Food in the Last Year: Sometimes true     Ran Out of Food in the Last  Year: Sometimes true   Transportation Needs: No Transportation Needs (2024)    PRAPARE - Transportation     Lack of Transportation (Medical): No     Lack of Transportation (Non-Medical): No   Physical Activity: Inactive (2024)    Exercise Vital Sign     Days of Exercise per Week: 0 days     Minutes of Exercise per Session: 0 min   Stress: Stress Concern Present (2024)    [employer] - Occupational Stress Questionnaire     Feeling of Stress : To some extent   Housing Stability: Low Risk  (2024)    Housing Stability Vital Sign     Unable to Pay for Housing in the Last Year: No     Number of Places Lived in the Last Year: 1     Unstable Housing in the Last Year: No        Family History   Problem Relation Name Age of Onset    Diabetes Mother Alise     Coronary artery disease Mother Alise     Alcohol abuse Mother Alise     Arthritis Mother Alise     Heart disease Mother Alise         Had 13 stents in her heart    Hyperlipidemia Mother Alise     Hypertension Mother Alise     Diabetes Father Joo     Cervical cancer Sister Chantale     Arthritis Sister Chantale     Cancer Sister Chantale         Breast cancer    COPD Sister Chantale     Diabetes Sister Chantale     Depression Sister Chantale     Colon polyps Sister Chantale     Fibromyalgia Sister Jemma     Breast cancer Sister Jemma     Cancer Sister Jemma          from cervical cancer at 55    Early death Sister Jemma     Skin cancer Sister Jemma         my maternal half sister Jemma  from cervical cancer at age 55    Cancer Maternal Grandfather Raza          from Leukemia    Cancer Maternal Grandmother [place] at 64          from throat cancer at 64    Early death Maternal Grandmother [place] at 64     Cancer Maternal Uncle Grzegorz          from cancer    Miscarriages / Stillbirths Sister Ibis     Skin cancer Sister Ibis         My maternal half sister Ibis had skin cancer before that was removed  "        Current Outpatient Medications:     aspirin (ECOTRIN) 81 MG EC tablet, Take 81 mg by mouth every evening. Stopped 6/21/24, Disp: , Rfl:     atorvastatin (LIPITOR) 40 MG tablet, Take 1 tablet (40 mg total) by mouth every evening., Disp: 90 tablet, Rfl: 3    ibuprofen (ADVIL,MOTRIN) 800 MG tablet, Take 800 mg by mouth every 6 (six) hours as needed., Disp: , Rfl:     lisinopriL (PRINIVIL,ZESTRIL) 20 MG tablet, Take 1 tablet (20 mg total) by mouth once daily. (Patient taking differently: Take 20 mg by mouth every morning.), Disp: 90 tablet, Rfl: 3    metFORMIN (GLUCOPHAGE) 1000 MG tablet, Take 1 tablet (1,000 mg total) by mouth 2 (two) times daily., Disp: 180 tablet, Rfl: 3    metoprolol tartrate (LOPRESSOR) 25 MG tablet, Take 1 tablet (25 mg total) by mouth 2 (two) times daily., Disp: 180 tablet, Rfl: 3    nicotine (NICODERM CQ) 21 mg/24 hr, Place 1 patch onto the skin once daily., Disp: 14 patch, Rfl: 0    nicotine polacrilex 2 MG Lozg, Take 1 lozenge (2 mg total) by mouth as needed (in place of a cigarette)., Disp: 72 lozenge, Rfl: 0    nitroGLYCERIN (NITROSTAT) 0.4 MG SL tablet, Place 1 tablet (0.4 mg total) under the tongue every 5 (five) minutes as needed for Chest pain., Disp: 25 tablet, Rfl: 6    pantoprazole (PROTONIX) 40 MG tablet, Take 1 tablet (40 mg total) by mouth once daily. (Patient taking differently: Take 40 mg by mouth every morning.), Disp: 30 tablet, Rfl: 5    SEMGLEE,INSULIN GLARG-YFGN, unit/mL (3 mL) InPn, Inject 47 Units into the skin every morning., Disp: , Rfl:     TRULICITY 3 mg/0.5 mL pen injector, Inject 3 mg into the skin every Tuesday. Last dose 6/11/24, Disp: , Rfl:     ammonium lactate 12 % Crea, Apply 1 application  topically 2 (two) times a day. Apply thin layer of Vaseline over Lac-Hydrin twice a day.  NOTHING BETWEEN TOES., Disp: 385 g, Rfl: 11    BD ULTRA-FINE SHORT PEN NEEDLE 31 gauge x 5/16" Ndle, Inject 1 each into the skin 3 (three) times daily., Disp: 100 each, " Rfl: 1    blood-glucose meter,continuous (DEXCOM ) Misc, Dexcom G7 .  Use as directed with Dexcom G7 sensors., Disp: 1 each, Rfl: 0    cycloSPORINE (RESTASIS) 0.05 % ophthalmic emulsion, 1 drop 2 (two) times daily., Disp: , Rfl:     DEXCOM G7 SENSOR Amy, Dexcom G7 sensors.  Use every 10 days as directed., Disp: 3 each, Rfl: 11    ferrous sulfate 324 mg (65 mg iron) TbEC, Take 1 tablet (324 mg total) by mouth every Mon, Wed, Fri. (Patient not taking: Reported on 6/20/2024), Disp: 24 tablet, Rfl: 3    magnesium chloride (MAG 64) 64 mg TbEC, Take 1 tablet (64 mg total) by mouth 2 (two) times a day. (Patient not taking: Reported on 6/20/2024), Disp: 14 tablet, Rfl: 0    TRUE METRIX GLUCOSE TEST STRIP Strp, USE 1 STRIP TO CHECK GLUCOSE ONCE DAILY, Disp: 100 each, Rfl: 1  No current facility-administered medications for this visit.    Facility-Administered Medications Ordered in Other Visits:     0.9%  NaCl infusion, , Intravenous, Continuous, Marielos Bahena MD    albuterol-ipratropium 2.5 mg-0.5 mg/3 mL nebulizer solution 3 mL, 3 mL, Nebulization, Once PRN, Marisa Ribera MD    dextrose 10% bolus 125 mL 125 mL, 12.5 g, Intravenous, PRN, Katherine Bernal S, FNP    dextrose 10% bolus 125 mL 125 mL, 12.5 g, Intravenous, PRN, Katherine Bernal S, FNP    diphenhydrAMINE injection 25 mg, 25 mg, Intravenous, Once PRN, Marisa Ribera MD    HYDROmorphone injection 0.2 mg, 0.2 mg, Intravenous, Q5 Min PRN, Marisa Ribera MD    HYDROmorphone injection 0.5 mg, 0.5 mg, Intravenous, Q5 Min PRN, Marisa Ribera MD    insulin aspart U-100 injection 2-9 Units, 2-9 Units, Subcutaneous, PRN, Katherine Bernal S, FNP    insulin aspart U-100 injection 4-12 Units, 4-12 Units, Subcutaneous, PRN, Katherine Bernal, FNP    lactated ringers infusion, , Intravenous, Continuous, Marisa Ribera MD, Last Rate: 125 mL/hr at 09/26/23 0856, Rate Change at 09/26/23 0856    LIDOcaine (PF) 10 mg/ml (1%)  "injection 10 mg, 1 mL, Intradermal, Once, Katherine Bernal FNCARINA    ondansetron injection 4 mg, 4 mg, Intravenous, Once, Marisa Ribera MD    oxyCODONE-acetaminophen 5-325 mg per tablet 2 tablet, 2 tablet, Oral, Once, Marisa Ribera MD    prochlorperazine injection Soln 5 mg, 5 mg, Intravenous, Once PRN, Marisa Ribera MD     ROS:                                                                                                                                                                             Review of Systems   Constitutional:  Positive for malaise/fatigue. Negative for chills, diaphoresis, fever and weight loss.        Weakness   Respiratory:  Positive for shortness of breath and wheezing. Negative for cough, hemoptysis and sputum production.    Cardiovascular:  Negative for chest pain, palpitations (Occasional), orthopnea, claudication, leg swelling and PND.   Gastrointestinal: Negative.    Musculoskeletal:  Negative for falls.   Skin: Negative.    Neurological:  Positive for dizziness. Negative for loss of consciousness and headaches.   Psychiatric/Behavioral:  Positive for depression and memory loss. Negative for substance abuse. The patient has insomnia. The patient is not nervous/anxious.         Blood pressure 92/64, pulse 84, temperature 97.9 °F (36.6 °C), temperature source Oral, resp. rate 20, height 5' 7" (1.702 m), weight 84.9 kg (187 lb 3.2 oz), SpO2 99%.   PE:  General: alert and oriented/no acute distress  Eye: EOMI/normal conjunctiva/no xanthelasma  HENT: normocephalic/moist oral mucosa  Neck: supple/nontender/no carotid bruit  Respiratory: lungs CTA/nonlabored respirations/BS equal/symmetrical expansion/no  chest wall tenderness  Cardiovascular: normal rate/normal rhythm/no murmur/normal peripheral perfusion/no  edema/no JVD  Gastrointestinal: soft/nontender  Musculoskeletal: normal ROM  Integumentary: warm/dry/pink/intact  Neurologic: alert/oriented/normal sensory/no " focal deficits  Psychiatric: cooperative/appropriate mood and affect/normal judgment    ASSESSMENT/PLAN:  Preoperative cardiovascular risk assessment  Dyspnea on mild exertion  Patient needs to undergo hernia repair.  She has been complaining of worsening dyspnea on mild exertion including ADLs and talking.  Mild CAD per [facility] Sept. 2016 (mid LCx 20%)  Dobutamine stress echo in 2022 showed ECG changes consistent with myocardial ischemia but echo portion was negative.  Suspect her dyspnea is multifactorial due to severe iron-deficiency, possible COPD (PFTs in 2022 did not show COPD but showed mild decrease in diffusion capacity).  Will also need to rule out progression of coronary disease and structural heart disease.  Will obtain echocardiogram and nuclear stress test.  Risk stratification to follow after testing      Palpitations   - No current reports, she states that they occur with Vyvanse use  - 48 hour Holter monitor Aug. 2019 - see results under HPI  - Continue Metoprolol Tartrate 25mg BID    HTN  - BP low today at 92/64  She has been losing weight consistently since being on Trulicity (and even prior)   Will decrease lisinopril to 10 mg.    HLD (hyperlipidemia)  - LDL 68 (Jan 2024)- at goal   - Continue Atorvastatin 40mg daily   - Encouraged low-cholesterol, heart healthy diet and exercise as tolerated    DM (diabetes mellitus)  - Continue management per PCP      Tobacco abuse  - Counseled importance of smoking cessation  - Continues to smoke about 1-1.5 PPD and is interested in quitting  - patient wants to quit using nicotine patches which she has    JIN on CPAP  - Reports noncompliance with CPAP due to discomfort  - Recommend nightly CPAP use

## 2024-06-20 NOTE — PATIENT INSTRUCTIONS
We will call Dr. Camejo to let his office know that we can not complete surgical clearance until after nuclear stress test and echocardiogram is complete.  Your upcoming surgery will have to be rescheduled.    Centralized scheduling will call you to schedule your two tests.

## 2024-06-20 NOTE — PROGRESS NOTES
Individual Follow-Up Form    6/20/2024    Quit Date:     Clinical Status of Patient: Outpatient    Length of Service: 30 minutes    Continuing Medication: yes  Patches    Other Medications: none     Target Symptoms: Withdrawal and medication side effects. The following were  rated moderate (3) to severe (4) on TCRS:  Moderate (3): none  Severe (4): none    Comments: Patient was seen virtually today and reports smoking around 18 cigarettes per day. She remains on a tobacco cessation medication regimen of 21mg nicotine patch and 2mg nicotine lozenge. No refill needed at this time as patient reports that she has not started any tobacco cessation medication as of yet. Patient reports that she has smoked less today because she was a the hospital for appointments and not thinking about a cigarette. Encouraged patient to try to implement the same level of discipline at home and wait at least an hour between each cigarette. Patient reports that she smokes more when having trouble sleeping throughout the night. Reviewed strategies, cues, and triggers. Introduced the negative impact of tobacco on health, the health advantages of discontinuing the use of tobacco, time line improved health changes after a quit, withdrawal issues to expect from nicotine and habit, and ways to achieve the goal of a quit. The patient will continue with  therapy sessions and medication monitoring by CTTS. Prescribed medication management will be by physician.      Diagnosis: F17.200    Next Visit: 7/10/2024

## 2024-06-26 ENCOUNTER — HOSPITAL ENCOUNTER (OUTPATIENT)
Dept: RADIOLOGY | Facility: HOSPITAL | Age: 59
Discharge: HOME | End: 2024-06-26
Attending: SURGERY
Payer: MEDICAID

## 2024-06-26 DIAGNOSIS — K43.9 VENTRAL HERNIA WITHOUT OBSTRUCTION OR GANGRENE: ICD-10-CM

## 2024-06-26 PROCEDURE — 76700 US EXAM ABDOM COMPLETE: CPT | Mod: TC

## 2024-06-27 ENCOUNTER — INFUSION (OUTPATIENT)
Dept: INFUSION THERAPY | Facility: HOSPITAL | Age: 59
End: 2024-06-27
Attending: INTERNAL MEDICINE
Payer: MEDICAID

## 2024-06-27 VITALS
DIASTOLIC BLOOD PRESSURE: 75 MMHG | HEIGHT: 67 IN | RESPIRATION RATE: 20 BRPM | HEART RATE: 93 BPM | OXYGEN SATURATION: 99 % | SYSTOLIC BLOOD PRESSURE: 106 MMHG | TEMPERATURE: 98 F | BODY MASS INDEX: 30.16 KG/M2 | WEIGHT: 192.13 LBS

## 2024-06-27 DIAGNOSIS — D50.9 IRON DEFICIENCY ANEMIA, UNSPECIFIED IRON DEFICIENCY ANEMIA TYPE: ICD-10-CM

## 2024-06-27 DIAGNOSIS — E61.1 IRON DEFICIENCY: Primary | ICD-10-CM

## 2024-06-27 PROCEDURE — 63600175 PHARM REV CODE 636 W HCPCS

## 2024-06-27 PROCEDURE — 25000003 PHARM REV CODE 250

## 2024-06-27 PROCEDURE — 96365 THER/PROPH/DIAG IV INF INIT: CPT

## 2024-06-27 RX ADMIN — IRON SUCROSE 250 MG: 20 INJECTION, SOLUTION INTRAVENOUS at 12:06

## 2024-06-27 NOTE — NURSING
Pt received Venofer # 1 of 5 via peripheral IV without incident; AVS given & pt verbalized understanding next Venofer appt on 7/11/24.

## 2024-07-05 ENCOUNTER — HOSPITAL ENCOUNTER (OUTPATIENT)
Dept: RADIOLOGY | Facility: HOSPITAL | Age: 59
Discharge: HOME | End: 2024-07-05
Attending: INTERNAL MEDICINE
Payer: MEDICAID

## 2024-07-05 DIAGNOSIS — R06.09 DYSPNEA ON EXERTION: ICD-10-CM

## 2024-07-05 LAB
AV PEAK GRADIENT: 8 MMHG
AV VALVE AREA BY VELOCITY RATIO: 2.93 CM²
AV VELOCITY RATIO: 0.87
CV ECHO LV RWT: 0.64 CM
DOP CALC AO PEAK VEL: 1.39 M/S
DOP CALC LVOT AREA: 3.4 CM2
DOP CALC LVOT DIAMETER: 2.07 CM
DOP CALC LVOT PEAK VEL: 1.21 M/S
DOP CALC MV VTI: 18.2 CM
E WAVE DECELERATION TIME: 214.19 MSEC
E/A RATIO: 0.83
ECHO LV POSTERIOR WALL: 1.16 CM (ref 0.6–1.1)
FRACTIONAL SHORTENING: 39 % (ref 28–44)
INTERVENTRICULAR SEPTUM: 1.11 CM (ref 0.6–1.1)
LEFT ATRIUM SIZE: 3.06 CM
LEFT INTERNAL DIMENSION IN SYSTOLE: 2.23 CM (ref 2.1–4)
LEFT VENTRICLE DIASTOLIC VOLUME: 55.52 ML
LEFT VENTRICLE SYSTOLIC VOLUME: 16.72 ML
LEFT VENTRICULAR INTERNAL DIMENSION IN DIASTOLE: 3.63 CM (ref 3.5–6)
LEFT VENTRICULAR MASS: 131.68 G
LVED V (TEICH): 55.52 ML
LVES V (TEICH): 16.72 ML
MV MEAN GRADIENT: 1 MMHG
MV PEAK A VEL: 0.93 M/S
MV PEAK E VEL: 0.77 M/S
MV PEAK GRADIENT: 2 MMHG
MV STENOSIS PRESSURE HALF TIME: 62.11 MS
MV VALVE AREA P 1/2 METHOD: 3.54 CM2
OHS CV RV/LV RATIO: 0.85 CM
PISA TR MAX VEL: 2.52 M/S
PV PEAK GRADIENT: 4 MMHG
PV PEAK VELOCITY: 0.95 M/S
RIGHT VENTRICULAR END-DIASTOLIC DIMENSION: 3.1 CM
TR MAX PG: 25 MMHG

## 2024-07-05 PROCEDURE — 93306 TTE W/DOPPLER COMPLETE: CPT

## 2024-07-08 NOTE — PROGRESS NOTES
Patient ID: [id number]     Chief Complaint: Positive MELANY (Pt having occ. Generalized joint pain)      Referred By: Dr. Leni Presley     HPI:     Jessica Walker is a 58 y.o. female here today for a new patient +MELANY visit.      Presents today for evaluation of positive MELANY, lab work completed due to dry eyes and dry mouth on February 20, 2024 revealed positive MELANY speckled 1:160, C3/C4 okay, SSA/SSB negative. She reports chronic fatigue for the past 10-15 years- states symptoms are worse as the years progressed. Use to be able to do laundry and dishes without issues but now will cause fatigue. She does report occ joint pain, mainly to her back- no history of injury- did have Xray with PCP and advised degenerative changes- has not tried PT- denies any numbness or tingling in her legs. Denies red/warm/swollen joints. Occ morning stiffness 15 minutes and not daily. She reports she just does not feel well overall, will get episodes of dizziness, nausea and weakness- suffers with anemia and is currently on Iron infusions- states has been on infusions for years. She was scheduled for hernia repair last month but due to SOB she has not been cleared by Cardiology at this time- awaiting further testing (ECHO and nuclear stress). She does admit dry eyes and dry mouth- she does follow with Optometrist and using Restasis as directed and provides some relief. She reports she drinks a lot of coffee but does try to drink water in between- she drinks 3-4 cups of coffee a day or more- was drinking all day and all night but has stopped drinking at night, will drink 1-2 16 oz bottle of water. She does have some teeth that have been falling- worse this past year- has lost all top teeth with only 2 remaining- does not follow with a dentist. She does have some anxiety and depression- not on any meds at this time as she states s/e with meds- make fatigue worse so not currently taking- denies any s/h ideation but reports will  stay in bed or on couch all day- not sleeping at night- has CPAP but does not use as she states makes her mouth even drier and not comfortable to use.      Follows with Heme-Onc for iron-deficiency anemia, thrombocytosis.  On baby aspirin and iron infusion.  Also follows with GI.  Follows with GYN for history of heavy menses, had D&C last year- no issues with bleeding since.     PMH:  NIDDM/HTN/Anemia/Vitamin-D deficiency/Tobacco use/JIN/Leukocytosis/Dyslipidemia    Hematology- [facility]  Cardiology- [facility]  GI- [facility]  [facility]- Dr. Terrazas    Denies history of fevers, rashes, photosensitivity, oral or nasal ulcers, h/o MI, stroke, seizures, h/o PE or DVT, Raynaud's phenomenon, uveitis, malignancies.   Family history of autoimmune disease: Sister has Hashimoto's   Pregnancies: 2 Miscarriages: None  Smoking:  Start age of 10-12 years old, hx of 1-1.5 ppd. She is trying to quit    Social History     Tobacco Use   Smoking Status Every Day    Current packs/day: 1.00    Average packs/day: 1 pack/day for 43.0 years (43.0 ttl pk-yrs)    Types: Cigarettes   Smokeless Tobacco Never   Tobacco Comments    Has chantix, has not started.        -------------------------------------    Acute pancreatitis    Had surgery for pancreatitis in June 1996    Anemia    Colon polyp    When I had colonoscopy, I had one small polyp they removed    Depression    Diabetes mellitus    Dieulafoy lesion of stomach    Diverticulosis    I think they said when I had my first colonoscopy around 2019 that I have diverticulosis    Encounter for comprehensive diabetic foot examination, type 2 diabetes mellitus    Essential thrombocytosis    GERD (gastroesophageal reflux disease)    Had surgery for pancreatitis in June 1996 and acid reflux since then    Hiatal hernia    Hyperlipidemia LDL goal <70    Hypertension    Iron deficiency    JAK2 gene mutation    Mild CAD    Obesity (BMI 30-39.9)    Overgrown toenails    Personal history of colonic polyps    Sleep  "apnea    No CPAP    Thrombocytosis    Tobacco user    Type 2 diabetes mellitus, with long-term current use of insulin    Xerosis of skin        Past Surgical History:   Procedure Laterality Date    CHOLECYSTECTOMY      COLONOSCOPY  10/2021    ESOPHAGOGASTRODUODENOSCOPY N/A 06/26/2023    Procedure: EGD (ESOPHAGOGASTRODUODENOSCOPY);  Surgeon: Kita Larose MD;  Location: [facility] ENDOSCOPY;  Service: Gastroenterology;  Laterality: N/A;    EYE SURGERY  7/6/93    LK and RK    HYSTEROSCOPY WITH DILATION AND CURETTAGE OF UTERUS N/A 09/26/2023    Procedure: HYSTEROSCOPY, WITH DILATION AND CURETTAGE OF UTERUS;  Surgeon: Deepti Chappell MD;  Location: [facility] OR;  Service: OB/GYN;  Laterality: N/A;  myosure    INTRALUMINAL GASTROINTESTINAL TRACT IMAGING VIA CAPSULE N/A 06/29/2023    Procedure: IMAGING PROCEDURE, GI TRACT, INTRALUMINAL, VIA CAPSULE;  Surgeon: Kita Larose MD;  Location: [facility] ENDOSCOPY;  Service: Gastroenterology;  Laterality: N/A;    Removal of Goiter      UPPER GASTROINTESTINAL ENDOSCOPY         Review of patient's allergies indicates:   Allergen Reactions    Ivp dye [iodinated contrast media] Itching       Current Outpatient Medications   Medication Instructions    aspirin (ECOTRIN) 81 mg, Oral, Nightly, Stopped 6/21/24    atorvastatin (LIPITOR) 40 mg, Oral, Nightly    BD ULTRA-FINE SHORT PEN NEEDLE 31 gauge x 5/16" Ndle 1 each, Subcutaneous, 3 times daily    blood-glucose meter,continuous (DEXCOM ) Misc Dexcom G7 .  Use as directed with Dexcom G7 sensors.    cycloSPORINE (RESTASIS) 0.05 % ophthalmic emulsion 1 drop, Both Eyes, 2 times daily    DEXCOM G7 SENSOR Amy Dexcom G7 sensors.  Use every 10 days as directed.    ferrous sulfate 324 mg, Oral, Every Mon, Wed, Fri    ibuprofen (ADVIL,MOTRIN) 800 mg, Oral, Every 6 hours PRN    lisinopriL 10 mg, Oral, Daily    magnesium chloride (MAG 64) 64 mg, Oral, 2 times daily    metFORMIN (GLUCOPHAGE) 1,000 mg, Oral, 2 times daily    " metoprolol tartrate (LOPRESSOR) 25 mg, Oral, 2 times daily    nicotine (NICODERM CQ) 21 mg/24 hr 1 patch, Transdermal, Daily    nicotine polacrilex 2 mg, Oral, As needed (PRN)    nitroGLYCERIN (NITROSTAT) 0.4 mg, Sublingual, Every 5 min PRN    pantoprazole (PROTONIX) 40 mg, Oral, Daily    SEMGLEE(INSULIN GLARG-YFGN)PEN 47 Units, Subcutaneous, Every morning    TRUE METRIX GLUCOSE TEST STRIP Strp USE 1 STRIP TO CHECK GLUCOSE ONCE DAILY    TRULICITY 3 mg, Subcutaneous, Every Tuesday, Last dose 6/11/24       Social History     Socioeconomic History    Marital status: Single   Tobacco Use    Smoking status: Every Day     Current packs/day: 1.00     Average packs/day: 1 pack/day for 43.0 years (43.0 ttl pk-yrs)     Types: Cigarettes    Smokeless tobacco: Never    Tobacco comments:     Has chantix, has not started.   Substance and Sexual Activity    Alcohol use: Not Currently    Drug use: Not Currently    Sexual activity: Not Currently     Birth control/protection: None     Comment: Havent been sexually active since 2006     Social Determinants of Health     Financial Resource Strain: Medium Risk (1/4/2024)    Overall Financial Resource Strain (CARDIA)     Difficulty of Paying Living Expenses: Somewhat hard   Food Insecurity: Food Insecurity Present (1/4/2024)    Hunger Vital Sign     Worried About Running Out of Food in the Last Year: Sometimes true     Ran Out of Food in the Last Year: Sometimes true   Transportation Needs: No Transportation Needs (1/4/2024)    PRAPARE - Transportation     Lack of Transportation (Medical): No     Lack of Transportation (Non-Medical): No   Physical Activity: Inactive (1/4/2024)    Exercise Vital Sign     Days of Exercise per Week: 0 days     Minutes of Exercise per Session: 0 min   Stress: Stress Concern Present (1/4/2024)    [place] [employer] - Occupational Stress Questionnaire     Feeling of Stress : To some extent   Housing Stability: Low Risk  (1/4/2024)     Housing Stability Vital Sign     Unable to Pay for Housing in the Last Year: No     Number of Places Lived in the Last Year: 1     Unstable Housing in the Last Year: No        Family History   Problem Relation Name Age of Onset    Diabetes Mother Alise     Coronary artery disease Mother Alise     Alcohol abuse Mother Alise     Arthritis Mother Alise     Heart disease Mother Alise         Had 13 stents in her heart    Hyperlipidemia Mother Alise     Hypertension Mother Alise     Diabetes Father Joo     Cervical cancer Sister Chantale     Arthritis Sister Chantale     Cancer Sister Chantale         Breast cancer    COPD Sister Chantale     Diabetes Sister Chantale     Depression Sister Chantale     Colon polyps Sister Chantale     Fibromyalgia Sister Jemma     Breast cancer Sister Jemma     Cancer Sister Jemma          from cervical cancer at 55    Early death Sister Jmema     Skin cancer Sister Jemma         my maternal half sister Jemma  from cervical cancer at age 55    Cancer Maternal Grandfather Raza          from Leukemia    Cancer Maternal Grandmother Marin at 64          from throat cancer at 64    Early death Maternal Grandmother [place] at 64     Cancer Maternal Uncle Grzegorz          from cancer    Miscarriages / Stillbirths Sister Ibis     Skin cancer Sister Ibis         My maternal half sister Ibis had skin cancer before that was removed        Immunization History   Administered Date(s) Administered    COVID-19, MRNA, LN-S, PF (Pfizer) (Purple Cap) 2021, 2021    COVID-19, mRNA, LNP-S, PF (Moderna )Ages 12+ 2024    Hepatitis B (recombinant) Adjuvanted, 2 dose 2024, 2024    Influenza (FLUBLOK) - Quadrivalent - Recombinant - PF *Preferred* (egg allergy) 2020    Influenza - Quadrivalent 10/24/2016    Influenza - Quadrivalent - PF *Preferred* (6 months and older) 2024    Influenza - Trivalent - PF (ADULT) 10/29/2014, 2018, 2021     Pneumococcal Conjugate - 20 Valent 01/19/2024    Pneumococcal Polysaccharide - 23 Valent 02/28/2008    Td (Adult), Unspecified Formulation 03/19/2008    Td - PF (ADULT) 03/19/2008    Tdap 11/12/2020    Zoster Recombinant 11/12/2020, 04/05/2021       Patient Care Team:  Leni Presley MD as PCP - General (Family Medicine)  Sade Mata FNP as Nurse Practitioner (Family Medicine)  Mary Castealn RD as Diabetes Educator (Diabetes)  Sheyla Lakhani LPN as Licensed Practical Nurse  Sade Mata FNP as Nurse Practitioner (Hematology and Oncology)     Subjective:     ROS    Constitutional:  Denies chills. Denies fever. Denies night sweats. Admits weight loss on Trulicity. Admits sleep disturbance- admits was drinking coffee all day so has decreased.   Ophthalmology: Admits blurred vision. Denies diminished visual acuity. Admits dry eyes. Denies eye pain. Admits Itching. Denies redness. Denies red eye.   ENT: Denies decreased hearing. Denies oral ulcers. Denies epistaxis. Denies swelling of ears or throat. Admits dry mouth. Denies swollen glands. Denies hair loss- reports hair very thin but has been thin her whole life.   Endocrine: Admits diabetes. Denies thyroid Problems.   Respiratory: Denies cough. Denies shortness of breath. Admits shortness of breath with exertion. Denies hemoptysis.   Cardiovascular: Denies chest pain at rest. Denies chest pain with exertion. Denies Irregular heartbeat. Denies palpitations. Denies edema. Denies orthopnea.   Gastrointestinal: Denies abdominal pain. Denies diarrhea. Denies nausea. Denies vomiting. Denies hematemesis or hematochezia. Denies change in appetite. Denies heartburn.  Genitourinary: Denies dysuria. Denies urinary frequency. Denies urinary urgency. Denies blood in urine.  Musculoskeletal: See HPI for details  Integumentary: Denies rash. Denies Itching. Admits dry skin- reports hx of very dry skin to her soles of feet. Denies photosensitivity.   Peripheral  "Vascular: Denies Ulcers of hands and/or feet. Denies Cold extremities.   Neurologic: Admits dizziness. Denies headache. Denies difficulty speaking. Denies loss of strength.  Denies numbness or tingling.   Psychiatric: Admits depression and anxiety- not currently on meds due to hx of s/e. Denies suicidal/homicidal ideations.      Objective:     Visit Vitals  /81 (BP Location: Right arm, Patient Position: Sitting, BP Method: Medium (Automatic))   Pulse 92   Temp 98 °F (36.7 °C) (Oral)   Resp 20   Ht 5' 7" (1.702 m)   Wt 88.5 kg (195 lb 3.2 oz)   SpO2 99%   BMI 30.57 kg/m²       Physical Exam    General Appearance: alert, pleasant, in no acute distress.  Skin: Skin color, texture, turgor normal. No rashes or lesions. No dry skin noted overall, bites nails  Eyes:  extraocular movement intact (EOMI), pupils equal, round, reactive to light and accommodation, conjunctiva clear.  ENT: No oral or nasal ulcers. Good salivary pool noted. Poor dentition with several missing teeth noted   Neck:  Neck supple. No adenopathy.   Lungs: CTA bilaterally without crackles, rhonchi, or wheezes.   Heart: RRR w/o murmurs.  No edema. 2+ DP pulse.  Abdomen: Soft, rebound or guarding. Large ventral hernia noted to right abd  Neuro: Alert, oriented, CN II-XII GI, sensory and motor innervation intact.  Musculoskeletal: No active synovitis noted, no pain to bilateral MCPs/PIPs, FROM noted to bilateral wrists, elbows and shoulders with no pain, FROM noted to bilateral knees, mild crepitus noted to left, right ok, no pain, no pain to bilateral ankles or MTPs,  - Dulce bilaterally, FROM noted to spine and hips. 5/5 strength to neck flexors, 5/5 proximal upper extremities and 5/5 to proximal lower extremities.   Psych: Alert, oriented, normal eye contact.       Labs Reviewed:   02/19/2024 MELANY positive speckled 1:160, C3/C4 okay, , SSA/SSB negative no other lab completed    06/20/2024 CBC WBC 15.64, Hgb 10.2, Hct 36, MCV 68.3, ANC 11.18, CMP " "Ca+ 10.5, glucose 214, otherwise okay    Rheumatology:  Lab Results   Component Value Date    ANAHS Positive 1:160 (A) 2024    ANATIT 1:160 2024    ANAPAT Speckled 2024    C3 143 2024    C4 44.0 2024    SEDRATE 14 2024        Chemistry:  Lab Results   Component Value Date     2024     2020    K 4.2 2024    K 4.0 2020    CHLORIDE 105 2020    BUN 15.0 2024    BUN 10.0 2020    CREATININE 0.73 2024    CREATININE 0.63 2020    EGFRNORACEVR >60 2024    GLUCOSE 214 (H) 2024    GLUCOSE 134 (H) 2020    CALCIUM 10.5 (H) 2024    CALCIUM 9.3 2020    ALKPHOS 96 2024    LABPROT 7.4 2024    LABPROT 13.8 2024    ALBUMIN 3.9 2024    BILIDIR 0.1 2022    IBILI 0.20 2022    AST 10 2024    ALT 8 2024    MG 1.40 (L) 2024    AQJCOGYZ37YT 49.8 2021        Hematology:  Lab Results   Component Value Date    WBC 15.64 (H) 2024    HGB 10.2 (L) 2024    HCT 36.0 (L) 2024     (H) 2024        Urine:  Lab Results   Component Value Date    APPEARANCEUA Clear 2023    SGUA >=1.030 2023    PROTEINUA 30 (A) 2023    KETONESUA Trace (A) 2023    LEUKOCYTESUR Small (A) 2023    RBCUA 0-2 2023    WBCUA 11-20 (A) 2023    BACTERIA Moderate (A) 2023    SQEPUA Few 2023    HYALINECASTS 0-2 (A) 2023        No results found for: "PROTEINURINE", "CREATRANDUR", "UPROTCREA"     Imagin2024 ECHO: LV EF 65-70%    2023 CT chest abdomen/pelvis W/contrast impression: Left adrenal nodule measuring 1.9 cm, slightly larger compared to prior from 2016, likely a lipid poor adrenal adenoma.  Large hiatal hernia, otherwise no evidence of malignancy to chest abdomen or pelvis.    2023 Lumbar Xray:Impression: Degenerative changes in the lower lumbar spine. Questionable spondylolysis at " L5-S1.   Oblique views may be beneficial for correlation.    Assessment:       ICD-10-CM ICD-9-CM   1. Positive MELANY (antinuclear antibody)  R76.8 795.79   2. Iron deficiency  E61.1 280.9   3. Thrombocytosis  D75.839 238.71   4. Primary hypertension  I10 401.9   5. Mild CAD  I25.10 414.00   6. Cigarette nicotine dependence without complication  F17.210 305.1        Plan:     1. Positive MELANY (antinuclear antibody)  Presents today for evaluation of +MELANY, lab on 2/19/2024 revealed MELANY positive speckled 1:160, C3/C4 okay, , SSA/SSB negative. She denies joint pain daily, no red/warm/swollen joints but reports generalized pain all over her body. She denies fevers/LAD/hair loss but does reports fatigue. She reports a history of dizziness, nausea and weakness that will occur at times- possibly due to severe anemia. She denies any falls and denies any vaginal/rectal bleeding. She suffers with dry eyes and dry mouth- states using Restasis gtts as directed per Optometrist and provides some relief. Dry mouth all day- denies any use of antihistamines- admits to only drinking 1-2 16 oz cups of water a day and several cups of coffee all day. She does not follow with a dentist- has had loss of teeth over the past 1-2 years, mainly to top. She has tried mouth wash but does not use consistently to help with dry mouth.     Positive MELANY: Reviewed outside records and records in Epic in detail. On work-up, patient was found to have a positive anti-nuclear antibody.  A positive MELANY can be seen in a variety of different diseases and syndrome.  The anti-nuclear antibody is a screening test for auto-immune disease.  Depending on the level or titer, it may be seen in autoimmune diseases such as SLE, sjogren's, scleroderma but could also be seen in auto-immune hepatitis, hypothyroidism and various other disease.  A positive MELANY does not give one a conclusive diagnosis of SLE as it may be a false positive and may not be significant as well.   Further workup for auto-immune disease can be considered based on clinical symptoms and other laboratory findings.    Recommend the following lab tests:  -ds DNA ab  -complement levels (C3/C4)  -anti-smith, RNP  -anti-SSA/SSB  -anti-SCL70 ab  -check UA, urine protein/creatinine to evaluate for renal involvement    2. Iron deficiency/Thrombocytosis  - Followed with Hematology, currently on IV Iron infusions    3.  Primary hypertension  - Currently at goal today, continue follow-up with PCP and Cardiology, encourage low Na+ diet    4. Mild CAD  - Followed by Cardiology, recently ordered Nuclear Stress Test for further evaluation of POSEY  - ECHO July 2024 LV EF 65-70%    5. Cigarette nicotine dependence without complication  - Strongly enc to quit smoking- she reports she does want to quit and has patches, plans to start using  - LD CT Chest may be beneficial for yearly monitoring as she has not had- advised to discuss with PCP     6. POSEY (dyspnea on exertion)  - X-Ray Chest  today for eval, currently being worked up further by Cardiology   - She is very sedentary- strongly enc to quit smoking and increase stretching and low impact exercise such as aquatic exercises     7. Weakness  - No weakness noted on exam today, will check CK and Aldolase- most likely due to sedentary lifestyle and severe anemia- can discuss with PCP possible PT eval if the future     8. Type 2 diabetes mellitus without complication, with long-term current use of insulin  - A1C 6.6 February 2024- followed by PCP, enc low sugar/carb/starch diet modifications.     9. Dry mouth/Dry Eyes  - Enc Biotene mouth was as directed, sugar free lemon candies-  strongly enc to increase H20 intake as she is not drinking enough water daily and consuming excessive amounts of caffeine/coffee- at length discussion to increase H20 intake to 6-8 cups of water daily and decrease intake of coffee- may also be contributing to difficulty sleeping at night  - Also enc to  follow up with dentist for eval/poor dentition  - Continue use of Restasis gtts as directed     10. JIN (obstructive sleep apnea)  - Reports noncompliant with CPAP as is uncomfortable and makes dry mouth worse- enc to discuss with PCP possible re-eval for fitting    11. DDD Lower Back  - 7/13/2023 Lumbar Xray:Impression: Degenerative changes in the lower lumbar spine. Questionable spondylolysis at L5-S1.   Oblique views may be beneficial for correlation.  - Denies any numbness or tingling into legs  - Enc stretches   - Enc to discuss with PCP possible PT eval    12. Anxiety and Depression  - Reports off of meds at this time due to s/e- reports makes fatigue worse- strongly enc to discuss other medication options with PCP, no S/H ideation- also enc to sit outside, take short walks to get out of the house and low impact exercise/stretching  to help            No follow-ups on file. In addition to their scheduled follow up, the patient has also been instructed to follow up on as needed basis.       Total time spent with patient and documentation is 97 minutes. All questions were answered to patient's satisfaction and patient verbalized understanding. This includes face to face time and non-face to face time preparing to see the patient (eg, review of tests), obtaining and/or reviewing separately obtained history, documenting clinical information in the electronic or other health record, independently interpreting results and communicating results to the patient/family/caregiver, or care coordinator.  Today's visit included increased complexity associated with the care of episodic problem+MELANY/Fatigue/POSEY/Anxiety/Depression/dry eyes/mouth addressed and managing the longitudinal care of the patient due to the series and or complex managed problem(s).   Total time also spent discussing importance of tobacco cessation 3 minutes.     No orders of the defined types were placed in this encounter.       Nancy  ANAHI Hudson

## 2024-07-09 ENCOUNTER — HOSPITAL ENCOUNTER (OUTPATIENT)
Dept: RADIOLOGY | Facility: HOSPITAL | Age: 59
Discharge: HOME | End: 2024-07-09
Attending: NURSE PRACTITIONER
Payer: MEDICAID

## 2024-07-09 ENCOUNTER — OFFICE VISIT (OUTPATIENT)
Dept: RHEUMATOLOGY | Facility: CLINIC | Age: 59
End: 2024-07-09
Payer: MEDICAID

## 2024-07-09 VITALS
WEIGHT: 195.19 LBS | TEMPERATURE: 98 F | HEART RATE: 92 BPM | SYSTOLIC BLOOD PRESSURE: 124 MMHG | BODY MASS INDEX: 30.64 KG/M2 | DIASTOLIC BLOOD PRESSURE: 81 MMHG | HEIGHT: 67 IN | OXYGEN SATURATION: 99 % | RESPIRATION RATE: 20 BRPM

## 2024-07-09 DIAGNOSIS — E61.1 IRON DEFICIENCY: ICD-10-CM

## 2024-07-09 DIAGNOSIS — E11.9 TYPE 2 DIABETES MELLITUS WITHOUT COMPLICATION, WITH LONG-TERM CURRENT USE OF INSULIN: ICD-10-CM

## 2024-07-09 DIAGNOSIS — I10 PRIMARY HYPERTENSION: ICD-10-CM

## 2024-07-09 DIAGNOSIS — F17.210 CIGARETTE NICOTINE DEPENDENCE WITHOUT COMPLICATION: ICD-10-CM

## 2024-07-09 DIAGNOSIS — G47.33 OSA (OBSTRUCTIVE SLEEP APNEA): ICD-10-CM

## 2024-07-09 DIAGNOSIS — R53.1 WEAKNESS: ICD-10-CM

## 2024-07-09 DIAGNOSIS — D75.839 THROMBOCYTOSIS: ICD-10-CM

## 2024-07-09 DIAGNOSIS — F41.9 ANXIETY AND DEPRESSION: ICD-10-CM

## 2024-07-09 DIAGNOSIS — R68.2 DRY MOUTH: ICD-10-CM

## 2024-07-09 DIAGNOSIS — F32.A ANXIETY AND DEPRESSION: ICD-10-CM

## 2024-07-09 DIAGNOSIS — I25.10 MILD CAD: ICD-10-CM

## 2024-07-09 DIAGNOSIS — R06.09 DOE (DYSPNEA ON EXERTION): ICD-10-CM

## 2024-07-09 DIAGNOSIS — M51.36 DDD (DEGENERATIVE DISC DISEASE), LUMBAR: ICD-10-CM

## 2024-07-09 DIAGNOSIS — H04.123 DRY EYES: ICD-10-CM

## 2024-07-09 DIAGNOSIS — R76.8 POSITIVE ANA (ANTINUCLEAR ANTIBODY): Primary | ICD-10-CM

## 2024-07-09 DIAGNOSIS — Z79.4 TYPE 2 DIABETES MELLITUS WITHOUT COMPLICATION, WITH LONG-TERM CURRENT USE OF INSULIN: ICD-10-CM

## 2024-07-09 PROCEDURE — 3008F BODY MASS INDEX DOCD: CPT | Mod: CPTII,,, | Performed by: NURSE PRACTITIONER

## 2024-07-09 PROCEDURE — 1160F RVW MEDS BY RX/DR IN RCRD: CPT | Mod: CPTII,,, | Performed by: NURSE PRACTITIONER

## 2024-07-09 PROCEDURE — 3079F DIAST BP 80-89 MM HG: CPT | Mod: CPTII,,, | Performed by: NURSE PRACTITIONER

## 2024-07-09 PROCEDURE — 99215 OFFICE O/P EST HI 40 MIN: CPT | Mod: PBBFAC,25 | Performed by: NURSE PRACTITIONER

## 2024-07-09 PROCEDURE — 71046 X-RAY EXAM CHEST 2 VIEWS: CPT | Mod: TC

## 2024-07-09 PROCEDURE — 3044F HG A1C LEVEL LT 7.0%: CPT | Mod: CPTII,,, | Performed by: NURSE PRACTITIONER

## 2024-07-09 PROCEDURE — 4010F ACE/ARB THERAPY RXD/TAKEN: CPT | Mod: CPTII,,, | Performed by: NURSE PRACTITIONER

## 2024-07-09 PROCEDURE — 1159F MED LIST DOCD IN RCRD: CPT | Mod: CPTII,,, | Performed by: NURSE PRACTITIONER

## 2024-07-09 PROCEDURE — 3074F SYST BP LT 130 MM HG: CPT | Mod: CPTII,,, | Performed by: NURSE PRACTITIONER

## 2024-07-09 PROCEDURE — 99406 BEHAV CHNG SMOKING 3-10 MIN: CPT | Mod: S$PBB,,, | Performed by: NURSE PRACTITIONER

## 2024-07-09 PROCEDURE — 99205 OFFICE O/P NEW HI 60 MIN: CPT | Mod: S$PBB,25,, | Performed by: NURSE PRACTITIONER

## 2024-07-10 ENCOUNTER — CLINICAL SUPPORT (OUTPATIENT)
Dept: SMOKING CESSATION | Facility: CLINIC | Age: 59
End: 2024-07-10
Payer: COMMERCIAL

## 2024-07-10 DIAGNOSIS — F17.200 NICOTINE DEPENDENCE: Primary | ICD-10-CM

## 2024-07-10 PROCEDURE — 99403 PREV MED CNSL INDIV APPRX 45: CPT | Mod: 95,,,

## 2024-07-10 RX ORDER — VARENICLINE TARTRATE 0.5 (11)-1
KIT ORAL
Qty: 1 EACH | Refills: 0 | Status: SHIPPED | OUTPATIENT
Start: 2024-07-10

## 2024-07-10 RX ORDER — VARENICLINE TARTRATE 0.5 (11)-1
KIT ORAL
Qty: 1 EACH | Refills: 0 | Status: SHIPPED | OUTPATIENT
Start: 2024-07-10 | End: 2024-07-10

## 2024-07-10 NOTE — PROGRESS NOTES
Individual Follow-Up Form    7/10/2024    Quit Date:     Clinical Status of Patient: Outpatient    Length of Service: 45 minutes    Continuing Medication: yes  Chantix    Other Medications: none     Target Symptoms: Withdrawal and medication side effects. The following were  rated moderate (3) to severe (4) on TCRS:  Moderate (3): none  Severe (4): none    Comments: Patient was seen virtually today and reports smoking 18 cigarettes per day. Patient has yet to begin using the 21mg nicotine patch. She will now start a tobacco cessation medication regimen of 1mg Chantix BID. Patient reports that in the past she was very successful with cutting down using Chantix. Patient reports that she had not started the patch because she simply forgot, she states that her low iron keeps her in very low spirits and unmotivated. She reports getting an iron infusion on tomorrow. Reviewed strategies, controlling environment, cues, triggers, new goals set. Introduced high risk situations with preparation interventions, caffeine similarities with withdrawal issues of habit and nicotine, Alcohol, Understanding urges, cravings, stress and relaxation. Open discussion with intervention discussion. The patient will continue with  therapy sessions and medication monitoring by CTTS. Prescribed medication management will be by physician.     Diagnosis: F17.200    Next Visit: 7/31/2024

## 2024-07-11 ENCOUNTER — INFUSION (OUTPATIENT)
Dept: INFUSION THERAPY | Facility: HOSPITAL | Age: 59
End: 2024-07-11
Attending: INTERNAL MEDICINE
Payer: MEDICAID

## 2024-07-11 VITALS
DIASTOLIC BLOOD PRESSURE: 94 MMHG | TEMPERATURE: 98 F | OXYGEN SATURATION: 99 % | HEIGHT: 67 IN | RESPIRATION RATE: 20 BRPM | HEART RATE: 97 BPM | SYSTOLIC BLOOD PRESSURE: 163 MMHG | WEIGHT: 194.88 LBS | BODY MASS INDEX: 30.59 KG/M2

## 2024-07-11 DIAGNOSIS — D50.9 IRON DEFICIENCY ANEMIA, UNSPECIFIED IRON DEFICIENCY ANEMIA TYPE: ICD-10-CM

## 2024-07-11 DIAGNOSIS — E61.1 IRON DEFICIENCY: Primary | ICD-10-CM

## 2024-07-11 PROCEDURE — 25000003 PHARM REV CODE 250

## 2024-07-11 PROCEDURE — 63600175 PHARM REV CODE 636 W HCPCS

## 2024-07-11 PROCEDURE — 96365 THER/PROPH/DIAG IV INF INIT: CPT

## 2024-07-11 RX ADMIN — IRON SUCROSE 250 MG: 20 INJECTION, SOLUTION INTRAVENOUS at 12:07

## 2024-07-15 ENCOUNTER — TELEPHONE (OUTPATIENT)
Dept: RHEUMATOLOGY | Facility: CLINIC | Age: 59
End: 2024-07-15
Payer: MEDICAID

## 2024-07-15 NOTE — TELEPHONE ENCOUNTER
Spoke to informed of message. Pt verbalized understanding                    ----- Message from ANAHI Deal sent at 7/15/2024  9:41 AM CDT -----  Please advise the patient that all lab are noted to be  clinically acceptable and all further work up is all negative, repeat MELANY also noted to be negative, muscles enzymes normal. We will keep follow up apt- can move apt to 4 months with me and silva IZQUIERDO apt at this time. Please make sure she is hydrating with H20 as we discussed recently and work on finding a dentist that accepts her insurance to get est with, continue use with Biotene mouth wash as directed to help with dry mouth along with appropriate water intake and decrease of caffeine intake- she can also try sugar free candies as we discussed

## 2024-07-18 ENCOUNTER — INFUSION (OUTPATIENT)
Dept: INFUSION THERAPY | Facility: HOSPITAL | Age: 59
End: 2024-07-18
Attending: INTERNAL MEDICINE
Payer: MEDICAID

## 2024-07-18 VITALS
TEMPERATURE: 98 F | WEIGHT: 191 LBS | OXYGEN SATURATION: 99 % | DIASTOLIC BLOOD PRESSURE: 70 MMHG | HEART RATE: 78 BPM | RESPIRATION RATE: 20 BRPM | HEIGHT: 67 IN | BODY MASS INDEX: 29.98 KG/M2 | SYSTOLIC BLOOD PRESSURE: 111 MMHG

## 2024-07-18 DIAGNOSIS — E61.1 IRON DEFICIENCY: Primary | ICD-10-CM

## 2024-07-18 DIAGNOSIS — R10.13 EPIGASTRIC ABDOMINAL PAIN: ICD-10-CM

## 2024-07-18 DIAGNOSIS — D50.9 IRON DEFICIENCY ANEMIA, UNSPECIFIED IRON DEFICIENCY ANEMIA TYPE: ICD-10-CM

## 2024-07-18 PROCEDURE — 96365 THER/PROPH/DIAG IV INF INIT: CPT

## 2024-07-18 PROCEDURE — 63600175 PHARM REV CODE 636 W HCPCS

## 2024-07-18 PROCEDURE — 25000003 PHARM REV CODE 250

## 2024-07-18 RX ORDER — PANTOPRAZOLE SODIUM 40 MG/1
40 TABLET, DELAYED RELEASE ORAL EVERY MORNING
Qty: 30 TABLET | Refills: 5 | Status: SHIPPED | OUTPATIENT
Start: 2024-07-18

## 2024-07-18 RX ADMIN — IRON SUCROSE 250 MG: 20 INJECTION, SOLUTION INTRAVENOUS at 12:07

## 2024-07-18 NOTE — NURSING
Venofer #3/5 250 mg given via PIV. Denies pain, SOB, N/V/C/D, fever, cough, rash, or chills. Assessment performed, no complaints reported.

## 2024-07-25 ENCOUNTER — INFUSION (OUTPATIENT)
Dept: INFUSION THERAPY | Facility: HOSPITAL | Age: 59
End: 2024-07-25
Attending: INTERNAL MEDICINE
Payer: MEDICAID

## 2024-07-25 VITALS
SYSTOLIC BLOOD PRESSURE: 118 MMHG | HEIGHT: 67 IN | TEMPERATURE: 98 F | RESPIRATION RATE: 20 BRPM | OXYGEN SATURATION: 98 % | WEIGHT: 192 LBS | BODY MASS INDEX: 30.13 KG/M2 | HEART RATE: 90 BPM | DIASTOLIC BLOOD PRESSURE: 79 MMHG

## 2024-07-25 DIAGNOSIS — D50.9 IRON DEFICIENCY ANEMIA, UNSPECIFIED IRON DEFICIENCY ANEMIA TYPE: ICD-10-CM

## 2024-07-25 DIAGNOSIS — E61.1 IRON DEFICIENCY: Primary | ICD-10-CM

## 2024-07-25 PROCEDURE — 96365 THER/PROPH/DIAG IV INF INIT: CPT

## 2024-07-25 PROCEDURE — 63600175 PHARM REV CODE 636 W HCPCS

## 2024-07-25 PROCEDURE — 25000003 PHARM REV CODE 250

## 2024-07-25 RX ADMIN — IRON SUCROSE 250 MG: 20 INJECTION, SOLUTION INTRAVENOUS at 12:07

## 2024-07-25 NOTE — NURSING
1217  [facility]  Rates LOP 2/10 to back.  Pt does report some SOB when walks a long distance and some fatigue.     yes...

## 2024-07-26 DIAGNOSIS — R06.09 DOE (DYSPNEA ON EXERTION): Primary | ICD-10-CM

## 2024-07-26 DIAGNOSIS — Z01.810 ENCOUNTER FOR PRE-OPERATIVE CARDIOVASCULAR CLEARANCE: ICD-10-CM

## 2024-07-31 ENCOUNTER — CLINICAL SUPPORT (OUTPATIENT)
Dept: SMOKING CESSATION | Facility: CLINIC | Age: 59
End: 2024-07-31
Payer: COMMERCIAL

## 2024-07-31 DIAGNOSIS — F17.200 NICOTINE DEPENDENCE: Primary | ICD-10-CM

## 2024-07-31 PROCEDURE — 99403 PREV MED CNSL INDIV APPRX 45: CPT | Mod: 95,,,

## 2024-07-31 RX ORDER — VARENICLINE TARTRATE 1 MG/1
1 TABLET, FILM COATED ORAL 2 TIMES DAILY
Qty: 60 TABLET | Refills: 0 | Status: SHIPPED | OUTPATIENT
Start: 2024-07-31

## 2024-07-31 NOTE — PROGRESS NOTES
Individual Follow-Up Form    7/31/2024    Quit Date:     Clinical Status of Patient: Outpatient    Length of Service: 45 minutes    Continuing Medication: yes  Chantix    Other Medications: none     Target Symptoms: Withdrawal and medication side effects. The following were  rated moderate (3) to severe (4) on TCRS:  Moderate (3): none  Severe (4): none    Comments: Patient was seen in clinic today and reports smoking 16-17 cigarettes per day. She remains on a tobacco cessation medication regimen of 1mg Chantix BID, refill sent to pharmacy. No abnormal behaviors or mental changes reported at this time. Patient reports that it is difficult to keep the accurate count of how many cigarettes she smokes because she stays awake all night. Suggested that patient keep 10 cigarettes in one pack and 10 in another. She continues to keep cigarettes directly next to her while playing poker, oftentimes causing her to chain smoke. Suggested again that patient place her cigarettes in another room. Reviewed strategies, habitual behavior, stress, and high risk situations. Introduced stress with addition interventions, SOLVE, relaxation with interventions, nutrition, exercise, weight gain, and the importance of rewarding oneself for accomplishments toward becoming tobacco free. Open discussion of all items with interventions.  The patient will continue with  therapy sessions and medication monitoring by CTTS. Prescribed medication management will be by physician.     Diagnosis: F17.200    Next Visit: 8/28/2024

## 2024-08-01 ENCOUNTER — INFUSION (OUTPATIENT)
Dept: INFUSION THERAPY | Facility: HOSPITAL | Age: 59
End: 2024-08-01
Attending: INTERNAL MEDICINE
Payer: MEDICAID

## 2024-08-01 ENCOUNTER — PATIENT OUTREACH (OUTPATIENT)
Dept: ADMINISTRATIVE | Facility: OTHER | Age: 59
End: 2024-08-01
Payer: MEDICAID

## 2024-08-01 VITALS
DIASTOLIC BLOOD PRESSURE: 68 MMHG | OXYGEN SATURATION: 99 % | BODY MASS INDEX: 29.29 KG/M2 | WEIGHT: 186.63 LBS | HEIGHT: 67 IN | RESPIRATION RATE: 20 BRPM | SYSTOLIC BLOOD PRESSURE: 105 MMHG | TEMPERATURE: 98 F | HEART RATE: 82 BPM

## 2024-08-01 DIAGNOSIS — D50.9 IRON DEFICIENCY ANEMIA, UNSPECIFIED IRON DEFICIENCY ANEMIA TYPE: ICD-10-CM

## 2024-08-01 DIAGNOSIS — E61.1 IRON DEFICIENCY: Primary | ICD-10-CM

## 2024-08-01 PROCEDURE — 63600175 PHARM REV CODE 636 W HCPCS

## 2024-08-01 PROCEDURE — 25000003 PHARM REV CODE 250

## 2024-08-01 PROCEDURE — 96365 THER/PROPH/DIAG IV INF INIT: CPT

## 2024-08-01 RX ADMIN — IRON SUCROSE 250 MG: 20 INJECTION, SOLUTION INTRAVENOUS at 12:08

## 2024-08-01 NOTE — PROGRESS NOTES
CHW - Case Closure    This LPN spoke to patient via telephone today. 08/01/24  Pt/Caregiver reported: No issues to address.  Pt/Caregiver denied any additional needs at this time and agrees with episode closure at this time.  Provided patient with Community Health Worker's contact information and encouraged him/her to contact this LPN if additional needs arise.

## 2024-08-01 NOTE — PROGRESS NOTES
CHW - Follow Up    This LPN completed a follow up visit with patient via telephone today.  Pt/Caregiver reported: Has her last Iron Infusion scheduled for later this AM. Says she has a few appointments also later this months. Doing good over all.  Community Health Worker provided: No services.  Follow up required: No

## 2024-08-24 DIAGNOSIS — I25.10 MILD CAD: ICD-10-CM

## 2024-08-24 DIAGNOSIS — I10 PRIMARY HYPERTENSION: ICD-10-CM

## 2024-08-26 ENCOUNTER — HOSPITAL ENCOUNTER (OUTPATIENT)
Dept: RADIOLOGY | Facility: HOSPITAL | Age: 59
Discharge: HOME | End: 2024-08-26
Payer: MEDICAID

## 2024-08-26 ENCOUNTER — LAB VISIT (OUTPATIENT)
Dept: LAB | Facility: HOSPITAL | Age: 59
End: 2024-08-26
Payer: MEDICAID

## 2024-08-26 ENCOUNTER — HOSPITAL ENCOUNTER (OUTPATIENT)
Dept: CARDIOLOGY | Facility: HOSPITAL | Age: 59
Discharge: HOME | End: 2024-08-26
Payer: MEDICAID

## 2024-08-26 VITALS
DIASTOLIC BLOOD PRESSURE: 66 MMHG | HEART RATE: 98 BPM | SYSTOLIC BLOOD PRESSURE: 99 MMHG | BODY MASS INDEX: 29.27 KG/M2 | RESPIRATION RATE: 20 BRPM | WEIGHT: 186.5 LBS | HEIGHT: 67 IN

## 2024-08-26 DIAGNOSIS — D47.3 THROMBOCYTHEMIA, ESSENTIAL: Primary | ICD-10-CM

## 2024-08-26 DIAGNOSIS — R06.09 DOE (DYSPNEA ON EXERTION): ICD-10-CM

## 2024-08-26 DIAGNOSIS — Z01.810 ENCOUNTER FOR PRE-OPERATIVE CARDIOVASCULAR CLEARANCE: ICD-10-CM

## 2024-08-26 LAB
ALBUMIN SERPL-MCNC: 4.1 G/DL (ref 3.5–5)
ALBUMIN/GLOB SERPL: 1.4 RATIO (ref 1.1–2)
ALP SERPL-CCNC: 96 UNIT/L (ref 40–150)
ALT SERPL-CCNC: 11 UNIT/L (ref 0–55)
ANION GAP SERPL CALC-SCNC: 11 MEQ/L
AST SERPL-CCNC: 13 UNIT/L (ref 5–34)
BILIRUB SERPL-MCNC: 0.3 MG/DL
BUN SERPL-MCNC: 14 MG/DL (ref 9.8–20.1)
CALCIUM SERPL-MCNC: 10.4 MG/DL (ref 8.4–10.2)
CHLORIDE SERPL-SCNC: 103 MMOL/L (ref 98–107)
CO2 SERPL-SCNC: 24 MMOL/L (ref 22–29)
CREAT SERPL-MCNC: 0.93 MG/DL (ref 0.55–1.02)
CREAT/UREA NIT SERPL: 15
CV STRESS BASE HR: 92 BPM
DIASTOLIC BLOOD PRESSURE: 66 MMHG
ERYTHROCYTE [DISTWIDTH] IN BLOOD BY AUTOMATED COUNT: 25 % (ref 11.5–17)
FERRITIN SERPL-MCNC: 63.6 NG/ML (ref 4.63–204)
GFR SERPLBLD CREATININE-BSD FMLA CKD-EPI: >60 ML/MIN/1.73/M2
GLOBULIN SER-MCNC: 2.9 GM/DL (ref 2.4–3.5)
GLUCOSE SERPL-MCNC: 299 MG/DL (ref 74–100)
HCT VFR BLD AUTO: 40.8 % (ref 37–47)
HGB BLD-MCNC: 12.5 G/DL (ref 12–16)
IRON SATN MFR SERPL: 10 % (ref 20–50)
IRON SERPL-MCNC: 33 UG/DL (ref 50–170)
MCH RBC QN AUTO: 22.6 PG (ref 27–31)
MCHC RBC AUTO-ENTMCNC: 30.6 G/DL (ref 33–36)
MCV RBC AUTO: 73.8 FL (ref 80–94)
NUC REST EJECTION FRACTION: 97
NUC STRESS EJECTION FRACTION: 97 %
OHS CV CPX 85 PERCENT MAX PREDICTED HEART RATE MALE: 138
OHS CV CPX ESTIMATED METS: 6
OHS CV CPX MAX PREDICTED HEART RATE: 162
OHS CV CPX PATIENT IS FEMALE: 1
OHS CV CPX PATIENT IS MALE: 0
OHS CV CPX PEAK DIASTOLIC BLOOD PRESSURE: 72 MMHG
OHS CV CPX PEAK HEAR RATE: 150 BPM
OHS CV CPX PEAK RATE PRESSURE PRODUCT: NORMAL
OHS CV CPX PEAK SYSTOLIC BLOOD PRESSURE: 140 MMHG
OHS CV CPX PERCENT MAX PREDICTED HEART RATE ACHIEVED: 97
OHS CV CPX RATE PRESSURE PRODUCT PRESENTING: 7912
OHS QRS DURATION: 82 MS
OHS QTC CALCULATION: 434 MS
PLATELET # BLD AUTO: 696 X10(3)/MCL (ref 130–400)
PMV BLD AUTO: 9.9 FL (ref 7.4–10.4)
POTASSIUM SERPL-SCNC: 4.2 MMOL/L (ref 3.5–5.1)
PROT SERPL-MCNC: 7 GM/DL (ref 6.4–8.3)
RBC # BLD AUTO: 5.53 X10(6)/MCL (ref 4.2–5.4)
SODIUM SERPL-SCNC: 138 MMOL/L (ref 136–145)
STRESS ECHO POST EXERCISE DUR MIN: 3 MINUTES
STRESS ECHO POST EXERCISE DUR SEC: 51 SECONDS
SYSTOLIC BLOOD PRESSURE: 86 MMHG
TIBC SERPL-MCNC: 306 UG/DL (ref 70–310)
TIBC SERPL-MCNC: 339 UG/DL (ref 250–450)
TRANSFERRIN SERPL-MCNC: 323 MG/DL (ref 180–382)
WBC # BLD AUTO: 11.21 X10(3)/MCL (ref 4.5–11.5)

## 2024-08-26 PROCEDURE — 83540 ASSAY OF IRON: CPT

## 2024-08-26 PROCEDURE — 93017 CV STRESS TEST TRACING ONLY: CPT

## 2024-08-26 PROCEDURE — 83550 IRON BINDING TEST: CPT

## 2024-08-26 PROCEDURE — 85027 COMPLETE CBC AUTOMATED: CPT

## 2024-08-26 PROCEDURE — 78452 HT MUSCLE IMAGE SPECT MULT: CPT

## 2024-08-26 PROCEDURE — 36415 COLL VENOUS BLD VENIPUNCTURE: CPT

## 2024-08-26 PROCEDURE — A9502 TC99M TETROFOSMIN: HCPCS

## 2024-08-26 PROCEDURE — 82728 ASSAY OF FERRITIN: CPT

## 2024-08-26 PROCEDURE — 80053 COMPREHEN METABOLIC PANEL: CPT

## 2024-08-26 RX ORDER — METOPROLOL TARTRATE 25 MG/1
25 TABLET, FILM COATED ORAL 2 TIMES DAILY
Qty: 60 TABLET | Refills: 0 | OUTPATIENT
Start: 2024-08-26

## 2024-08-26 RX ADMIN — TETROFOSMIN 10.9 MILLICURIE: 1.38 INJECTION, POWDER, LYOPHILIZED, FOR SOLUTION INTRAVENOUS at 08:08

## 2024-08-26 RX ADMIN — TETROFOSMIN 31.8 MILLICURIE: 1.38 INJECTION, POWDER, LYOPHILIZED, FOR SOLUTION INTRAVENOUS at 09:08

## 2024-08-28 ENCOUNTER — CLINICAL SUPPORT (OUTPATIENT)
Dept: SMOKING CESSATION | Facility: CLINIC | Age: 59
End: 2024-08-28
Payer: COMMERCIAL

## 2024-08-28 ENCOUNTER — TELEPHONE (OUTPATIENT)
Dept: HEMATOLOGY/ONCOLOGY | Facility: CLINIC | Age: 59
End: 2024-08-28
Payer: MEDICAID

## 2024-08-28 DIAGNOSIS — F17.200 NICOTINE DEPENDENCE: Primary | ICD-10-CM

## 2024-08-28 PROCEDURE — 99402 PREV MED CNSL INDIV APPRX 30: CPT | Mod: 95,,,

## 2024-08-28 RX ORDER — VARENICLINE TARTRATE 1 MG/1
1 TABLET, FILM COATED ORAL 2 TIMES DAILY
Qty: 60 TABLET | Refills: 0 | Status: SHIPPED | OUTPATIENT
Start: 2024-08-28

## 2024-08-28 NOTE — Clinical Note
"Patient states smoking 15 cpd. The patient remains on the prescribed tobacco cessation medication regimen of 1 mg Chantix BID without any negative side effects at this time. Refill sent to pharmacy. The patient denies any abnormal behavioral or mental changes at this time. She states smoking the most at night when she can not sleep while playing "poker game" on her phone. Discussed and reviewed strategies, habitual behavior, stress, and high risk situations. SOLVE, relaxation with interventions, exercise, and the importance of rewarding oneself for accomplishments toward becoming tobacco free. Encouraged patient to not keep cigarettes close and to try strategies discussed, she verbalized understanding. The patient will continue with therapy sessions and medication monitoring by CTTS. Prescribed medication management will be by physician."

## 2024-08-28 NOTE — PROGRESS NOTES
"Individual Follow-Up Form    8/28/2024    Quit Date:     Clinical Status of Patient: Outpatient    Length of Service: 30 minutes    Continuing Medication: yes  Chantix    Other Medications:      Target Symptoms: Withdrawal and medication side effects. The following were  rated moderate (3) to severe (4) on TCRS:  Moderate (3): none  Severe (4): none    The patient location is:     [facility]  The chief complaint leading to consultation is: Nicotine dependence    Visit type: audiovisual    Face to Face time with patient:   30 minutes of total time spent on the encounter, which includes face to face time and non-face to face time preparing to see the patient (eg, review of tests), Obtaining and/or reviewing separately obtained history, Documenting clinical information in the electronic or other health record, Independently interpreting results (not separately reported) and communicating results to the patient/family/caregiver, or Care coordination (not separately reported).         Each patient to whom he or she provides medical services by telemedicine is:  (1) informed of the relationship between the physician and patient and the respective role of any other health care provider with respect to management of the patient; and (2) notified that he or she may decline to receive medical services by telemedicine and may withdraw from such care at any time.     Comments: Patient states smoking 15 cpd. The patient remains on the prescribed tobacco cessation medication regimen of 1 mg Chantix BID without any negative side effects at this time. Refill sent to pharmacy. The patient denies any abnormal behavioral or mental changes at this time. She states smoking the most at night when she can not sleep while playing "poker game" on her phone. Discussed and reviewed strategies, habitual behavior, stress, and high risk situations. SOLVE, relaxation with interventions, exercise, and the importance of rewarding oneself for " accomplishments toward becoming tobacco free. Encouraged patient to not keep cigarettes close and to try strategies discussed, she verbalized understanding. The patient will continue with therapy sessions and medication monitoring by CTTS. Prescribed medication management will be by physician.            Diagnosis: F17.200    Next Visit: 9/25/2024

## 2024-08-29 ENCOUNTER — OFFICE VISIT (OUTPATIENT)
Dept: CARDIOLOGY | Facility: CLINIC | Age: 59
End: 2024-08-29
Payer: MEDICAID

## 2024-08-29 ENCOUNTER — OFFICE VISIT (OUTPATIENT)
Dept: HEMATOLOGY/ONCOLOGY | Facility: CLINIC | Age: 59
End: 2024-08-29
Payer: MEDICAID

## 2024-08-29 DIAGNOSIS — R06.09 DOE (DYSPNEA ON EXERTION): ICD-10-CM

## 2024-08-29 DIAGNOSIS — E11.9 TYPE 2 DIABETES MELLITUS WITHOUT COMPLICATION, WITH LONG-TERM CURRENT USE OF INSULIN: ICD-10-CM

## 2024-08-29 DIAGNOSIS — Z72.0 TOBACCO USER: ICD-10-CM

## 2024-08-29 DIAGNOSIS — E66.9 OBESITY (BMI 30-39.9): ICD-10-CM

## 2024-08-29 DIAGNOSIS — Z01.810 ENCOUNTER FOR PRE-OPERATIVE CARDIOVASCULAR CLEARANCE: ICD-10-CM

## 2024-08-29 DIAGNOSIS — Z79.4 TYPE 2 DIABETES MELLITUS WITHOUT COMPLICATION, WITH LONG-TERM CURRENT USE OF INSULIN: ICD-10-CM

## 2024-08-29 DIAGNOSIS — I25.10 MILD CAD: ICD-10-CM

## 2024-08-29 DIAGNOSIS — E78.5 HYPERLIPIDEMIA, UNSPECIFIED HYPERLIPIDEMIA TYPE: ICD-10-CM

## 2024-08-29 DIAGNOSIS — R94.39 ABNORMAL CARDIOVASCULAR STRESS TEST: ICD-10-CM

## 2024-08-29 DIAGNOSIS — D50.9 IRON DEFICIENCY ANEMIA, UNSPECIFIED IRON DEFICIENCY ANEMIA TYPE: Primary | ICD-10-CM

## 2024-08-29 DIAGNOSIS — Z72.0 TOBACCO USE: ICD-10-CM

## 2024-08-29 DIAGNOSIS — I10 PRIMARY HYPERTENSION: Primary | ICD-10-CM

## 2024-08-29 DIAGNOSIS — D75.839 THROMBOCYTOSIS: ICD-10-CM

## 2024-08-29 DIAGNOSIS — E78.5 HYPERLIPIDEMIA LDL GOAL <70: ICD-10-CM

## 2024-08-29 PROCEDURE — 99213 OFFICE O/P EST LOW 20 MIN: CPT | Mod: PBBFAC

## 2024-08-29 RX ORDER — METOPROLOL TARTRATE 25 MG/1
25 TABLET, FILM COATED ORAL 2 TIMES DAILY
Qty: 180 TABLET | Refills: 3 | Status: SHIPPED | OUTPATIENT
Start: 2024-08-29 | End: 2025-08-29

## 2024-08-29 RX ORDER — HEPARIN 100 UNIT/ML
500 SYRINGE INTRAVENOUS
OUTPATIENT
Start: 2024-08-29

## 2024-08-29 RX ORDER — SODIUM CHLORIDE 9 MG/ML
INJECTION, SOLUTION INTRAVENOUS ONCE
OUTPATIENT
Start: 2024-08-29 | End: 2024-08-29

## 2024-08-29 RX ORDER — NITROGLYCERIN 0.4 MG/1
0.4 TABLET SUBLINGUAL EVERY 5 MIN PRN
Qty: 25 TABLET | Refills: 6 | Status: SHIPPED | OUTPATIENT
Start: 2024-08-29 | End: 2025-08-29

## 2024-08-29 RX ORDER — ATORVASTATIN CALCIUM 40 MG/1
40 TABLET, FILM COATED ORAL NIGHTLY
Qty: 90 TABLET | Refills: 3 | Status: SHIPPED | OUTPATIENT
Start: 2024-08-29

## 2024-08-29 RX ORDER — EPINEPHRINE 0.3 MG/.3ML
0.3 INJECTION SUBCUTANEOUS ONCE AS NEEDED
OUTPATIENT
Start: 2024-08-29

## 2024-08-29 RX ORDER — SODIUM CHLORIDE 0.9 % (FLUSH) 0.9 %
10 SYRINGE (ML) INJECTION
Status: SHIPPED | OUTPATIENT
Start: 2024-08-29

## 2024-08-29 RX ORDER — DIPHENHYDRAMINE HCL 25 MG
25 CAPSULE ORAL
OUTPATIENT
Start: 2024-08-29

## 2024-08-29 RX ORDER — SODIUM CHLORIDE 0.9 % (FLUSH) 0.9 %
10 SYRINGE (ML) INJECTION
OUTPATIENT
Start: 2024-08-29

## 2024-08-29 RX ORDER — DIPHENHYDRAMINE HYDROCHLORIDE 50 MG/ML
50 INJECTION INTRAMUSCULAR; INTRAVENOUS ONCE AS NEEDED
OUTPATIENT
Start: 2024-08-29

## 2024-08-29 RX ORDER — DIAZEPAM 5 MG/1
5 TABLET ORAL ONCE
Status: ACTIVE | OUTPATIENT
Start: 2024-08-29

## 2024-08-29 RX ORDER — PREDNISONE 50 MG/1
50 TABLET ORAL EVERY 6 HOURS
Qty: 3 TABLET | Refills: 0 | Status: SHIPPED | OUTPATIENT
Start: 2024-09-17 | End: 2024-09-18

## 2024-08-29 NOTE — PROGRESS NOTES
CHIEF COMPLAINT:   Chief Complaint   Patient presents with    Follow-up     Positive stress                                                  HPI:  Jessica Walker 58 y.o. female Mild CAD, JAK2 Gene mutation, Thrombocytosis, severe iron deficiency on iron infusion, JIN on CPAP, tobacco use, DM II, HTN, HLD, and GERD who presents for routine follow up and ongoing care after recent stress test and echocardiogram.  Testing was ordered given a status change of significant POSEY and fatigue at last office visit and of the need for risk assessment for a hernia repair.  Echocardiogram revealed EF of 65-70%, grade 1 diastolic dysfunction, mild MR, trace TR.  See full report below.  Nuclear stress test revealed an abnormal myocardial perfusion scan, there was a moderate to severe intensity, medium-sized, reversible perfusion abnormality consistent with ischemia in the mid to distal inferior walls in the typical distribution of the RCA territory.  The patient had a moderate risk nuclear stress test due to inferior reversible defect, the patient also was noted to have stress dilation. See full report below.     Today the patient continues to have significant POSEY and fatigue.  She states that she feels as though she was increasingly weak and she was having issues with dyspnea with mild levels of activity including doing dishes, folding clothes, and even talking.  She states it was even become an issue for her to take a shower.  She has some lightheadedness and dizziness but denies any recent syncopal episodes.  She denies any chest pain, PND, orthopnea, lower extremity edema, or claudication symptoms.  She was minimally physically active and states that because of her current symptoms she has been mostly sedentary at home.  She reports compliance with all her current medications and is tolerating them well.  She does not follow a specific diet at this time.  She continues to smoke daily.                                                                                                                                                                                                                                                                                  CARDIAC TESTING:  Results for orders placed during the hospital encounter of 07/05/24    Echo    Interpretation Summary    Left Ventricle: The left ventricle is normal in size. Mildly increased wall thickness. There is normal systolic function with a visually estimated ejection fraction of 65 - 70%. Grade I diastolic dysfunction.    Right Ventricle: Normal right ventricular cavity size. Systolic function is normal.    Mitral Valve: There is mild regurgitation.    Tricuspid Valve: There is trace regurgitation.    Results for orders placed during the hospital encounter of 08/26/24    Nuclear Stress - Cardiology Interpreted    Interpretation Summary    Abnormal myocardial perfusion scan.    There is a moderate to severe intensity, medium sized, reversible perfusion abnormality that is consistent with ischemia in the  mid to distal inferior wall(s) in the typical distribution of the RCA territory.    There are no other significant perfusion abnormalities.    The gated perfusion images showed an ejection fraction of 97% at rest. The gated perfusion images showed an ejection fraction of 97% post stress.    The patient reported no chest pain during the stress test.    There were no arrhythmias during stress.    The patient exercised for 3 minutes 51 seconds on a Jani protocol, corresponding to a functional capacity of 6METS, achieving a peak heart rate of 150 bpm, which is 97% of the age predicted maximum heart rate.    The nuclear stress test is  fair quality.  On the nuclear stress test the EF is supranormal.  If the patient has an echo, the EF on the echo is considered more accurate.    The patient has a moderate risk nuclear stress test due to inferior reversible defect.    If clinically  indicated, consider further evaluation with coronary angiogram.    The patient has stress dilation.     Results for orders placed in visit on 09/02/16    CATH LAB PROCEDURE    Dobutamine Stress Echocardiogram 6.21.22:  The stress echo portion of this study is negative for myocardial ischemia.  The ECG portion of this study is positive for myocardial ischemia.  There were no arrhythmias during stress.     [facility] 9/2016  mLCx 20% stenosis     Patient Active Problem List   Diagnosis    Iron deficiency anemia    POSEY (dyspnea on exertion)    Primary hypertension    Mild CAD    Hyperlipidemia LDL goal <70    Type 2 diabetes mellitus, with long-term current use of insulin    Obesity (BMI 30-39.9)    Tobacco user    Thrombocytosis    Iron deficiency    Personal history of colonic polyps    Essential thrombocytosis    JAK2 gene mutation    Preop examination    PMB (postmenopausal bleeding)    Post-operative state    Hiatal hernia    Dieulafoy lesion of stomach    Encounter for comprehensive diabetic foot examination, type 2 diabetes mellitus    Overgrown toenails    Xerosis of skin     Past Surgical History:   Procedure Laterality Date    CHOLECYSTECTOMY      COLONOSCOPY  10/2021    ESOPHAGOGASTRODUODENOSCOPY N/A 06/26/2023    Procedure: EGD (ESOPHAGOGASTRODUODENOSCOPY);  Surgeon: Kita Larose MD;  Location: [facility] END[facility];  Service: Gastroenterology;  Laterality: N/A;    EYE SURGERY  7/6/93    LK and RK    HYSTEROSCOPY WITH DILATION AND CURETTAGE OF UTERUS N/A 09/26/2023    Procedure: HYSTEROSCOPY, WITH DILATION AND CURETTAGE OF UTERUS;  Surgeon: Deepti Chappell MD;  Location: [facility] OR;  Service: OB/GYN;  Laterality: N/A;  myosure    INTRALUMINAL GASTROINTESTINAL TRACT IMAGING VIA CAPSULE N/A 06/29/2023    Procedure: IMAGING PROCEDURE, GI TRACT, INTRALUMINAL, VIA CAPSULE;  Surgeon: Kita Larose MD;  Location: [facility] END[facility];  Service: Gastroenterology;  Laterality: N/A;    Removal of Goiter      UPPER  GASTROINTESTINAL ENDOSCOPY       Social History     Socioeconomic History    Marital status: Single   Tobacco Use    Smoking status: Every Day     Current packs/day: 1.00     Average packs/day: 1 pack/day for 43.0 years (43.0 ttl pk-yrs)     Types: Cigarettes    Smokeless tobacco: Never    Tobacco comments:     Has chantix, has not started.   Substance and Sexual Activity    Alcohol use: Not Currently    Drug use: Not Currently    Sexual activity: Not Currently     Birth control/protection: None     Comment: Havent been sexually active since 2006     Social Determinants of Health     Financial Resource Strain: Medium Risk (1/4/2024)    Overall Financial Resource Strain (CARDIA)     Difficulty of Paying Living Expenses: Somewhat hard   Food Insecurity: Food Insecurity Present (1/4/2024)    Hunger Vital Sign     Worried About Running Out of Food in the Last Year: Sometimes true     Ran Out of Food in the Last Year: Sometimes true   Transportation Needs: No Transportation Needs (1/4/2024)    PRAPARE - Transportation     Lack of Transportation (Medical): No     Lack of Transportation (Non-Medical): No   Physical Activity: Inactive (1/4/2024)    Exercise Vital Sign     Days of Exercise per Week: 0 days     Minutes of Exercise per Session: 0 min   Stress: Stress Concern Present (1/4/2024)    [employer] [place] [employer] - Occupational Stress Questionnaire     Feeling of Stress : To some extent   Housing Stability: Low Risk  (1/4/2024)    Housing Stability Vital Sign     Unable to Pay for Housing in the Last Year: No     Number of Places Lived in the Last Year: 1     Unstable Housing in the Last Year: No        Family History   Problem Relation Name Age of Onset    Diabetes Mother Alise     Coronary artery disease Mother Alise     Alcohol abuse Mother Alise     Arthritis Mother Alise     Heart disease Mother Alise         Had 13 stents in her heart    Hyperlipidemia Mother Alise     Hypertension  Mother Alise     Diabetes Father Joo     Cervical cancer Sister Chantale     Arthritis Sister Chantale     Cancer Sister Chantale         Breast cancer    COPD Sister Chantale     Diabetes Sister Chantale     Depression Sister Chantale     Colon polyps Sister Chantale     Fibromyalgia Sister Jemma     Breast cancer Sister Jemma     Cancer Sister Jemma          from cervical cancer at 55    Early death Sister Jemma     Skin cancer Sister Jemma         my maternal half sister Jemma  from cervical cancer at age 55    Cancer Maternal Grandfather Raza          from Leukemia    Cancer Maternal Grandmother Marin at 64          from throat cancer at 64    Early death Maternal Grandmother Marin at 64     Cancer Maternal Uncle Grzegorz          from cancer    Miscarriages / Stillbirths Sister Ibis     Skin cancer Sister Ibis         My maternal half sister Ibis had skin cancer before that was removed     Review of patient's allergies indicates:   Allergen Reactions    Ivp dye [iodinated contrast media] Itching         ROS:  Review of Systems   Constitutional:  Positive for malaise/fatigue and weight loss.   HENT: Negative.     Eyes: Negative.    Respiratory:  Positive for shortness of breath.    Cardiovascular:  Positive for palpitations. Negative for chest pain, orthopnea, claudication, leg swelling and PND.   Gastrointestinal: Negative.    Genitourinary: Negative.    Musculoskeletal:  Positive for neck pain.   Skin: Negative.    Neurological: Negative.    Endo/Heme/Allergies: Negative.    Psychiatric/Behavioral: Negative.                                                                                                                                                                                  Negative except as stated in the history of present illness. See HPI for details.    PHYSICAL EXAM:  There were no vitals taken for this visit.    Physical Exam  HENT:      Head: Normocephalic.      Nose: Nose normal.      " Mouth/Throat:      Mouth: Mucous membranes are moist.   Eyes:      Extraocular Movements: Extraocular movements intact.   Neck:      Vascular: No carotid bruit.   Cardiovascular:      Rate and Rhythm: Normal rate and regular rhythm.      Pulses: Normal pulses.      Heart sounds: Normal heart sounds. No murmur heard.  Pulmonary:      Effort: Pulmonary effort is normal.      Breath sounds: Normal breath sounds.   Abdominal:      General: There is no distension.   Musculoskeletal:         General: Normal range of motion.      Cervical back: Normal range of motion.      Right lower leg: No edema.      Left lower leg: No edema.   Skin:     General: Skin is warm.   Neurological:      General: No focal deficit present.      Mental Status: She is alert.   Psychiatric:         Mood and Affect: Mood normal.         Current Outpatient Medications   Medication Instructions    aspirin (ECOTRIN) 81 mg, Oral, Nightly, Stopped 6/21/24    atorvastatin (LIPITOR) 40 mg, Oral, Nightly    BD ULTRA-FINE SHORT PEN NEEDLE 31 gauge x 5/16" Ndle 1 each, Subcutaneous, 3 times daily    blood-glucose meter,continuous (DEXCOM ) Misc Dexcom G7 .  Use as directed with Dexcom G7 sensors.    cycloSPORINE (RESTASIS) 0.05 % ophthalmic emulsion 1 drop, Both Eyes, 2 times daily    DEXCOM G7 SENSOR Amy Dexcom G7 sensors.  Use every 10 days as directed.    ibuprofen (ADVIL,MOTRIN) 800 mg, Oral, Every 6 hours PRN    lisinopriL 10 mg, Oral, Daily    magnesium chloride (MAG 64) 64 mg, Oral, 2 times daily    metFORMIN (GLUCOPHAGE) 1,000 mg, Oral, 2 times daily    metoprolol tartrate (LOPRESSOR) 25 mg, Oral, 2 times daily    nicotine (NICODERM CQ) 21 mg/24 hr 1 patch, Transdermal, Daily    nicotine polacrilex 2 mg, Oral, As needed (PRN)    nitroGLYCERIN (NITROSTAT) 0.4 mg, Sublingual, Every 5 min PRN    pantoprazole (PROTONIX) 40 mg, Oral, Every morning    SEMGLEE(INSULIN GLARG-YFGN)PEN 47 Units, Subcutaneous, Every morning    TRUE METRIX " "GLUCOSE TEST STRIP Strp USE 1 STRIP TO CHECK GLUCOSE ONCE DAILY    TRULICITY 3 mg, Subcutaneous, Every Tuesday, Last dose 6/11/24    varenicline (CHANTIX) 1 mg, Oral, 2 times daily        All medications, laboratory studies, cardiac diagnostic imaging reviewed.     Lab Results   Component Value Date    LDL 68.00 01/23/2024    LDL 91.00 04/12/2023    TRIG 109 01/23/2024    TRIG 108 04/12/2023    CREATININE 0.93 08/26/2024    MG 1.40 (L) 04/29/2024    K 4.2 08/26/2024        ASSESSMENT/PLAN:    History of Mild CAD per [facility] Sept. 2016 (mid LCx 20%)  POSEY  Need for Cardiac Risk Assessment   - Patient needs to undergo hernia repair.  She has been complaining of worsening dyspnea on mild exertion including ADLs and talking.  - States that this is a status change for her over the last 6 months to a year   - She states that she was constantly feeling fatigued, weak, and experiencing dyspnea with even low levels of exertion  - Echo revealed EF of 65-70%, grade 1 diastolic dysfunction, mild MR, trace TR, otherwise no significant valvular structural disease.  See full report above   - Nuclear stress test revealed an abnormal myocardial perfusion scan.  There was a moderate to severe intensity, medium size, reversible perfusion abnormality consistent with ischemia in the mid to distal inferior walls in the typical distribution of the RCA territory.  The patient had a moderate risk nuclear stress test due to inferior reversible day-to-day.  The patient also was noted to have stress dilation   - Given significant POSEY, several cardiac risk factors, and abnormal stress test, decision made to proceed with [facility]   - Case discussed with staff interventional cardiologist, Dr. Mensah  - Patient states that she would like to proceed with [facility], especially "since I have been so short of breath and tired"  - Risk, Benefits and Alternatives Reviewed and Discussed with the PT and they wish to proceed with above Procedure.   - Continue " Metoprolol Tartrate for now  - BP on soft side, unable to add additional anti anginals at this time  - Continue SL Nitro PRN CP   - Strict ED precautions given  - Risk assessment will be provided following [facility]     Palpitations   - No current reports, she states that they occur with Vyvanse use  - 48 hour Holter monitor Aug. 2019 - see results under HPI  - Continue Metoprolol Tartrate 25mg BID    HTN  - BP at goal today   - She has been losing weight consistently since being on Trulicity (and even prior)   - Continue Lisinopril 10 mg for now  - Counseled on the importance of low sodium, heart healthy diet, and exercise as tolerated     HLD (hyperlipidemia)  - LDL 68 (Jan 2024)- at goal   - Continue Atorvastatin 40mg daily   - Encouraged low-cholesterol, heart healthy diet and exercise as tolerated    DM (diabetes mellitus)  - Continue management per PCP    Tobacco abuse  - Counseled importance of smoking cessation  - Continues to smoke about 1-1.5 PPD and is interested in quitting  - patient wants to quit using nicotine patches which she has    JIN on CPAP  - Reports noncompliance with CPAP due to discomfort  - Recommend nightly CPAP use   - Education on the deleterious effects of untreated sleep apnea        Proceed with [facility]   Return to clinic for PAT visit   You will follow up in clinic after the procedure   Please notify clinic if any new concerns or any change in symptoms   ED precautions given

## 2024-08-29 NOTE — Clinical Note
- schedule iron infusion of Venofer 250 mg x 10 doses weekly- orders are placed - return to clinic with me via TeleMed 6 weeks after the last doses given with labs prior (CBC/CMP/TIBC/ferritin)

## 2024-08-29 NOTE — PATIENT INSTRUCTIONS
Proceed with C   Return to clinic for PAT visit   You will follow up in clinic after the procedure   Please notify clinic if any new concerns or any change in symptoms   ED precautions given

## 2024-08-29 NOTE — H&P (VIEW-ONLY)
CHIEF COMPLAINT:   Chief Complaint   Patient presents with    Follow-up     Positive stress                                                  HPI:  Jessica Walker 58 y.o. female Mild CAD, JAK2 Gene mutation, Thrombocytosis, severe iron deficiency on iron infusion, JIN on CPAP, tobacco use, DM II, HTN, HLD, and GERD who presents for routine follow up and ongoing care after recent stress test and echocardiogram.  Testing was ordered given a status change of significant POSEY and fatigue at last office visit and of the need for risk assessment for a hernia repair.  Echocardiogram revealed EF of 65-70%, grade 1 diastolic dysfunction, mild MR, trace TR.  See full report below.  Nuclear stress test revealed an abnormal myocardial perfusion scan, there was a moderate to severe intensity, medium-sized, reversible perfusion abnormality consistent with ischemia in the mid to distal inferior walls in the typical distribution of the RCA territory.  The patient had a moderate risk nuclear stress test due to inferior reversible defect, the patient also was noted to have stress dilation. See full report below.     Today the patient continues to have significant POSEY and fatigue.  She states that she feels as though she was increasingly weak and she was having issues with dyspnea with mild levels of activity including doing dishes, folding clothes, and even talking.  She states it was even become an issue for her to take a shower.  She has some lightheadedness and dizziness but denies any recent syncopal episodes.  She denies any chest pain, PND, orthopnea, lower extremity edema, or claudication symptoms.  She was minimally physically active and states that because of her current symptoms she has been mostly sedentary at home.  She reports compliance with all her current medications and is tolerating them well.  She does not follow a specific diet at this time.  She continues to smoke daily.                                                                                                                                                                                                                                                                                  CARDIAC TESTING:  Results for orders placed during the hospital encounter of 07/05/24    Echo    Interpretation Summary    Left Ventricle: The left ventricle is normal in size. Mildly increased wall thickness. There is normal systolic function with a visually estimated ejection fraction of 65 - 70%. Grade I diastolic dysfunction.    Right Ventricle: Normal right ventricular cavity size. Systolic function is normal.    Mitral Valve: There is mild regurgitation.    Tricuspid Valve: There is trace regurgitation.    Results for orders placed during the hospital encounter of 08/26/24    Nuclear Stress - Cardiology Interpreted    Interpretation Summary    Abnormal myocardial perfusion scan.    There is a moderate to severe intensity, medium sized, reversible perfusion abnormality that is consistent with ischemia in the  mid to distal inferior wall(s) in the typical distribution of the RCA territory.    There are no other significant perfusion abnormalities.    The gated perfusion images showed an ejection fraction of 97% at rest. The gated perfusion images showed an ejection fraction of 97% post stress.    The patient reported no chest pain during the stress test.    There were no arrhythmias during stress.    The patient exercised for 3 minutes 51 seconds on a Jani protocol, corresponding to a functional capacity of 6METS, achieving a peak heart rate of 150 bpm, which is 97% of the age predicted maximum heart rate.    The nuclear stress test is  fair quality.  On the nuclear stress test the EF is supranormal.  If the patient has an echo, the EF on the echo is considered more accurate.    The patient has a moderate risk nuclear stress test due to inferior reversible defect.    If clinically  indicated, consider further evaluation with coronary angiogram.    The patient has stress dilation.     Results for orders placed in visit on 09/02/16    CATH LAB PROCEDURE    Dobutamine Stress Echocardiogram 6.21.22:  The stress echo portion of this study is negative for myocardial ischemia.  The ECG portion of this study is positive for myocardial ischemia.  There were no arrhythmias during stress.     [facility] 9/2016  mLCx 20% stenosis     Patient Active Problem List   Diagnosis    Iron deficiency anemia    POSEY (dyspnea on exertion)    Primary hypertension    Mild CAD    Hyperlipidemia LDL goal <70    Type 2 diabetes mellitus, with long-term current use of insulin    Obesity (BMI 30-39.9)    Tobacco user    Thrombocytosis    Iron deficiency    Personal history of colonic polyps    Essential thrombocytosis    JAK2 gene mutation    Preop examination    PMB (postmenopausal bleeding)    Post-operative state    Hiatal hernia    Dieulafoy lesion of stomach    Encounter for comprehensive diabetic foot examination, type 2 diabetes mellitus    Overgrown toenails    Xerosis of skin     Past Surgical History:   Procedure Laterality Date    CHOLECYSTECTOMY      COLONOSCOPY  10/2021    ESOPHAGOGASTRODUODENOSCOPY N/A 06/26/2023    Procedure: EGD (ESOPHAGOGASTRODUODENOSCOPY);  Surgeon: Kita Larose MD;  Location: [facility] ENDOSCOPY;  Service: Gastroenterology;  Laterality: N/A;    EYE SURGERY  7/6/93    LK and RK    HYSTEROSCOPY WITH DILATION AND CURETTAGE OF UTERUS N/A 09/26/2023    Procedure: HYSTEROSCOPY, WITH DILATION AND CURETTAGE OF UTERUS;  Surgeon: Deepti Chappell MD;  Location: [facility] OR;  Service: OB/GYN;  Laterality: N/A;  myosure    INTRALUMINAL GASTROINTESTINAL TRACT IMAGING VIA CAPSULE N/A 06/29/2023    Procedure: IMAGING PROCEDURE, GI TRACT, INTRALUMINAL, VIA CAPSULE;  Surgeon: Kita Larose MD;  Location: [facility] ENDOSCOPY;  Service: Gastroenterology;  Laterality: N/A;    Removal of Goiter      UPPER  GASTROINTESTINAL ENDOSCOPY       Social History     Socioeconomic History    Marital status: Single   Tobacco Use    Smoking status: Every Day     Current packs/day: 1.00     Average packs/day: 1 pack/day for 43.0 years (43.0 ttl pk-yrs)     Types: Cigarettes    Smokeless tobacco: Never    Tobacco comments:     Has chantix, has not started.   Substance and Sexual Activity    Alcohol use: Not Currently    Drug use: Not Currently    Sexual activity: Not Currently     Birth control/protection: None     Comment: Havent been sexually active since 2006     Social Determinants of Health     Financial Resource Strain: Medium Risk (1/4/2024)    Overall Financial Resource Strain (CARDIA)     Difficulty of Paying Living Expenses: Somewhat hard   Food Insecurity: Food Insecurity Present (1/4/2024)    Hunger Vital Sign     Worried About Running Out of Food in the Last Year: Sometimes true     Ran Out of Food in the Last Year: Sometimes true   Transportation Needs: No Transportation Needs (1/4/2024)    PRAPARE - Transportation     Lack of Transportation (Medical): No     Lack of Transportation (Non-Medical): No   Physical Activity: Inactive (1/4/2024)    Exercise Vital Sign     Days of Exercise per Week: 0 days     Minutes of Exercise per Session: 0 min   Stress: Stress Concern Present (1/4/2024)    [employer] - Occupational Stress Questionnaire     Feeling of Stress : To some extent   Housing Stability: Low Risk  (1/4/2024)    Housing Stability Vital Sign     Unable to Pay for Housing in the Last Year: No     Number of Places Lived in the Last Year: 1     Unstable Housing in the Last Year: No        Family History   Problem Relation Name Age of Onset    Diabetes Mother Alise     Coronary artery disease Mother Alise     Alcohol abuse Mother Alise     Arthritis Mother Alise     Heart disease Mother Alise         Had 13 stents in her heart    Hyperlipidemia Mother Alise     Hypertension  Mother Alise     Diabetes Father Joo     Cervical cancer Sister Chantale     Arthritis Sister Chantale     Cancer Sister Chantale         Breast cancer    COPD Sister Chantale     Diabetes Sister Chantale     Depression Sister Chantale     Colon polyps Sister Chantale     Fibromyalgia Sister Jemma     Breast cancer Sister Jemma     Cancer Sister Jemma          from cervical cancer at 55    Early death Sister Jemma     Skin cancer Sister Jemma         my maternal half sister Jemma  from cervical cancer at age 55    Cancer Maternal Grandfather Raza          from Leukemia    Cancer Maternal Grandmother Marin at 64          from throat cancer at 64    Early death Maternal Grandmother Marin at 64     Cancer Maternal Uncle Grzegorz          from cancer    Miscarriages / Stillbirths Sister Ibis     Skin cancer Sister Ibis         My maternal half sister Ibis had skin cancer before that was removed     Review of patient's allergies indicates:   Allergen Reactions    Ivp dye [iodinated contrast media] Itching         ROS:  Review of Systems   Constitutional:  Positive for malaise/fatigue and weight loss.   HENT: Negative.     Eyes: Negative.    Respiratory:  Positive for shortness of breath.    Cardiovascular:  Positive for palpitations. Negative for chest pain, orthopnea, claudication, leg swelling and PND.   Gastrointestinal: Negative.    Genitourinary: Negative.    Musculoskeletal:  Positive for neck pain.   Skin: Negative.    Neurological: Negative.    Endo/Heme/Allergies: Negative.    Psychiatric/Behavioral: Negative.                                                                                                                                                                                  Negative except as stated in the history of present illness. See HPI for details.    PHYSICAL EXAM:  There were no vitals taken for this visit.    Physical Exam  HENT:      Head: Normocephalic.      Nose: Nose normal.      " Mouth/Throat:      Mouth: Mucous membranes are moist.   Eyes:      Extraocular Movements: Extraocular movements intact.   Neck:      Vascular: No carotid bruit.   Cardiovascular:      Rate and Rhythm: Normal rate and regular rhythm.      Pulses: Normal pulses.      Heart sounds: Normal heart sounds. No murmur heard.  Pulmonary:      Effort: Pulmonary effort is normal.      Breath sounds: Normal breath sounds.   Abdominal:      General: There is no distension.   Musculoskeletal:         General: Normal range of motion.      Cervical back: Normal range of motion.      Right lower leg: No edema.      Left lower leg: No edema.   Skin:     General: Skin is warm.   Neurological:      General: No focal deficit present.      Mental Status: She is alert.   Psychiatric:         Mood and Affect: Mood normal.         Current Outpatient Medications   Medication Instructions    aspirin (ECOTRIN) 81 mg, Oral, Nightly, Stopped 6/21/24    atorvastatin (LIPITOR) 40 mg, Oral, Nightly    BD ULTRA-FINE SHORT PEN NEEDLE 31 gauge x 5/16" Ndle 1 each, Subcutaneous, 3 times daily    blood-glucose meter,continuous (DEXCOM ) Misc Dexcom G7 .  Use as directed with Dexcom G7 sensors.    cycloSPORINE (RESTASIS) 0.05 % ophthalmic emulsion 1 drop, Both Eyes, 2 times daily    DEXCOM G7 SENSOR Amy Dexcom G7 sensors.  Use every 10 days as directed.    ibuprofen (ADVIL,MOTRIN) 800 mg, Oral, Every 6 hours PRN    lisinopriL 10 mg, Oral, Daily    magnesium chloride (MAG 64) 64 mg, Oral, 2 times daily    metFORMIN (GLUCOPHAGE) 1,000 mg, Oral, 2 times daily    metoprolol tartrate (LOPRESSOR) 25 mg, Oral, 2 times daily    nicotine (NICODERM CQ) 21 mg/24 hr 1 patch, Transdermal, Daily    nicotine polacrilex 2 mg, Oral, As needed (PRN)    nitroGLYCERIN (NITROSTAT) 0.4 mg, Sublingual, Every 5 min PRN    pantoprazole (PROTONIX) 40 mg, Oral, Every morning    SEMGLEE(INSULIN GLARG-YFGN)PEN 47 Units, Subcutaneous, Every morning    TRUE METRIX " "GLUCOSE TEST STRIP Strp USE 1 STRIP TO CHECK GLUCOSE ONCE DAILY    TRULICITY 3 mg, Subcutaneous, Every Tuesday, Last dose 6/11/24    varenicline (CHANTIX) 1 mg, Oral, 2 times daily        All medications, laboratory studies, cardiac diagnostic imaging reviewed.     Lab Results   Component Value Date    LDL 68.00 01/23/2024    LDL 91.00 04/12/2023    TRIG 109 01/23/2024    TRIG 108 04/12/2023    CREATININE 0.93 08/26/2024    MG 1.40 (L) 04/29/2024    K 4.2 08/26/2024        ASSESSMENT/PLAN:    History of Mild CAD per [facility] Sept. 2016 (mid LCx 20%)  POSEY  Need for Cardiac Risk Assessment   - Patient needs to undergo hernia repair.  She has been complaining of worsening dyspnea on mild exertion including ADLs and talking.  - States that this is a status change for her over the last 6 months to a year   - She states that she was constantly feeling fatigued, weak, and experiencing dyspnea with even low levels of exertion  - Echo revealed EF of 65-70%, grade 1 diastolic dysfunction, mild MR, trace TR, otherwise no significant valvular structural disease.  See full report above   - Nuclear stress test revealed an abnormal myocardial perfusion scan.  There was a moderate to severe intensity, medium size, reversible perfusion abnormality consistent with ischemia in the mid to distal inferior walls in the typical distribution of the RCA territory.  The patient had a moderate risk nuclear stress test due to inferior reversible day-to-day.  The patient also was noted to have stress dilation   - Given significant POSEY, several cardiac risk factors, and abnormal stress test, decision made to proceed with [facility]   - Case discussed with staff interventional cardiologist, Dr. Mensah  - Patient states that she would like to proceed with [facility], especially "since I have been so short of breath and tired"  - Risk, Benefits and Alternatives Reviewed and Discussed with the PT and they wish to proceed with above Procedure.   - Continue " Metoprolol Tartrate for now  - BP on soft side, unable to add additional anti anginals at this time  - Continue SL Nitro PRN CP   - Strict ED precautions given  - Risk assessment will be provided following [facility]     Palpitations   - No current reports, she states that they occur with Vyvanse use  - 48 hour Holter monitor Aug. 2019 - see results under HPI  - Continue Metoprolol Tartrate 25mg BID    HTN  - BP at goal today   - She has been losing weight consistently since being on Trulicity (and even prior)   - Continue Lisinopril 10 mg for now  - Counseled on the importance of low sodium, heart healthy diet, and exercise as tolerated     HLD (hyperlipidemia)  - LDL 68 (Jan 2024)- at goal   - Continue Atorvastatin 40mg daily   - Encouraged low-cholesterol, heart healthy diet and exercise as tolerated    DM (diabetes mellitus)  - Continue management per PCP    Tobacco abuse  - Counseled importance of smoking cessation  - Continues to smoke about 1-1.5 PPD and is interested in quitting  - patient wants to quit using nicotine patches which she has    JIN on CPAP  - Reports noncompliance with CPAP due to discomfort  - Recommend nightly CPAP use   - Education on the deleterious effects of untreated sleep apnea        Proceed with [facility]   Return to clinic for PAT visit   You will follow up in clinic after the procedure   Please notify clinic if any new concerns or any change in symptoms   ED precautions given

## 2024-08-29 NOTE — PROGRESS NOTES
Established Patient - Audio Only Telehealth Visit     The patient location is: [place], [place]   The chief complaint leading to consultation is: FU on Iron def anemia  Visit type: Virtual visit with audio only (Telemedicine visit)  Total time spent with patient: 12 minutes        The reason for the audio only service rather than synchronous audio and video virtual visit was related to technical difficulties or patient preference/necessity.     Each patient to whom I provide medical services by telemedicine is:  (1) informed of the relationship between the physician and patient and the respective role of any other health care provider with respect to management of the patient; and (2) notified that they may decline to receive medical services by telemedicine and may withdraw from such care at any time. Patient verbally consented to receive this service via voice-only telephone call.       Reason for Follow-up:  recurrent severe iron-deficiency anemia; no definite GI source of bleeding  -low risk essential thrombocytosis     Current Treatment:  ASA 81mg po daily     Treatment History:  -S/P Feraheme 510 mg IV x2 (2023, 2023)  -S/P Ferrlecit 125mg IV x 8  (,,,,,)  -S/P Venofer 250mg IV X 5 doses (,,5/3,)  -S/P Venofer 250mg IV (, , , in )  -S/P Venofer 250mg x 10 doses (     Past medical history: NIDDM.  Hypertension.  Dyslipidemia.  History of anemia.  Vitamin D deficiency.  Tobacco abuse.  Obesity.  Obstructive sleep apnea.  HFpEF. Leukocytosis.  Cholecystectomy.  Goiter surgery.  Social history: Single.  Lives in [place], [place].  Has 2 grown up sons.  Has been smoking 1-1/2 packs of cigarettes daily since age 10.  Has tried to quit smoking many times but does not get refills of Chantix, therefore, has been unable to.  Currently, down to 10 cigarettes daily.  Denies history of alcohol or illicit drug abuse.  Family history: Sister   from cervical cancer at age 55.  Health maintenance:   EGD 02/21/2018 (Dr. Koo in [place]: Esophageal dilation).    Colonoscopy 02/28/2018 (Dr. Koo in [place]), apparently to be repeated in 2 years.    Mammogram 05/14/2018: Negative for malignancy.   ThinPrep cervical Pap smear 04/14/2016: Negative for intraepithelial lesion or malignancy; negative for HPV high-risk types (type 16 and type 18/45)  Menstrual and OB/GYN history: Menopause in 2015.  For last 3 months or so, has experienced vaginal spotting.  Apparently, had Pap smear done at [facility] 2 months back (probably December 2018), which was apparently unremarkable. Pelvic ultrasound on 02/08/2019 showed normal endometrium; likely myomatous changes of uterus with a lower uterine segment lipoma leiomyoma.         History of Present Illness:   53-year-old lady referred from [facility] for evaluation of leukocytosis.     For details, please see my last note dated 09/03/2020.  Please also refer to assessment and plan section.     Interval History: 8/29/24:  Patient was scheduled in the clinic today for a virtual follow-up regarding her iron-deficiency anemia.  She states that she continues to feel weak, tired and exhausted more than normal.  She states that it is difficult for her to do her activities of daily living. She reports taking her aspirin 81 mg approximately 3 times a week.  She reports that she will have an angiogram done on 09/18.  Denies any chest pain, dizziness, headache, heart palpitations.  Denies any active bleeding or unusual bruising.  All future appointments were discussed.        This is a telemedicine note. Patient was treated using telemedicine, real time audio and video, according to [facility] protocols. I, distant provider, conducted the visit from location identified below. The patient participated in the visit at a non-[facility] location selected by the patient (or patient's representative),  identified below. I am licensed in the state where the patient stated they are located. The patient (or patient's representative) stated that they understood and accepted the privacy and security risks to their information at their location. Patient was located at her/his residence in , [place]. I, distant provider, was located at [facility].    Face to face time spent with patient exceeds 15 minutes, over 50% of which was used for education and counseling regarding medical conditions, current medications including risk/benefit and side effects/adverse events, over the counter medications-uses/doses. The patient is receptive, expresses understanding and is agreeable to plan. All questions answered.         Physical Examination: Physical Exam was not completed today as this was a telemedicine visit.     Assessment:  Severe iron deficiency anemia:   Presented as moderate anemia with severe microcytosis    EGD, colonoscopy negative for any bleeders (02/2018)   Severe iron deficiency    Feraheme (03/2019)   Hemoglobin dropped from 14.8 (06/2019) to 9.5 (08/28/2019) due to iron deficiency   Feraheme x2 (09/2019) --> improvement in iron stores and normalization of hemoglobin (9.5 --> 13.7)   CT enterogram (09/11/2019): Hiatal hernia; 1.7 cm left adrenal nodule   -03/10/2020: Remains iron deficient (transferrin saturation 13.2%, ferritin 19.2)   -Did not present for Feraheme shots in a timely manner   -04/27/2020: Ferritin 4.9 (down from 19.2 on 03/10/2020); however, hemoglobin 12.4, MCV 83.0   (Iron deficiency erythropoiesis)   -Feraheme 510 mg IV x2 (04/27/2020; 05/04/2020)   -06/01/2020: Iron deficiency resolved with parenteral iron therapy   -However, as of 06/01/2020, severe recurrent iron deficiency remains unexplained   -07/01/2020: Iron stores dropping again within a month; hemoglobin remains normal   -09/02/2020: Iron stores normal/stable (ferritin 21, transferrin saturation 15.8%); hemoglobin 14.8, normal    -09/08/2020: Feels better and more energetic than before   -11/12/2020: Iron deficient; hemoglobin 15.1  -08/04/2020:  [facility]:  Insurance denied capsule endoscopy.  Upper single balloon enteroscopy performed.  1. Duodenum biopsy:  Minimal focal acute inflammation with reactive changes; no celiac sprue  2. Duodenal bulb:  Biopsy showed no significant histopathologic findings; no evidence of celiac sprue  3. Stomach biopsy:  Chemical/reactive gastropathy; H pylori stain negative  -S/P Feraheme 510 mg IV x2 (05/17/2021, 05/24/2021)  -S/P Feraheme 510 mg IV x2 (07/09/2021, 07/19/2021)  -10/27/2021: Colonoscopy (iron-deficiency anemia):  Diffuse diverticulosis; otherwise unremarkable  -10/27/2021:  EGD with APC fulguration and biopsy (iron-deficiency anemia): unctate AVMs within the stomach.  A directed fulguration with APC with good result achieved, superficial ulcerations within the duodenal bulb, possibly secondary to daily aspirin use.  Biopsy taken in the antrum to rule out Helicobacter pylori by pathology  -S/P Feraheme 510 mg IV x2 (08/29/2022, 09/08/2022)  -11/11/2022:  WBC 12.7.  Hemoglobin 12.8.  Platelets 543 K. ferritin 251.70 (was 93.95 on 08/29/2022, 24.88 on 06/29/2022) (transferrin saturation was 19% on 08/29/2022, 7% on 06/29/2022)  -01/11/2023: Labs reviewed.  Hemoglobin 11.2 (hemoglobin was 15.9 on 08/29/2022).  MCV 85.0.  Platelets 562 K, stable.  Ferritin 9.02, down from 251.7 on 11/11/2022.  Transferrin saturation is 6%, low.  CMP unremarkable except glucose 188 mg/dL.  -S/P Feraheme 510 mg IV x2 (01/11/2023, 01/18/2023)  To summarize:   -Recurrent severe iron deficiency anemia, unexplained   -EGD, colonoscopy negative (02/2018)   -CT enterogram negative (09/11/2019)   -has required multiple rounds of parenteral iron therapy over past 4-5 years  -no source of bleeding, H pylori gastritis, or celiac sprue on multiple GI endoscopic procedures including EGD, colonoscopy, CT enteroscopy, and upper  single balloon enteroscopy   [ferritin level has dropped:  9.02 (01/11/2023), down from 251.7 (11/11/2022)]  [Hemoglobin: 11.2 on 01/11/2023; down from 15.9 on 08/29/2022)  -treated with Feraheme 510 mg IV x2 (01/11/2023, 01/18/2023)  -02/27/2023:  Hemoglobin 14.6, remarkably improved with Feraheme.  Transferrin saturation 17%, improved but remains low. Ferritin 55.55 (was 9.02 on 01/11/2023)  -09/21/2023: Hgb 11.6, iron 26, iron sat 9 %, TIBC 276, Ferritin 30.30     Thrombocytosis (low risk IPSET-thrombosis score ET)    (age < 60 years; JAK2 V617F mutation positive)   GAGANDEEP-2 V617F mutation positive (03/2019)   Bone marrow consistent (06/2019)   Low risk IPSET-thrombosis score ET   Cardiovascular risk factors: NIDDM, hypertension, tobacco abuse   -04/27/2020: Platelets 500K, stable   -06/01/2020: Platelets 561K, more or less stable   -09/02/2020: Platelets 544K, stable   -11/12/2020: Platelets 567,000/mm³, stable  -11/11/2022:  Platelets 543 K, stable   -02/27/2023:  Platelets 623 K  -09/21/2023:  Platelets 600k, stable.      Chronic, very mild, benign-appearing, intermittent leukocytosis:   Secondary to smoking or underlying myeloproliferative disorder   GAGANDEEP-2 V617F mutation positive (03/2019)   BCR-ABL 1 fusion transcripts negative (03/2019)   Underlying essential thrombocytosis     Plan:  -Recurrent severe iron deficiency anemia, unexplained  -EGD, colonoscopy negative (02/2018)  -CT enterogram negative (09/11/2019)  -has required multiple rounds of parenteral iron therapy over past 4-5 years  -no source of bleeding, H pylori gastritis, or celiac sprue on multiple GI endoscopic procedures including EGD, colonoscopy, CT enteroscopy, and upper single balloon enteroscopy  >>>  [ferritin level has dropped:  9.02 (01/11/2023), down from 251.7 (11/11/2022)]  [Hemoglobin: 11.2 on 01/11/2023; down from 15.9 on 08/29/2022)  -treated with Feraheme 510 mg IV x2 (01/11/2023, 01/18/2023)  -02/27/2023:  Hemoglobin 14.6,  remarkably improved with Feraheme.  Transferrin saturation 17%, improved but remains low. Ferritin 55.55 (was 9.02 on 01/11/2023)  -8/2/2023: Hgb 14.8, Hct 47.4, Plt 633, iron 40, iron sat 15% (improved)  -requiring multiple rounds of parenteral iron therapy in the past, essentially with negative exhaustive GI workup     Iron Deficiency Anemia:   - Give Venofer IV x 10 doses -per patient's request  -Educated patient on compliance with infusions  -RTC in 6 weeks after the completion of the last dose of Venofer via telemed with labwork completed before CBC/CMP/Serum iron/TIBC/Ferrtin Level      Low risk essential thrombocytosis    -No history of Thrombosis. Age < 60  -Continue OTC ASA 81 mg tablet daily. Do not miss any doses.   -Advised her elevated platelets can be secondary to anemia as well.      Smoker:   - Instructed the patient on smoking cessation    Above discussed at length with her.  All questions answered.    She understands and agrees with this plan.     This service was not originating from a related E/M service provided within the previous 7 days nor will  to an E/M service or procedure within the next 24 hours or my soonest available appointment.  Prevailing standard of care was able to be met in this audio-only visit.          No follow-ups on file.

## 2024-09-04 RX ORDER — SODIUM CHLORIDE 0.9 % (FLUSH) 0.9 %
10 SYRINGE (ML) INJECTION
OUTPATIENT
Start: 2024-09-04

## 2024-09-04 RX ORDER — EPINEPHRINE 0.3 MG/.3ML
0.3 INJECTION SUBCUTANEOUS ONCE AS NEEDED
OUTPATIENT
Start: 2024-09-04

## 2024-09-04 RX ORDER — DIPHENHYDRAMINE HYDROCHLORIDE 50 MG/ML
50 INJECTION INTRAMUSCULAR; INTRAVENOUS ONCE AS NEEDED
OUTPATIENT
Start: 2024-09-04

## 2024-09-04 RX ORDER — HEPARIN 100 UNIT/ML
500 SYRINGE INTRAVENOUS
OUTPATIENT
Start: 2024-09-04

## 2024-09-05 ENCOUNTER — LAB VISIT (OUTPATIENT)
Dept: LAB | Facility: HOSPITAL | Age: 59
End: 2024-09-05
Payer: MEDICAID

## 2024-09-05 DIAGNOSIS — Z01.810 ENCOUNTER FOR PRE-OPERATIVE CARDIOVASCULAR CLEARANCE: ICD-10-CM

## 2024-09-05 DIAGNOSIS — I25.10 MILD CAD: ICD-10-CM

## 2024-09-05 DIAGNOSIS — R94.39 ABNORMAL CARDIOVASCULAR STRESS TEST: ICD-10-CM

## 2024-09-05 DIAGNOSIS — R06.09 DOE (DYSPNEA ON EXERTION): ICD-10-CM

## 2024-09-05 DIAGNOSIS — Z72.0 TOBACCO USER: ICD-10-CM

## 2024-09-05 LAB
ALBUMIN SERPL-MCNC: 4.1 G/DL (ref 3.5–5)
ALBUMIN/GLOB SERPL: 1.3 RATIO (ref 1.1–2)
ALP SERPL-CCNC: 107 UNIT/L (ref 40–150)
ALT SERPL-CCNC: 13 UNIT/L (ref 0–55)
ANION GAP SERPL CALC-SCNC: 11 MEQ/L
APTT PPP: 27.8 SECONDS (ref 23.2–33.7)
AST SERPL-CCNC: 14 UNIT/L (ref 5–34)
BASOPHILS # BLD AUTO: 0.06 X10(3)/MCL
BASOPHILS NFR BLD AUTO: 0.4 %
BILIRUB SERPL-MCNC: 0.4 MG/DL
BUN SERPL-MCNC: 8.9 MG/DL (ref 9.8–20.1)
CALCIUM SERPL-MCNC: 10.3 MG/DL (ref 8.4–10.2)
CHLORIDE SERPL-SCNC: 103 MMOL/L (ref 98–107)
CO2 SERPL-SCNC: 23 MMOL/L (ref 22–29)
CREAT SERPL-MCNC: 0.77 MG/DL (ref 0.55–1.02)
CREAT/UREA NIT SERPL: 12
EOSINOPHIL # BLD AUTO: 0.3 X10(3)/MCL (ref 0–0.9)
EOSINOPHIL NFR BLD AUTO: 2.1 %
ERYTHROCYTE [DISTWIDTH] IN BLOOD BY AUTOMATED COUNT: 23.8 % (ref 11.5–17)
GFR SERPLBLD CREATININE-BSD FMLA CKD-EPI: >60 ML/MIN/1.73/M2
GLOBULIN SER-MCNC: 3.1 GM/DL (ref 2.4–3.5)
GLUCOSE SERPL-MCNC: 182 MG/DL (ref 74–100)
HCT VFR BLD AUTO: 42 % (ref 37–47)
HGB BLD-MCNC: 13.2 G/DL (ref 12–16)
IMM GRANULOCYTES # BLD AUTO: 0.13 X10(3)/MCL (ref 0–0.04)
IMM GRANULOCYTES NFR BLD AUTO: 0.9 %
INR PPP: 1
LYMPHOCYTES # BLD AUTO: 2.09 X10(3)/MCL (ref 0.6–4.6)
LYMPHOCYTES NFR BLD AUTO: 14.5 %
MCH RBC QN AUTO: 23.3 PG (ref 27–31)
MCHC RBC AUTO-ENTMCNC: 31.4 G/DL (ref 33–36)
MCV RBC AUTO: 74.1 FL (ref 80–94)
MONOCYTES # BLD AUTO: 1.12 X10(3)/MCL (ref 0.1–1.3)
MONOCYTES NFR BLD AUTO: 7.8 %
NEUTROPHILS # BLD AUTO: 10.75 X10(3)/MCL (ref 2.1–9.2)
NEUTROPHILS NFR BLD AUTO: 74.3 %
PLATELET # BLD AUTO: 714 X10(3)/MCL (ref 130–400)
PMV BLD AUTO: 10.2 FL (ref 7.4–10.4)
POTASSIUM SERPL-SCNC: 4.8 MMOL/L (ref 3.5–5.1)
PROT SERPL-MCNC: 7.2 GM/DL (ref 6.4–8.3)
PROTHROMBIN TIME: 10.4 SECONDS (ref 12.5–14.5)
RBC # BLD AUTO: 5.67 X10(6)/MCL (ref 4.2–5.4)
SODIUM SERPL-SCNC: 137 MMOL/L (ref 136–145)
WBC # BLD AUTO: 14.45 X10(3)/MCL (ref 4.5–11.5)

## 2024-09-05 PROCEDURE — 85610 PROTHROMBIN TIME: CPT

## 2024-09-05 PROCEDURE — 80053 COMPREHEN METABOLIC PANEL: CPT

## 2024-09-05 PROCEDURE — 85025 COMPLETE CBC W/AUTO DIFF WBC: CPT

## 2024-09-05 PROCEDURE — 85730 THROMBOPLASTIN TIME PARTIAL: CPT

## 2024-09-05 PROCEDURE — 36415 COLL VENOUS BLD VENIPUNCTURE: CPT

## 2024-09-10 ENCOUNTER — CLINICAL SUPPORT (OUTPATIENT)
Dept: CARDIOLOGY | Facility: CLINIC | Age: 59
End: 2024-09-10
Payer: MEDICAID

## 2024-09-10 VITALS
WEIGHT: 188.19 LBS | SYSTOLIC BLOOD PRESSURE: 124 MMHG | OXYGEN SATURATION: 98 % | DIASTOLIC BLOOD PRESSURE: 77 MMHG | HEIGHT: 67 IN | BODY MASS INDEX: 29.54 KG/M2 | RESPIRATION RATE: 18 BRPM | TEMPERATURE: 98 F | HEART RATE: 79 BPM

## 2024-09-10 DIAGNOSIS — R94.39 ABNORMAL CARDIOVASCULAR STRESS TEST: ICD-10-CM

## 2024-09-10 DIAGNOSIS — Z01.810 ENCOUNTER FOR PRE-OPERATIVE CARDIOVASCULAR CLEARANCE: Primary | ICD-10-CM

## 2024-09-10 PROCEDURE — 99214 OFFICE O/P EST MOD 30 MIN: CPT | Mod: PBBFAC

## 2024-09-10 RX ORDER — MIRTAZAPINE 7.5 MG/1
15 TABLET, FILM COATED ORAL NIGHTLY
COMMUNITY
Start: 2024-08-31

## 2024-09-10 RX ORDER — INSULIN GLARGINE 100 [IU]/ML
INJECTION, SOLUTION SUBCUTANEOUS
COMMUNITY
Start: 2024-08-17

## 2024-09-13 ENCOUNTER — INFUSION (OUTPATIENT)
Dept: INFUSION THERAPY | Facility: HOSPITAL | Age: 59
End: 2024-09-13
Attending: INTERNAL MEDICINE
Payer: MEDICAID

## 2024-09-13 VITALS
SYSTOLIC BLOOD PRESSURE: 112 MMHG | DIASTOLIC BLOOD PRESSURE: 90 MMHG | HEIGHT: 67 IN | BODY MASS INDEX: 29.31 KG/M2 | HEART RATE: 102 BPM | TEMPERATURE: 98 F | WEIGHT: 186.75 LBS | OXYGEN SATURATION: 100 % | RESPIRATION RATE: 20 BRPM

## 2024-09-13 DIAGNOSIS — E61.1 IRON DEFICIENCY: Primary | ICD-10-CM

## 2024-09-13 PROCEDURE — 63600175 PHARM REV CODE 636 W HCPCS

## 2024-09-13 PROCEDURE — 96374 THER/PROPH/DIAG INJ IV PUSH: CPT

## 2024-09-13 PROCEDURE — 25000003 PHARM REV CODE 250

## 2024-09-13 RX ORDER — EPINEPHRINE 0.3 MG/.3ML
0.3 INJECTION SUBCUTANEOUS ONCE AS NEEDED
Status: DISCONTINUED | OUTPATIENT
Start: 2024-09-13 | End: 2024-09-13 | Stop reason: HOSPADM

## 2024-09-13 RX ORDER — DIPHENHYDRAMINE HYDROCHLORIDE 50 MG/ML
50 INJECTION INTRAMUSCULAR; INTRAVENOUS ONCE AS NEEDED
Status: DISCONTINUED | OUTPATIENT
Start: 2024-09-13 | End: 2024-09-13 | Stop reason: HOSPADM

## 2024-09-13 RX ORDER — DIPHENHYDRAMINE HYDROCHLORIDE 50 MG/ML
50 INJECTION INTRAMUSCULAR; INTRAVENOUS ONCE AS NEEDED
OUTPATIENT
Start: 2024-09-16

## 2024-09-13 RX ORDER — HEPARIN 100 UNIT/ML
500 SYRINGE INTRAVENOUS
Status: DISCONTINUED | OUTPATIENT
Start: 2024-09-13 | End: 2024-09-13 | Stop reason: HOSPADM

## 2024-09-13 RX ORDER — EPINEPHRINE 0.3 MG/.3ML
0.3 INJECTION SUBCUTANEOUS ONCE AS NEEDED
Status: CANCELLED | OUTPATIENT
Start: 2024-09-16

## 2024-09-13 RX ORDER — HEPARIN 100 UNIT/ML
500 SYRINGE INTRAVENOUS
OUTPATIENT
Start: 2024-09-16

## 2024-09-13 RX ORDER — SODIUM CHLORIDE 0.9 % (FLUSH) 0.9 %
10 SYRINGE (ML) INJECTION
OUTPATIENT
Start: 2024-09-16

## 2024-09-13 RX ORDER — SODIUM CHLORIDE 0.9 % (FLUSH) 0.9 %
10 SYRINGE (ML) INJECTION
Status: DISCONTINUED | OUTPATIENT
Start: 2024-09-13 | End: 2024-09-13 | Stop reason: HOSPADM

## 2024-09-13 RX ADMIN — IRON SUCROSE 200 MG: 20 INJECTION, SOLUTION INTRAVENOUS at 11:09

## 2024-09-13 RX ADMIN — SODIUM CHLORIDE: 9 INJECTION, SOLUTION INTRAVENOUS at 11:09

## 2024-09-13 NOTE — NURSING
1118  Pt arrived for #1/5 venofer.  Denies any pain.  Reports SOB, nausea, constipation, and extreme fatigue.

## 2024-09-16 ENCOUNTER — TELEPHONE (OUTPATIENT)
Dept: CARDIOLOGY | Facility: CLINIC | Age: 59
End: 2024-09-16
Payer: MEDICAID

## 2024-09-16 NOTE — TELEPHONE ENCOUNTER
Patient called stating she's not sure if she'll be able to have angiogram done on this upcoming Wednesday due to having a dental apt the very next day. Please call pt to advise.

## 2024-09-20 ENCOUNTER — INFUSION (OUTPATIENT)
Dept: INFUSION THERAPY | Facility: HOSPITAL | Age: 59
End: 2024-09-20
Attending: INTERNAL MEDICINE
Payer: MEDICAID

## 2024-09-20 ENCOUNTER — PATIENT MESSAGE (OUTPATIENT)
Dept: ADMINISTRATIVE | Facility: OTHER | Age: 59
End: 2024-09-20
Payer: MEDICAID

## 2024-09-20 VITALS
WEIGHT: 189.13 LBS | RESPIRATION RATE: 20 BRPM | SYSTOLIC BLOOD PRESSURE: 130 MMHG | HEART RATE: 100 BPM | TEMPERATURE: 98 F | HEIGHT: 67 IN | DIASTOLIC BLOOD PRESSURE: 88 MMHG | BODY MASS INDEX: 29.69 KG/M2 | OXYGEN SATURATION: 99 %

## 2024-09-20 DIAGNOSIS — E61.1 IRON DEFICIENCY: Primary | ICD-10-CM

## 2024-09-20 PROCEDURE — 96374 THER/PROPH/DIAG INJ IV PUSH: CPT

## 2024-09-20 PROCEDURE — 63600175 PHARM REV CODE 636 W HCPCS

## 2024-09-20 PROCEDURE — 25000003 PHARM REV CODE 250

## 2024-09-20 RX ORDER — DIPHENHYDRAMINE HYDROCHLORIDE 50 MG/ML
50 INJECTION INTRAMUSCULAR; INTRAVENOUS ONCE AS NEEDED
OUTPATIENT
Start: 2024-09-27

## 2024-09-20 RX ORDER — DIPHENHYDRAMINE HYDROCHLORIDE 50 MG/ML
50 INJECTION INTRAMUSCULAR; INTRAVENOUS ONCE AS NEEDED
Status: DISCONTINUED | OUTPATIENT
Start: 2024-09-20 | End: 2024-09-20 | Stop reason: HOSPADM

## 2024-09-20 RX ORDER — HEPARIN 100 UNIT/ML
500 SYRINGE INTRAVENOUS
Status: DISCONTINUED | OUTPATIENT
Start: 2024-09-20 | End: 2024-09-20 | Stop reason: HOSPADM

## 2024-09-20 RX ORDER — SODIUM CHLORIDE 0.9 % (FLUSH) 0.9 %
10 SYRINGE (ML) INJECTION
OUTPATIENT
Start: 2024-09-27

## 2024-09-20 RX ORDER — EPINEPHRINE 0.3 MG/.3ML
0.3 INJECTION SUBCUTANEOUS ONCE AS NEEDED
Status: DISCONTINUED | OUTPATIENT
Start: 2024-09-20 | End: 2024-09-20 | Stop reason: HOSPADM

## 2024-09-20 RX ORDER — SODIUM CHLORIDE 0.9 % (FLUSH) 0.9 %
10 SYRINGE (ML) INJECTION
Status: DISCONTINUED | OUTPATIENT
Start: 2024-09-20 | End: 2024-09-20 | Stop reason: HOSPADM

## 2024-09-20 RX ORDER — HEPARIN 100 UNIT/ML
500 SYRINGE INTRAVENOUS
OUTPATIENT
Start: 2024-09-27

## 2024-09-20 RX ADMIN — SODIUM CHLORIDE: 9 INJECTION, SOLUTION INTRAVENOUS at 11:09

## 2024-09-20 RX ADMIN — IRON SUCROSE 200 MG: 20 INJECTION, SOLUTION INTRAVENOUS at 11:09

## 2024-09-20 NOTE — NURSING
1103  [facility]  Pt rates LOP 1/10 to mouth where she had 5 teeth pulled yesterday from upper gums.  Reports weakness and fatigue but states it is not as bad as last week.  Pt looks less tired and droopy today.

## 2024-09-22 ENCOUNTER — PATIENT MESSAGE (OUTPATIENT)
Dept: ADMINISTRATIVE | Facility: OTHER | Age: 59
End: 2024-09-22
Payer: MEDICAID

## 2024-09-23 ENCOUNTER — HOSPITAL ENCOUNTER (OUTPATIENT)
Facility: HOSPITAL | Age: 59
Discharge: HOME | End: 2024-09-23
Attending: INTERNAL MEDICINE | Admitting: INTERNAL MEDICINE
Payer: MEDICAID

## 2024-09-23 VITALS
BODY MASS INDEX: 29.03 KG/M2 | DIASTOLIC BLOOD PRESSURE: 72 MMHG | SYSTOLIC BLOOD PRESSURE: 129 MMHG | WEIGHT: 185 LBS | TEMPERATURE: 98 F | OXYGEN SATURATION: 97 % | HEART RATE: 76 BPM | RESPIRATION RATE: 18 BRPM | HEIGHT: 67 IN

## 2024-09-23 DIAGNOSIS — E78.5 HYPERLIPIDEMIA, UNSPECIFIED HYPERLIPIDEMIA TYPE: ICD-10-CM

## 2024-09-23 DIAGNOSIS — Z01.810 ENCOUNTER FOR PRE-OPERATIVE CARDIOVASCULAR CLEARANCE: ICD-10-CM

## 2024-09-23 DIAGNOSIS — I10 PRIMARY HYPERTENSION: ICD-10-CM

## 2024-09-23 DIAGNOSIS — I25.10 MILD CAD: ICD-10-CM

## 2024-09-23 DIAGNOSIS — Z72.0 TOBACCO USER: ICD-10-CM

## 2024-09-23 DIAGNOSIS — I25.110 ATHEROSCLEROSIS OF NATIVE CORONARY ARTERY OF NATIVE HEART WITH UNSTABLE ANGINA PECTORIS: Primary | ICD-10-CM

## 2024-09-23 DIAGNOSIS — E78.5 HYPERLIPIDEMIA LDL GOAL <70: ICD-10-CM

## 2024-09-23 DIAGNOSIS — R06.09 DOE (DYSPNEA ON EXERTION): ICD-10-CM

## 2024-09-23 DIAGNOSIS — R94.39 ABNORMAL CARDIOVASCULAR STRESS TEST: ICD-10-CM

## 2024-09-23 PROCEDURE — C1769 GUIDE WIRE: HCPCS | Performed by: INTERNAL MEDICINE

## 2024-09-23 PROCEDURE — 25000003 PHARM REV CODE 250: Performed by: INTERNAL MEDICINE

## 2024-09-23 PROCEDURE — 63600175 PHARM REV CODE 636 W HCPCS: Mod: JZ,JG | Performed by: INTERNAL MEDICINE

## 2024-09-23 PROCEDURE — 93799 UNLISTED CV SVC/PROCEDURE: CPT | Mod: LD | Performed by: INTERNAL MEDICINE

## 2024-09-23 PROCEDURE — 99153 MOD SED SAME PHYS/QHP EA: CPT | Performed by: INTERNAL MEDICINE

## 2024-09-23 PROCEDURE — C1760 CLOSURE DEV, VASC: HCPCS | Performed by: INTERNAL MEDICINE

## 2024-09-23 PROCEDURE — 63600175 PHARM REV CODE 636 W HCPCS

## 2024-09-23 PROCEDURE — C1894 INTRO/SHEATH, NON-LASER: HCPCS | Performed by: INTERNAL MEDICINE

## 2024-09-23 PROCEDURE — C1887 CATHETER, GUIDING: HCPCS | Performed by: INTERNAL MEDICINE

## 2024-09-23 PROCEDURE — 25000003 PHARM REV CODE 250

## 2024-09-23 PROCEDURE — 99152 MOD SED SAME PHYS/QHP 5/>YRS: CPT | Performed by: INTERNAL MEDICINE

## 2024-09-23 PROCEDURE — 93458 L HRT ARTERY/VENTRICLE ANGIO: CPT | Performed by: INTERNAL MEDICINE

## 2024-09-23 PROCEDURE — 27201423 OPTIME MED/SURG SUP & DEVICES STERILE SUPPLY: Performed by: INTERNAL MEDICINE

## 2024-09-23 PROCEDURE — 25500020 PHARM REV CODE 255: Performed by: INTERNAL MEDICINE

## 2024-09-23 DEVICE — IMPLANTABLE DEVICE: Type: IMPLANTABLE DEVICE | Site: FEMORAL | Status: FUNCTIONAL

## 2024-09-23 RX ORDER — DIPHENHYDRAMINE HCL 25 MG
25 CAPSULE ORAL
Status: DISCONTINUED | OUTPATIENT
Start: 2024-09-23 | End: 2024-09-23

## 2024-09-23 RX ORDER — HEPARIN SODIUM 5000 [USP'U]/ML
INJECTION, SOLUTION INTRAVENOUS; SUBCUTANEOUS
Status: DISCONTINUED | OUTPATIENT
Start: 2024-09-23 | End: 2024-09-23 | Stop reason: HOSPADM

## 2024-09-23 RX ORDER — NITROGLYCERIN 20 MG/100ML
INJECTION INTRAVENOUS
Status: DISCONTINUED | OUTPATIENT
Start: 2024-09-23 | End: 2024-09-23 | Stop reason: HOSPADM

## 2024-09-23 RX ORDER — DIPHENHYDRAMINE HYDROCHLORIDE 50 MG/ML
INJECTION INTRAMUSCULAR; INTRAVENOUS
Status: COMPLETED
Start: 2024-09-23 | End: 2024-09-23

## 2024-09-23 RX ORDER — DIPHENHYDRAMINE HCL 25 MG
CAPSULE ORAL
Status: DISCONTINUED
Start: 2024-09-23 | End: 2024-09-23 | Stop reason: HOSPADM

## 2024-09-23 RX ORDER — VERAPAMIL HYDROCHLORIDE 2.5 MG/ML
INJECTION, SOLUTION INTRAVENOUS
Status: DISCONTINUED | OUTPATIENT
Start: 2024-09-23 | End: 2024-09-23 | Stop reason: HOSPADM

## 2024-09-23 RX ORDER — DIPHENHYDRAMINE HCL 25 MG
25 CAPSULE ORAL ONCE
Status: COMPLETED | OUTPATIENT
Start: 2024-09-23 | End: 2024-09-23

## 2024-09-23 RX ORDER — ONDANSETRON HYDROCHLORIDE 2 MG/ML
4 INJECTION, SOLUTION INTRAVENOUS EVERY 12 HOURS PRN
Status: DISCONTINUED | OUTPATIENT
Start: 2024-09-23 | End: 2024-09-23 | Stop reason: HOSPADM

## 2024-09-23 RX ORDER — DIPHENHYDRAMINE HYDROCHLORIDE 50 MG/ML
25 INJECTION INTRAMUSCULAR; INTRAVENOUS
Status: DISCONTINUED | OUTPATIENT
Start: 2024-09-23 | End: 2024-09-23

## 2024-09-23 RX ORDER — DIPHENHYDRAMINE HYDROCHLORIDE 50 MG/ML
25 INJECTION INTRAMUSCULAR; INTRAVENOUS
Status: DISCONTINUED | OUTPATIENT
Start: 2024-09-23 | End: 2024-09-23 | Stop reason: HOSPADM

## 2024-09-23 RX ORDER — FENTANYL CITRATE 50 UG/ML
INJECTION, SOLUTION INTRAMUSCULAR; INTRAVENOUS
Status: DISCONTINUED | OUTPATIENT
Start: 2024-09-23 | End: 2024-09-23 | Stop reason: HOSPADM

## 2024-09-23 RX ORDER — LIDOCAINE HYDROCHLORIDE 10 MG/ML
INJECTION, SOLUTION INFILTRATION; PERINEURAL
Status: DISCONTINUED | OUTPATIENT
Start: 2024-09-23 | End: 2024-09-23 | Stop reason: HOSPADM

## 2024-09-23 RX ORDER — GLUCAGON 1 MG
1 KIT INJECTION
Status: DISCONTINUED | OUTPATIENT
Start: 2024-09-23 | End: 2024-09-23 | Stop reason: HOSPADM

## 2024-09-23 RX ORDER — SODIUM CHLORIDE 9 MG/ML
INJECTION, SOLUTION INTRAVENOUS ONCE
Status: COMPLETED | OUTPATIENT
Start: 2024-09-23 | End: 2024-09-23

## 2024-09-23 RX ORDER — MIDAZOLAM HYDROCHLORIDE 1 MG/ML
INJECTION INTRAMUSCULAR; INTRAVENOUS
Status: DISCONTINUED | OUTPATIENT
Start: 2024-09-23 | End: 2024-09-23 | Stop reason: HOSPADM

## 2024-09-23 RX ORDER — DIAZEPAM 5 MG/1
5 TABLET ORAL ONCE
Status: COMPLETED | OUTPATIENT
Start: 2024-09-23 | End: 2024-09-23

## 2024-09-23 RX ORDER — INSULIN ASPART 100 [IU]/ML
INJECTION, SOLUTION INTRAVENOUS; SUBCUTANEOUS
Status: COMPLETED
Start: 2024-09-23 | End: 2024-09-23

## 2024-09-23 RX ORDER — ACETAMINOPHEN 325 MG/1
650 TABLET ORAL EVERY 4 HOURS PRN
Status: DISCONTINUED | OUTPATIENT
Start: 2024-09-23 | End: 2024-09-23 | Stop reason: HOSPADM

## 2024-09-23 RX ORDER — INSULIN ASPART 100 [IU]/ML
INJECTION, SOLUTION INTRAVENOUS; SUBCUTANEOUS
Status: DISCONTINUED
Start: 2024-09-23 | End: 2024-09-23 | Stop reason: HOSPADM

## 2024-09-23 RX ADMIN — DIPHENHYDRAMINE HYDROCHLORIDE 25 MG: 50 INJECTION INTRAMUSCULAR; INTRAVENOUS at 06:09

## 2024-09-23 RX ADMIN — INSULIN ASPART: 100 INJECTION, SOLUTION INTRAVENOUS; SUBCUTANEOUS at 06:09

## 2024-09-23 RX ADMIN — BACITRACIN ZINC, NEOMYCIN, POLYMYXIN B 1 EACH: 400; 3.5; 5 OINTMENT TOPICAL at 12:09

## 2024-09-23 RX ADMIN — DIPHENHYDRAMINE HYDROCHLORIDE 25 MG: 25 CAPSULE ORAL at 06:09

## 2024-09-23 RX ADMIN — SODIUM CHLORIDE: 9 INJECTION, SOLUTION INTRAVENOUS at 06:09

## 2024-09-23 RX ADMIN — DIAZEPAM 5 MG: 5 TABLET ORAL at 06:09

## 2024-09-23 NOTE — DISCHARGE SUMMARY
[facility] - Cath Lab  Discharge Note  Short Stay    Procedure(s) (LRB):  Ultrasound guided right radial artery access.   R CFA access.   Coronary Angiogram.   Left Heart Catheterization.   iFR of the mid LAD.   Moderate Sedation.   Mynx closure device R CFA.       OUTCOME: Patient tolerated treatment/procedure well without complication and is now ready for discharge.    DISPOSITION: Home or Self Care    FINAL DIAGNOSIS:  Non Obstructive Epicardial Coronary Artery Disease    58 year old female with reported CAD, T2DM, (A1c 6.6), HTN, HLD, JIN on CPAP, and tobacco abuse with JAK2 mutation who presented for diagnostic LHC/CORs after nuclear stress test demonstrated a reversible perfusion defect in the mid-distal inferior wall (RCA territory). The patient underwent successful and uncomplicated LHC/CORs/iFR of the mid LAD (0.93). The patient was discharged in stable condition. Follow up in cardiology clinic as previously scheduled.     See cath report for complete details of study.     Pending hernia repair. Moderate risk for moderate risk procedure.     FOLLOWUP: In clinic    DISCHARGE INSTRUCTIONS:    Light activity x 5 days. Weight lifting restriction of 5 lbs.   No driving for 24 hours.     TIME SPENT ON DISCHARGE: 15 minutes

## 2024-09-23 NOTE — Clinical Note
The radial band was applied to the right radial artery. 11 cc's of air were inserted into the closure device. Fingers warm to touch. Cap refill < 3 sec. Move fingers w/ out any pain or discomfort.

## 2024-09-23 NOTE — DISCHARGE INSTRUCTIONS
Keep right hand/wrist still and elevated X 24 hours. You may remove the armboard and start using  Your right hand for simple activities in 24 hours. No heavy lifting or strenuous activities X 5 days,   no activities that may cause an infection at the puncture site X 5 days.  Continue current medications.   Keep dressing clean and dry X 48 hours. You may remove the dressing in 48 hours, Wednesday, wash puncture   Site gently with mild soap and keep open to air.   No heavy lifting or strenuous activities X 5 days. No activities that may cause an infection at the puncture site               X 5 days. No tub baths X 5 days, Showers only.               If your puncture site starts to bleed, lie down. Put pressure on it until the bleeding stops.  Be sure to wash your hands before touching your wound or dressing.  Take small walks around your house. Get enough rest.  You should drink plenty water to flush the dye out of your body.  Avoid straining when having a bowel movement. Eat a lot of fiber-rich foods like fruits, whole grains, and vegetables.

## 2024-09-23 NOTE — BRIEF OP NOTE
Date of Procedure: 9/23/2024    Interventional Cardiologist: Francesco Mensah M.D.  General Cardiology Fellow: Rick Horn M.D.    Pre-Operative Diagnosis: Abnormal Nuclear Stress Test.   Post-Operative Diagnosis: Non Obstructive Epicardial Coronary Artery Disease    Procedures Performed:   Ultrasound guided right radial artery access.   Right common femoral artery access.   Coronary angiogram.   Left heart catheterization  Peripheral Angiography   Mynx closure device R CFA.  Moderate Sedation    Diagnostic Catheter:   5 Fr Tig 4 catheter.     Intervention Catheter:   6 Fr Ikari (Left).     FINDINGS  LVEDP:  18 mmHg  Left Main:  No stenosis   LAD:  30% proximal LAD stenosis. 60% mid LAD stenosis (iFr 0.93).   Circumflex:  Luminal irregularities.   RCA:  30% stenosis of the mid RCA  The patient has non obstructive epicardial coronary artery disease  Blood loss:  less than 20 cc.  iFR = 0.93 in mid LAD.  iFR > 0.89 is not significant Guide used:   6Fr Ikari (Left)     PERIPHERAL ANGIOGRAM  Right Femoral:  normal size and without significant disease.    Hemostasis:   TR band R radial.   Mynx R CFA.     Post Procedure Plan:   NPO x 1 hour post procedure. Cardiac diet thereafter.   Flat time 4 hours post Mnyx closure R CFA.     Contrast: 150 cc.     OUTPATIENT RECOMMENDATIONS:  Medical management  Optimize risk factor modifications   Activity -- avoid straining with affected right upper and lower extremity for one week  Exercise on regular basis.  Precautions post D/C -- come to ER for hematoma, unusual pain, erythema, or unusual drainage at access site  Precautions post D/C -- if develop bleeding or hematoma at access site, hold pressure at access site, and come to ER  Antiplatelet:  Aspirin 81 mg daily.     Pre-Operative Risk Stratification for Hernia Repair:   The patient is intermediate risk for an intermediate risk procedure (hernia repair).

## 2024-09-23 NOTE — Clinical Note
The catheter was repositioned into the and insertion attempt was made into the ostium   left main. The catheter was unable to engage the area..

## 2024-09-23 NOTE — Clinical Note
151 ml of contrast were injected throughout the case. 10 mL of contrast was the total wasted during the case. 161 mL was the total amount used during the case.

## 2024-09-23 NOTE — INTERVAL H&P NOTE
The patient has been examined and the H&P has been reviewed:    I concur with the findings and changes have occurred since H&P was written.    Procedure risks, benefits and alternative options discussed and understood by patient/family.    58 year old female with reported CAD, T2DM, (A1c 6.6), HTN, HLD, JIN on CPAP, and tobacco abuse with JAK2 mutation who presents for diagnostic LHC/CORs after nuclear stress test demonstrated a reversible perfusion defect in the mid-distal inferior wall (RCA territory).     The patient has been NPO since midnight. The patient is without acute complaint. The patient can lay flat.    The patient reports iodine allergy. The patient reports adherence with pre-medication prior to presentation today.     No reported bleeding. Not on anticoagulation. On ASA 81 mg outpatient. Pending abdominal surgery (hernia).       Physical Exam:   General: Alert and oriented. NAD.   HEENT: Normocephalic. Atraumatic.EOMI. Moist oral and nasal mucosa.   CV: RRR. No M/R/G. +2 radial pulse and DP/PT pulses bilaterally.   Lung: CTA. On RA.   Abdomen: S. NT. ND.+BS.   Extremities: Warm. Dry. No edema.     A/P:   58 year old female with reported CAD, T2DM, (A1c 6.6), HTN, HLD, JIN on CPAP, and tobacco abuse with JAK2 mutation who presents for diagnostic LHC/CORs after nuclear stress test demonstrated a reversible perfusion defect in the mid-distal inferior wall (RCA territory).     -Proceed with LHC/CORs for abnormal nuclear stress test.   -Risk and benefits discussed with the patient.     Rick Horn MD  [facility] Cardiology Fellow PGY6

## 2024-09-26 ENCOUNTER — TELEPHONE (OUTPATIENT)
Dept: SMOKING CESSATION | Facility: CLINIC | Age: 59
End: 2024-09-26
Payer: MEDICAID

## 2024-09-26 ENCOUNTER — PATIENT MESSAGE (OUTPATIENT)
Dept: ADMINISTRATIVE | Facility: OTHER | Age: 59
End: 2024-09-26
Payer: MEDICAID

## 2024-09-26 LAB
POCT GLUCOSE: 238 MG/DL (ref 70–110)
POCT GLUCOSE: 384 MG/DL (ref 70–110)

## 2024-09-27 ENCOUNTER — INFUSION (OUTPATIENT)
Dept: INFUSION THERAPY | Facility: HOSPITAL | Age: 59
End: 2024-09-27
Attending: INTERNAL MEDICINE
Payer: MEDICAID

## 2024-09-27 VITALS
HEART RATE: 78 BPM | RESPIRATION RATE: 20 BRPM | HEIGHT: 67 IN | OXYGEN SATURATION: 98 % | SYSTOLIC BLOOD PRESSURE: 117 MMHG | DIASTOLIC BLOOD PRESSURE: 70 MMHG | BODY MASS INDEX: 29.25 KG/M2 | TEMPERATURE: 98 F | WEIGHT: 186.38 LBS

## 2024-09-27 DIAGNOSIS — E61.1 IRON DEFICIENCY: Primary | ICD-10-CM

## 2024-09-27 PROCEDURE — 96374 THER/PROPH/DIAG INJ IV PUSH: CPT

## 2024-09-27 PROCEDURE — 63600175 PHARM REV CODE 636 W HCPCS

## 2024-09-27 PROCEDURE — 25000003 PHARM REV CODE 250

## 2024-09-27 RX ORDER — EPINEPHRINE 0.3 MG/.3ML
0.3 INJECTION SUBCUTANEOUS ONCE AS NEEDED
Status: DISCONTINUED | OUTPATIENT
Start: 2024-09-27 | End: 2024-09-27 | Stop reason: HOSPADM

## 2024-09-27 RX ORDER — SODIUM CHLORIDE 0.9 % (FLUSH) 0.9 %
10 SYRINGE (ML) INJECTION
OUTPATIENT
Start: 2024-10-04

## 2024-09-27 RX ORDER — HEPARIN 100 UNIT/ML
500 SYRINGE INTRAVENOUS
OUTPATIENT
Start: 2024-10-04

## 2024-09-27 RX ORDER — DIPHENHYDRAMINE HYDROCHLORIDE 50 MG/ML
50 INJECTION INTRAMUSCULAR; INTRAVENOUS ONCE AS NEEDED
OUTPATIENT
Start: 2024-10-04

## 2024-09-27 RX ORDER — DIPHENHYDRAMINE HYDROCHLORIDE 50 MG/ML
50 INJECTION INTRAMUSCULAR; INTRAVENOUS ONCE AS NEEDED
Status: DISCONTINUED | OUTPATIENT
Start: 2024-09-27 | End: 2024-09-27 | Stop reason: HOSPADM

## 2024-09-27 RX ORDER — EPINEPHRINE 0.3 MG/.3ML
0.3 INJECTION SUBCUTANEOUS ONCE AS NEEDED
OUTPATIENT
Start: 2024-10-04

## 2024-09-27 RX ORDER — SODIUM CHLORIDE 0.9 % (FLUSH) 0.9 %
10 SYRINGE (ML) INJECTION
Status: DISCONTINUED | OUTPATIENT
Start: 2024-09-27 | End: 2024-09-27 | Stop reason: HOSPADM

## 2024-09-27 RX ADMIN — SODIUM CHLORIDE: 9 INJECTION, SOLUTION INTRAVENOUS at 10:09

## 2024-09-27 RX ADMIN — IRON SUCROSE 200 MG: 20 INJECTION, SOLUTION INTRAVENOUS at 10:09

## 2024-09-27 NOTE — NURSING
Pt ambulated to [facility], chief complaint of feeling tired, hopes her venofer will work this time.

## 2024-10-04 ENCOUNTER — INFUSION (OUTPATIENT)
Dept: INFUSION THERAPY | Facility: HOSPITAL | Age: 59
End: 2024-10-04
Attending: INTERNAL MEDICINE
Payer: MEDICAID

## 2024-10-04 VITALS
BODY MASS INDEX: 29 KG/M2 | HEART RATE: 76 BPM | TEMPERATURE: 98 F | RESPIRATION RATE: 16 BRPM | OXYGEN SATURATION: 97 % | WEIGHT: 184.75 LBS | HEIGHT: 67 IN | DIASTOLIC BLOOD PRESSURE: 75 MMHG | SYSTOLIC BLOOD PRESSURE: 119 MMHG

## 2024-10-04 DIAGNOSIS — E61.1 IRON DEFICIENCY: Primary | ICD-10-CM

## 2024-10-04 PROCEDURE — 63600175 PHARM REV CODE 636 W HCPCS

## 2024-10-04 PROCEDURE — 25000003 PHARM REV CODE 250

## 2024-10-04 PROCEDURE — 96374 THER/PROPH/DIAG INJ IV PUSH: CPT

## 2024-10-04 RX ORDER — SODIUM CHLORIDE 0.9 % (FLUSH) 0.9 %
10 SYRINGE (ML) INJECTION
OUTPATIENT
Start: 2024-10-11

## 2024-10-04 RX ORDER — DIPHENHYDRAMINE HYDROCHLORIDE 50 MG/ML
50 INJECTION INTRAMUSCULAR; INTRAVENOUS ONCE AS NEEDED
OUTPATIENT
Start: 2024-10-11

## 2024-10-04 RX ORDER — SODIUM CHLORIDE 0.9 % (FLUSH) 0.9 %
10 SYRINGE (ML) INJECTION
Status: DISCONTINUED | OUTPATIENT
Start: 2024-10-04 | End: 2024-10-04 | Stop reason: HOSPADM

## 2024-10-04 RX ORDER — EPINEPHRINE 0.3 MG/.3ML
0.3 INJECTION SUBCUTANEOUS ONCE AS NEEDED
OUTPATIENT
Start: 2024-10-11

## 2024-10-04 RX ORDER — HEPARIN 100 UNIT/ML
500 SYRINGE INTRAVENOUS
Status: DISCONTINUED | OUTPATIENT
Start: 2024-10-04 | End: 2024-10-04 | Stop reason: HOSPADM

## 2024-10-04 RX ORDER — EPINEPHRINE 0.3 MG/.3ML
0.3 INJECTION SUBCUTANEOUS ONCE AS NEEDED
Status: DISCONTINUED | OUTPATIENT
Start: 2024-10-04 | End: 2024-10-04 | Stop reason: HOSPADM

## 2024-10-04 RX ORDER — HEPARIN 100 UNIT/ML
500 SYRINGE INTRAVENOUS
OUTPATIENT
Start: 2024-10-11

## 2024-10-04 RX ORDER — DIPHENHYDRAMINE HYDROCHLORIDE 50 MG/ML
50 INJECTION INTRAMUSCULAR; INTRAVENOUS ONCE AS NEEDED
Status: DISCONTINUED | OUTPATIENT
Start: 2024-10-04 | End: 2024-10-04 | Stop reason: HOSPADM

## 2024-10-04 RX ADMIN — IRON SUCROSE 200 MG: 20 INJECTION, SOLUTION INTRAVENOUS at 11:10

## 2024-10-04 RX ADMIN — SODIUM CHLORIDE: 9 INJECTION, SOLUTION INTRAVENOUS at 11:10

## 2024-10-11 ENCOUNTER — INFUSION (OUTPATIENT)
Dept: INFUSION THERAPY | Facility: HOSPITAL | Age: 59
End: 2024-10-11
Attending: INTERNAL MEDICINE
Payer: MEDICAID

## 2024-10-11 VITALS
BODY MASS INDEX: 29.37 KG/M2 | HEIGHT: 67 IN | HEART RATE: 93 BPM | OXYGEN SATURATION: 98 % | RESPIRATION RATE: 18 BRPM | SYSTOLIC BLOOD PRESSURE: 140 MMHG | TEMPERATURE: 98 F | DIASTOLIC BLOOD PRESSURE: 92 MMHG | WEIGHT: 187.13 LBS

## 2024-10-11 DIAGNOSIS — E61.1 IRON DEFICIENCY: Primary | ICD-10-CM

## 2024-10-11 PROCEDURE — 96374 THER/PROPH/DIAG INJ IV PUSH: CPT

## 2024-10-11 PROCEDURE — 63600175 PHARM REV CODE 636 W HCPCS

## 2024-10-11 PROCEDURE — 25000003 PHARM REV CODE 250

## 2024-10-11 RX ORDER — HEPARIN 100 UNIT/ML
500 SYRINGE INTRAVENOUS
Status: DISCONTINUED | OUTPATIENT
Start: 2024-10-11 | End: 2024-10-11 | Stop reason: HOSPADM

## 2024-10-11 RX ORDER — EPINEPHRINE 1 MG/ML
0.3 INJECTION INTRAMUSCULAR; INTRAVENOUS; SUBCUTANEOUS ONCE AS NEEDED
Status: DISCONTINUED | OUTPATIENT
Start: 2024-10-11 | End: 2024-10-11 | Stop reason: HOSPADM

## 2024-10-11 RX ORDER — DIPHENHYDRAMINE HYDROCHLORIDE 50 MG/ML
50 INJECTION INTRAMUSCULAR; INTRAVENOUS ONCE AS NEEDED
Status: CANCELLED | OUTPATIENT
Start: 2024-10-18

## 2024-10-11 RX ORDER — EPINEPHRINE 0.3 MG/.3ML
0.3 INJECTION SUBCUTANEOUS ONCE AS NEEDED
Status: CANCELLED | OUTPATIENT
Start: 2024-10-18

## 2024-10-11 RX ORDER — SODIUM CHLORIDE 0.9 % (FLUSH) 0.9 %
10 SYRINGE (ML) INJECTION
Status: DISCONTINUED | OUTPATIENT
Start: 2024-10-11 | End: 2024-10-11 | Stop reason: HOSPADM

## 2024-10-11 RX ORDER — DIPHENHYDRAMINE HYDROCHLORIDE 50 MG/ML
50 INJECTION INTRAMUSCULAR; INTRAVENOUS ONCE AS NEEDED
Status: DISCONTINUED | OUTPATIENT
Start: 2024-10-11 | End: 2024-10-11 | Stop reason: HOSPADM

## 2024-10-11 RX ORDER — HEPARIN 100 UNIT/ML
500 SYRINGE INTRAVENOUS
OUTPATIENT
Start: 2024-10-18

## 2024-10-11 RX ORDER — SODIUM CHLORIDE 0.9 % (FLUSH) 0.9 %
10 SYRINGE (ML) INJECTION
Status: CANCELLED | OUTPATIENT
Start: 2024-10-18

## 2024-10-11 RX ADMIN — IRON SUCROSE 200 MG: 20 INJECTION, SOLUTION INTRAVENOUS at 11:10

## 2024-10-11 RX ADMIN — SODIUM CHLORIDE: 9 INJECTION, SOLUTION INTRAVENOUS at 11:10

## 2024-10-11 NOTE — NURSING
"1100 Patient is here for Venofer #5/5. Patient stated "I actually feel good today."  1135 Infusion completed w/o incident.   "

## 2024-10-26 ENCOUNTER — PATIENT MESSAGE (OUTPATIENT)
Dept: OTHER | Facility: OTHER | Age: 59
End: 2024-10-26
Payer: MEDICAID

## 2024-11-06 ENCOUNTER — CLINICAL SUPPORT (OUTPATIENT)
Dept: FAMILY MEDICINE | Facility: CLINIC | Age: 59
End: 2024-11-06
Attending: FAMILY MEDICINE
Payer: MEDICAID

## 2024-11-06 ENCOUNTER — OFFICE VISIT (OUTPATIENT)
Dept: FAMILY MEDICINE | Facility: CLINIC | Age: 59
End: 2024-11-06
Payer: MEDICAID

## 2024-11-06 VITALS
RESPIRATION RATE: 18 BRPM | SYSTOLIC BLOOD PRESSURE: 119 MMHG | HEART RATE: 78 BPM | BODY MASS INDEX: 29.66 KG/M2 | TEMPERATURE: 98 F | DIASTOLIC BLOOD PRESSURE: 79 MMHG | OXYGEN SATURATION: 98 % | HEIGHT: 67 IN | WEIGHT: 189 LBS

## 2024-11-06 DIAGNOSIS — B35.3 TINEA PEDIS OF BOTH FEET: Primary | ICD-10-CM

## 2024-11-06 DIAGNOSIS — H61.21 IMPACTED CERUMEN OF RIGHT EAR: ICD-10-CM

## 2024-11-06 DIAGNOSIS — E11.3292 MILD NONPROLIFERATIVE DIABETIC RETINOPATHY OF LEFT EYE WITHOUT MACULAR EDEMA ASSOCIATED WITH TYPE 2 DIABETES MELLITUS: ICD-10-CM

## 2024-11-06 DIAGNOSIS — L84 CALLUS OF FOOT: ICD-10-CM

## 2024-11-06 DIAGNOSIS — R68.89 SUSPECTED GLAUCOMA OF BOTH EYES: ICD-10-CM

## 2024-11-06 DIAGNOSIS — E11.69 TYPE 2 DIABETES MELLITUS WITH OTHER SPECIFIED COMPLICATION, WITH LONG-TERM CURRENT USE OF INSULIN: ICD-10-CM

## 2024-11-06 DIAGNOSIS — H91.93 DECREASED HEARING OF BOTH EARS: ICD-10-CM

## 2024-11-06 DIAGNOSIS — Z79.4 TYPE 2 DIABETES MELLITUS WITH OTHER SPECIFIED COMPLICATION, WITH LONG-TERM CURRENT USE OF INSULIN: ICD-10-CM

## 2024-11-06 DIAGNOSIS — B07.9 VIRAL WART ON FINGER: ICD-10-CM

## 2024-11-06 DIAGNOSIS — E83.42 HYPOMAGNESEMIA: ICD-10-CM

## 2024-11-06 DIAGNOSIS — Z23 IMMUNIZATION DUE: ICD-10-CM

## 2024-11-06 PROCEDURE — 99215 OFFICE O/P EST HI 40 MIN: CPT | Mod: PBBFAC | Performed by: FAMILY MEDICINE

## 2024-11-06 PROCEDURE — 90471 IMMUNIZATION ADMIN: CPT | Mod: PBBFAC

## 2024-11-06 PROCEDURE — 92228 IMG RTA DETC/MNTR DS PHY/QHP: CPT | Mod: TC,PBBFAC | Performed by: FAMILY MEDICINE

## 2024-11-06 PROCEDURE — 92228 IMG RTA DETC/MNTR DS PHY/QHP: CPT | Mod: PBBFAC

## 2024-11-06 PROCEDURE — 90656 IIV3 VACC NO PRSV 0.5 ML IM: CPT | Mod: PBBFAC

## 2024-11-06 RX ORDER — CLOTRIMAZOLE 1 %
CREAM (GRAM) TOPICAL 2 TIMES DAILY
Qty: 113 G | Refills: 1 | Status: SHIPPED | OUTPATIENT
Start: 2024-11-06

## 2024-11-06 RX ADMIN — INFLUENZA VIRUS VACCINE 0.5 ML: 15; 15; 15 SUSPENSION INTRAMUSCULAR at 08:11

## 2024-11-06 NOTE — PROGRESS NOTES
Jessica Walker is a 59 y.o. female here for a diabetic eye screening with non-dilated fundus photos per Dr. Presley.    Patient cooperative?: Yes  Small pupils?: No  Last eye exam: unknown    For exam results, see Encounter Report.

## 2024-11-06 NOTE — PROGRESS NOTES
"Patient Name: Jessica Walker   : 1965  MRN: [id number]     SUBJECTIVE:  Jessica Walker is a 59 y.o. female here for Follow-up (The patient states that insurance will not cover trulicity and asked to be prescribed ozempic.)  .    HPI  Here for few issues as below:  Patient follows with endocrinology for diabetes and advised her to reach out to them about medication issues.  Just had an appointment with them two weeks ago.  Per them they decreased Lantus to 40 units and advised to continue with Trulicity.  A1c has been stable.  Patient would like to start Ozempic instead and will reach out to them about this.  Patient saw rheumatology for the abnormal labs doing further workup  Following with Psychiatry for anxiety/depression    Would like referral to ENT for decreased hearing right more than left.  Had a test 5 years ago and had some hearing loss. Also hx of wax buildup.   Right index finger with a wart for the past 20 years or so, had cryo freeze  Would like to get it checked again    Also and toenails bilateral feet, having peeling of the skin, softening the skin especially between toes.  Also having some calluses.      ALLERGIES:   Review of patient's allergies indicates:   Allergen Reactions    Ivp dye [iodinated contrast media] Itching         ROS:  Review of Systems   Constitutional:  Negative for chills and fever.   HENT:  Positive for hearing loss. Negative for congestion.    Respiratory:  Negative for cough and shortness of breath.    Cardiovascular:  Negative for chest pain, palpitations and leg swelling.   Gastrointestinal:  Negative for abdominal pain, blood in stool, nausea and vomiting.   Genitourinary:  Negative for dysuria and hematuria.   Skin:  Positive for rash.         OBJECTIVE:  Vital signs  Vitals:    24 0754   BP: 119/79   Pulse: 78   Resp: 18   Temp: 97.5 °F (36.4 °C)   TempSrc: Oral   SpO2: 98%   Weight: 85.7 kg (189 lb)   Height: 5' 7" (1.702 m)      Body mass " index is 29.6 kg/m².    PHYSICAL EXAM:   Physical Exam  Vitals reviewed.   Constitutional:       General: She is not in acute distress.     Appearance: Normal appearance. She is not ill-appearing.   HENT:      Head: Normocephalic and atraumatic.      Right Ear: External ear normal. There is impacted cerumen (hard cerumen).      Left Ear: Tympanic membrane and external ear normal. There is no impacted cerumen.      Nose: Nose normal. No rhinorrhea.      Mouth/Throat:      Mouth: Mucous membranes are moist.   Eyes:      General: No scleral icterus.        Right eye: No discharge.         Left eye: No discharge.      Conjunctiva/sclera: Conjunctivae normal.      Pupils: Pupils are equal, round, and reactive to light.   Cardiovascular:      Rate and Rhythm: Normal rate and regular rhythm.   Pulmonary:      Effort: Pulmonary effort is normal. No respiratory distress.      Breath sounds: No wheezing, rhonchi or rales.   Abdominal:      General: Bowel sounds are normal. There is no distension.      Palpations: Abdomen is soft.      Tenderness: There is no abdominal tenderness.   Musculoskeletal:      Cervical back: Normal range of motion and neck supple. No rigidity or tenderness.      Right lower leg: No edema.      Left lower leg: No edema.   Skin:     General: Skin is warm.      Findings: Lesion (right index finger with a small non tended nodule) and rash (plantar feet with peeling skin and maceration of skin between toes. calluses also noted) present.   Neurological:      General: No focal deficit present.      Mental Status: She is alert and oriented to person, place, and time.   Psychiatric:         Mood and Affect: Mood normal.         Behavior: Behavior normal.          ASSESSMENT/PLAN:  1. Tinea pedis of both feet  Apply clotrimazole cream twice a day and keep feet as dry as possible.  -     clotrimazole (LOTRIMIN) 1 % cream; Apply topically 2 (two) times daily.  Dispense: 113 g; Refill: 1    2. Callus of  foot  Refer to wound clinic for foot care.  -     Ambulatory referral/consult to Wound Clinic; Future; Expected date: 11/13/2024    3. Viral wart on finger  Refer next door for further management  -     Ambulatory referral/consult to Family Practice; Future; Expected date: 11/13/2024    4. Decreased hearing of both ears  Right-sided more than left because of impacted wax.  Apply Debrox and refer to ENT for the cerumen and the hearing loss on both ears being a chronic issue for years.  -     Ambulatory referral/consult to ENT; Future; Expected date: 11/13/2024    5. Impacted cerumen of right ear  See 4  -     Ambulatory referral/consult to ENT; Future; Expected date: 11/13/2024  -     carbamide peroxide (DEBROX) 6.5 % otic solution; Place 5 drops into the right ear 2 (two) times daily.  Dispense: 18 mL; Refill: 1    6. Hypomagnesemia  Recheck magnesium level.  History of low magnesium.  -     Magnesium; Future; Expected date: 11/06/2024    7. Immunization due  -     influenza (Flulaval, Fluzone, Fluarix) 45 mcg/0.5 mL IM vaccine (> or = 6 mo) 0.5 mL             Previous medical history/lab work/radiology reviewed and considered during medical management decisions.   Medication list reviewed and medication reconciliation performed.  Patient was provided  and care about his/her current diagnosis (es) and medications including risk/benefit and side effects/adverse events, over the counter medication uses/doses, home self-care and contact precautions,  and red flags and indications for when to seek immediate medical attention.   Patient was advised to continue compliance with current medication list and medical recommendations.  Recommended/ Advised continued compliance with recommended eating habits/ diets for medical conditions and exercise 150 minutes/ week (if possible) for medical condition (s).        RESULTS:  No results found for this or any previous visit (from the past 6 weeks).      Follow Up:  Follow  up in about 6 months (around 5/6/2025).     [unfilled]    This note was created with the assistance of a voice recognition software or phone dictation. There may be transcription errors as a result of using this technology however minimal. Effort has been made to assure accuracy of transcription but any obvious errors or omissions should be clarified with the author of the document

## 2024-11-06 NOTE — Clinical Note
Mild NPDR OS, but pt also has large CDs, making her a glaucoma suspect.  She should get a routine exam in the next 3 mos

## 2024-11-07 ENCOUNTER — TELEPHONE (OUTPATIENT)
Dept: FAMILY MEDICINE | Facility: CLINIC | Age: 59
End: 2024-11-07
Payer: MEDICAID

## 2024-11-07 NOTE — TELEPHONE ENCOUNTER
----- Message from Leni Presley MD sent at 11/6/2024  4:25 PM CST -----  Please let the patient know that the diabetic eye screening showed no retinopathy on the right eye, but left eye had mild diabetic retinopathy.  They are also suspecting some glaucoma changes and they are recommending her to get a test in 3 months so I sent referral to Ophthalmology.

## 2024-11-11 ENCOUNTER — OFFICE VISIT (OUTPATIENT)
Dept: CARDIOLOGY | Facility: CLINIC | Age: 59
End: 2024-11-11
Payer: MEDICAID

## 2024-11-11 VITALS
OXYGEN SATURATION: 99 % | TEMPERATURE: 98 F | BODY MASS INDEX: 29.79 KG/M2 | RESPIRATION RATE: 20 BRPM | DIASTOLIC BLOOD PRESSURE: 72 MMHG | HEIGHT: 67 IN | WEIGHT: 189.81 LBS | HEART RATE: 61 BPM | SYSTOLIC BLOOD PRESSURE: 110 MMHG

## 2024-11-11 DIAGNOSIS — Z01.818 PREOP EXAMINATION: ICD-10-CM

## 2024-11-11 DIAGNOSIS — I25.10 NON-OCCLUSIVE CORONARY ARTERY DISEASE: Primary | ICD-10-CM

## 2024-11-11 DIAGNOSIS — I10 PRIMARY HYPERTENSION: ICD-10-CM

## 2024-11-11 DIAGNOSIS — Z15.89 JAK2 GENE MUTATION: ICD-10-CM

## 2024-11-11 DIAGNOSIS — E78.5 HYPERLIPIDEMIA LDL GOAL <70: ICD-10-CM

## 2024-11-11 DIAGNOSIS — Z72.0 TOBACCO USER: ICD-10-CM

## 2024-11-11 PROBLEM — Z01.810 ENCOUNTER FOR PRE-OPERATIVE CARDIOVASCULAR CLEARANCE: Status: RESOLVED | Noted: 2024-08-29 | Resolved: 2024-11-11

## 2024-11-11 PROBLEM — R06.09 DOE (DYSPNEA ON EXERTION): Status: RESOLVED | Noted: 2022-05-17 | Resolved: 2024-11-11

## 2024-11-11 PROCEDURE — 99215 OFFICE O/P EST HI 40 MIN: CPT | Mod: PBBFAC | Performed by: INTERNAL MEDICINE

## 2024-11-11 NOTE — PROGRESS NOTES
CHIEF COMPLAINT:   Chief Complaint   Patient presents with    f/u denies chest pain not sure if had any sob needs clearan                                                  HPI:  Jessica Walker 59 y.o. female Mild CAD, JAK2 Gene mutation, Thrombocytosis, severe iron deficiency on iron infusion, JIN on CPAP, tobacco use, DM II, HTN, HLD, and GERD who presents for follow up after recent angiogram.    Patient needs to undergo hernia repair and given complaints of dyspnea on exertion and fatigue a nuclear stress test was ordered that showed reversible defect in the RCA territory.  Subsequently a coronary angiogram showed 60% mid LAD stenosis with IFR 0.93, 1st diagonal 50% ostial stenosis, 30% proximal LAD stenosis, mid RCA 30% stenosis.    Today patient reports feeling better overall.  She has been taking cow colostrum in a pill form that she buys on [employer].  She reports her overall fatigue and shortness of breath have improved since then.  Patient has also been on Trulicity and has been losing weight and thinks this has also been contributing to her improvement in symptoms.  She denies chest pain.  She continues to smoke. She wants to quit but is not ready to do so now.                                                                                                                                                                                                                                                    CARDIAC TESTING:    Results for orders placed during the hospital encounter of 09/23/24    Cardiac catheterization    Conclusion    The Prox LAD to Mid LAD lesion was 60% stenosed.    The pre-procedure left ventricular end diastolic pressure was 18.    The estimated blood loss was <50 mL.    There was non-obstructive coronary artery disease..    Pre-Operative Diagnosis: Abnormal Nuclear Stress Test.  Post-Operative Diagnosis: Non Obstructive Epicardial Coronary Artery Disease      FINDINGS  LVEDP:  18  mmHg  Left Main:  No obstructive epicardial coronary artery disease.  LAD:  There is a 30% lesion in the proximal portion of the vessel. There is a 60% lesion in the mid portion of the vessel at the bifurcation of the 2nd diagonal and the mid LAD. iFR was performed and not significant (0.93). There was 50% ostial stenosis of the 1st diagonal. The 1st diagonal is a small vessel < 2 mm.  Circumflex:  No obstructive epicardial coronary artery disease.  RCA:  No obstructive epicardial coronary artery disease. There is a mid RCA 30% lesion. Dominant.  Blood loss:  less than 20 cc.  iFR = 0.93 in mid LAD.  iFR > 0.89 is not significant Guide used:  6 Fr Ikkari 3.5    Conclusion:  The patient has non obstructive epicardial coronary artery disease.    PERIPHERAL ANGIOGRAM  Right Femoral:  normal size and patent    RECOMMENDATIONS  ASA 81 mg daily.  Optimize risk factor modification. Target A1c < 7.0 and LDL < 70. Blood pressure control.  3.    Light activity x 5 days. Weight lifting restriction x 5 lbs x 5 days. No driving for 24 hours.  4.    Exercise as tolerated once restrictions have ended.  5.    Precautions post D/C -- come to ER for hematoma, unusual pain, erythema, or unusual drainage at access site  6.    Precautions post D/C -- if develop bleeding or hematoma at access site, hold pressure at access site, and come to ER    PREOP CARDIAC RISK ASSESSMENT  Cardiac risk:  The patient is at low to moderate cardiac risk for a intermediate risk procedure.    PERIOPERATIVE RECOMMENDATIONS  Continue aspirin 81 mg daily during the perioperative period.  Discontinuing the aspirin may increase the cardiac risk.  If aspirin is held, please resume the aspirin after the procedure when it is safe to do so from a procedural stand point.        Results for orders placed during the hospital encounter of 07/05/24    Echo    Interpretation Summary    Left Ventricle: The left ventricle is normal in size. Mildly increased wall thickness.  There is normal systolic function with a visually estimated ejection fraction of 65 - 70%. Grade I diastolic dysfunction.    Right Ventricle: Normal right ventricular cavity size. Systolic function is normal.    Mitral Valve: There is mild regurgitation.    Tricuspid Valve: There is trace regurgitation.    Results for orders placed during the hospital encounter of 08/26/24    Nuclear Stress - Cardiology Interpreted    Interpretation Summary    Abnormal myocardial perfusion scan.    There is a moderate to severe intensity, medium sized, reversible perfusion abnormality that is consistent with ischemia in the  mid to distal inferior wall(s) in the typical distribution of the RCA territory.    There are no other significant perfusion abnormalities.    The gated perfusion images showed an ejection fraction of 97% at rest. The gated perfusion images showed an ejection fraction of 97% post stress.    The patient reported no chest pain during the stress test.    There were no arrhythmias during stress.    The patient exercised for 3 minutes 51 seconds on a Jani protocol, corresponding to a functional capacity of 6METS, achieving a peak heart rate of 150 bpm, which is 97% of the age predicted maximum heart rate.    The nuclear stress test is  fair quality.  On the nuclear stress test the EF is supranormal.  If the patient has an echo, the EF on the echo is considered more accurate.    The patient has a moderate risk nuclear stress test due to inferior reversible defect.    If clinically indicated, consider further evaluation with coronary angiogram.    The patient has stress dilation.     Results for orders placed in visit on 09/02/16    CATH LAB PROCEDURE    Dobutamine Stress Echocardiogram 6.21.22:  The stress echo portion of this study is negative for myocardial ischemia.  The ECG portion of this study is positive for myocardial ischemia.  There were no arrhythmias during stress.     [facility] 9/2016  mLCx 20% stenosis      Patient Active Problem List   Diagnosis    Iron deficiency anemia    POSEY (dyspnea on exertion)    Primary hypertension    Mild CAD    Hyperlipidemia LDL goal <70    Type 2 diabetes mellitus, with long-term current use of insulin    Obesity (BMI 30-39.9)    Tobacco user    Thrombocytosis    Iron deficiency    Personal history of colonic polyps    Essential thrombocytosis    JAK2 gene mutation    Preop examination    PMB (postmenopausal bleeding)    Post-operative state    Hiatal hernia    Dieulafoy lesion of stomach    Encounter for comprehensive diabetic foot examination, type 2 diabetes mellitus    Overgrown toenails    Xerosis of skin    Encounter for pre-operative cardiovascular clearance    Mild nonproliferative diabetic retinopathy of left eye without macular edema associated with type 2 diabetes mellitus    Suspected glaucoma of both eyes     Past Surgical History:   Procedure Laterality Date    ANGIOGRAM, CORONARY, WITH LEFT HEART CATHETERIZATION N/A 9/23/2024    Procedure: Angiogram, Coronary, with Left Heart Cath;  Surgeon: Francesco Mensah MD;  Location: [facility] CATH LAB;  Service: Cardiology;  Laterality: N/A;    CHOLECYSTECTOMY      COLONOSCOPY  10/2021    ESOPHAGOGASTRODUODENOSCOPY N/A 06/26/2023    Procedure: EGD (ESOPHAGOGASTRODUODENOSCOPY);  Surgeon: Kita Larose MD;  Location: [facility] ENDOSCOPY;  Service: Gastroenterology;  Laterality: N/A;    EYE SURGERY  7/6/93    LK and RK    HYSTEROSCOPY WITH DILATION AND CURETTAGE OF UTERUS N/A 09/26/2023    Procedure: HYSTEROSCOPY, WITH DILATION AND CURETTAGE OF UTERUS;  Surgeon: Deepti Chappell MD;  Location: [facility] OR;  Service: OB/GYN;  Laterality: N/A;  myosure    INTRALUMINAL GASTROINTESTINAL TRACT IMAGING VIA CAPSULE N/A 06/29/2023    Procedure: IMAGING PROCEDURE, GI TRACT, INTRALUMINAL, VIA CAPSULE;  Surgeon: Kita Larose MD;  Location: [facility] ENDOSCOPY;  Service: Gastroenterology;  Laterality: N/A;    Removal of Goiter      UPPER  GASTROINTESTINAL ENDOSCOPY       Social History     Socioeconomic History    Marital status: Single   Tobacco Use    Smoking status: Every Day     Current packs/day: 1.00     Average packs/day: 1 pack/day for 43.0 years (43.0 ttl pk-yrs)     Types: Cigarettes    Smokeless tobacco: Never    Tobacco comments:     Has chantix, has not started.   Substance and Sexual Activity    Alcohol use: Not Currently    Drug use: Not Currently    Sexual activity: Not Currently     Birth control/protection: None     Comment: Havent been sexually active since 2006     Social Drivers of Health     Financial Resource Strain: Low Risk  (11/5/2024)    Overall Financial Resource Strain (CARDIA)     Difficulty of Paying Living Expenses: Not very hard   Food Insecurity: Food Insecurity Present (11/5/2024)    Hunger Vital Sign     Worried About Running Out of Food in the Last Year: Never true     Ran Out of Food in the Last Year: Sometimes true   Transportation Needs: No Transportation Needs (1/4/2024)    PRAPARE - Transportation     Lack of Transportation (Medical): No     Lack of Transportation (Non-Medical): No   Physical Activity: Inactive (11/5/2024)    Exercise Vital Sign     Days of Exercise per Week: 0 days     Minutes of Exercise per Session: 0 min   Stress: Stress Concern Present (11/5/2024)    [employer] [place] [employer] - Occupational Stress Questionnaire     Feeling of Stress : Very much   Housing Stability: Unknown (11/5/2024)    Housing Stability Vital Sign     Unable to Pay for Housing in the Last Year: Patient declined        Family History   Problem Relation Name Age of Onset    Diabetes Mother Alise     Coronary artery disease Mother Alise     Alcohol abuse Mother Alise     Arthritis Mother Alise     Heart disease Mother Alise         Had 13 stents in her heart    Hyperlipidemia Mother Alise     Hypertension Mother Alise     Diabetes Father Joo     Cervical cancer Sister Chantale     Arthritis  "Sister Chantale     Cancer Sister Chantale         Breast cancer    COPD Sister Chantale     Diabetes Sister Chantale     Depression Sister Chantale     Colon polyps Sister Chantale     Fibromyalgia Sister Jemma     Breast cancer Sister Jemma     Cancer Sister Jemma          from cervical cancer at 55    Early death Sister Jemma     Skin cancer Sister Jemma         my maternal half sister Jemma  from cervical cancer at age 55    Cancer Maternal Grandfather Raza          from Leukemia    Cancer Maternal Grandmother [place] at 64          from throat cancer at 64    Early death Maternal Grandmother [place] at 64     Cancer Maternal Uncle Grzegorz          from cancer    Miscarriages / Stillbirths Sister Ibis     Skin cancer Sister Ibis         My maternal half sister Ibis had skin cancer before that was removed     Review of patient's allergies indicates:   Allergen Reactions    Ivp dye [iodinated contrast media] Itching         ROS:                                                                                                          Negative except as stated in the history of present illness. See HPI for details.    PHYSICAL EXAM:  Visit Vitals  /72 (BP Location: Left arm, Patient Position: Sitting)   Pulse 61   Temp 97.7 °F (36.5 °C) (Oral)   Resp 20   Ht 5' 7" (1.702 m)   Wt 86.1 kg (189 lb 13.1 oz)   SpO2 99%   BMI 29.73 kg/m²       General: alert and oriented/no acute distress  Eye: EOMI/normal conjunctiva/no xanthelasma  HENT: normocephalic/moist oral mucosa  Neck: supple/nontender/no carotid bruit  Respiratory: lungs CTA/nonlabored respirations/BS equal/symmetrical expansion/no  chest wall tenderness  Cardiovascular: normal rate/normal rhythm/no murmur/normal peripheral perfusion/no  edema/no JVD  Gastrointestinal: soft/nontender  Musculoskeletal: normal ROM  Integumentary: warm/dry/pink/intact  Neurologic: alert/oriented/normal sensory/no focal deficits  Psychiatric: cooperative/appropriate mood " "and affect/normal judgment      Current Outpatient Medications   Medication Instructions    aspirin (ECOTRIN) 81 mg, Nightly    atorvastatin (LIPITOR) 40 mg, Oral, Nightly    BD ULTRA-FINE SHORT PEN NEEDLE 31 gauge x 5/16" Ndle 1 each, Subcutaneous, 3 times daily    blood-glucose meter,continuous (DEXCOM ) Misc Dexcom G7 .  Use as directed with Dexcom G7 sensors.    carbamide peroxide (DEBROX) 6.5 % otic solution 5 drops, Right Ear, 2 times daily    clotrimazole (LOTRIMIN) 1 % cream Topical (Top), 2 times daily    cycloSPORINE (RESTASIS) 0.05 % ophthalmic emulsion 1 drop, 2 times daily    DEXCOM G7 SENSOR Amy Dexcom G7 sensors.  Use every 10 days as directed.    ibuprofen (ADVIL,MOTRIN) 800 mg, Every 6 hours PRN    LANTUS SOLOSTAR U-100 INSULIN 100 unit/mL (3 mL) InPn pen     linaCLOtide (LINZESS) 72 mcg Cap capsule 1 capsule at least 30 minutes before the first meal of the day on an empty stomach Orally Once a day prn for 30 days    lisinopriL 10 mg, Oral, Daily    magnesium chloride (MAG 64) 64 mg, Oral, 2 times daily    metFORMIN (GLUCOPHAGE) 1,000 mg, Oral, 2 times daily    metoprolol tartrate (LOPRESSOR) 25 mg, Oral, 2 times daily    mirtazapine (REMERON) 15 mg, Nightly    nicotine (NICODERM CQ) 21 mg/24 hr 1 patch, Transdermal, Daily    nicotine polacrilex 2 mg, Oral, As needed (PRN)    nitroGLYCERIN (NITROSTAT) 0.4 mg, Sublingual, Every 5 min PRN    pantoprazole (PROTONIX) 40 mg, Oral, Every morning    TRUE METRIX GLUCOSE TEST STRIP Strp USE 1 STRIP TO CHECK GLUCOSE ONCE DAILY    TRULICITY 3 mg, Every Tuesday    varenicline (CHANTIX) 1 mg, Oral, 2 times daily        All medications, laboratory studies, cardiac diagnostic imaging reviewed.     Lab Results   Component Value Date    LDL 68.00 01/23/2024    LDL 91.00 04/12/2023    TRIG 109 01/23/2024    TRIG 108 04/12/2023    CREATININE 0.77 09/05/2024    MG 1.40 (L) 04/29/2024    K 4.8 09/05/2024        ASSESSMENT/PLAN:    Preoperative " cardiovascular risk assessment   Patient needs to undergo hernia repair   Coronary angiogram showed nonobstructive coronary artery disease  Pt is at low to moderate CV risk for a moderate risk procedure  She can hold aspirin 5 days prior to procedure. Please resume once hemostasis is achieved.     Nonobstructive CAD  Coronary angiogram obtained in the setting of dyspnea and fatigue and abnormal nuclear stress test.  [facility] on 9/23/2024 showed 60% mid LAD stenosis with IFR 0.93, 1st diagonal 50% ostial stenosis, 30% proximal LAD stenosis, mid RCA 30% stenosis.  Continue aspirin, statin and Lopressor     Hyperlipidemia   LDL at goal 68 in January 20, 2024   Continue Lipitor 40 mg      DM (diabetes mellitus)  - Continue management per PCP    Tobacco abuse  - Counseled importance of smoking cessation  Explained how continued cigarette smoking has certainly lead to progression of her CAD over the years  - Continues to smoke about 1-1.5 PPD and is interested in quitting but does not seem ready at the time    JIN on CPAP  - Reports noncompliance with CPAP due to discomfort  - Recommend nightly CPAP use   - Education on the deleterious effects of untreated sleep apnea

## 2024-11-13 NOTE — PROGRESS NOTES
Patient ID: [id number]     Chief Complaint: Positive MELANY (antinuclear antibody) (Pt stated having back pain)    HPI:     Jessica Walker is a 59 y.o. female here today for a follow up +MELANY visit.      Presents today for follow up of positive MELANY, lab work completed due to dry eyes and dry mouth on February 20, 2024 revealed positive MELANY speckled 1:160, C3/C4 okay, SSA/SSB negative. She reports chronic fatigue for the past 10-15 years- states symptoms are worse as the years progressed. Use to be able to do laundry and dishes without issues but now will cause fatigue. She does report occ joint pain, mainly to her back- no history of injury- did have Xray with PCP and advised degenerative changes- has not tried PT- denies any numbness or tingling in her legs. Denies red/warm/swollen joints. Occ morning stiffness 15 minutes and not daily. She reports she just does not feel well overall, will get episodes of dizziness, nausea and weakness- suffers with anemia and is currently on Iron infusions- states has been on infusions for years. She was scheduled for hernia repair last month but due to SOB she has not been cleared by Cardiology at this time- awaiting further testing (ECHO and nuclear stress). She does admit dry eyes and dry mouth- she does follow with Optometrist and using Restasis as directed and provides some relief. She reports she drinks a lot of coffee but does try to drink water in between- she drinks 3-4 cups of coffee a day or more- was drinking all day and all night but has stopped drinking at night, will drink 1-2 16 oz bottle of water. She does have some teeth that have been falling- worse this past year- has lost all top teeth with only 2 remaining- does not follow with a dentist. She does have some anxiety and depression- not on any meds at this time as she states s/e with meds- make fatigue worse so not currently taking- denies any s/h ideation but reports will stay in bed or on couch all day-  "not sleeping at night- has CPAP but does not use as she states makes her mouth even drier and not comfortable to use.      Follows with Heme-Onc for iron-deficiency anemia, thrombocytosis.  On baby aspirin and iron infusion.  Also follows with GI.  Follows with GYN for history of heavy menses, had D&C last year- no issues with bleeding since.     November 2024: Here today for follow-up visit.  After initial evaluation in July of 2024 repeat MELANY to be negative, RF and CCP also negative. She continues to deny red/warm/swollen joints, denies morning stiffness overall. Continues to have back pain, no numbness or tingling in her legs- comes and goes. She reports dry eyes and dry mouth have improved, using gtts and has improved. Continues to suffer with chronic fatigue, does feel like she is constantly drained, always tired, has had some weakness- states no motivation, always "just exhausted". She reports anxiety and depression remain- following with psych and plans to restart Remeron- Dangelo Rascon rx meds. She has continued her iron infusions, does not report much relief after infusions. She will be having her HH repaired in the next month as she has been cleared by Cardiology- she had a LHC 9/23/2024 and states has some blockage, no stents at this time- continuing to monitoring.     PMH:  NIDDM/HTN/Anemia/Vitamin-D deficiency/Tobacco use/JIN/Leukocytosis/Dyslipidemia    Hematology- [facility]  Cardiology- [facility]  GI- [facility]  [facility]- Dr. Mk Mortensen Psych   Dr. Delaney Caballero    Denies history of fevers, rashes, photosensitivity, oral or nasal ulcers, h/o MI, stroke, seizures, h/o PE or DVT, Raynaud's phenomenon, uveitis, malignancies.   Family history of autoimmune disease: Sister has Hashimoto's   Pregnancies: 2 Miscarriages: None  Smoking:  Start age of 10-12 years old, hx of 1-1.5 ppd. She is trying to quit- currently on  Chantix- currently at 1 ppd.- enc cessation when taking.     Social " History     Tobacco Use   Smoking Status Every Day    Current packs/day: 1.00    Average packs/day: 1 pack/day for 43.0 years (43.0 ttl pk-yrs)    Types: Cigarettes   Smokeless Tobacco Never   Tobacco Comments    Has chantix, has not started.        -------------------------------------    Abnormal Pap smear of cervix    Acute pancreatitis    Had surgery for pancreatitis in June 1996    Anemia    Colon polyp    When I had colonoscopy, I had one small polyp they removed    Depression    Diabetes mellitus    Dieulafoy lesion of stomach    Diverticulosis    I think they said when I had my first colonoscopy around 2019 that I have diverticulosis    Encounter for comprehensive diabetic foot examination, type 2 diabetes mellitus    Essential thrombocytosis    GERD (gastroesophageal reflux disease)    Had surgery for pancreatitis in June 1996 and acid reflux since then    Hiatal hernia    Hyperlipidemia LDL goal <70    Hypertension    Iron deficiency    JAK2 gene mutation    Mild CAD    Obesity (BMI 30-39.9)    Overgrown toenails    Personal history of colonic polyps    PMB (postmenopausal bleeding)    Sleep apnea    No CPAP    Thrombocytosis    Tobacco user    Type 2 diabetes mellitus, with long-term current use of insulin    Xerosis of skin        Past Surgical History:   Procedure Laterality Date    ANGIOGRAM, CORONARY, WITH LEFT HEART CATHETERIZATION N/A 9/23/2024    Procedure: Angiogram, Coronary, with Left Heart Cath;  Surgeon: Francesco Mensah MD;  Location: [facility] CATH LAB;  Service: Cardiology;  Laterality: N/A;    CHOLECYSTECTOMY      COLONOSCOPY  10/2021    ESOPHAGOGASTRODUODENOSCOPY N/A 06/26/2023    Procedure: EGD (ESOPHAGOGASTRODUODENOSCOPY);  Surgeon: Kita Larose MD;  Location: [facility] ENDOSCOPY;  Service: Gastroenterology;  Laterality: N/A;    EYE SURGERY  7/6/93    LK and RK    HYSTEROSCOPY WITH DILATION AND CURETTAGE OF UTERUS N/A 09/26/2023    Procedure: HYSTEROSCOPY, WITH DILATION AND CURETTAGE OF  "UTERUS;  Surgeon: Deepti Chappell MD;  Location: [facility] OR;  Service: OB/GYN;  Laterality: N/A;  myosure    INTRALUMINAL GASTROINTESTINAL TRACT IMAGING VIA CAPSULE N/A 06/29/2023    Procedure: IMAGING PROCEDURE, GI TRACT, INTRALUMINAL, VIA CAPSULE;  Surgeon: Kita Larose MD;  Location: [facility] ENDOSCOPY;  Service: Gastroenterology;  Laterality: N/A;    Removal of Goiter      UPPER GASTROINTESTINAL ENDOSCOPY         Review of patient's allergies indicates:   Allergen Reactions    Ivp dye [iodinated contrast media] Itching       Current Outpatient Medications   Medication Instructions    aspirin (ECOTRIN) 81 mg, Nightly    atorvastatin (LIPITOR) 40 mg, Oral, Nightly    BD ULTRA-FINE SHORT PEN NEEDLE 31 gauge x 5/16" Ndle 1 each, Subcutaneous, 3 times daily    blood-glucose meter,continuous (DEXCOM ) Misc Dexcom G7 .  Use as directed with Dexcom G7 sensors.    carbamide peroxide (DEBROX) 6.5 % otic solution 5 drops, Right Ear, 2 times daily    clotrimazole (LOTRIMIN) 1 % cream Topical (Top), 2 times daily    cycloSPORINE (RESTASIS) 0.05 % ophthalmic emulsion 1 drop, 2 times daily    DEXCOM G7 SENSOR Amy Dexcom G7 sensors.  Use every 10 days as directed.    ibuprofen (ADVIL,MOTRIN) 800 mg, Every 6 hours PRN    LANTUS SOLOSTAR U-100 INSULIN 100 unit/mL (3 mL) InPn pen     linaCLOtide (LINZESS) 72 mcg Cap capsule 1 capsule at least 30 minutes before the first meal of the day on an empty stomach Orally Once a day prn for 30 days    lisinopriL 10 mg, Oral, Daily    magnesium chloride (MAG 64) 64 mg, Oral, 2 times daily    metFORMIN (GLUCOPHAGE) 1,000 mg, Oral, 2 times daily    metoprolol tartrate (LOPRESSOR) 25 mg, Oral, 2 times daily    mirtazapine (REMERON) 15 mg, Nightly    nicotine (NICODERM CQ) 21 mg/24 hr 1 patch, Transdermal, Daily    nicotine polacrilex 2 mg, Oral, As needed (PRN)    nitroGLYCERIN (NITROSTAT) 0.4 mg, Sublingual, Every 5 min PRN    pantoprazole (PROTONIX) 40 mg, Oral, Every " morning    TRUE METRIX GLUCOSE TEST STRIP Strp USE 1 STRIP TO CHECK GLUCOSE ONCE DAILY    TRULICITY 3 mg, Every Tuesday    varenicline (CHANTIX) 1 mg, Oral, 2 times daily       Social History     Socioeconomic History    Marital status: Single   Tobacco Use    Smoking status: Every Day     Current packs/day: 1.00     Average packs/day: 1 pack/day for 43.0 years (43.0 ttl pk-yrs)     Types: Cigarettes    Smokeless tobacco: Never    Tobacco comments:     Has chantix, has not started.   Substance and Sexual Activity    Alcohol use: Not Currently    Drug use: Not Currently    Sexual activity: Not Currently     Birth control/protection: None     Comment: Havent been sexually active since 2006     Social Drivers of Health     Financial Resource Strain: Low Risk  (11/5/2024)    Overall Financial Resource Strain (CARDIA)     Difficulty of Paying Living Expenses: Not very hard   Food Insecurity: Food Insecurity Present (11/5/2024)    Hunger Vital Sign     Worried About Running Out of Food in the Last Year: Never true     Ran Out of Food in the Last Year: Sometimes true   Transportation Needs: No Transportation Needs (1/4/2024)    PRAPARE - Transportation     Lack of Transportation (Medical): No     Lack of Transportation (Non-Medical): No   Physical Activity: Inactive (11/5/2024)    Exercise Vital Sign     Days of Exercise per Week: 0 days     Minutes of Exercise per Session: 0 min   Stress: Stress Concern Present (11/5/2024)    [employer] - Occupational Stress Questionnaire     Feeling of Stress : Very much   Housing Stability: Unknown (11/5/2024)    Housing Stability Vital Sign     Unable to Pay for Housing in the Last Year: Patient declined        Family History   Problem Relation Name Age of Onset    Diabetes Mother Alise     Coronary artery disease Mother Alise     Alcohol abuse Mother Alise     Arthritis Mother Alise     Heart disease Mother Alise         Had 13 stents in her  heart    Hyperlipidemia Mother Alise     Hypertension Mother Alise     Diabetes Father Joo     Cervical cancer Sister Chantale     Arthritis Sister Chantale     Breast cancer Sister Chantale     COPD Sister Chantale     Diabetes Sister Chantale     Depression Sister Chantale     Colon polyps Sister Chantale     Fibromyalgia Sister Jemma     Breast cancer Sister Jemma     Cervical cancer Sister Jemma 55    Early death Sister Jemma     Other (cervical cancer) Sister Jemma 55        Maternal half sister    Miscarriages / Stillbirths Sister Ibis     Skin cancer Sister Ibis         Maternal half sister    Throat cancer Maternal Grandmother Marin at 64 64    Early death Maternal Grandmother Marin at 64     Leukemia Maternal Grandfather Raza     Cancer Maternal Uncle Grzegorz         Unknown        Immunization History   Administered Date(s) Administered    COVID-19, MRNA, LN-S, PF (Pfizer) (Purple Cap) 08/31/2021, 09/23/2021    COVID-19, mRNA, LNP-S, PF (Moderna) Ages 12+ 01/19/2024    Hepatitis B (recombinant) Adjuvanted, 2 dose 01/19/2024, 02/19/2024    Influenza (FLUBLOK) - Quadrivalent - Recombinant - PF *Preferred* (egg allergy) 11/12/2020    Influenza - Quadrivalent 10/24/2016    Influenza - Quadrivalent - PF *Preferred* (6 months and older) 01/19/2024    Influenza - Trivalent - Fluarix, Flulaval, Fluzone, Afluria - PF 10/29/2014, 11/05/2018, 12/01/2021, 11/06/2024    Pneumococcal Conjugate - 20 Valent 01/19/2024    Pneumococcal Polysaccharide - 23 Valent 02/28/2008    Td (Adult), Unspecified Formulation 03/19/2008    Td - PF (ADULT) 03/19/2008    Tdap 11/12/2020    Zoster Recombinant 11/12/2020, 04/05/2021       Patient Care Team:  Leni Presley MD as PCP - General (Family Medicine)  Sade Mata FNP as Nurse Practitioner (Family Medicine)  Mary Castelan RD as Diabetes Educator (Diabetes)  Sade Mata FNP as Nurse Practitioner (Hematology and Oncology)  Kleber Regan FNP as Nurse Practitioner  (Hematology and Oncology)  Alla Yuan PA-C as Physician Assistant (Cardiology)  Emely Joy MD as Hypertension Digital Medicine Responsible Provider (Internal Medicine)  Emely Joy MD as Diabetes Digital Medicine Responsible Provider (Internal Medicine)  [facility] as Hypertension Digital Medicine Contract  [facility] [employer] [facility]. as Diabetes Digital Medicine Contract  Kemi Fraser, Mable as Hypertension Digital Medicine Clinician (Pharmacist)  Kemi Fraser PharmD as Diabetes Digital Medicine Clinician (Pharmacist)     Subjective:     ROS    Constitutional:  Denies chills. Denies fever. Denies night sweats. Admits weight loss on Trulicity- weight stable, no longer on Trulicity due to insurance. Admits sleep disturbance- admits was drinking coffee all day so has decreased.   Ophthalmology: Denies blurred vision. Denies diminished visual acuity. Admits dry eyes- has improved with gtts. Denies eye pain. Admits Itching- comes and goes. Denies redness. Denies red eye.   ENT: Denies decreased hearing. Denies oral ulcers. Denies epistaxis. Denies swelling of ears or throat. Admits dry mouth- has improved. Denies swollen glands. Denies hair loss- reports hair very thin but has been thin her whole life.   Endocrine: Admits diabetes. Denies thyroid Problems.   Respiratory: Denies cough. Denies shortness of breath. Denies shortness of breath with exertion- reports has resolved. Denies hemoptysis.   Cardiovascular: Denies chest pain at rest. Denies chest pain with exertion. Denies Irregular heartbeat. Denies palpitations. Denies edema. Denies orthopnea.   Gastrointestinal: Denies abdominal pain. Denies diarrhea. Denies nausea. Denies vomiting. Denies hematemesis or hematochezia. Denies change in appetite. Denies heartburn.  Genitourinary: Denies dysuria. Denies urinary frequency. Denies urinary urgency. Denies blood in urine.  Musculoskeletal: See HPI for  "details  Integumentary: Denies rash. Denies Itching. Admits dry skin- reports hx of very dry skin to her soles of feet and now on her hands. Denies photosensitivity.   Peripheral Vascular: Denies Ulcers of hands and/or feet. Denies Cold extremities.   Neurologic: Admits dizziness. Denies headache. Denies difficulty speaking. Denies loss of strength.  Denies numbness or tingling.   Psychiatric: Admits depression and anxiety- restarting Remeron with Psych Denies suicidal/homicidal ideations.      Objective:     Visit Vitals  /83 (BP Location: Right arm, Patient Position: Sitting)   Pulse 67   Temp 97.4 °F (36.3 °C) (Oral)   Resp 20   Ht 5' 7" (1.702 m)   Wt 86.8 kg (191 lb 6.4 oz)   SpO2 99%   BMI 29.98 kg/m²         Physical Exam    General Appearance: alert, pleasant, in no acute distress.  Skin: Skin color, texture, turgor normal. No rashes or lesions. Bites nails, dry skin noted to soles of feet, peeling as well as on her fingers, fingertips erythematous with very dry skin.  No rashes noted to chest, outer thighs  Eyes:  extraocular movement intact (EOMI), pupils equal, round, reactive to light and accommodation, conjunctiva clear.  ENT: No oral or nasal ulcers. Good salivary pool noted. Poor dentition with several missing teeth noted   Neck:  Neck supple. No adenopathy.   Lungs: CTA bilaterally without crackles, rhonchi, or wheezes.   Heart: RRR w/o murmurs.  No edema. 2+ DP pulse.  Neuro: Alert, oriented, CN II-XII GI, sensory and motor innervation intact.  Musculoskeletal: No active synovitis noted, no pain to bilateral MCPs/PIPs, FROM noted to bilateral wrists, elbows and shoulders with no pain, FROM noted to bilateral knees, mild crepitus noted to left, right ok, no pain, no pain to bilateral ankles or MTPs,   5/5 strength to neck flexors, 5/5 proximal and distal bilateral upper extremities and 5/5 to proximal and distal bilateral lower extremities.   (Initial exam: - Dulce bilaterally, FROM noted to " spine and hips.)  Psych: Alert, oriented, normal eye contact.       Labs Reviewed:   02/19/2024 MELANY positive speckled 1:160, C3/C4 okay, , SSA/SSB negative no other lab completed    06/20/2024 CBC WBC 15.64, Hgb 10.2, Hct 36, MCV 68.3, ANC 11.18, CMP Ca+ 10.5, glucose 214, otherwise okay    7/9/2024  UPC ok. Urine trace protein, no blood noted, +1 Nitrites, CMP glucose 206, CBC WBC 11.87, platelets 754- stable- follows with hematology, RF negative, CCP negative, Scl 70 negative, MELANY by IFA negative, thyroglobulin negative, TPO negative, aldolase 5.8 okay, centromere negative, C3/C4 okay, vitamin-D 54, CRP 1.30 (<5), CK 29 okay, ESR 9 (<20), dsDNA negative, SSA 52, SSA 60, SSB, Sm, Sm/RNP all negative    Rheumatology:  Lab Results   Component Value Date    ANAHS <1:80 (Negative) 07/09/2024    ANATIT 1:160 02/19/2024    ANAPAT Speckled 02/19/2024    C3 143 07/09/2024    C4 43.0 07/09/2024    SEDRATE 9 07/09/2024        Chemistry:  Lab Results   Component Value Date     09/05/2024     08/04/2020    K 4.8 09/05/2024    K 4.0 08/04/2020    CHLORIDE 105 08/04/2020    BUN 8.9 (L) 09/05/2024    BUN 10.0 08/04/2020    CREATININE 0.77 09/05/2024    CREATININE 0.63 08/04/2020    EGFRNORACEVR >60 09/05/2024    GLUCOSE 182 (H) 09/05/2024    GLUCOSE 134 (H) 08/04/2020    CALCIUM 10.3 (H) 09/05/2024    CALCIUM 9.3 08/04/2020    ALKPHOS 107 09/05/2024    LABPROT 7.2 09/05/2024    LABPROT 10.4 (L) 09/05/2024    ALBUMIN 4.1 09/05/2024    BILIDIR 0.1 04/29/2022    IBILI 0.20 04/29/2022    AST 14 09/05/2024    ALT 13 09/05/2024    MG 1.40 (L) 04/29/2024    IYBSEZRE94VO 54 07/09/2024        Hematology:  Lab Results   Component Value Date    WBC 14.45 (H) 09/05/2024    HGB 13.2 09/05/2024    HCT 42.0 09/05/2024     (H) 09/05/2024        Urine:  Lab Results   Component Value Date    APPEARANCEUA Clear 07/09/2024    SGUA 1.022 07/09/2024    PROTEINUA Trace (A) 07/09/2024    KETONESUA Trace (A) 07/09/2024    LEUKOCYTESUR  75 (A) 2024    RBCUA 0-5 2024    WBCUA 6-10 (A) 2024    BACTERIA Few (A) 2024    SQEPUA Trace (A) 2024    HYALINECASTS None Seen 2024    CREATRANDUR 150.7 (H) 2024    PROTEINURINE 14.3 2024    UPROTCREA 0.1 2024        Lab Results   Component Value Date    PROTEINURINE 14.3 2024    CREATRANDUR 150.7 (H) 2024    UPROTCREA 0.1 2024        Imagin2023 CT chest abdomen/pelvis W/contrast impression: Left adrenal nodule measuring 1.9 cm, slightly larger compared to prior from 2016, likely a lipid poor adrenal adenoma.  Large hiatal hernia, otherwise no evidence of malignancy to chest abdomen or pelvis.    2023 Lumbar Xray:Impression: Degenerative changes in the lower lumbar spine. Questionable spondylolysis at L5-S1.   Oblique views may be beneficial for correlation.    2024 CXRay: Impression:  No acute cardiopulmonary abnormality.    2024 ECHO: LV EF 65-70%  Assessment:       ICD-10-CM ICD-9-CM   1. Weakness  R53.1 780.79   2. Thrombocytosis  D75.839 238.71   3. Dry mouth  R68.2 527.7   4. Dry eyes  H04.123 375.15   5. Iron deficiency  E61.1 280.9   6. Primary hypertension  I10 401.9   7. Mild CAD  I25.10 414.00   8. Cigarette nicotine dependence without complication  F17.210 305.1   9. POSEY (dyspnea on exertion)  R06.09 786.09   10. Type 2 diabetes mellitus without complication, with long-term current use of insulin  E11.9 250.00    Z79.4 V58.67   11. JIN (obstructive sleep apnea)  G47.33 327.23   12. Anxiety and depression  F41.9 300.00    F32.A 311   13. Degeneration of intervertebral disc of lumbar region, unspecified whether pain present  M51.369 722.52          Plan:     1. Positive MELANY (antinuclear antibody)- repeat lab 2024 negative  Presents today for follow up of +MELANY, lab on 2024 revealed MELANY positive speckled 1:160, C3/C4 okay, , SSA/SSB negative. She denies joint pain daily, no red/warm/swollen joints  but reports generalized pain all over her body. She denies fevers/LAD/hair loss but does reports fatigue. She reports a history of dizziness, nausea and weakness that will occur at times- possibly due to severe anemia. She denies any falls and denies any vaginal/rectal bleeding. She suffers with dry eyes and dry mouth- states using Restasis gtts as directed per Optometrist and provides some relief. Dry mouth all day- denies any use of antihistamines- admits to only drinking 1-2 16 oz cups of water a day and several cups of coffee all day. She does not follow with a dentist- has had loss of teeth over the past 1-2 years, mainly to top. She has tried mouth wash but does not use consistently to help with dry mouth.    - Today, after last OV, repeat MELANY in July along with all ENAs, RF, CCP negative- she reports overall improvement in both dry eyes and dry mouth, no active synovitis noted on exam.    2. Iron deficiency/Thrombocytosis  - Followed with Hematology, currently on IV Iron infusions    3.  Primary hypertension/JIN/T2DM  - Currently at goal today, continue follow-up with PCP and Cardiology, encourage low Na+ diet  - Reports noncompliant with CPAP as is uncomfortable and makes dry mouth worse- enc to discuss with PCP possible re-eval for fitting  - A1C 6.6 February 2024- followed by Endo, enc low sugar/carb/starch diet modifications.     4. Mild CAD  - ECHO July 2024 LV EF 65-70%, 09/23/2024 [facility]  - Continue to follow up with Cardiology    5. Cigarette nicotine dependence without complication  - Strongly enc to quit smoking- she reports she does want to quit and has patches, plans to start using  - LD CT Chest may be beneficial for yearly monitoring as she has not had- advised to discuss with PCP   - 7/9/2024 CXRay: Impression: No acute cardiopulmonary abnormality.    6.  Weakness  - No weakness noted on exam today, 5/5 strength to BUE and BLE proximal and distal extremitites,  CK and Aldolase ok July 2024-  most  likely due to sedentary lifestyle and severe anemia- did discuss possible PT in the future  However today on exam very dry skin to bilateral hands with cracking, we will workup further with myositis panel for further evaluation.  She denies rashes on her chest/lateral thighs, only issues with dry skin to bilateral hands and soles of feet.    7. Dry mouth/Dry Eyes  - Enc Biotene mouth was as directed, sugar free lemon candies-  strongly enc to increase H20 intake as she is not drinking enough water daily and consuming excessive amounts of caffeine/coffee- at length discussion to increase H20 intake to 6-8 cups of water daily and decrease intake of coffee- may also be contributing to difficulty sleeping at night  - Also enc to follow up with dentist for eval/poor dentition  - Continue use of Restasis gtts as directed   - Overall reports has improved since initial ov    9. DDD Lower Back  - 7/13/2023 Lumbar Xray:Impression: Degenerative changes in the lower lumbar spine. Questionable spondylolysis at L5-S1.   Oblique views may be beneficial for correlation.  - Denies any numbness or tingling into legs  - Enc stretches   - Enc to discuss with PCP possible PT eval    11. Anxiety and Depression  - Reports about to restart medication- currently following with psych/PCP, she denies any S/H ideation, encouraged to continue follow-up with psych provider     12. Adrenal Adenoma  - 08/18/2023 CT chest abdomen/pelvis W/contrast impression: Left adrenal nodule measuring 1.9 cm, slightly larger compared to prior from 2016, likely a lipid poor adrenal adenoma.  Large hiatal hernia, otherwise no evidence of malignancy to chest abdomen or pelvis.  - Encouraged to continue to follow-up with PCP/endo for continued monitoring.    No follow-ups on file. In addition to their scheduled follow up, the patient has also been instructed to follow up on as needed basis.       Total time spent with patient and documentation is 65 minutes. All  questions were answered to patient's satisfaction and patient verbalized understanding. This includes face to face time and non-face to face time preparing to see the patient (eg, review of tests), obtaining and/or reviewing separately obtained history, documenting clinical information in the electronic or other health record, independently interpreting results and communicating results to the patient/family/caregiver, or care coordinator.  Today's visit included increased complexity associated with the care of episodic problem of lab review/Fatigue/weakness/previous lab and imaging reviewed addressed and managing the longitudinal care of the patient due to the series and or complex managed problem(s).   Total time also spent discussing importance of tobacco cessation x3 minutes.     Orders Placed This Encounter   Procedures    Miscellaneous Test, Sendout MELANY by IFA, send to [facility]     [id number]     Standing Status:   Future     Standing Expiration Date:   1/15/2026     Order Specific Question:   What is the name of the test you wish to order? (Note: Please provide the reference lab test code if possible. If you have any questions, call the Lab.)     Answer:   MELANY by IFA, send to [facility]    Miscellaneous Test, Sendout Extended myositis panel, send to [facility]. [id number]     Standing Status:   Future     Standing Expiration Date:   1/15/2026     Order Specific Question:   What is the name of the test you wish to order? (Note: Please provide the reference lab test code if possible. If you have any questions, call the Lab.)     Answer:   Extended myositis panel, send to [facility]. [id number]    Miscellaneous Test, Sendout Anti IBM antibody(cN1A).     Standing Status:   Future     Standing Expiration Date:   1/15/2026     Order Specific Question:   What is the name of the test you wish to order? (Note: Please provide the reference lab test code if possible. If you have any questions, call the Lab.)     Answer:   Anti IBM antibody(cN1A).     CK     Standing Status:   Future     Standing Expiration Date:   1/14/2026    Aldolase     Standing Status:   Future     Standing Expiration Date:   1/14/2026    CBC Auto Differential     Standing Status:   Future     Standing Expiration Date:   1/14/2026    Comprehensive Metabolic Panel     Standing Status:   Future     Standing Expiration Date:   1/14/2026    C-Reactive Protein     Standing Status:   Future     Standing Expiration Date:   1/14/2026    Sedimentation rate     Standing Status:   Future     Standing Expiration Date:   1/14/2026        ANAHI Deal

## 2024-11-15 ENCOUNTER — LAB VISIT (OUTPATIENT)
Dept: LAB | Facility: HOSPITAL | Age: 59
End: 2024-11-15
Attending: NURSE PRACTITIONER
Payer: MEDICAID

## 2024-11-15 ENCOUNTER — TELEPHONE (OUTPATIENT)
Dept: FAMILY MEDICINE | Facility: CLINIC | Age: 59
End: 2024-11-15
Payer: MEDICAID

## 2024-11-15 ENCOUNTER — OFFICE VISIT (OUTPATIENT)
Dept: RHEUMATOLOGY | Facility: CLINIC | Age: 59
End: 2024-11-15
Payer: MEDICAID

## 2024-11-15 VITALS
WEIGHT: 191.38 LBS | HEIGHT: 67 IN | RESPIRATION RATE: 20 BRPM | DIASTOLIC BLOOD PRESSURE: 83 MMHG | HEART RATE: 67 BPM | OXYGEN SATURATION: 99 % | BODY MASS INDEX: 30.04 KG/M2 | TEMPERATURE: 97 F | SYSTOLIC BLOOD PRESSURE: 124 MMHG

## 2024-11-15 DIAGNOSIS — E11.9 TYPE 2 DIABETES MELLITUS WITHOUT COMPLICATION, WITH LONG-TERM CURRENT USE OF INSULIN: ICD-10-CM

## 2024-11-15 DIAGNOSIS — R06.09 DOE (DYSPNEA ON EXERTION): ICD-10-CM

## 2024-11-15 DIAGNOSIS — F41.9 ANXIETY AND DEPRESSION: ICD-10-CM

## 2024-11-15 DIAGNOSIS — R53.1 WEAKNESS: ICD-10-CM

## 2024-11-15 DIAGNOSIS — E83.42 HYPOMAGNESEMIA: ICD-10-CM

## 2024-11-15 DIAGNOSIS — M51.369 DEGENERATION OF INTERVERTEBRAL DISC OF LUMBAR REGION, UNSPECIFIED WHETHER PAIN PRESENT: ICD-10-CM

## 2024-11-15 DIAGNOSIS — H04.123 DRY EYES: ICD-10-CM

## 2024-11-15 DIAGNOSIS — F17.210 CIGARETTE NICOTINE DEPENDENCE WITHOUT COMPLICATION: ICD-10-CM

## 2024-11-15 DIAGNOSIS — E61.1 IRON DEFICIENCY: ICD-10-CM

## 2024-11-15 DIAGNOSIS — G47.33 OSA (OBSTRUCTIVE SLEEP APNEA): ICD-10-CM

## 2024-11-15 DIAGNOSIS — I10 PRIMARY HYPERTENSION: ICD-10-CM

## 2024-11-15 DIAGNOSIS — R53.1 WEAKNESS: Primary | ICD-10-CM

## 2024-11-15 DIAGNOSIS — F32.A ANXIETY AND DEPRESSION: ICD-10-CM

## 2024-11-15 DIAGNOSIS — D75.839 THROMBOCYTOSIS: ICD-10-CM

## 2024-11-15 DIAGNOSIS — I25.10 MILD CAD: ICD-10-CM

## 2024-11-15 DIAGNOSIS — Z79.4 TYPE 2 DIABETES MELLITUS WITHOUT COMPLICATION, WITH LONG-TERM CURRENT USE OF INSULIN: ICD-10-CM

## 2024-11-15 DIAGNOSIS — R68.2 DRY MOUTH: ICD-10-CM

## 2024-11-15 LAB
ALBUMIN SERPL-MCNC: 3.9 G/DL (ref 3.5–5)
ALBUMIN/GLOB SERPL: 1.2 RATIO (ref 1.1–2)
ALP SERPL-CCNC: 104 UNIT/L (ref 40–150)
ALT SERPL-CCNC: 19 UNIT/L (ref 0–55)
ANION GAP SERPL CALC-SCNC: 9 MEQ/L
AST SERPL-CCNC: 15 UNIT/L (ref 5–34)
BASOPHILS # BLD AUTO: 0.09 X10(3)/MCL
BASOPHILS NFR BLD AUTO: 0.8 %
BILIRUB SERPL-MCNC: 0.3 MG/DL
BUN SERPL-MCNC: 13.3 MG/DL (ref 9.8–20.1)
CALCIUM SERPL-MCNC: 10.6 MG/DL (ref 8.4–10.2)
CHLORIDE SERPL-SCNC: 103 MMOL/L (ref 98–107)
CK SERPL-CCNC: <140 U/L (ref 29–168)
CO2 SERPL-SCNC: 25 MMOL/L (ref 22–29)
CREAT SERPL-MCNC: 0.71 MG/DL (ref 0.55–1.02)
CREAT/UREA NIT SERPL: 19
CRP SERPL-MCNC: 1.3 MG/L
EOSINOPHIL # BLD AUTO: 0.5 X10(3)/MCL (ref 0–0.9)
EOSINOPHIL NFR BLD AUTO: 4.2 %
ERYTHROCYTE [DISTWIDTH] IN BLOOD BY AUTOMATED COUNT: 22.6 % (ref 11.5–17)
ERYTHROCYTE [SEDIMENTATION RATE] IN BLOOD: 4 MM/HR (ref 0–20)
GFR SERPLBLD CREATININE-BSD FMLA CKD-EPI: >60 ML/MIN/1.73/M2
GLOBULIN SER-MCNC: 3.3 GM/DL (ref 2.4–3.5)
GLUCOSE SERPL-MCNC: 160 MG/DL (ref 74–100)
HCT VFR BLD AUTO: 44.3 % (ref 37–47)
HGB BLD-MCNC: 14.5 G/DL (ref 12–16)
IMM GRANULOCYTES # BLD AUTO: 0.18 X10(3)/MCL (ref 0–0.04)
IMM GRANULOCYTES NFR BLD AUTO: 1.5 %
LYMPHOCYTES # BLD AUTO: 1.98 X10(3)/MCL (ref 0.6–4.6)
LYMPHOCYTES NFR BLD AUTO: 16.7 %
MAGNESIUM SERPL-MCNC: 1.7 MG/DL (ref 1.6–2.6)
MCH RBC QN AUTO: 26.2 PG (ref 27–31)
MCHC RBC AUTO-ENTMCNC: 32.7 G/DL (ref 33–36)
MCV RBC AUTO: 80 FL (ref 80–94)
MONOCYTES # BLD AUTO: 1.02 X10(3)/MCL (ref 0.1–1.3)
MONOCYTES NFR BLD AUTO: 8.6 %
NEUTROPHILS # BLD AUTO: 8.1 X10(3)/MCL (ref 2.1–9.2)
NEUTROPHILS NFR BLD AUTO: 68.2 %
NRBC BLD AUTO-RTO: 0 %
PLATELET # BLD AUTO: 613 X10(3)/MCL (ref 130–400)
PMV BLD AUTO: 10.2 FL (ref 7.4–10.4)
POTASSIUM SERPL-SCNC: 4.3 MMOL/L (ref 3.5–5.1)
PROT SERPL-MCNC: 7.2 GM/DL (ref 6.4–8.3)
RBC # BLD AUTO: 5.54 X10(6)/MCL (ref 4.2–5.4)
SODIUM SERPL-SCNC: 137 MMOL/L (ref 136–145)
WBC # BLD AUTO: 11.87 X10(3)/MCL (ref 4.5–11.5)

## 2024-11-15 PROCEDURE — 36415 COLL VENOUS BLD VENIPUNCTURE: CPT

## 2024-11-15 PROCEDURE — 85025 COMPLETE CBC W/AUTO DIFF WBC: CPT

## 2024-11-15 PROCEDURE — 82550 ASSAY OF CK (CPK): CPT

## 2024-11-15 PROCEDURE — 99215 OFFICE O/P EST HI 40 MIN: CPT | Mod: PBBFAC | Performed by: NURSE PRACTITIONER

## 2024-11-15 PROCEDURE — 80053 COMPREHEN METABOLIC PANEL: CPT

## 2024-11-15 PROCEDURE — 83735 ASSAY OF MAGNESIUM: CPT

## 2024-11-15 PROCEDURE — 82085 ASSAY OF ALDOLASE: CPT

## 2024-11-15 PROCEDURE — 85652 RBC SED RATE AUTOMATED: CPT

## 2024-11-15 PROCEDURE — 86140 C-REACTIVE PROTEIN: CPT

## 2024-11-15 PROCEDURE — 86038 ANTINUCLEAR ANTIBODIES: CPT

## 2024-11-15 NOTE — TELEPHONE ENCOUNTER
Called patient to give results. Patient verbalized understanding. No additional questions at this time.       ----- Message from Leni Presley MD sent at 11/6/2024  4:25 PM CST -----  Please let the patient know that the diabetic eye screening showed no retinopathy on the right eye, but left eye had mild diabetic retinopathy.  They are also suspecting some glaucoma changes and they are recommending her to get a test in 3 months so I sent referral to Ophthalmology.

## 2024-11-16 LAB — ALDOLASE SERPL-CCNC: 5.8 U/L

## 2024-11-18 DIAGNOSIS — D50.9 IRON DEFICIENCY ANEMIA, UNSPECIFIED IRON DEFICIENCY ANEMIA TYPE: Primary | ICD-10-CM

## 2024-11-19 ENCOUNTER — DOCUMENTATION ONLY (OUTPATIENT)
Facility: CLINIC | Age: 59
End: 2024-11-19
Payer: MEDICAID

## 2024-11-19 ENCOUNTER — OFFICE VISIT (OUTPATIENT)
Dept: GYNECOLOGY | Facility: CLINIC | Age: 59
End: 2024-11-19
Payer: MEDICAID

## 2024-11-19 VITALS
DIASTOLIC BLOOD PRESSURE: 75 MMHG | WEIGHT: 192 LBS | HEIGHT: 67 IN | TEMPERATURE: 98 F | OXYGEN SATURATION: 97 % | HEART RATE: 77 BPM | BODY MASS INDEX: 30.13 KG/M2 | SYSTOLIC BLOOD PRESSURE: 135 MMHG | RESPIRATION RATE: 18 BRPM

## 2024-11-19 DIAGNOSIS — Z01.419 WOMEN'S ANNUAL ROUTINE GYNECOLOGICAL EXAMINATION: Primary | ICD-10-CM

## 2024-11-19 DIAGNOSIS — Z12.31 SCREENING MAMMOGRAM FOR BREAST CANCER: ICD-10-CM

## 2024-11-19 DIAGNOSIS — R23.2 HOT FLASHES: ICD-10-CM

## 2024-11-19 PROCEDURE — 3078F DIAST BP <80 MM HG: CPT | Mod: CPTII,,, | Performed by: NURSE PRACTITIONER

## 2024-11-19 PROCEDURE — 3044F HG A1C LEVEL LT 7.0%: CPT | Mod: CPTII,,, | Performed by: NURSE PRACTITIONER

## 2024-11-19 PROCEDURE — 1160F RVW MEDS BY RX/DR IN RCRD: CPT | Mod: CPTII,,, | Performed by: NURSE PRACTITIONER

## 2024-11-19 PROCEDURE — 99215 OFFICE O/P EST HI 40 MIN: CPT | Mod: PBBFAC | Performed by: NURSE PRACTITIONER

## 2024-11-19 PROCEDURE — 3075F SYST BP GE 130 - 139MM HG: CPT | Mod: CPTII,,, | Performed by: NURSE PRACTITIONER

## 2024-11-19 PROCEDURE — 1159F MED LIST DOCD IN RCRD: CPT | Mod: CPTII,,, | Performed by: NURSE PRACTITIONER

## 2024-11-19 PROCEDURE — 3008F BODY MASS INDEX DOCD: CPT | Mod: CPTII,,, | Performed by: NURSE PRACTITIONER

## 2024-11-19 PROCEDURE — 99396 PREV VISIT EST AGE 40-64: CPT | Mod: S$PBB,,, | Performed by: NURSE PRACTITIONER

## 2024-11-19 PROCEDURE — 4010F ACE/ARB THERAPY RXD/TAKEN: CPT | Mod: CPTII,,, | Performed by: NURSE PRACTITIONER

## 2024-11-19 NOTE — PROGRESS NOTES
"Patient ID: Jessica Walker is a 59 y.o. female.    Chief Complaint: Gynecologic Exam      Review of patient's allergies indicates:   Allergen Reactions    Ivp dye [iodinated contrast media] Itching              HPI:  The patient is  here for annual gyn exam. Last pap 3/2023-NIL; HPV negative. Pt is s/p hysteroscopy D&C 2023 with gyn resident team for PMB. She denies any further episodes of bleeding. She is not sexually active and has not been in years. Denies breast complaints. Denies pelvic pain. Pt c/o hot flashes that are worsening. She is requesting treatment. PMHx-DM, HTN, Pt is smoker. Sister recently diagnosed with breast cancer. Pt is established with outside psyc for anxiety/depression and is currently on remeron.     Review of Systems:   Negative except for findings in HPI     Objective:   /75   Pulse 77   Temp 98.4 °F (36.9 °C) (Oral)   Resp 18   Ht 5' 7" (1.702 m)   Wt 87.1 kg (192 lb)   SpO2 97%   BMI 30.07 kg/m²    Physical Exam:  GENERAL: Pt is aware and alert and  in no acute distress.  BREASTS: Bilateral-No masses, nipple discharge, skin changes, or tenderness.  ABDOMEN: Soft, non tender.  VULVA:  No lesions or skin changes.  URETHRA: No lesions  BLADDER: No tenderness.  VAGINA: Mucosa pale,no discharge; no lesions.  CERVIX:  no CMT, NO discharge; NO lesions  BIMANUAL EXAM: reveals a 8-week-sized uterus. The uterus is mobile, nontender, no palpable masses. Emerson adnexa reveal no evidence of masses; no fullness   SKIN: Warm and Dry  PSYCHIATRIC: Patient is awake and alert. Mood and affect are normal.    Assessment:   Women's annual routine gynecological examination    Screening mammogram for breast cancer  -     Mammo Digital Screening Bilat w/ Claude; Future; Expected date: 2025    Hot flashes            1. Women's annual routine gynecological examination    2. Screening mammogram for breast cancer    3. Hot flashes             -pelvic; pap UTD  -front office to schedule " with gyn resident clinic to address hot flashes; will avoid HRT d/t age and comorbidities. Did recommend otc estroven in the interim. Decrease triggers (hot/spicy foods and beverages)  Plan:       Follow up for next availabe [facility] gyn resident team.

## 2024-11-20 LAB — BEAKER SEE SCANNED REPORT: NORMAL

## 2024-11-21 ENCOUNTER — TELEPHONE (OUTPATIENT)
Dept: HEMATOLOGY/ONCOLOGY | Facility: CLINIC | Age: 59
End: 2024-11-21
Payer: MEDICAID

## 2024-11-21 ENCOUNTER — PATIENT MESSAGE (OUTPATIENT)
Dept: ADMINISTRATIVE | Facility: OTHER | Age: 59
End: 2024-11-21
Payer: MEDICAID

## 2024-11-21 ENCOUNTER — TELEPHONE (OUTPATIENT)
Dept: FAMILY MEDICINE | Facility: CLINIC | Age: 59
End: 2024-11-21
Payer: MEDICAID

## 2024-11-21 DIAGNOSIS — Z79.4 TYPE 2 DIABETES MELLITUS WITH OTHER SPECIFIED COMPLICATION, WITH LONG-TERM CURRENT USE OF INSULIN: Primary | ICD-10-CM

## 2024-11-21 DIAGNOSIS — E11.69 TYPE 2 DIABETES MELLITUS WITH OTHER SPECIFIED COMPLICATION, WITH LONG-TERM CURRENT USE OF INSULIN: Primary | ICD-10-CM

## 2024-11-21 RX ORDER — DULAGLUTIDE 3 MG/.5ML
3 INJECTION, SOLUTION SUBCUTANEOUS WEEKLY
Qty: 4 PEN | Refills: 1 | Status: SHIPPED | OUTPATIENT
Start: 2024-11-21 | End: 2024-11-21

## 2024-11-21 RX ORDER — DULAGLUTIDE 3 MG/.5ML
1 INJECTION, SOLUTION SUBCUTANEOUS
Qty: 4 PEN | Refills: 1 | Status: SHIPPED | OUTPATIENT
Start: 2024-11-21

## 2024-11-21 NOTE — TELEPHONE ENCOUNTER
Called patient via phone call and patient understands results given. No further questions at this time.     ----- Message from Leni Presley MD sent at 11/19/2024  1:28 PM CST -----  Let the patient know that her magnesium level has normalized.

## 2024-11-22 ENCOUNTER — OFFICE VISIT (OUTPATIENT)
Dept: HEMATOLOGY/ONCOLOGY | Facility: CLINIC | Age: 59
End: 2024-11-22
Payer: MEDICAID

## 2024-11-22 DIAGNOSIS — D50.9 IRON DEFICIENCY ANEMIA, UNSPECIFIED IRON DEFICIENCY ANEMIA TYPE: Primary | ICD-10-CM

## 2024-11-22 PROCEDURE — 1159F MED LIST DOCD IN RCRD: CPT | Mod: CPTII,95,, | Performed by: NURSE PRACTITIONER

## 2024-11-22 PROCEDURE — 99442 PR PHYSICIAN TELEPHONE EVALUATION 11-20 MIN: CPT | Mod: 95,,, | Performed by: NURSE PRACTITIONER

## 2024-11-22 PROCEDURE — 1160F RVW MEDS BY RX/DR IN RCRD: CPT | Mod: CPTII,95,, | Performed by: NURSE PRACTITIONER

## 2024-11-22 PROCEDURE — 3044F HG A1C LEVEL LT 7.0%: CPT | Mod: CPTII,95,, | Performed by: NURSE PRACTITIONER

## 2024-11-22 PROCEDURE — 4010F ACE/ARB THERAPY RXD/TAKEN: CPT | Mod: CPTII,95,, | Performed by: NURSE PRACTITIONER

## 2024-11-22 NOTE — PROGRESS NOTES
Established Patient - Audio Only Telehealth Visit     The patient location is: [place], [place]   The chief complaint leading to consultation is: FU on Iron def anemia  Visit type: Virtual visit with audio only (Telemedicine visit)  Total time spent with patient: 12 minutes        The reason for the audio only service rather than synchronous audio and video virtual visit was related to technical difficulties or patient preference/necessity.     Each patient to whom I provide medical services by telemedicine is:  (1) informed of the relationship between the physician and patient and the respective role of any other health care provider with respect to management of the patient; and (2) notified that they may decline to receive medical services by telemedicine and may withdraw from such care at any time. Patient verbally consented to receive this service via voice-only telephone call.       Reason for Follow-up:  recurrent severe iron-deficiency anemia; no definite GI source of bleeding  -low risk essential thrombocytosis     Current Treatment:  ASA 81mg po daily     Treatment History:  -S/P Feraheme 510 mg IV x2 (2023, 2023)  -S/P Ferrlecit 125mg IV x 8  (,,,,,)  -S/P Venofer 250mg IV X 5 doses (,,5/3,)  -S/P Venofer 250mg IV (, , , in )  -S/P Venofer 250mg x 10 doses (     Past medical history: NIDDM.  Hypertension.  Dyslipidemia.  History of anemia.  Vitamin D deficiency.  Tobacco abuse.  Obesity.  Obstructive sleep apnea.  HFpEF. Leukocytosis.  Cholecystectomy.  Goiter surgery.  Social history: Single.  Lives in [place], [place].  Has 2 grown up sons.  Has been smoking 1-1/2 packs of cigarettes daily since age 10.  Has tried to quit smoking many times but does not get refills of Chantix, therefore, has been unable to.  Currently, down to 10 cigarettes daily.  Denies history of alcohol or illicit drug abuse.  Family history: Sister   from cervical cancer at age 55.  Health maintenance:   EGD 02/21/2018 (Dr. Koo in [place]: Esophageal dilation).    Colonoscopy 02/28/2018 (Dr. Koo in [place]), apparently to be repeated in 2 years.    Mammogram 05/14/2018: Negative for malignancy.   ThinPrep cervical Pap smear 04/14/2016: Negative for intraepithelial lesion or malignancy; negative for HPV high-risk types (type 16 and type 18/45)  Menstrual and OB/GYN history: Menopause in 2015.  For last 3 months or so, has experienced vaginal spotting.  Apparently, had Pap smear done at [facility] 2 months back (probably December 2018), which was apparently unremarkable. Pelvic ultrasound on 02/08/2019 showed normal endometrium; likely myomatous changes of uterus with a lower uterine segment lipoma leiomyoma.         History of Present Illness:   53-year-old lady referred from [facility] for evaluation of leukocytosis.     For details, please see my last note dated 09/03/2020.  Please also refer to assessment and plan section.     Interval History:    EGD on 12/19, and possible hernia repair. They will give her a date after completion of EGD. Patient is very concerned, and would like to receive iron infusions weekly despite her iron levels being improved. Concerned she will die from low iron levels. She would like more frequent lab monitoring and iron more regularly. She has started taking oral iron 3x per week and cows colostrum which she believes is helpful.        8/29/24:  Patient was scheduled in the clinic today for a virtual follow-up regarding her iron-deficiency anemia.  She states that she continues to feel weak, tired and exhausted more than normal.  She states that it is difficult for her to do her activities of daily living. She reports taking her aspirin 81 mg approximately 3 times a week.  She reports that she will have an angiogram done on 09/18.  Denies any chest pain, dizziness, headache, heart  palpitations.  Denies any active bleeding or unusual bruising.  All future appointments were discussed.        This is a telemedicine note. Patient was treated using telemedicine, real time audio and video, according to [facility] protocols. I, distant provider, conducted the visit from location identified below. The patient participated in the visit at a non-[facility] location selected by the patient (or patient's representative), identified below. I am licensed in the state where the patient stated they are located. The patient (or patient's representative) stated that they understood and accepted the privacy and security risks to their information at their location. Patient was located at her/his residence in , [place]. I, distant provider, was located at [facility].    Face to face time spent with patient exceeds 15 minutes, over 50% of which was used for education and counseling regarding medical conditions, current medications including risk/benefit and side effects/adverse events, over the counter medications-uses/doses. The patient is receptive, expresses understanding and is agreeable to plan. All questions answered.         Physical Examination: Physical Exam was not completed today as this was a telemedicine visit.     Assessment:  Severe iron deficiency anemia:   Presented as moderate anemia with severe microcytosis    EGD, colonoscopy negative for any bleeders (02/2018)   Severe iron deficiency    Feraheme (03/2019)   Hemoglobin dropped from 14.8 (06/2019) to 9.5 (08/28/2019) due to iron deficiency   Feraheme x2 (09/2019) --> improvement in iron stores and normalization of hemoglobin (9.5 --> 13.7)   CT enterogram (09/11/2019): Hiatal hernia; 1.7 cm left adrenal nodule   -03/10/2020: Remains iron deficient (transferrin saturation 13.2%, ferritin 19.2)   -Did not present for Feraheme shots in a timely manner   -04/27/2020: Ferritin 4.9 (down from 19.2 on 03/10/2020); however, hemoglobin 12.4, MCV 83.0   (Iron deficiency  erythropoiesis)   -Feraheme 510 mg IV x2 (04/27/2020; 05/04/2020)   -06/01/2020: Iron deficiency resolved with parenteral iron therapy   -However, as of 06/01/2020, severe recurrent iron deficiency remains unexplained   -07/01/2020: Iron stores dropping again within a month; hemoglobin remains normal   -09/02/2020: Iron stores normal/stable (ferritin 21, transferrin saturation 15.8%); hemoglobin 14.8, normal   -09/08/2020: Feels better and more energetic than before   -11/12/2020: Iron deficient; hemoglobin 15.1  -08/04/2020:  [facility]:  Insurance denied capsule endoscopy.  Upper single balloon enteroscopy performed.  1. Duodenum biopsy:  Minimal focal acute inflammation with reactive changes; no celiac sprue  2. Duodenal bulb:  Biopsy showed no significant histopathologic findings; no evidence of celiac sprue  3. Stomach biopsy:  Chemical/reactive gastropathy; H pylori stain negative  -S/P Feraheme 510 mg IV x2 (05/17/2021, 05/24/2021)  -S/P Feraheme 510 mg IV x2 (07/09/2021, 07/19/2021)  -10/27/2021: Colonoscopy (iron-deficiency anemia):  Diffuse diverticulosis; otherwise unremarkable  -10/27/2021:  EGD with APC fulguration and biopsy (iron-deficiency anemia): unctate AVMs within the stomach.  A directed fulguration with APC with good result achieved, superficial ulcerations within the duodenal bulb, possibly secondary to daily aspirin use.  Biopsy taken in the antrum to rule out Helicobacter pylori by pathology  -S/P Feraheme 510 mg IV x2 (08/29/2022, 09/08/2022)  -11/11/2022:  WBC 12.7.  Hemoglobin 12.8.  Platelets 543 K. ferritin 251.70 (was 93.95 on 08/29/2022, 24.88 on 06/29/2022) (transferrin saturation was 19% on 08/29/2022, 7% on 06/29/2022)  -01/11/2023: Labs reviewed.  Hemoglobin 11.2 (hemoglobin was 15.9 on 08/29/2022).  MCV 85.0.  Platelets 562 K, stable.  Ferritin 9.02, down from 251.7 on 11/11/2022.  Transferrin saturation is 6%, low.  CMP unremarkable except glucose 188 mg/dL.  -S/P Feraheme 510 mg IV  x2 (01/11/2023, 01/18/2023)  To summarize:   -Recurrent severe iron deficiency anemia, unexplained   -EGD, colonoscopy negative (02/2018)   -CT enterogram negative (09/11/2019)   -has required multiple rounds of parenteral iron therapy over past 4-5 years  -no source of bleeding, H pylori gastritis, or celiac sprue on multiple GI endoscopic procedures including EGD, colonoscopy, CT enteroscopy, and upper single balloon enteroscopy   [ferritin level has dropped:  9.02 (01/11/2023), down from 251.7 (11/11/2022)]  [Hemoglobin: 11.2 on 01/11/2023; down from 15.9 on 08/29/2022)  -treated with Feraheme 510 mg IV x2 (01/11/2023, 01/18/2023)  -02/27/2023:  Hemoglobin 14.6, remarkably improved with Feraheme.  Transferrin saturation 17%, improved but remains low. Ferritin 55.55 (was 9.02 on 01/11/2023)  -09/21/2023: Hgb 11.6, iron 26, iron sat 9 %, TIBC 276, Ferritin 30.30     Thrombocytosis (low risk IPSET-thrombosis score ET)    (age < 60 years; JAK2 V617F mutation positive)   GAGANDEEP-2 V617F mutation positive (03/2019)   Bone marrow consistent (06/2019)   Low risk IPSET-thrombosis score ET   Cardiovascular risk factors: NIDDM, hypertension, tobacco abuse   -04/27/2020: Platelets 500K, stable   -06/01/2020: Platelets 561K, more or less stable   -09/02/2020: Platelets 544K, stable   -11/12/2020: Platelets 567,000/mm³, stable  -11/11/2022:  Platelets 543 K, stable   -02/27/2023:  Platelets 623 K  -09/21/2023:  Platelets 600k, stable.      Chronic, very mild, benign-appearing, intermittent leukocytosis:   Secondary to smoking or underlying myeloproliferative disorder   GAGANDEEP-2 V617F mutation positive (03/2019)   BCR-ABL 1 fusion transcripts negative (03/2019)   Underlying essential thrombocytosis     Plan:  -Recurrent severe iron deficiency anemia, unexplained  -EGD, colonoscopy negative (02/2018)  -CT enterogram negative (09/11/2019)  -has required multiple rounds of parenteral iron therapy over past 4-5 years  -no source of  bleeding, H pylori gastritis, or celiac sprue on multiple GI endoscopic procedures including EGD, colonoscopy, CT enteroscopy, and upper single balloon enteroscopy  >>>  [ferritin level has dropped:  9.02 (01/11/2023), down from 251.7 (11/11/2022)]  [Hemoglobin: 11.2 on 01/11/2023; down from 15.9 on 08/29/2022)  -treated with Feraheme 510 mg IV x2 (01/11/2023, 01/18/2023)  -02/27/2023:  Hemoglobin 14.6, remarkably improved with Feraheme.  Transferrin saturation 17%, improved but remains low. Ferritin 55.55 (was 9.02 on 01/11/2023)  -8/2/2023: Hgb 14.8, Hct 47.4, Plt 633, iron 40, iron sat 15% (improved)  -requiring multiple rounds of parenteral iron therapy in the past, essentially with negative exhaustive GI workup     Iron Deficiency Anemia:   - Give Venofer IV x 10 doses -per patient's request  -Educated patient on compliance with infusions  -RTC in 6 weeks after the completion of the last dose of Venofer via telemed with labwork completed before CBC/CMP/Serum iron/TIBC/Ferrtin Level      Low risk essential thrombocytosis    -No history of Thrombosis. Age < 60  -Continue OTC ASA 81 mg tablet daily. Do not miss any doses.   -Advised her elevated platelets can be secondary to anemia as well.      Smoker:   - Instructed the patient on smoking cessation    Above discussed at length with her.  All questions answered.    She understands and agrees with this plan.     This service was not originating from a related E/M service provided within the previous 7 days nor will  to an E/M service or procedure within the next 24 hours or my soonest available appointment.  Prevailing standard of care was able to be met in this audio-only visit.          No follow-ups on file.               Answers submitted by the patient for this visit:  Review of Systems Questionnaire (Submitted on 11/15/2024)  appetite change : No  unexpected weight change: No  mouth sores: No  visual disturbance: No  cough: No  shortness of breath:  No  chest pain: No  abdominal pain: No  diarrhea: No  frequency: No  back pain: Yes  rash: No  headaches: No  adenopathy: No  nervous/ anxious: Yes

## 2024-11-27 ENCOUNTER — PATIENT MESSAGE (OUTPATIENT)
Dept: ADMINISTRATIVE | Facility: OTHER | Age: 59
End: 2024-11-27
Payer: MEDICAID

## 2024-12-04 ENCOUNTER — TELEPHONE (OUTPATIENT)
Dept: FAMILY MEDICINE | Facility: CLINIC | Age: 59
End: 2024-12-04
Payer: MEDICAID

## 2024-12-04 ENCOUNTER — OFFICE VISIT (OUTPATIENT)
Dept: GASTROENTEROLOGY | Facility: CLINIC | Age: 59
End: 2024-12-04
Payer: MEDICAID

## 2024-12-04 ENCOUNTER — LAB VISIT (OUTPATIENT)
Dept: LAB | Facility: HOSPITAL | Age: 59
End: 2024-12-04
Attending: NURSE PRACTITIONER
Payer: MEDICAID

## 2024-12-04 VITALS
RESPIRATION RATE: 16 BRPM | HEIGHT: 67 IN | SYSTOLIC BLOOD PRESSURE: 125 MMHG | DIASTOLIC BLOOD PRESSURE: 84 MMHG | OXYGEN SATURATION: 98 % | HEART RATE: 87 BPM | TEMPERATURE: 98 F | WEIGHT: 189.38 LBS | BODY MASS INDEX: 29.72 KG/M2

## 2024-12-04 DIAGNOSIS — K44.9 HIATAL HERNIA: ICD-10-CM

## 2024-12-04 DIAGNOSIS — K31.82 DIEULAFOY LESION OF STOMACH: ICD-10-CM

## 2024-12-04 DIAGNOSIS — D50.9 IRON DEFICIENCY ANEMIA, UNSPECIFIED IRON DEFICIENCY ANEMIA TYPE: Primary | ICD-10-CM

## 2024-12-04 DIAGNOSIS — Z72.0 TOBACCO USER: ICD-10-CM

## 2024-12-04 DIAGNOSIS — D50.9 IRON DEFICIENCY ANEMIA, UNSPECIFIED IRON DEFICIENCY ANEMIA TYPE: ICD-10-CM

## 2024-12-04 DIAGNOSIS — Z86.0100 HISTORY OF COLONIC POLYPS: ICD-10-CM

## 2024-12-04 LAB
BASOPHILS # BLD AUTO: 0.09 X10(3)/MCL
BASOPHILS NFR BLD AUTO: 0.8 %
EOSINOPHIL # BLD AUTO: 0.43 X10(3)/MCL (ref 0–0.9)
EOSINOPHIL NFR BLD AUTO: 3.6 %
ERYTHROCYTE [DISTWIDTH] IN BLOOD BY AUTOMATED COUNT: 21.2 % (ref 11.5–17)
FERRITIN SERPL-MCNC: 81.03 NG/ML (ref 4.63–204)
HCT VFR BLD AUTO: 47.3 % (ref 37–47)
HGB BLD-MCNC: 15.9 G/DL (ref 12–16)
IMM GRANULOCYTES # BLD AUTO: 0.16 X10(3)/MCL (ref 0–0.04)
IMM GRANULOCYTES NFR BLD AUTO: 1.3 %
IRON SATN MFR SERPL: 16 % (ref 20–50)
IRON SERPL-MCNC: 54 UG/DL (ref 50–170)
LYMPHOCYTES # BLD AUTO: 2.47 X10(3)/MCL (ref 0.6–4.6)
LYMPHOCYTES NFR BLD AUTO: 20.6 %
MCH RBC QN AUTO: 26.6 PG (ref 27–31)
MCHC RBC AUTO-ENTMCNC: 33.6 G/DL (ref 33–36)
MCV RBC AUTO: 79.2 FL (ref 80–94)
MONOCYTES # BLD AUTO: 0.93 X10(3)/MCL (ref 0.1–1.3)
MONOCYTES NFR BLD AUTO: 7.8 %
NEUTROPHILS # BLD AUTO: 7.92 X10(3)/MCL (ref 2.1–9.2)
NEUTROPHILS NFR BLD AUTO: 65.9 %
NRBC BLD AUTO-RTO: 0 %
PLATELET # BLD AUTO: 611 X10(3)/MCL (ref 130–400)
PMV BLD AUTO: 9.7 FL (ref 7.4–10.4)
RBC # BLD AUTO: 5.97 X10(6)/MCL (ref 4.2–5.4)
TIBC SERPL-MCNC: 282 UG/DL (ref 70–310)
TIBC SERPL-MCNC: 336 UG/DL (ref 250–450)
TRANSFERRIN SERPL-MCNC: 304 MG/DL (ref 180–382)
WBC # BLD AUTO: 12 X10(3)/MCL (ref 4.5–11.5)

## 2024-12-04 PROCEDURE — 99215 OFFICE O/P EST HI 40 MIN: CPT | Mod: PBBFAC | Performed by: NURSE PRACTITIONER

## 2024-12-04 PROCEDURE — 36415 COLL VENOUS BLD VENIPUNCTURE: CPT

## 2024-12-04 PROCEDURE — 1159F MED LIST DOCD IN RCRD: CPT | Mod: CPTII,,, | Performed by: NURSE PRACTITIONER

## 2024-12-04 PROCEDURE — 3079F DIAST BP 80-89 MM HG: CPT | Mod: CPTII,,, | Performed by: NURSE PRACTITIONER

## 2024-12-04 PROCEDURE — 85025 COMPLETE CBC W/AUTO DIFF WBC: CPT

## 2024-12-04 PROCEDURE — 4010F ACE/ARB THERAPY RXD/TAKEN: CPT | Mod: CPTII,,, | Performed by: NURSE PRACTITIONER

## 2024-12-04 PROCEDURE — 3044F HG A1C LEVEL LT 7.0%: CPT | Mod: CPTII,,, | Performed by: NURSE PRACTITIONER

## 2024-12-04 PROCEDURE — 83550 IRON BINDING TEST: CPT

## 2024-12-04 PROCEDURE — 3074F SYST BP LT 130 MM HG: CPT | Mod: CPTII,,, | Performed by: NURSE PRACTITIONER

## 2024-12-04 PROCEDURE — 1160F RVW MEDS BY RX/DR IN RCRD: CPT | Mod: CPTII,,, | Performed by: NURSE PRACTITIONER

## 2024-12-04 PROCEDURE — 82728 ASSAY OF FERRITIN: CPT

## 2024-12-04 PROCEDURE — 99214 OFFICE O/P EST MOD 30 MIN: CPT | Mod: S$PBB,,, | Performed by: NURSE PRACTITIONER

## 2024-12-04 PROCEDURE — 3008F BODY MASS INDEX DOCD: CPT | Mod: CPTII,,, | Performed by: NURSE PRACTITIONER

## 2024-12-04 RX ORDER — PANTOPRAZOLE SODIUM 40 MG/1
40 TABLET, DELAYED RELEASE ORAL EVERY MORNING
Qty: 30 TABLET | Refills: 11 | Status: SHIPPED | OUTPATIENT
Start: 2024-12-04

## 2024-12-04 NOTE — ASSESSMENT & PLAN NOTE
She underwent EGD and colonoscopy with Dr. Wong October 27, 2021.  Colonoscopy with findings of diffuse diverticulosis and EGD with findings of large hiatal hernia and gastric punctate AVM fulgurated with APC, small superficial duodenal ulcerations.  Pathology was benign.  She underwent EGD June 26, 2023 with findings of 7 cm type III paraesophageal hiatal hernia with Buddy ulcers, small Dieulafoy lesion with mild oozing seen in the gastric body treated with APC, normal examined duodenum, no specimens collected.    She was referred to surgeon to discuss antireflux surgery options and iron deficiency was thought likely due to large hernia with Buddy ulcerations.  Video capsule endoscopy was normal June 29, 2023.  Iron infusions were received January 11, 2023, January 18, 2023, May 2, 2023, May 9, 2023, June 30, 2023, July 7, 2023, October 9, 2023, October 24, 2023, December 8, 2023, December 15, 2023.  She was evaluated by surgery clinic and a referral was placed to Dr. Farrar for hiatal hernia repair evaluation.  She received iron infusions December 22, 2023, January 5, 2024, January 19, 2024, January 26, 2024, February 2, 2024, February 21, 2024, April 19, 2024, April 26, 2024, May 3, 2024, May 10, 2024.  [place] Upper GI with small bowel June 13, 2024 revealed large hiatal hernia.  Gastric emptying study June 17, 2024 revealed slow solid gastric emptying.  Abdominal ultrasound June 26, 2024 revealed limited exam to the area of concern reveals 2 hernias there is a supraumbilical hernia which appears to be fat containing with some movement identified the hernia defect measures 1.7 cm.  To the right-side of the abdomen there is a large fat containing hernia with movement of the herniated contents with a defect that measures 1.5 cm  She received iron infusions June 27, 2024, July 11, 2024, July 18, 2024, July 25, 2024, August 1, 2024, September 13, 2024, September 20, 2024, September 27, 2024, October 4, 2024,  October 11, 2024.  She is scheduled with EGD with Dr. YSABEL Camejo December 19, 2024.  CBC, iron profile, ferritin  Continue pantoprazole 40 mg daily   Recommend tobacco cessation  Call with updates  ER precautions provided  Follow-up clinic visit with NP in 6 months

## 2024-12-05 NOTE — PROGRESS NOTES
Please notify patient that hemoglobin and hematocrit have increased from previous.  White blood cell count remains mildly elevated and would defer to PCP for continued monitoring.  This has improved from 2 weeks ago and has been consistently stable over the past several months per review of lab trend.  Ferritin and iron profile okay.  Iron saturation very mildly decreased.  Thanks

## 2024-12-06 ENCOUNTER — PATIENT MESSAGE (OUTPATIENT)
Dept: RHEUMATOLOGY | Facility: CLINIC | Age: 59
End: 2024-12-06
Payer: MEDICAID

## 2024-12-09 ENCOUNTER — CLINICAL SUPPORT (OUTPATIENT)
Dept: SMOKING CESSATION | Facility: CLINIC | Age: 59
End: 2024-12-09
Payer: COMMERCIAL

## 2024-12-09 DIAGNOSIS — F17.200 NICOTINE DEPENDENCE: Primary | ICD-10-CM

## 2024-12-09 PROCEDURE — 99406 BEHAV CHNG SMOKING 3-10 MIN: CPT | Mod: S$GLB,,,

## 2024-12-09 NOTE — PROGRESS NOTES
Spoke with patient today in regard to smoking cessation progress for 6 month telephone follow up, she states not tobacco free.  Patient states she is busy and work and was not interested in scheduling for the program at this time.  Informed patient of benefit period and contact information if any further help or support is needed.  Will complete smart form for 3/6 month follow up on Quit attempt #1.

## 2024-12-10 NOTE — OR NURSING
Inst given to patient to have clear liquids only on Wednesday 12-28-24 nothing to eat or drink after midnight--take metoprolol and valsartan am of procedure ---must have  upon discharge

## 2024-12-12 ENCOUNTER — HOSPITAL ENCOUNTER (OUTPATIENT)
Dept: CARDIOLOGY | Facility: HOSPITAL | Age: 59
Discharge: HOME | End: 2024-12-12
Attending: SURGERY
Payer: MEDICAID

## 2024-12-12 DIAGNOSIS — Z01.818 PREOP EXAMINATION: ICD-10-CM

## 2024-12-12 DIAGNOSIS — Z01.818 PREOP EXAMINATION: Primary | ICD-10-CM

## 2024-12-12 PROCEDURE — 99900031 HC PATIENT EDUCATION (STAT)

## 2024-12-17 ENCOUNTER — LAB VISIT (OUTPATIENT)
Dept: LAB | Facility: HOSPITAL | Age: 59
End: 2024-12-17
Attending: INTERNAL MEDICINE
Payer: MEDICAID

## 2024-12-17 ENCOUNTER — OFFICE VISIT (OUTPATIENT)
Dept: OTOLARYNGOLOGY | Facility: CLINIC | Age: 59
End: 2024-12-17
Payer: MEDICAID

## 2024-12-17 VITALS — SYSTOLIC BLOOD PRESSURE: 93 MMHG | HEART RATE: 86 BPM | DIASTOLIC BLOOD PRESSURE: 70 MMHG | TEMPERATURE: 98 F

## 2024-12-17 DIAGNOSIS — H91.93 DECREASED HEARING OF BOTH EARS: ICD-10-CM

## 2024-12-17 DIAGNOSIS — E11.65 INADEQUATELY CONTROLLED DIABETES MELLITUS: Primary | ICD-10-CM

## 2024-12-17 DIAGNOSIS — E11.69 TYPE 2 DIABETES MELLITUS WITH OTHER SPECIFIED COMPLICATION, WITH LONG-TERM CURRENT USE OF INSULIN: ICD-10-CM

## 2024-12-17 DIAGNOSIS — Z79.4 TYPE 2 DIABETES MELLITUS WITH OTHER SPECIFIED COMPLICATION, WITH LONG-TERM CURRENT USE OF INSULIN: ICD-10-CM

## 2024-12-17 DIAGNOSIS — Z79.4 ENCOUNTER FOR LONG-TERM (CURRENT) USE OF INSULIN: ICD-10-CM

## 2024-12-17 DIAGNOSIS — H61.23 BILATERAL IMPACTED CERUMEN: ICD-10-CM

## 2024-12-17 LAB
CHOLEST SERPL-MCNC: 199 MG/DL
CHOLEST/HDLC SERPL: 3 {RATIO} (ref 0–5)
EST. AVERAGE GLUCOSE BLD GHB EST-MCNC: 157.1 MG/DL
HBA1C MFR BLD: 7.1 %
HDLC SERPL-MCNC: 57 MG/DL (ref 35–60)
LDLC SERPL CALC-MCNC: 109 MG/DL (ref 50–140)
T4 FREE SERPL-MCNC: 1.07 NG/DL (ref 0.7–1.48)
TRIGL SERPL-MCNC: 164 MG/DL (ref 37–140)
TSH SERPL-ACNC: 1.9 UIU/ML (ref 0.35–4.94)
VLDLC SERPL CALC-MCNC: 33 MG/DL

## 2024-12-17 PROCEDURE — 3044F HG A1C LEVEL LT 7.0%: CPT | Mod: CPTII,,, | Performed by: NURSE PRACTITIONER

## 2024-12-17 PROCEDURE — 80061 LIPID PANEL: CPT

## 2024-12-17 PROCEDURE — 1159F MED LIST DOCD IN RCRD: CPT | Mod: CPTII,,, | Performed by: NURSE PRACTITIONER

## 2024-12-17 PROCEDURE — 4010F ACE/ARB THERAPY RXD/TAKEN: CPT | Mod: CPTII,,, | Performed by: NURSE PRACTITIONER

## 2024-12-17 PROCEDURE — 82043 UR ALBUMIN QUANTITATIVE: CPT

## 2024-12-17 PROCEDURE — 84443 ASSAY THYROID STIM HORMONE: CPT

## 2024-12-17 PROCEDURE — 69210 REMOVE IMPACTED EAR WAX UNI: CPT | Mod: S$PBB,,, | Performed by: NURSE PRACTITIONER

## 2024-12-17 PROCEDURE — 3078F DIAST BP <80 MM HG: CPT | Mod: CPTII,,, | Performed by: NURSE PRACTITIONER

## 2024-12-17 PROCEDURE — 99203 OFFICE O/P NEW LOW 30 MIN: CPT | Mod: 25,S$PBB,, | Performed by: NURSE PRACTITIONER

## 2024-12-17 PROCEDURE — 99215 OFFICE O/P EST HI 40 MIN: CPT | Mod: PBBFAC | Performed by: NURSE PRACTITIONER

## 2024-12-17 PROCEDURE — 36415 COLL VENOUS BLD VENIPUNCTURE: CPT

## 2024-12-17 PROCEDURE — 83036 HEMOGLOBIN GLYCOSYLATED A1C: CPT

## 2024-12-17 PROCEDURE — 69210 REMOVE IMPACTED EAR WAX UNI: CPT | Mod: 50,PBBFAC | Performed by: NURSE PRACTITIONER

## 2024-12-17 PROCEDURE — 84439 ASSAY OF FREE THYROXINE: CPT

## 2024-12-17 PROCEDURE — 3074F SYST BP LT 130 MM HG: CPT | Mod: CPTII,,, | Performed by: NURSE PRACTITIONER

## 2024-12-17 NOTE — PROGRESS NOTES
[facility]  Otolaryngology Clinic Note    Jessica Walker  YOB: 1965    Chief Complaint:   Chief Complaint   Patient presents with    referral: Decreased Hearing         HPI: 12/17/2024: 59 y.o. female presents with c/o hearing loss and cerumen buildup. States she has had to get her ears flushed in the past. Denies any hx of otologic infections or procedures. 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 Reports having an audiogram 5-10 years ago with b/l loss which was worse on one side.     ROS:   10-point review of systems negative except per HPI      Review of patient's allergies indicates:   Allergen Reactions    Iodine Hives    Ivp dye [iodinated contrast media] Itching       Past Medical History:   Diagnosis Date    Abnormal Pap smear of cervix     Acute pancreatitis 1996    Had surgery for pancreatitis in June 1996    Anemia     Colon polyp 2021    When I had colonoscopy, I had one small polyp they removed    Depression     Diabetes mellitus     Dieulafoy lesion of stomach 12/20/2023    Diverticulosis 2019    I think they said when I had my first colonoscopy around 2019 that I have diverticulosis    Encounter for comprehensive diabetic foot examination, type 2 diabetes mellitus 05/29/2024    Essential thrombocytosis 01/10/2023    GERD (gastroesophageal reflux disease) 1996    Had surgery for pancreatitis in June 1996 and acid reflux since then    Hiatal hernia 12/20/2023    Hyperlipidemia LDL goal <70 05/17/2022    Hypertension     Iron deficiency 06/29/2022    JAK2 gene mutation 01/10/2023    Mild CAD 05/17/2022    Obesity (BMI 30-39.9) 05/17/2022    Overgrown toenails 05/29/2024    Personal history of colonic polyps 11/29/2022    PMB (postmenopausal bleeding)     Sleep apnea     No CPAP    Thrombocytosis 06/29/2022    Tobacco user 05/17/2022    Type 2 diabetes mellitus, with long-term current use of insulin  05/17/2022    Xerosis of skin 05/29/2024       Past Surgical History:   Procedure Laterality Date    ANGIOGRAM, CORONARY, WITH LEFT HEART CATHETERIZATION N/A 9/23/2024    Procedure: Angiogram, Coronary, with Left Heart Cath;  Surgeon: Francesco Mensah MD;  Location: [facility] CATH LAB;  Service: Cardiology;  Laterality: N/A;    CHOLECYSTECTOMY      COLONOSCOPY  10/2021    ESOPHAGOGASTRODUODENOSCOPY N/A 06/26/2023    Procedure: EGD (ESOPHAGOGASTRODUODENOSCOPY);  Surgeon: Kita Larose MD;  Location: [facility] ENDOSCOPY;  Service: Gastroenterology;  Laterality: N/A;    EYE SURGERY  7/6/93    LK and RK    HYSTEROSCOPY WITH DILATION AND CURETTAGE OF UTERUS N/A 09/26/2023    Procedure: HYSTEROSCOPY, WITH DILATION AND CURETTAGE OF UTERUS;  Surgeon: Deepti Chappell MD;  Location: [facility] OR;  Service: OB/GYN;  Laterality: N/A;  myosure    INTRALUMINAL GASTROINTESTINAL TRACT IMAGING VIA CAPSULE N/A 06/29/2023    Procedure: IMAGING PROCEDURE, GI TRACT, INTRALUMINAL, VIA CAPSULE;  Surgeon: Kita Larose MD;  Location: [facility] ENDOSCOPY;  Service: Gastroenterology;  Laterality: N/A;    Removal of Goiter      UPPER GASTROINTESTINAL ENDOSCOPY         Social History     Socioeconomic History    Marital status: Single   Tobacco Use    Smoking status: Every Day     Current packs/day: 1.00     Average packs/day: 1 pack/day for 43.0 years (43.0 ttl pk-yrs)     Types: Cigarettes     Passive exposure: Current    Smokeless tobacco: Never   Substance and Sexual Activity    Alcohol use: Not Currently    Drug use: Not Currently    Sexual activity: Not Currently     Birth control/protection: None     Comment: Havent been sexually active since 2006     Social Drivers of Health     Financial Resource Strain: Low Risk  (11/5/2024)    Overall Financial Resource Strain (CARDIA)     Difficulty of Paying Living Expenses: Not very hard   Food Insecurity: Food Insecurity Present (11/5/2024)    Hunger Vital Sign     Worried About Running Out of  Food in the Last Year: Never true     Ran Out of Food in the Last Year: Sometimes true   Transportation Needs: No Transportation Needs (2024)    PRAPARE - Transportation     Lack of Transportation (Medical): No     Lack of Transportation (Non-Medical): No   Physical Activity: Inactive (2024)    Exercise Vital Sign     Days of Exercise per Week: 0 days     Minutes of Exercise per Session: 0 min   Stress: Stress Concern Present (2024)    [employer] [place] [employer] - Occupational Stress Questionnaire     Feeling of Stress : Very much   Housing Stability: Unknown (2024)    Housing Stability Vital Sign     Unable to Pay for Housing in the Last Year: Patient declined       Family History   Problem Relation Name Age of Onset    Diabetes Mother Alise          from unknown causes    Coronary artery disease Mother Alise     Alcohol abuse Mother Alise     Arthritis Mother Alise     Heart disease Mother Alise         Had 13 stents in her heart    Hyperlipidemia Mother Alise     Hypertension Mother Alise     Diabetes Father Joo         Still living    Cervical cancer Sister Chantale     Arthritis Sister Chantale     Breast cancer Sister Chantale     COPD Sister Chantale     Diabetes Sister Chantale     Depression Sister Chantale     Colon polyps Sister Chantale     Cancer Sister Chantale         Has had breast cancer    Fibromyalgia Sister Jemma     Breast cancer Sister Jemma     Cervical cancer Sister Jemma 55    Early death Sister Jemma     Other (cervical cancer) Sister Jemma 55        Maternal half sister    Cancer Sister Jemma          at 55 from cervical cancer    Miscarriages / Stillbirths Sister Ibis     Skin cancer Sister Ibis         My maternal half sister Ibis had skin cancer before that was removed    Cancer Sister Ibis         Has had skin cancer    Throat cancer Maternal Grandmother Lutz at 64 64    Early death Maternal Grandmother Lutz at 64     Cancer Maternal  "Grandmother Marin at 64          at 64 from lung cancer    Diabetes Maternal Grandmother Marin at 64         Had diabetes but  from lung cancee    Leukemia Maternal Grandfather Raza     Cancer Maternal Uncle Grzegorz          from cancer       Outpatient Encounter Medications as of 2024   Medication Sig Dispense Refill    aspirin (ECOTRIN) 81 MG EC tablet Take 81 mg by mouth every evening. Stopped sat 24      atorvastatin (LIPITOR) 40 MG tablet Take 1 tablet (40 mg total) by mouth every evening. 90 tablet 3    BD ULTRA-FINE SHORT PEN NEEDLE 31 gauge x 5/16" Ndle Inject 1 each into the skin 3 (three) times daily. 100 each 1    blood-glucose meter,continuous (DEXCOM ) Misc Dexcom G7 .  Use as directed with Dexcom G7 sensors. 1 each 0    cycloSPORINE (RESTASIS) 0.05 % ophthalmic emulsion Place 1 drop into both eyes 2 (two) times daily.      DEXCOM G7 SENSOR Amy Dexcom G7 sensors.  Use every 10 days as directed. 3 each 11    dulaglutide (TRULICITY) 3 mg/0.5 mL pen injector INJECT 1 MG SUBCUTANEOUSLY ONCE A WEEK 4 pen 1    ibuprofen (ADVIL,MOTRIN) 800 MG tablet Take 800 mg by mouth every 6 (six) hours as needed.      LANTUS SOLOSTAR U-100 INSULIN 100 unit/mL (3 mL) InPn pen Inject 47 Units into the skin every morning.      linaCLOtide (LINZESS) 72 mcg Cap capsule Patient states takes as needed      lisinopriL 10 MG tablet Take 10 mg by mouth once daily.      magnesium chloride (MAG 64) 64 mg TbEC Take 1 tablet (64 mg total) by mouth 2 (two) times a day. 14 tablet 0    metFORMIN (GLUCOPHAGE) 1000 MG tablet Take 1 tablet (1,000 mg total) by mouth 2 (two) times daily. 180 tablet 3    metoprolol tartrate (LOPRESSOR) 25 MG tablet Take 1 tablet (25 mg total) by mouth 2 (two) times daily. 180 tablet 3    mirtazapine (REMERON) 7.5 MG Tab Take 15 mg by mouth every evening.      nitroGLYCERIN (NITROSTAT) 0.4 MG SL tablet Place 1 tablet (0.4 mg total) under the tongue every 5 (five) minutes as " needed for Chest pain. 25 tablet 6    pantoprazole (PROTONIX) 40 MG tablet Take 1 tablet (40 mg total) by mouth every morning. 30 tablet 11    TRUE METRIX GLUCOSE TEST STRIP Strp USE 1 STRIP TO CHECK GLUCOSE ONCE DAILY 100 each 1    varenicline (CHANTIX) 1 mg Tab Take 1 tablet (1 mg total) by mouth 2 (two) times daily. 60 tablet 0    carbamide peroxide (DEBROX) 6.5 % otic solution Place 5 drops into the right ear 2 (two) times daily. (Patient not taking: Reported on 12/17/2024) 18 mL 1    clotrimazole (LOTRIMIN) 1 % cream Apply topically 2 (two) times daily. (Patient not taking: Reported on 12/17/2024) 113 g 1    nicotine (NICODERM CQ) 21 mg/24 hr Place 1 patch onto the skin once daily. (Patient not taking: Reported on 12/17/2024) 14 patch 0    nicotine polacrilex 2 MG Lozg Take 1 lozenge (2 mg total) by mouth as needed (in place of a cigarette). (Patient not taking: Reported on 12/17/2024) 72 lozenge 0     Facility-Administered Encounter Medications as of 12/17/2024   Medication Dose Route Frequency Provider Last Rate Last Admin    0.9%  NaCl infusion   Intravenous Continuous Marielos Bahena MD        albuterol-ipratropium 2.5 mg-0.5 mg/3 mL nebulizer solution 3 mL  3 mL Nebulization Once PRN Marisa Ribera MD        dextrose 10% bolus 125 mL 125 mL  12.5 g Intravenous PRN Katherine Bernal FNP        dextrose 10% bolus 125 mL 125 mL  12.5 g Intravenous PRN Katherine Bernal FNP        HYDROmorphone injection 0.2 mg  0.2 mg Intravenous Q5 Min PRN Marisa Ribera MD        HYDROmorphone injection 0.5 mg  0.5 mg Intravenous Q5 Min PRN Marisa Ribera MD        insulin aspart U-100 injection 2-9 Units  2-9 Units Subcutaneous PRN Katherine Bernal FNP        insulin aspart U-100 injection 4-12 Units  4-12 Units Subcutaneous PRN Katherine Bernal FNP        lactated ringers infusion   Intravenous Continuous Marisa Ribera  mL/hr at 09/26/23 0856 Rate Change at 09/26/23 0856     LIDOcaine (PF) 10 mg/ml (1%) injection 10 mg  1 mL Intradermal Once Katherine Bernal, ANAHI        ondansetron injection 4 mg  4 mg Intravenous Once Marisa Ribera MD        oxyCODONE-acetaminophen 5-325 mg per tablet 2 tablet  2 tablet Oral Once Marisa Ribera MD        prochlorperazine injection Soln 5 mg  5 mg Intravenous Once PRN Marisa Ribera MD        sodium chloride 0.9% flush 10 mL  10 mL Intravenous PRN Alla Yuan PA-C           Physical Exam:  Vitals:    12/17/24 1451 12/17/24 1455   BP: 95/63 93/70   BP Location: Right arm Left arm   Patient Position: Sitting Sitting   Pulse: 84 86   Temp: 97.5 °F (36.4 °C)    TempSrc: Oral        Physical Exam   General: NAD, voice normal  Neuro: AAO, CN II - XII grossly intact  Head/ Face: NCAT, symmetric, sensations intact bilaterally  Eyes: EOMI, PERRL  Ears: externally normal with grossly normal hearing  AD: EAC with cerumen impaction- atraumatic removal under microscopy with suction-  TM intact, no middle ear effusion, no retractions  AS: EAC with partial cerumen impaction- atraumatic removal under microscopy with suction-  TM intact, no middle ear effusion, no retractions  Nose: bilateral nares patent, midline septum, no rhinorrhea, no external deformity, no turbinate hypertrophy  OC/OP: MMM, no intraoral lesions, no trismus, dentition is moderate, no uvular deviation, bilaterally symmetric soft palate elevation, palatoglossus and palatopharyngeal fold wnl; tonsils are symmetric and 1+  Indirect laryngoscopy: deferred due to patient intolerance  Neck: soft, supple, no LAD, normal ROM, no thyromegaly  Respiratory: nonlabored, no wheezing, bilateral chest rise  Cardiovascular: RRR  Gastrointestinal: S NT ND  Skin: warm, no lesions  Musculoskeletal: 5/5 strength  Psych: Appropriate affect/mood     Pertinent Data:  ? LABS:  ? AUDIO:           ? PATH:      Imaging:   I personally reviewed the following  images:        Assessment/Plan:  59 y.o. female with subjective HL & b/l cerumen impaction which was removed today.   - Baby oil to b/l ear canals 1-2x/wk for ceruminolysis  - Audio  - RTC 3-4mo to review audio. Ok for telemed.    Heather Castellon NP

## 2024-12-18 ENCOUNTER — ANESTHESIA EVENT (OUTPATIENT)
Dept: SURGERY | Facility: HOSPITAL | Age: 59
End: 2024-12-18
Payer: MEDICAID

## 2024-12-18 LAB
CREAT UR-MCNC: 309.8 MG/DL (ref 45–106)
MICROALBUMIN UR-MCNC: 50.5 UG/ML
MICROALBUMIN/CREAT RATIO PNL UR: 16.3 MG/GM CR (ref 0–30)

## 2024-12-18 RX ORDER — CALCIUM CITRATE/VITAMIN D3 200MG-6.25
TABLET ORAL
Qty: 100 EACH | Refills: 1 | OUTPATIENT
Start: 2024-12-18

## 2024-12-18 NOTE — ANESTHESIA PREPROCEDURE EVALUATION
12/18/2024  Jessica Walker is a 59 y.o., female.      Pre-op Assessment    I have reviewed the Patient Summary Reports.     I have reviewed the Nursing Notes. I have reviewed the NPO Status.   I have reviewed the Medications.     Review of Systems  Anesthesia Hx:             Denies Family Hx of Anesthesia complications.    Denies Personal Hx of Anesthesia complications.                    Social:  Smoker, No Alcohol Use       Hematology/Oncology:  Hematology Normal   Oncology Normal                                   EENT/Dental:  EENT/Dental Normal           Cardiovascular:     Hypertension, well controlled   CAD  asymptomatic            ECG has been reviewed.                            Pulmonary:        Sleep Apnea                Renal/:  Renal/ Normal                 Hepatic/GI:    Hiatal Hernia, GERD, well controlled                Musculoskeletal:  Musculoskeletal Normal                Neurological:    Neuromuscular Disease,                                   Endocrine:  Diabetes, well controlled, type 2           Dermatological:  Skin Normal    Psych:  Psychiatric History                  Physical Exam  General: Cooperative, Alert and Oriented    Airway:  Mallampati: II   Mouth Opening: Normal  TM Distance: Normal  Tongue: Normal  Neck ROM: Normal ROM    Dental:  Intact        Anesthesia Plan  Type of Anesthesia, risks & benefits discussed:    Anesthesia Type: Gen Natural Airway  Intra-op Monitoring Plan: Standard ASA Monitors  Post Op Pain Control Plan:   (medical reason for not using multimodal pain management)  Induction:  IV  Informed Consent: Informed consent signed with the Patient and all parties understand the risks and agree with anesthesia plan.  All questions answered. Patient consented to blood products? Yes  ASA Score: 3    Ready For Surgery From Anesthesia Perspective.      .

## 2024-12-19 ENCOUNTER — ANESTHESIA (OUTPATIENT)
Dept: SURGERY | Facility: HOSPITAL | Age: 59
End: 2024-12-19
Payer: MEDICAID

## 2024-12-19 ENCOUNTER — HOSPITAL ENCOUNTER (OUTPATIENT)
Facility: HOSPITAL | Age: 59
Discharge: HOME | End: 2024-12-19
Attending: SURGERY | Admitting: SURGERY
Payer: MEDICAID

## 2024-12-19 VITALS
BODY MASS INDEX: 29.65 KG/M2 | OXYGEN SATURATION: 96 % | SYSTOLIC BLOOD PRESSURE: 114 MMHG | HEART RATE: 78 BPM | HEIGHT: 67 IN | WEIGHT: 188.94 LBS | RESPIRATION RATE: 18 BRPM | DIASTOLIC BLOOD PRESSURE: 75 MMHG | TEMPERATURE: 98 F

## 2024-12-19 DIAGNOSIS — D64.9 ANEMIA: ICD-10-CM

## 2024-12-19 DIAGNOSIS — D50.0 BLOOD LOSS ANEMIA: Primary | ICD-10-CM

## 2024-12-19 LAB — POCT GLUCOSE: 109 MG/DL (ref 70–110)

## 2024-12-19 PROCEDURE — 88305 TISSUE EXAM BY PATHOLOGIST: CPT | Performed by: SURGERY

## 2024-12-19 PROCEDURE — 27201423 OPTIME MED/SURG SUP & DEVICES STERILE SUPPLY: Performed by: SURGERY

## 2024-12-19 PROCEDURE — 43239 EGD BIOPSY SINGLE/MULTIPLE: CPT | Performed by: SURGERY

## 2024-12-19 PROCEDURE — 88342 IMHCHEM/IMCYTCHM 1ST ANTB: CPT

## 2024-12-19 PROCEDURE — 88313 SPECIAL STAINS GROUP 2: CPT

## 2024-12-19 PROCEDURE — 63600175 PHARM REV CODE 636 W HCPCS: Performed by: NURSE ANESTHETIST, CERTIFIED REGISTERED

## 2024-12-19 PROCEDURE — 37000009 HC ANESTHESIA EA ADD 15 MINS: Performed by: SURGERY

## 2024-12-19 PROCEDURE — 37000008 HC ANESTHESIA 1ST 15 MINUTES: Performed by: SURGERY

## 2024-12-19 RX ORDER — ONDANSETRON 4 MG/1
8 TABLET, ORALLY DISINTEGRATING ORAL EVERY 8 HOURS PRN
Status: CANCELLED | OUTPATIENT
Start: 2024-12-19

## 2024-12-19 RX ORDER — PROPOFOL 10 MG/ML
VIAL (ML) INTRAVENOUS
Status: DISCONTINUED | OUTPATIENT
Start: 2024-12-19 | End: 2024-12-19

## 2024-12-19 RX ORDER — TRAMADOL HYDROCHLORIDE 50 MG/1
50 TABLET ORAL EVERY 4 HOURS PRN
Status: DISCONTINUED | OUTPATIENT
Start: 2024-12-19 | End: 2024-12-19 | Stop reason: HOSPADM

## 2024-12-19 RX ORDER — HYDROMORPHONE HYDROCHLORIDE 2 MG/ML
0.5 INJECTION, SOLUTION INTRAMUSCULAR; INTRAVENOUS; SUBCUTANEOUS
Status: CANCELLED | OUTPATIENT
Start: 2024-12-19

## 2024-12-19 RX ORDER — SODIUM CHLORIDE 9 MG/ML
INJECTION, SOLUTION INTRAVENOUS CONTINUOUS
Status: CANCELLED | OUTPATIENT
Start: 2024-12-19

## 2024-12-19 RX ORDER — LIDOCAINE HYDROCHLORIDE 20 MG/ML
INJECTION INTRAVENOUS
Status: DISCONTINUED | OUTPATIENT
Start: 2024-12-19 | End: 2024-12-19

## 2024-12-19 RX ADMIN — LIDOCAINE HYDROCHLORIDE 100 MG: 20 INJECTION, SOLUTION INTRAVENOUS at 11:12

## 2024-12-19 RX ADMIN — PROPOFOL 25 MG: 10 INJECTION, EMULSION INTRAVENOUS at 11:12

## 2024-12-19 RX ADMIN — PROPOFOL 125 MG: 10 INJECTION, EMULSION INTRAVENOUS at 11:12

## 2024-12-19 NOTE — OP NOTE
[facility] - Periop Services  Operative Note      Date of Procedure: 12/19/2024     Procedure: Procedure(s) (LRB):  EGD, WITH CLOSED BIOPSY (N/A)   1. normal duodenal in all 4 portions and into the jejunum  2. Cold biopsy of the duodenal taken for histo path   3. Gastritis of the antrum, fundus, cardia of the stomach  4. Cold biopsy of the antrum taken for histo path  5. Z-line at 32 centimeters with a 8 centimeter hiatal hernia from 32-40 centimeters  6. Cold biopsy of the GE junction taken at 32 centimeters for histo path  7. Normal esophagus cricopharyngeus muscle vocal cords   Surgeons and Role:     * Ravindra Camejo MD - Primary    Assisting Surgeon: None    Pre-Operative Diagnosis: Blood loss anemia [D50.0]    Post-Operative Diagnosis: Post-Op Diagnosis Codes:     * Blood loss anemia [D50.0]  EGD, WITH CLOSED BIOPSY (N/A)   1. normal duodenal in all 4 portions and into the jejunum  2. Cold biopsy of the duodenal taken for histo path   3. Gastritis of the antrum, fundus, cardia of the stomach  4. Cold biopsy of the antrum taken for histo path  5. Z-line at 32 centimeters with a 8 centimeter hiatal hernia from 32-40 centimeters  6. Cold biopsy of the GE junction taken at 32 centimeters for histo path  7. Normal esophagus cricopharyngeus muscle vocal cords   Anesthesia: General    Operative Findings (including complications, if any):  Patient is a 59-year-old  female with a history of anemia, type 2 diabetes on Trulicity A1c 6.4, hypercholesterolemia, hypertension, anxiety depressive disorder who has upper GI with a large hiatal hernia noted on 06/13/2024.  Gastric emptying was with delayed on 06/17/2024 .  I suspect this is secondary to the size of the hiatal hernia.  Patient does have iron-deficiency anemia.  Cardiac evaluation was cleared on 11/13/2024.  Patient has complained of severe anemia with iron infusions for 2-1/2 years.  Patient was scheduled for EGD for evaluation and  possible hiatal hernia repair.  Patient has results were as follows.         * Blood loss anemia [D50.0]  EGD, WITH CLOSED BIOPSY (N/A)   1. normal duodenal in all 4 portions and into the jejunum  2. Cold biopsy of the duodenal taken for histo path   3. Gastritis of the antrum, fundus, cardia of the stomach  4. Cold biopsy of the antrum taken for histo path  5. Z-line at 32 centimeters with a 8 centimeter hiatal hernia from 32-40 centimeters  6. Cold biopsy of the GE junction taken at 32 centimeters for histo path  7. Normal esophagus cricopharyngeus muscle vocal cords     Plan  1. Follow up in the office to discuss possible hiatal hernia repair as a method of correcting her anemia  2. 24 hour pH/manometric maybe necessary.        Description of Technical Procedures:  Noted above       Significant Surgical Tasks Conducted by the Assistant(s), if Applicable:  None       Estimated Blood Loss (EBL): * No values recorded between 12/19/2024 11:14 AM and 12/19/2024 11:32 AM *           Implants: * No implants in log *    Specimens:   Specimen (24h ago, onward)       Start     Ordered    12/19/24 1129  Specimen to Pathology  RELEASE UPON ORDERING        References:    Click here for ordering Quick Tip   Question:  Release to patient  Answer:  Immediate    12/19/24 1129                            Condition: Good    Disposition: PACU - hemodynamically stable.    Attestation: I was present and scrubbed for the entire procedure.    Discharge Note    OUTCOME: Patient tolerated treatment/procedure well without complication and is now ready for discharge.      DISPOSITION: Home or Self Care    FINAL DIAGNOSIS:       * Blood loss anemia [D50.0]  EGD, WITH CLOSED BIOPSY (N/A)   1. normal duodenal in all 4 portions and into the jejunum  2. Cold biopsy of the duodenal taken for histo path   3. Gastritis of the antrum, fundus, cardia of the stomach  4. Cold biopsy of the antrum taken for histo path  5. Z-line at 32 centimeters with a  8 centimeter hiatal hernia from 32-40 centimeters  6. Cold biopsy of the GE junction taken at 32 centimeters for histo path  7. Normal esophagus cricopharyngeus muscle vocal cords   FOLLOWUP: In clinic 1 week    DISCHARGE INSTRUCTIONS:  No discharge procedures on file.     Clinical Reference Documents Added to Patient Instructions         Document    [facility] ENDOS[facility] DISCHARGE INSTRUCTIONS (ENGLISH)

## 2024-12-19 NOTE — ANESTHESIA POSTPROCEDURE EVALUATION
Anesthesia Post Evaluation    Patient: Jessica Walker    Procedure(s) Performed: Procedure(s) (LRB):  EGD, WITH CLOSED BIOPSY (N/A)    Final Anesthesia Type: general      Patient location during evaluation: OPS  Patient participation: Yes- Able to Participate  Level of consciousness: awake and alert  Post-procedure vital signs: reviewed and stable  Pain management: adequate  Airway patency: patent  JIN mitigation strategies: Multimodal analgesia  PONV status at discharge: No PONV  Anesthetic complications: no      Cardiovascular status: hemodynamically stable  Respiratory status: unassisted, room air and spontaneous ventilation  Hydration status: euvolemic  Follow-up not needed.              Vitals Value Taken Time   /67 12/19/24 1010   Temp 36.5 °C (97.7 °F) 12/19/24 1010   Pulse 86 12/19/24 1010   Resp 20 12/19/24 1134   SpO2 96 % 12/19/24 1010         No case tracking events are documented in the log.      Pain/Srinivasa Score: No data recorded

## 2024-12-19 NOTE — DISCHARGE SUMMARY
[facility] - Peri Services  Discharge Note  Short Stay    Procedure(s) (LRB):  EGD, WITH CLOSED BIOPSY (N/A)      OUTCOME: Patient tolerated treatment/procedure well without complication and is now ready for discharge.    DISPOSITION: Home or Self Care        FINAL DIAGNOSIS:       * Blood loss anemia [D50.0]  EGD, WITH CLOSED BIOPSY (N/A)   1. normal duodenal in all 4 portions and into the jejunum  2. Cold biopsy of the duodenal taken for histo path   3. Gastritis of the antrum, fundus, cardia of the stomach  4. Cold biopsy of the antrum taken for histo path  5. Z-line at 32 centimeters with a 8 centimeter hiatal hernia from 32-40 centimeters  6. Cold biopsy of the GE junction taken at 32 centimeters for histo path  7. Normal esophagus cricopharyngeus muscle vocal cords   FOLLOWUP: In clinic 1 week    DISCHARGE INSTRUCTIONS:  No discharge procedures on file.      Clinical Reference Documents Added to Patient Instructions         Document    [facility] ENDOSCOPY DISCHARGE INSTRUCTIONS (ENGLISH)            TIME SPENT ON DISCHARGE:  5 minutes

## 2024-12-23 LAB — PSYCHE PATHOLOGY RESULT: NORMAL

## 2024-12-30 ENCOUNTER — OFFICE VISIT (OUTPATIENT)
Dept: HEMATOLOGY/ONCOLOGY | Facility: CLINIC | Age: 59
End: 2024-12-30
Payer: MEDICAID

## 2024-12-30 ENCOUNTER — LAB VISIT (OUTPATIENT)
Dept: LAB | Facility: HOSPITAL | Age: 59
End: 2024-12-30
Attending: INTERNAL MEDICINE
Payer: MEDICAID

## 2024-12-30 VITALS
SYSTOLIC BLOOD PRESSURE: 129 MMHG | RESPIRATION RATE: 18 BRPM | DIASTOLIC BLOOD PRESSURE: 76 MMHG | BODY MASS INDEX: 29.76 KG/M2 | TEMPERATURE: 98 F | WEIGHT: 189.63 LBS | HEIGHT: 67 IN | OXYGEN SATURATION: 96 % | HEART RATE: 87 BPM

## 2024-12-30 DIAGNOSIS — D75.839 THROMBOCYTOSIS: ICD-10-CM

## 2024-12-30 DIAGNOSIS — D50.9 IRON DEFICIENCY ANEMIA, UNSPECIFIED IRON DEFICIENCY ANEMIA TYPE: Primary | ICD-10-CM

## 2024-12-30 DIAGNOSIS — D50.9 IRON DEFICIENCY ANEMIA, UNSPECIFIED IRON DEFICIENCY ANEMIA TYPE: ICD-10-CM

## 2024-12-30 DIAGNOSIS — Z15.89 JAK2 GENE MUTATION: ICD-10-CM

## 2024-12-30 DIAGNOSIS — D47.3 ESSENTIAL THROMBOCYTOSIS: ICD-10-CM

## 2024-12-30 LAB
ALBUMIN SERPL-MCNC: 3.7 G/DL (ref 3.5–5)
ALBUMIN/GLOB SERPL: 1.2 RATIO (ref 1.1–2)
ALP SERPL-CCNC: 91 UNIT/L (ref 40–150)
ALT SERPL-CCNC: 15 UNIT/L (ref 0–55)
ANION GAP SERPL CALC-SCNC: 8 MEQ/L
AST SERPL-CCNC: 13 UNIT/L (ref 5–34)
BASOPHILS # BLD AUTO: 0.08 X10(3)/MCL
BASOPHILS NFR BLD AUTO: 0.7 %
BILIRUB SERPL-MCNC: 0.4 MG/DL
BUN SERPL-MCNC: 11.9 MG/DL (ref 9.8–20.1)
CALCIUM SERPL-MCNC: 9.4 MG/DL (ref 8.4–10.2)
CHLORIDE SERPL-SCNC: 104 MMOL/L (ref 98–107)
CO2 SERPL-SCNC: 25 MMOL/L (ref 22–29)
CREAT SERPL-MCNC: 0.63 MG/DL (ref 0.55–1.02)
CREAT/UREA NIT SERPL: 19
EOSINOPHIL # BLD AUTO: 0.32 X10(3)/MCL (ref 0–0.9)
EOSINOPHIL NFR BLD AUTO: 2.9 %
ERYTHROCYTE [DISTWIDTH] IN BLOOD BY AUTOMATED COUNT: 18.6 % (ref 11.5–17)
FERRITIN SERPL-MCNC: 60.1 NG/ML (ref 4.63–204)
GFR SERPLBLD CREATININE-BSD FMLA CKD-EPI: >60 ML/MIN/1.73/M2
GLOBULIN SER-MCNC: 3.2 GM/DL (ref 2.4–3.5)
GLUCOSE SERPL-MCNC: 215 MG/DL (ref 74–100)
HCT VFR BLD AUTO: 42.4 % (ref 37–47)
HGB BLD-MCNC: 14.2 G/DL (ref 12–16)
IMM GRANULOCYTES # BLD AUTO: 0.16 X10(3)/MCL (ref 0–0.04)
IMM GRANULOCYTES NFR BLD AUTO: 1.4 %
IRON SATN MFR SERPL: 19 % (ref 20–50)
IRON SERPL-MCNC: 60 UG/DL (ref 50–170)
LYMPHOCYTES # BLD AUTO: 1.86 X10(3)/MCL (ref 0.6–4.6)
LYMPHOCYTES NFR BLD AUTO: 16.7 %
MCH RBC QN AUTO: 27.4 PG (ref 27–31)
MCHC RBC AUTO-ENTMCNC: 33.5 G/DL (ref 33–36)
MCV RBC AUTO: 81.9 FL (ref 80–94)
MONOCYTES # BLD AUTO: 0.99 X10(3)/MCL (ref 0.1–1.3)
MONOCYTES NFR BLD AUTO: 8.9 %
NEUTROPHILS # BLD AUTO: 7.75 X10(3)/MCL (ref 2.1–9.2)
NEUTROPHILS NFR BLD AUTO: 69.4 %
PLATELET # BLD AUTO: 565 X10(3)/MCL (ref 130–400)
PMV BLD AUTO: 9.9 FL (ref 7.4–10.4)
POTASSIUM SERPL-SCNC: 4.4 MMOL/L (ref 3.5–5.1)
PROT SERPL-MCNC: 6.9 GM/DL (ref 6.4–8.3)
RBC # BLD AUTO: 5.18 X10(6)/MCL (ref 4.2–5.4)
SODIUM SERPL-SCNC: 137 MMOL/L (ref 136–145)
TIBC SERPL-MCNC: 255 UG/DL (ref 70–310)
TIBC SERPL-MCNC: 315 UG/DL (ref 250–450)
TRANSFERRIN SERPL-MCNC: 287 MG/DL (ref 180–382)
WBC # BLD AUTO: 11.16 X10(3)/MCL (ref 4.5–11.5)

## 2024-12-30 PROCEDURE — 3051F HG A1C>EQUAL 7.0%<8.0%: CPT | Mod: CPTII,,, | Performed by: INTERNAL MEDICINE

## 2024-12-30 PROCEDURE — 80053 COMPREHEN METABOLIC PANEL: CPT

## 2024-12-30 PROCEDURE — 3066F NEPHROPATHY DOC TX: CPT | Mod: CPTII,,, | Performed by: INTERNAL MEDICINE

## 2024-12-30 PROCEDURE — 99215 OFFICE O/P EST HI 40 MIN: CPT | Mod: PBBFAC | Performed by: INTERNAL MEDICINE

## 2024-12-30 PROCEDURE — 3074F SYST BP LT 130 MM HG: CPT | Mod: CPTII,,, | Performed by: INTERNAL MEDICINE

## 2024-12-30 PROCEDURE — 99213 OFFICE O/P EST LOW 20 MIN: CPT | Mod: S$PBB,,, | Performed by: INTERNAL MEDICINE

## 2024-12-30 PROCEDURE — 36415 COLL VENOUS BLD VENIPUNCTURE: CPT

## 2024-12-30 PROCEDURE — 82728 ASSAY OF FERRITIN: CPT

## 2024-12-30 PROCEDURE — 3060F POS MICROALBUMINURIA REV: CPT | Mod: CPTII,,, | Performed by: INTERNAL MEDICINE

## 2024-12-30 PROCEDURE — 1159F MED LIST DOCD IN RCRD: CPT | Mod: CPTII,,, | Performed by: INTERNAL MEDICINE

## 2024-12-30 PROCEDURE — 3008F BODY MASS INDEX DOCD: CPT | Mod: CPTII,,, | Performed by: INTERNAL MEDICINE

## 2024-12-30 PROCEDURE — 4010F ACE/ARB THERAPY RXD/TAKEN: CPT | Mod: CPTII,,, | Performed by: INTERNAL MEDICINE

## 2024-12-30 PROCEDURE — 85025 COMPLETE CBC W/AUTO DIFF WBC: CPT

## 2024-12-30 PROCEDURE — 83540 ASSAY OF IRON: CPT

## 2024-12-30 PROCEDURE — 3078F DIAST BP <80 MM HG: CPT | Mod: CPTII,,, | Performed by: INTERNAL MEDICINE

## 2024-12-30 NOTE — PROGRESS NOTES
Reason for Follow-up:  recurrent severe iron-deficiency anemia; no definite GI source of bleeding  -low risk essential thrombocytosis     Current Treatment:  ASA 81mg po daily     Treatment History:  -S/P Feraheme 510 mg IV x2 (2023, 2023)  -S/P Ferrlecit 125mg IV x 8  (,,,,,)  -S/P Venofer 250mg IV X 5 doses (,,5/3,)  -S/P Venofer 250mg IV (, , , in )  -S/P Venofer 250mg x 10 doses (     Past medical history: NIDDM.  Hypertension.  Dyslipidemia.  History of anemia.  Vitamin D deficiency.  Tobacco abuse.  Obesity.  Obstructive sleep apnea.  HFpEF. Leukocytosis.  Cholecystectomy.  Goiter surgery.  Social history: Single.  Lives in [place], [place].  Has 2 grown up sons.  Has been smoking 1-1/2 packs of cigarettes daily since age 10.  Has tried to quit smoking many times but does not get refills of Chantix, therefore, has been unable to.  Currently, down to 10 cigarettes daily.  Denies history of alcohol or illicit drug abuse.  Family history: Sister  from cervical cancer at age 55.  Health maintenance:   EGD 2018 (Dr. Koo in [place]: Esophageal dilation).    Colonoscopy 2018 (Dr. Koo in [place]), apparently to be repeated in 2 years.    Mammogram 2018: Negative for malignancy.   ThinPrep cervical Pap smear 2016: Negative for intraepithelial lesion or malignancy; negative for HPV high-risk types (type 16 and type 18/45)  Menstrual and OB/GYN history: Menopause in .  For last 3 months or so, has experienced vaginal spotting.  Apparently, had Pap smear done at [facility] 2 months back (probably 2018), which was apparently unremarkable. Pelvic ultrasound on 2019 showed normal endometrium; likely myomatous changes of uterus with a lower uterine segment lipoma leiomyoma.         History of Present Illness:   59-year-old lady referred from [facility]  "for evaluation of leukocytosis.     For details, please see my last note dated 09/03/2020.  Please also refer to assessment and plan section.     Interval History:  12/20/24:  HGB is 14.2, no need for IV iron.   11/22/24  Ms. Walker presents today for follow-up for her iron deficiency. She will be undergoing EGD on 12/19, and possible hernia repair. They will give her a date for the hernia repair after completion of EGD. Patient is very concerned, and would like to receive iron infusions weekly despite her iron levels being improved. Concerned she will "die from low iron levels". She would like more frequent lab monitoring and IV iron more regularly. She has started taking oral iron 3x per week and cows colostrum which she believes is helpful.        8/29/24:  Patient was scheduled in the clinic today for a virtual follow-up regarding her iron-deficiency anemia.  She states that she continues to feel weak, tired and exhausted more than normal.  She states that it is difficult for her to do her activities of daily living. She reports taking her aspirin 81 mg approximately 3 times a week.  She reports that she will have an angiogram done on 09/18.  Denies any chest pain, dizziness, headache, heart palpitations.  Denies any active bleeding or unusual bruising.  All future appointments were discussed.           Physical Examination: Physical Exam was not completed today as this was a telemedicine visit.     Assessment:  Severe iron deficiency anemia:   Presented as moderate anemia with severe microcytosis    EGD, colonoscopy negative for any bleeders (02/2018)   Severe iron deficiency    Feraheme (03/2019)   Hemoglobin dropped from 14.8 (06/2019) to 9.5 (08/28/2019) due to iron deficiency   Feraheme x2 (09/2019) --> improvement in iron stores and normalization of hemoglobin (9.5 --> 13.7)   CT enterogram (09/11/2019): Hiatal hernia; 1.7 cm left adrenal nodule   -03/10/2020: Remains iron deficient (transferrin saturation " 13.2%, ferritin 19.2)   -Did not present for Feraheme shots in a timely manner   -04/27/2020: Ferritin 4.9 (down from 19.2 on 03/10/2020); however, hemoglobin 12.4, MCV 83.0   (Iron deficiency erythropoiesis)   -Feraheme 510 mg IV x2 (04/27/2020; 05/04/2020)   -06/01/2020: Iron deficiency resolved with parenteral iron therapy   -However, as of 06/01/2020, severe recurrent iron deficiency remains unexplained   -07/01/2020: Iron stores dropping again within a month; hemoglobin remains normal   -09/02/2020: Iron stores normal/stable (ferritin 21, transferrin saturation 15.8%); hemoglobin 14.8, normal   -09/08/2020: Feels better and more energetic than before   -11/12/2020: Iron deficient; hemoglobin 15.1  -08/04/2020:  [facility]:  Insurance denied capsule endoscopy.  Upper single balloon enteroscopy performed.  1. Duodenum biopsy:  Minimal focal acute inflammation with reactive changes; no celiac sprue  2. Duodenal bulb:  Biopsy showed no significant histopathologic findings; no evidence of celiac sprue  3. Stomach biopsy:  Chemical/reactive gastropathy; H pylori stain negative  -S/P Feraheme 510 mg IV x2 (05/17/2021, 05/24/2021)  -S/P Feraheme 510 mg IV x2 (07/09/2021, 07/19/2021)  -10/27/2021: Colonoscopy (iron-deficiency anemia):  Diffuse diverticulosis; otherwise unremarkable  -10/27/2021:  EGD with APC fulguration and biopsy (iron-deficiency anemia): unctate AVMs within the stomach.  A directed fulguration with APC with good result achieved, superficial ulcerations within the duodenal bulb, possibly secondary to daily aspirin use.  Biopsy taken in the antrum to rule out Helicobacter pylori by pathology  -S/P Feraheme 510 mg IV x2 (08/29/2022, 09/08/2022)  -11/11/2022:  WBC 12.7.  Hemoglobin 12.8.  Platelets 543 K. ferritin 251.70 (was 93.95 on 08/29/2022, 24.88 on 06/29/2022) (transferrin saturation was 19% on 08/29/2022, 7% on 06/29/2022)  -01/11/2023: Labs reviewed.  Hemoglobin 11.2 (hemoglobin was 15.9 on  08/29/2022).  MCV 85.0.  Platelets 562 K, stable.  Ferritin 9.02, down from 251.7 on 11/11/2022.  Transferrin saturation is 6%, low.  CMP unremarkable except glucose 188 mg/dL.  -S/P Feraheme 510 mg IV x2 (01/11/2023, 01/18/2023)  -s/p IV iron in September and October 2024  To summarize:   -Recurrent severe iron deficiency anemia, unexplained   -EGD, colonoscopy negative (02/2018)   -CT enterogram negative (09/11/2019)   -has required multiple rounds of parenteral iron therapy over past 4-5 years  -no source of bleeding, H pylori gastritis, or celiac sprue on multiple GI endoscopic procedures including EGD, colonoscopy, CT enteroscopy, and upper single balloon enteroscopy   [ferritin level has dropped:  9.02 (01/11/2023), down from 251.7 (11/11/2022)]  [Hemoglobin: 11.2 on 01/11/2023; down from 15.9 on 08/29/2022)  -treated with Feraheme 510 mg IV x2 (01/11/2023, 01/18/2023)  -02/27/2023:  Hemoglobin 14.6, remarkably improved with Feraheme.  Transferrin saturation 17%, improved but remains low. Ferritin 55.55 (was 9.02 on 01/11/2023)  -09/21/2023: Hgb 11.6, iron 26, iron sat 9 %, TIBC 276, Ferritin 30.30  --repeat IV iron in September and October 2024  -12/30/24 ferritin 60, hGB 14.2, MCV 81.    Thrombocytosis (low risk IPSET-thrombosis score ET)    (age < 60 years; JAK2 V617F mutation positive)   GAGANDEEP-2 V617F mutation positive (03/2019)   Bone marrow consistent (06/2019)   Low risk IPSET-thrombosis score ET   Cardiovascular risk factors: NIDDM, hypertension, tobacco abuse   -04/27/2020: Platelets 500K, stable   -06/01/2020: Platelets 561K, more or less stable   -09/02/2020: Platelets 544K, stable   -11/12/2020: Platelets 567,000/mm³, stable  -11/11/2022:  Platelets 543 K, stable   -02/27/2023:  Platelets 623 K  -09/21/2023:  Platelets 600k, stable.  -12/12/24:      PLT 593K      Chronic, very mild, benign-appearing, intermittent leukocytosis:   Secondary to smoking or underlying myeloproliferative disorder   GAGANDEEP-2  V617F mutation positive (03/2019)   BCR-ABL 1 fusion transcripts negative (03/2019)   Underlying essential thrombocytosis     Plan 12/30/24:  -telemed in 2 weeks (patient request) with repeat iron labs, she would also like her vit D checked at that time.   -Recurrent severe iron deficiency anemia, unexplained, last treated in September and October with IV iron. Ferriin currently 60 and MCV is normal with HGB 14.2.  -EGD, colonoscopy negative (02/2018). She will be undergoing another EGD 12/19 per patient and subsequently scheduled for hernia repair.   -CT enterogram negative (09/11/2019)  -has required multiple rounds of parenteral iron therapy over past 4-5 years  -no source of bleeding, H pylori gastritis, or celiac sprue on multiple GI endoscopic procedures including EGD, colonoscopy, CT enteroscopy, and upper single balloon enteroscopy  >>>  [ferritin level has dropped:  9.02 (01/11/2023), down from 251.7 (11/11/2022)]  [Hemoglobin: 11.2 on 01/11/2023; down from 15.9 on 08/29/2022)  -treated with Feraheme 510 mg IV x2 (01/11/2023, 01/18/2023)  -02/27/2023:  Hemoglobin 14.6, remarkably improved with Feraheme.  Transferrin saturation 17%, improved but remains low. Ferritin 55.55 (was 9.02 on 01/11/2023)  -8/2/2023: Hgb 14.8, Hct 47.4, Plt 633, iron 40, iron sat 15% (improved)  -requiring multiple rounds of parenteral iron therapy in the past, essentially with negative exhaustive GI workup     Iron Deficiency Anemia:   - Gave Venofer IV x 10 doses in 9/2024 and 10/2024 -per patient's request  -Hgb 14.1, Ferritin 87.66, Iron sat 20%  -Due to the patient's high level of concern we will check her iron and Hgb every 2 weeks to determine how low her iron declines, and arrange for IV iron if needed. She was advised the IV is not recommended at this time.       Latest Reference Range & Units 11/19/24 07:55 12/04/24 15:11 12/12/24 13:28   Iron 50 - 170 ug/dL 63 54 52   TIBC 250 - 450 ug/dL 323 336 344   UIBC 70 - 310 ug/dL  260 282 292   Transferrin 180 - 382 mg/dL 292 304 311   Ferritin 4.63 - 204.00 ng/mL 87.66 81.03 96.10   Iron Saturation 20 - 50 % 20 16 (L) 15 (L)     Low risk essential thrombocytosis    -No history of Thrombosis. Age < 60  -Continue OTC ASA 81 mg tablet daily. Do not miss any doses.   -Advised her elevated platelets can be secondary to anemia as well.      Smoker:   - Instructed the patient on smoking cessation    Above discussed at length with her.  All questions answered.    She understands and agrees with this plan.     This service was not originating from a related E/M service provided within the previous 7 days nor will  to an E/M service or procedure within the next 24 hours or my soonest available appointment.  Prevailing standard of care was able to be met in this audio-only visit.          No follow-ups on file.           Answers submitted by the patient for this visit:  Review of Systems Questionnaire (Submitted on 12/23/2024)  appetite change : No  unexpected weight change: No  mouth sores: No  visual disturbance: No  cough: No  shortness of breath: No  chest pain: No  abdominal pain: No  diarrhea: No  frequency: No  back pain: Yes  rash: No  headaches: No  adenopathy: No  nervous/ anxious: No

## 2025-01-03 DIAGNOSIS — K44.9 HIATAL HERNIA: ICD-10-CM

## 2025-01-03 DIAGNOSIS — D50.9 IRON DEFICIENCY ANEMIA, UNSPECIFIED: Primary | ICD-10-CM

## 2025-01-13 DIAGNOSIS — Z79.4 TYPE 2 DIABETES MELLITUS WITH OTHER SPECIFIED COMPLICATION, WITH LONG-TERM CURRENT USE OF INSULIN: ICD-10-CM

## 2025-01-13 DIAGNOSIS — E11.69 TYPE 2 DIABETES MELLITUS WITH OTHER SPECIFIED COMPLICATION, WITH LONG-TERM CURRENT USE OF INSULIN: ICD-10-CM

## 2025-01-13 RX ORDER — DULAGLUTIDE 3 MG/.5ML
3 INJECTION, SOLUTION SUBCUTANEOUS WEEKLY
Qty: 12 PEN | Refills: 2 | Status: SHIPPED | OUTPATIENT
Start: 2025-01-13 | End: 2026-01-13

## 2025-01-15 ENCOUNTER — LAB VISIT (OUTPATIENT)
Dept: LAB | Facility: HOSPITAL | Age: 60
End: 2025-01-15
Attending: SURGERY
Payer: MEDICAID

## 2025-01-15 ENCOUNTER — HOSPITAL ENCOUNTER (OUTPATIENT)
Dept: RADIOLOGY | Facility: HOSPITAL | Age: 60
Discharge: HOME | End: 2025-01-15
Attending: SURGERY
Payer: MEDICAID

## 2025-01-15 DIAGNOSIS — D50.9 IRON DEFICIENCY ANEMIA, UNSPECIFIED: ICD-10-CM

## 2025-01-15 DIAGNOSIS — D75.839 THROMBOCYTOSIS: ICD-10-CM

## 2025-01-15 DIAGNOSIS — D50.9 IRON DEFICIENCY ANEMIA, UNSPECIFIED IRON DEFICIENCY ANEMIA TYPE: ICD-10-CM

## 2025-01-15 DIAGNOSIS — K44.9 HIATAL HERNIA: ICD-10-CM

## 2025-01-15 LAB
25(OH)D3+25(OH)D2 SERPL-MCNC: 41 NG/ML (ref 30–80)
BASOPHILS # BLD AUTO: 0.07 X10(3)/MCL
BASOPHILS NFR BLD AUTO: 0.6 %
EOSINOPHIL # BLD AUTO: 0.39 X10(3)/MCL (ref 0–0.9)
EOSINOPHIL NFR BLD AUTO: 3.5 %
ERYTHROCYTE [DISTWIDTH] IN BLOOD BY AUTOMATED COUNT: 17.3 % (ref 11.5–17)
FERRITIN SERPL-MCNC: 39.3 NG/ML (ref 4.63–204)
HCT VFR BLD AUTO: 48.1 % (ref 37–47)
HGB BLD-MCNC: 15.6 G/DL (ref 12–16)
IMM GRANULOCYTES # BLD AUTO: 0.14 X10(3)/MCL (ref 0–0.04)
IMM GRANULOCYTES NFR BLD AUTO: 1.2 %
IRON SATN MFR SERPL: 22 % (ref 20–50)
IRON SERPL-MCNC: 77 UG/DL (ref 50–170)
LYMPHOCYTES # BLD AUTO: 2.09 X10(3)/MCL (ref 0.6–4.6)
LYMPHOCYTES NFR BLD AUTO: 18.6 %
MAGNESIUM SERPL-MCNC: 1.4 MG/DL (ref 1.6–2.6)
MCH RBC QN AUTO: 27.1 PG (ref 27–31)
MCHC RBC AUTO-ENTMCNC: 32.4 G/DL (ref 33–36)
MCV RBC AUTO: 83.7 FL (ref 80–94)
MONOCYTES # BLD AUTO: 0.94 X10(3)/MCL (ref 0.1–1.3)
MONOCYTES NFR BLD AUTO: 8.4 %
NEUTROPHILS # BLD AUTO: 7.58 X10(3)/MCL (ref 2.1–9.2)
NEUTROPHILS NFR BLD AUTO: 67.7 %
PLATELET # BLD AUTO: 604 X10(3)/MCL (ref 130–400)
PMV BLD AUTO: 9.7 FL (ref 7.4–10.4)
RBC # BLD AUTO: 5.75 X10(6)/MCL (ref 4.2–5.4)
RET# (OHS): 0.09 X10E6/UL (ref 0.02–0.08)
RETICULOCYTE COUNT AUTOMATED (OLG): 1.57 % (ref 1.1–2.1)
TIBC SERPL-MCNC: 273 UG/DL (ref 70–310)
TIBC SERPL-MCNC: 350 UG/DL (ref 250–450)
TRANSFERRIN SERPL-MCNC: 298 MG/DL (ref 180–382)
WBC # BLD AUTO: 11.21 X10(3)/MCL (ref 4.5–11.5)

## 2025-01-15 PROCEDURE — 82728 ASSAY OF FERRITIN: CPT

## 2025-01-15 PROCEDURE — 82306 VITAMIN D 25 HYDROXY: CPT

## 2025-01-15 PROCEDURE — 83735 ASSAY OF MAGNESIUM: CPT

## 2025-01-15 PROCEDURE — 85045 AUTOMATED RETICULOCYTE COUNT: CPT

## 2025-01-15 PROCEDURE — 83550 IRON BINDING TEST: CPT

## 2025-01-15 PROCEDURE — 85025 COMPLETE CBC W/AUTO DIFF WBC: CPT

## 2025-01-15 PROCEDURE — 36415 COLL VENOUS BLD VENIPUNCTURE: CPT

## 2025-01-16 ENCOUNTER — OFFICE VISIT (OUTPATIENT)
Dept: HEMATOLOGY/ONCOLOGY | Facility: CLINIC | Age: 60
End: 2025-01-16
Payer: MEDICAID

## 2025-01-16 DIAGNOSIS — D50.9 IRON DEFICIENCY ANEMIA, UNSPECIFIED IRON DEFICIENCY ANEMIA TYPE: ICD-10-CM

## 2025-01-16 DIAGNOSIS — D75.839 THROMBOCYTOSIS: ICD-10-CM

## 2025-01-16 DIAGNOSIS — D47.3 ESSENTIAL THROMBOCYTOSIS: ICD-10-CM

## 2025-01-16 DIAGNOSIS — Z72.0 TOBACCO USE: ICD-10-CM

## 2025-01-16 DIAGNOSIS — D47.3 ESSENTIAL THROMBOCYTOSIS: Primary | ICD-10-CM

## 2025-01-16 DIAGNOSIS — E83.42 HYPOMAGNESEMIA: Primary | ICD-10-CM

## 2025-01-16 RX ORDER — LANOLIN ALCOHOL/MO/W.PET/CERES
400 CREAM (GRAM) TOPICAL 2 TIMES DAILY
Qty: 28 TABLET | Refills: 0 | Status: SHIPPED | OUTPATIENT
Start: 2025-01-16 | End: 2025-01-30

## 2025-01-16 NOTE — Clinical Note
Monthly labs in [place] --- (CBC/TIBC/Iron Profile) RTC with me in 3 months with labs prior (CBC/TIBC/Iron Profile) via TM
No bruits; no thyromegaly or nodules

## 2025-01-16 NOTE — PROGRESS NOTES
Reason for Follow-up:  recurrent severe iron-deficiency anemia; no definite GI source of bleeding  -low risk essential thrombocytosis     Current Treatment:  ASA 81mg po daily     Treatment History:  -S/P Feraheme 510 mg IV x2 (2023, 2023)  -S/P Ferrlecit 125mg IV x 8  (,,,,,)  -S/P Venofer 250mg IV X 5 doses (,,5/3,)  -S/P Venofer 250mg IV (, , , in )  -S/P Venofer 250mg x 10 doses (     Past medical history: NIDDM.  Hypertension.  Dyslipidemia.  History of anemia.  Vitamin D deficiency.  Tobacco abuse.  Obesity.  Obstructive sleep apnea.  HFpEF. Leukocytosis.  Cholecystectomy.  Goiter surgery.  Social history: Single.  Lives in [place], [place].  Has 2 grown up sons.  Has been smoking 1-1/2 packs of cigarettes daily since age 10.  Has tried to quit smoking many times but does not get refills of Chantix, therefore, has been unable to.  Currently, down to 10 cigarettes daily.  Denies history of alcohol or illicit drug abuse.  Family history: Sister  from cervical cancer at age 55.  Health maintenance:   EGD 2018 (Dr. Koo in [place]: Esophageal dilation).    Colonoscopy 2018 (Dr. Koo in [place]), apparently to be repeated in 2 years.    Mammogram 2018: Negative for malignancy.   ThinPrep cervical Pap smear 2016: Negative for intraepithelial lesion or malignancy; negative for HPV high-risk types (type 16 and type 18/45)  Menstrual and OB/GYN history: Menopause in .  For last 3 months or so, has experienced vaginal spotting.  Apparently, had Pap smear done at [facility] 2 months back (probably 2018), which was apparently unremarkable. Pelvic ultrasound on 2019 showed normal endometrium; likely myomatous changes of uterus with a lower uterine segment lipoma leiomyoma.         History of Present Illness:   59-year-old lady referred from [facility]  for evaluation of leukocytosis.     For details, please see my last note dated 09/03/2020.  Please also refer to assessment and plan section.     Interval History 1/16/25:  Patient presents to the clinic for a virtual visit to follow up regarding iron-deficiency anemia.  Patient requested to follow-up 2 weeks after previous visit re-evaluation of labs.  She admits to needing a hernia repair.  Denies any active bleeding or any bruising.  But admits to weakness and fatigue which he states is her normal.  Lab work was reviewed with the patient (stable).  All future appointments were discussed         Physical Examination:   Physical Exam was not completed today as this was a telemedicine visit.     Assessment:  Severe iron deficiency anemia:   Presented as moderate anemia with severe microcytosis    EGD, colonoscopy negative for any bleeders (02/2018)   Severe iron deficiency    Feraheme (03/2019)   Hemoglobin dropped from 14.8 (06/2019) to 9.5 (08/28/2019) due to iron deficiency   Feraheme x2 (09/2019) --> improvement in iron stores and normalization of hemoglobin (9.5 --> 13.7)   CT enterogram (09/11/2019): Hiatal hernia; 1.7 cm left adrenal nodule   -03/10/2020: Remains iron deficient (transferrin saturation 13.2%, ferritin 19.2)   -Did not present for Feraheme shots in a timely manner   -04/27/2020: Ferritin 4.9 (down from 19.2 on 03/10/2020); however, hemoglobin 12.4, MCV 83.0   (Iron deficiency erythropoiesis)   -Feraheme 510 mg IV x2 (04/27/2020; 05/04/2020)   -06/01/2020: Iron deficiency resolved with parenteral iron therapy   -However, as of 06/01/2020, severe recurrent iron deficiency remains unexplained   -07/01/2020: Iron stores dropping again within a month; hemoglobin remains normal   -09/02/2020: Iron stores normal/stable (ferritin 21, transferrin saturation 15.8%); hemoglobin 14.8, normal   -09/08/2020: Feels better and more energetic than before   -11/12/2020: Iron deficient; hemoglobin  15.1  -08/04/2020:  [facility]:  Insurance denied capsule endoscopy.  Upper single balloon enteroscopy performed.  1. Duodenum biopsy:  Minimal focal acute inflammation with reactive changes; no celiac sprue  2. Duodenal bulb:  Biopsy showed no significant histopathologic findings; no evidence of celiac sprue  3. Stomach biopsy:  Chemical/reactive gastropathy; H pylori stain negative  -S/P Feraheme 510 mg IV x2 (05/17/2021, 05/24/2021)  -S/P Feraheme 510 mg IV x2 (07/09/2021, 07/19/2021)  -10/27/2021: Colonoscopy (iron-deficiency anemia):  Diffuse diverticulosis; otherwise unremarkable  -10/27/2021:  EGD with APC fulguration and biopsy (iron-deficiency anemia): unctate AVMs within the stomach.  A directed fulguration with APC with good result achieved, superficial ulcerations within the duodenal bulb, possibly secondary to daily aspirin use.  Biopsy taken in the antrum to rule out Helicobacter pylori by pathology  -S/P Feraheme 510 mg IV x2 (08/29/2022, 09/08/2022)  -11/11/2022:  WBC 12.7.  Hemoglobin 12.8.  Platelets 543 K. ferritin 251.70 (was 93.95 on 08/29/2022, 24.88 on 06/29/2022) (transferrin saturation was 19% on 08/29/2022, 7% on 06/29/2022)  -01/11/2023: Labs reviewed.  Hemoglobin 11.2 (hemoglobin was 15.9 on 08/29/2022).  MCV 85.0.  Platelets 562 K, stable.  Ferritin 9.02, down from 251.7 on 11/11/2022.  Transferrin saturation is 6%, low.  CMP unremarkable except glucose 188 mg/dL.  -S/P Feraheme 510 mg IV x2 (01/11/2023, 01/18/2023)  -s/p IV iron in September and October 2024  To summarize:   -Recurrent severe iron deficiency anemia, unexplained   -EGD, colonoscopy negative (02/2018)   -CT enterogram negative (09/11/2019)   -has required multiple rounds of parenteral iron therapy over past 4-5 years  -no source of bleeding, H pylori gastritis, or celiac sprue on multiple GI endoscopic procedures including EGD, colonoscopy, CT enteroscopy, and upper single balloon enteroscopy   [ferritin level has dropped:   9.02 (01/11/2023), down from 251.7 (11/11/2022)]  [Hemoglobin: 11.2 on 01/11/2023; down from 15.9 on 08/29/2022)  -treated with Feraheme 510 mg IV x2 (01/11/2023, 01/18/2023)  -02/27/2023:  Hemoglobin 14.6, remarkably improved with Feraheme.  Transferrin saturation 17%, improved but remains low. Ferritin 55.55 (was 9.02 on 01/11/2023)  -09/21/2023: Hgb 11.6, iron 26, iron sat 9 %, TIBC 276, Ferritin 30.30  --repeat IV iron in September and October 2024  -12/30/24 ferritin 60, hGB 14.2, MCV 81.    Thrombocytosis (low risk IPSET-thrombosis score ET)    (age < 60 years; JAK2 V617F mutation positive)   GAGANDEEP-2 V617F mutation positive (03/2019)   Bone marrow consistent (06/2019)   Low risk IPSET-thrombosis score ET   Cardiovascular risk factors: NIDDM, hypertension, tobacco abuse   -04/27/2020: Platelets 500K, stable   -06/01/2020: Platelets 561K, more or less stable   -09/02/2020: Platelets 544K, stable   -11/12/2020: Platelets 567,000/mm³, stable  -11/11/2022:  Platelets 543 K, stable   -02/27/2023:  Platelets 623 K  -09/21/2023:  Platelets 600k, stable.  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   -Recurrent severe iron deficiency anemia, unexplained, last treated in September and October with IV iron. Ferriin currently 60 and MCV is normal with HGB 14.2.  -EGD, colonoscopy negative (02/2018). She will be undergoing another EGD 12/19 per patient and subsequently scheduled for hernia repair.   -CT enterogram negative (09/11/2019)  -has required multiple rounds of parenteral iron therapy over past 4-5 years  -no source of bleeding, H pylori  gastritis, or celiac sprue on multiple GI endoscopic procedures including EGD, colonoscopy, CT enteroscopy, and upper single balloon enteroscopy  >>>  [ferritin level has dropped:  9.02 (01/11/2023), down from 251.7 (11/11/2022)]  [Hemoglobin: 11.2 on 01/11/2023; down from 15.9 on 08/29/2022)  -treated with Feraheme 510 mg IV x2 (01/11/2023, 01/18/2023)  -02/27/2023:  Hemoglobin 14.6, remarkably improved with Feraheme.  Transferrin saturation 17%, improved but remains low. Ferritin 55.55 (was 9.02 on 01/11/2023)  -8/2/2023: Hgb 14.8, Hct 47.4, Plt 633, iron 40, iron sat 15% (improved)  -requiring multiple rounds of parenteral iron therapy in the past, essentially with negative exhaustive GI workup     Iron Deficiency Anemia:   - Gave Venofer IV x 10 doses in 9/2024 and 10/2024 -per patient's request  -Hgb 15.6 Ferritin 39.30 Iron sat 22%  -Educated patient that we do not monitor labs every 2 weeks for iron-deficiency anemia.  She is welcome to have her labs done drawn monthly and I will follow up with her in 3 months.  -RTC with me 3 months with labs prior (CBC/CMP/TIBC/Iron Profile)    Low risk essential thrombocytosis    -No history of Thrombosis. Age < 60  -Continue OTC ASA 81 mg tablet daily. Do not miss any doses.   -Advised her elevated platelets can be secondary to anemia as well.      Smoker:   - Instructed the patient on smoking cessation    Above discussed at length with her.  All questions answered.    She understands and agrees with this plan.     This service was not originating from a related E/M service provided within the previous 7 days nor will  to an E/M service or procedure within the next 24 hours or my soonest available appointment.  Prevailing standard of care was able to be met in this audio-only visit.          No follow-ups on file.

## 2025-01-17 ENCOUNTER — TELEPHONE (OUTPATIENT)
Dept: HEMATOLOGY/ONCOLOGY | Facility: CLINIC | Age: 60
End: 2025-01-17
Payer: MEDICAID

## 2025-02-10 NOTE — PROGRESS NOTES
"[facility] Minor Procedure Clinic Note    ID:  Jessica Walker   MRN:  [id number]   2/11/2025    Chief Complaint:    Chief Complaint   Patient presents with    Warts     History of Present Illness:  Jessica Walker is a 59 y.o. female who presents to [facility] clinic as a new patient for evaluation of warts. Patient has 2 warts on the right index finger that has been present for the past 5 years or so. Has had them frozen before and used Compound W OTC, with recurrence. Patient reports that she also has some lesions on her scalp that irritate her. She has been scratching at them but denies any bleeding.      Current Outpatient Medications   Medication Instructions    aspirin (ECOTRIN) 81 mg, Nightly    atorvastatin (LIPITOR) 40 mg, Oral, Nightly    BD ULTRA-FINE SHORT PEN NEEDLE 31 gauge x 5/16" Ndle 1 each, Subcutaneous, 3 times daily    blood-glucose meter,continuous (DEXCOM ) Misc Dexcom G7 .  Use as directed with Dexcom G7 sensors.    carbamide peroxide (DEBROX) 6.5 % otic solution 5 drops, Right Ear, 2 times daily    clotrimazole (LOTRIMIN) 1 % cream Topical (Top), 2 times daily    cycloSPORINE (RESTASIS) 0.05 % ophthalmic emulsion 1 drop, 2 times daily    DEXCOM G7 SENSOR Amy Dexcom G7 sensors.  Use every 10 days as directed.    ibuprofen (ADVIL,MOTRIN) 800 mg, Every 6 hours PRN    LANTUS SOLOSTAR U-100 INSULIN 47 Units, Every morning    linaCLOtide (LINZESS) 72 mcg Cap capsule Patient states takes as needed    lisinopriL 10 mg, Daily    magnesium chloride (MAG 64) 64 mg, Oral, 2 times daily    metFORMIN (GLUCOPHAGE) 1,000 mg, Oral, 2 times daily    metoprolol tartrate (LOPRESSOR) 25 mg, Oral, 2 times daily    mirtazapine (REMERON) 15 mg, Nightly    nicotine (NICODERM CQ) 21 mg/24 hr 1 patch, Transdermal, Daily    nicotine polacrilex 2 mg, Oral, As needed (PRN)    nitroGLYCERIN (NITROSTAT) 0.4 mg, Sublingual, Every 5 min PRN    pantoprazole (PROTONIX) 40 mg, Oral, Every morning    TRUE " "METRIX GLUCOSE TEST STRIP Strp USE 1 STRIP TO CHECK GLUCOSE ONCE DAILY    TRULICITY 3 mg, Subcutaneous, Weekly    varenicline tartrate (CHANTIX) 1 mg, Oral, 2 times daily       Review of Systems: as per HPI      Objective:  Vitals:    02/11/25 1334   BP: 101/71   Patient Position: Sitting   Pulse: 89   SpO2: 99%   Weight: 88 kg (194 lb)   Height: 5' 6.93" (1.7 m)       Physical Exam  Constitutional:       General: She is not in acute distress.     Appearance: Normal appearance.   Skin:     General: Skin is dry.      Coloration: Skin is pale.      Comments: Two skin colored papules on the R index finger. Two hyperpigmented papules on the scalp at the hairline on the forehead and on    Neurological:      Mental Status: She is alert.          Procedure: Cryotherapy   - Location: R index finger, Scalp  Indication: relief of irritation/pain  - Physicians: Dr. Chantal Clark  - Attending: Dr. Donna Negrete  - Procedure, benefits, risks (including those of bleeding, infection, anesthesia & allergic reaction), and alternatives explained to the patient who voiced understanding of the information. Patient agreed to proceed with cryotherapy. Informed consent signed and on chart. Time out preformed.   - Description: Cryotip nozzle with liquid nitrogen held approximately 1 cm away from lesion. Liquid nitrogen was applied 5 seconds to the lesion. Lesion was allowed to thaw, before being reapplied for 5 seconds. No complications. Patient tolerated procedure well. Skin care precautions given.   - Disposition: Patient tolerated procedure well with no complications. Patient instructed on wound care management.    Assessment/Plan:  1. Viral wart on finger    2. Seborrheic keratosis of scalp      - Cryotherapy performed in clinic on both warts and SKs. Patient understood risk/benefits.   - Instructed on proper care of areas treated.   - Patient will follow up for assessment and response to treatment.   - Patient voiced fatigue and some " weakness in clinic. Instructed to follow up with her PCP or seek care at urgent care or ED if urgent and clinics closed.      Follow up in about 4 weeks (around 3/11/2025) for cryo f/u.    Future Appointments   Date Time Provider Department Center   2/28/2025  9:00 AM AUDIOLOGIST 4, [facility] AUDIO [place]    2/28/2025 10:30 AM [facility] MAMMO1 [facility] MAMMO [facility]   3/5/2025  8:45 AM RESIDENTS, [facility] GYN [facility] GYN [place]   3/17/2025  2:30 PM Heather Castellon FNP [facility] ENT [place]   3/18/2025  2:30 PM SCHEDULE, FM MINOR SURGERY [facility] FM RES [place]   4/9/2025 11:00 AM Conchis Willingham MD [facility] RHEU [place]   4/16/2025  1:30 PM Kleber Regan FNP [facility] HEMOMC [place]   5/6/2025 11:00 AM Lori Rodrigez MD [facility] CARD [place]   5/15/2025  8:10 AM PROVIDERS, [facility] [place]   6/5/2025  2:00 PM Bridgette Mckeon FNP [facility] GASTRO [place]   7/17/2025  1:40 PM Leni Presley MD [facility] FAMMED [place]   11/20/2025  8:10 AM Shabana Lucas FNP [facility] GYN [place]       Chantal Clark DO  [facility] HO[facility]

## 2025-02-11 ENCOUNTER — OFFICE VISIT (OUTPATIENT)
Dept: FAMILY MEDICINE | Facility: CLINIC | Age: 60
End: 2025-02-11
Payer: MEDICAID

## 2025-02-11 VITALS
HEIGHT: 67 IN | HEART RATE: 89 BPM | SYSTOLIC BLOOD PRESSURE: 101 MMHG | WEIGHT: 194 LBS | BODY MASS INDEX: 30.45 KG/M2 | DIASTOLIC BLOOD PRESSURE: 71 MMHG | OXYGEN SATURATION: 99 %

## 2025-02-11 DIAGNOSIS — L82.1 SEBORRHEIC KERATOSIS OF SCALP: ICD-10-CM

## 2025-02-11 DIAGNOSIS — B07.9 VIRAL WART ON FINGER: Primary | ICD-10-CM

## 2025-02-11 PROCEDURE — 99215 OFFICE O/P EST HI 40 MIN: CPT | Mod: PBBFAC

## 2025-02-11 PROCEDURE — 17110 DESTRUCTION B9 LES UP TO 14: CPT | Mod: PBBFAC

## 2025-02-13 NOTE — NURSING
Patient arrived ambulatory to infusion alert and oriented with no acute complaints at this time.  Pt is here for # 4/8 Ferrlecit. Pt tolerated tx well.    Tita Monae RN  
Alert-The patient is alert, awake and responds to voice. The patient is oriented to time, place, and person. The triage nurse is able to obtain subjective information.

## 2025-02-19 ENCOUNTER — LAB VISIT (OUTPATIENT)
Dept: LAB | Facility: HOSPITAL | Age: 60
End: 2025-02-19
Payer: MEDICAID

## 2025-02-19 DIAGNOSIS — D47.3 ESSENTIAL THROMBOCYTOSIS: ICD-10-CM

## 2025-02-19 DIAGNOSIS — D75.839 THROMBOCYTOSIS: ICD-10-CM

## 2025-02-19 DIAGNOSIS — D50.9 IRON DEFICIENCY ANEMIA, UNSPECIFIED IRON DEFICIENCY ANEMIA TYPE: ICD-10-CM

## 2025-02-19 LAB
ALBUMIN SERPL-MCNC: 3.6 G/DL (ref 3.5–5)
ALBUMIN/GLOB SERPL: 1.3 RATIO (ref 1.1–2)
ALP SERPL-CCNC: 75 UNIT/L (ref 40–150)
ALT SERPL-CCNC: 9 UNIT/L (ref 0–55)
ANION GAP SERPL CALC-SCNC: 10 MEQ/L
AST SERPL-CCNC: 10 UNIT/L (ref 5–34)
BASOPHILS # BLD AUTO: 0.08 X10(3)/MCL
BASOPHILS NFR BLD AUTO: 0.5 %
BILIRUB SERPL-MCNC: 0.3 MG/DL
BUN SERPL-MCNC: 27.1 MG/DL (ref 9.8–20.1)
CALCIUM SERPL-MCNC: 9.3 MG/DL (ref 8.4–10.2)
CHLORIDE SERPL-SCNC: 100 MMOL/L (ref 98–107)
CO2 SERPL-SCNC: 25 MMOL/L (ref 22–29)
CREAT SERPL-MCNC: 0.73 MG/DL (ref 0.55–1.02)
CREAT/UREA NIT SERPL: 37
EOSINOPHIL # BLD AUTO: 0.45 X10(3)/MCL (ref 0–0.9)
EOSINOPHIL NFR BLD AUTO: 3 %
ERYTHROCYTE [DISTWIDTH] IN BLOOD BY AUTOMATED COUNT: 14.7 % (ref 11.5–17)
FERRITIN SERPL-MCNC: 17.9 NG/ML (ref 4.63–204)
GFR SERPLBLD CREATININE-BSD FMLA CKD-EPI: >60 ML/MIN/1.73/M2
GLOBULIN SER-MCNC: 2.8 GM/DL (ref 2.4–3.5)
GLUCOSE SERPL-MCNC: 226 MG/DL (ref 74–100)
HCT VFR BLD AUTO: 33.8 % (ref 37–47)
HGB BLD-MCNC: 11.1 G/DL (ref 12–16)
IMM GRANULOCYTES # BLD AUTO: 0.24 X10(3)/MCL (ref 0–0.04)
IMM GRANULOCYTES NFR BLD AUTO: 1.6 %
IRON SATN MFR SERPL: 8 % (ref 20–50)
IRON SERPL-MCNC: 26 UG/DL (ref 50–170)
LYMPHOCYTES # BLD AUTO: 4.15 X10(3)/MCL (ref 0.6–4.6)
LYMPHOCYTES NFR BLD AUTO: 27.5 %
MCH RBC QN AUTO: 27.3 PG (ref 27–31)
MCHC RBC AUTO-ENTMCNC: 32.8 G/DL (ref 33–36)
MCV RBC AUTO: 83 FL (ref 80–94)
MONOCYTES # BLD AUTO: 0.96 X10(3)/MCL (ref 0.1–1.3)
MONOCYTES NFR BLD AUTO: 6.4 %
NEUTROPHILS # BLD AUTO: 9.22 X10(3)/MCL (ref 2.1–9.2)
NEUTROPHILS NFR BLD AUTO: 61 %
PLATELET # BLD AUTO: 697 X10(3)/MCL (ref 130–400)
PMV BLD AUTO: 9.8 FL (ref 7.4–10.4)
POTASSIUM SERPL-SCNC: 4.4 MMOL/L (ref 3.5–5.1)
PROT SERPL-MCNC: 6.4 GM/DL (ref 6.4–8.3)
RBC # BLD AUTO: 4.07 X10(6)/MCL (ref 4.2–5.4)
SODIUM SERPL-SCNC: 135 MMOL/L (ref 136–145)
TIBC SERPL-MCNC: 299 UG/DL (ref 70–310)
TIBC SERPL-MCNC: 325 UG/DL (ref 250–450)
TRANSFERRIN SERPL-MCNC: 305 MG/DL (ref 180–382)
WBC # BLD AUTO: 15.1 X10(3)/MCL (ref 4.5–11.5)

## 2025-02-19 PROCEDURE — 82728 ASSAY OF FERRITIN: CPT

## 2025-02-19 PROCEDURE — 83540 ASSAY OF IRON: CPT

## 2025-02-19 PROCEDURE — 80053 COMPREHEN METABOLIC PANEL: CPT

## 2025-02-19 PROCEDURE — 36415 COLL VENOUS BLD VENIPUNCTURE: CPT

## 2025-02-19 PROCEDURE — 85025 COMPLETE CBC W/AUTO DIFF WBC: CPT

## 2025-02-28 ENCOUNTER — CLINICAL SUPPORT (OUTPATIENT)
Dept: AUDIOLOGY | Facility: HOSPITAL | Age: 60
End: 2025-02-28
Payer: MEDICAID

## 2025-02-28 ENCOUNTER — TELEPHONE (OUTPATIENT)
Dept: GYNECOLOGY | Facility: CLINIC | Age: 60
End: 2025-02-28
Payer: MEDICAID

## 2025-02-28 DIAGNOSIS — H91.90 HEARING DISORDER, UNSPECIFIED LATERALITY: Primary | ICD-10-CM

## 2025-02-28 DIAGNOSIS — H91.93 DECREASED HEARING OF BOTH EARS: ICD-10-CM

## 2025-02-28 PROCEDURE — 92567 TYMPANOMETRY: CPT | Performed by: AUDIOLOGIST

## 2025-02-28 PROCEDURE — 92557 COMPREHENSIVE HEARING TEST: CPT | Performed by: AUDIOLOGIST

## 2025-02-28 NOTE — PROGRESS NOTES
Hearing Evaluation      Patient History: Ms. Walker is in for a hearing evaluation reporting a subjective decrease in hearing, onset unknown. She endorses constant bilateral tinnitus. Vertigo and middle ear issues are denied. All additional history is unremarkable.      Test Results:       Pure Tone Testing:     Right ear:   Normal peripheral hearing sensitivity from 250-8kHz. Speech reception threshold is in agreement with puretone findings.  Discrimination score of 100% is considered excellent.      Left ear: Normal peripheral hearing sensitivity from 250-8kHz. Speech reception threshold is in agreement with puretone findings.  Discrimination score of 100% is considered excellent.         Tympanometry:        Right ear:   Type 'A' tymp, normal middle ear pressure/function    Left ear: Type 'A' tymp, normal middle ear pressure/function       Interpretations:     Behavioral test findings indicate hearing within normal limits, bilaterally. Speech reception thresholds obtained at 15dB, AU, and are in agreement with puretone findings. Speech discrimination scores of 100%, AU, are considered excellent. Immittance measures indicate normal middle ear pressure/function, bilaterally.         Recommendations:   Continue with ENT follow up  RTC PRN with audiology

## 2025-03-05 ENCOUNTER — OFFICE VISIT (OUTPATIENT)
Dept: GYNECOLOGY | Facility: CLINIC | Age: 60
End: 2025-03-05
Payer: MEDICAID

## 2025-03-05 ENCOUNTER — LAB VISIT (OUTPATIENT)
Dept: LAB | Facility: HOSPITAL | Age: 60
End: 2025-03-05
Payer: MEDICAID

## 2025-03-05 ENCOUNTER — PATIENT MESSAGE (OUTPATIENT)
Dept: GYNECOLOGY | Facility: CLINIC | Age: 60
End: 2025-03-05

## 2025-03-05 VITALS
HEART RATE: 83 BPM | TEMPERATURE: 99 F | OXYGEN SATURATION: 100 % | DIASTOLIC BLOOD PRESSURE: 68 MMHG | BODY MASS INDEX: 29.66 KG/M2 | SYSTOLIC BLOOD PRESSURE: 101 MMHG | RESPIRATION RATE: 20 BRPM | HEIGHT: 67 IN | WEIGHT: 189 LBS

## 2025-03-05 DIAGNOSIS — N95.1 VASOMOTOR SYMPTOMS DUE TO MENOPAUSE: ICD-10-CM

## 2025-03-05 DIAGNOSIS — N95.1 VASOMOTOR SYMPTOMS DUE TO MENOPAUSE: Primary | ICD-10-CM

## 2025-03-05 LAB
ALBUMIN SERPL-MCNC: 4.1 G/DL (ref 3.5–5)
ALBUMIN/GLOB SERPL: 1.2 RATIO (ref 1.1–2)
ALP SERPL-CCNC: 99 UNIT/L (ref 40–150)
ALT SERPL-CCNC: 14 UNIT/L (ref 0–55)
ANION GAP SERPL CALC-SCNC: 7 MEQ/L
AST SERPL-CCNC: 13 UNIT/L (ref 5–34)
BILIRUB SERPL-MCNC: 0.4 MG/DL
BUN SERPL-MCNC: 15.7 MG/DL (ref 9.8–20.1)
CALCIUM SERPL-MCNC: 9.7 MG/DL (ref 8.4–10.2)
CHLORIDE SERPL-SCNC: 106 MMOL/L (ref 98–107)
CO2 SERPL-SCNC: 23 MMOL/L (ref 22–29)
CREAT SERPL-MCNC: 0.74 MG/DL (ref 0.55–1.02)
CREAT/UREA NIT SERPL: 21
GFR SERPLBLD CREATININE-BSD FMLA CKD-EPI: >60 ML/MIN/1.73/M2
GLOBULIN SER-MCNC: 3.5 GM/DL (ref 2.4–3.5)
GLUCOSE SERPL-MCNC: 282 MG/DL (ref 74–100)
POTASSIUM SERPL-SCNC: 4.6 MMOL/L (ref 3.5–5.1)
PROT SERPL-MCNC: 7.6 GM/DL (ref 6.4–8.3)
SODIUM SERPL-SCNC: 136 MMOL/L (ref 136–145)

## 2025-03-05 PROCEDURE — 80053 COMPREHEN METABOLIC PANEL: CPT

## 2025-03-05 PROCEDURE — 99215 OFFICE O/P EST HI 40 MIN: CPT | Mod: PBBFAC

## 2025-03-05 PROCEDURE — 36415 COLL VENOUS BLD VENIPUNCTURE: CPT

## 2025-03-05 RX ORDER — INSULIN PMP CART,AUT,G6/7,CNTR
EACH SUBCUTANEOUS
COMMUNITY

## 2025-03-05 RX ORDER — INSULIN LISPRO 100 [IU]/ML
INJECTION, SOLUTION INTRAVENOUS; SUBCUTANEOUS
COMMUNITY

## 2025-03-05 RX ORDER — FLUOXETINE HYDROCHLORIDE 20 MG/1
20 CAPSULE ORAL
COMMUNITY
Start: 2025-03-03

## 2025-03-05 RX ORDER — FEZOLINETANT 45 MG/1
45 TABLET, FILM COATED ORAL DAILY
Qty: 30 TABLET | Refills: 2 | Status: SHIPPED | OUTPATIENT
Start: 2025-03-05

## 2025-03-05 RX ORDER — INSULIN PMP CART,AUT,G6/7,CNTR
EACH SUBCUTANEOUS
COMMUNITY
Start: 2025-02-17

## 2025-03-10 ENCOUNTER — TELEPHONE (OUTPATIENT)
Dept: GYNECOLOGY | Facility: CLINIC | Age: 60
End: 2025-03-10
Payer: MEDICAID

## 2025-03-10 RX ORDER — SODIUM CHLORIDE 0.9 % (FLUSH) 0.9 %
10 SYRINGE (ML) INJECTION
Status: CANCELLED | OUTPATIENT
Start: 2025-03-10

## 2025-03-10 RX ORDER — EPINEPHRINE 0.3 MG/.3ML
0.3 INJECTION SUBCUTANEOUS ONCE AS NEEDED
Status: CANCELLED | OUTPATIENT
Start: 2025-03-10

## 2025-03-10 RX ORDER — HEPARIN 100 UNIT/ML
500 SYRINGE INTRAVENOUS
Status: CANCELLED | OUTPATIENT
Start: 2025-03-10

## 2025-03-10 RX ORDER — DIPHENHYDRAMINE HYDROCHLORIDE 50 MG/ML
50 INJECTION, SOLUTION INTRAMUSCULAR; INTRAVENOUS ONCE AS NEEDED
Status: CANCELLED | OUTPATIENT
Start: 2025-03-10

## 2025-03-12 ENCOUNTER — INFUSION (OUTPATIENT)
Dept: INFUSION THERAPY | Facility: HOSPITAL | Age: 60
End: 2025-03-12
Payer: MEDICAID

## 2025-03-12 VITALS
SYSTOLIC BLOOD PRESSURE: 134 MMHG | WEIGHT: 190.31 LBS | HEIGHT: 67 IN | HEART RATE: 85 BPM | DIASTOLIC BLOOD PRESSURE: 80 MMHG | TEMPERATURE: 98 F | BODY MASS INDEX: 29.87 KG/M2 | OXYGEN SATURATION: 98 % | RESPIRATION RATE: 20 BRPM

## 2025-03-12 DIAGNOSIS — D50.9 IRON DEFICIENCY ANEMIA, UNSPECIFIED IRON DEFICIENCY ANEMIA TYPE: ICD-10-CM

## 2025-03-12 DIAGNOSIS — E61.1 IRON DEFICIENCY: Primary | ICD-10-CM

## 2025-03-12 PROCEDURE — A4216 STERILE WATER/SALINE, 10 ML: HCPCS

## 2025-03-12 PROCEDURE — 96374 THER/PROPH/DIAG INJ IV PUSH: CPT

## 2025-03-12 PROCEDURE — 63600175 PHARM REV CODE 636 W HCPCS

## 2025-03-12 PROCEDURE — 25000003 PHARM REV CODE 250

## 2025-03-12 RX ORDER — EPINEPHRINE 0.3 MG/.3ML
0.3 INJECTION SUBCUTANEOUS ONCE AS NEEDED
Status: CANCELLED | OUTPATIENT
Start: 2025-03-19

## 2025-03-12 RX ORDER — EPINEPHRINE 1 MG/ML
0.3 INJECTION INTRAMUSCULAR; INTRAVENOUS; SUBCUTANEOUS ONCE AS NEEDED
Status: DISCONTINUED | OUTPATIENT
Start: 2025-03-12 | End: 2025-03-12 | Stop reason: HOSPADM

## 2025-03-12 RX ORDER — DIPHENHYDRAMINE HYDROCHLORIDE 50 MG/ML
50 INJECTION, SOLUTION INTRAMUSCULAR; INTRAVENOUS ONCE AS NEEDED
OUTPATIENT
Start: 2025-03-19

## 2025-03-12 RX ORDER — EPINEPHRINE 0.3 MG/.3ML
0.3 INJECTION SUBCUTANEOUS ONCE AS NEEDED
OUTPATIENT
Start: 2025-03-19

## 2025-03-12 RX ORDER — DIPHENHYDRAMINE HYDROCHLORIDE 50 MG/ML
50 INJECTION, SOLUTION INTRAMUSCULAR; INTRAVENOUS ONCE AS NEEDED
Status: DISCONTINUED | OUTPATIENT
Start: 2025-03-12 | End: 2025-03-12 | Stop reason: HOSPADM

## 2025-03-12 RX ORDER — SODIUM CHLORIDE 0.9 % (FLUSH) 0.9 %
10 SYRINGE (ML) INJECTION
OUTPATIENT
Start: 2025-03-19

## 2025-03-12 RX ORDER — HEPARIN 100 UNIT/ML
500 SYRINGE INTRAVENOUS
OUTPATIENT
Start: 2025-03-19

## 2025-03-12 RX ORDER — SODIUM CHLORIDE 0.9 % (FLUSH) 0.9 %
10 SYRINGE (ML) INJECTION
Status: DISCONTINUED | OUTPATIENT
Start: 2025-03-12 | End: 2025-03-12 | Stop reason: HOSPADM

## 2025-03-12 RX ADMIN — IRON SUCROSE 200 MG: 20 INJECTION, SOLUTION INTRAVENOUS at 11:03

## 2025-03-12 RX ADMIN — Medication 10 ML: at 11:03

## 2025-03-12 NOTE — NURSING
Pt presented for another round of iron infusions, Venofer # 1 of 5 today, pt c/o being tired, denied other complaints; received Venofer IVP without incident; observed pt for 15 minutes post infusion, AVS given.

## 2025-03-14 NOTE — PROGRESS NOTES
Audio Only Telehealth Visit     The patient location is: [facility]  The chief complaint leading to consultation is: f/u  Visit type: Virtual visit with audio only (telephone)  Total time spent on medical discussion: 11 min  Total time spent on visit: 11 min     The reason for the audio only service rather than synchronous audio and video virtual visit was related to technical difficulties or patient preference/necessity.     Each patient to whom I provide medical services by telemedicine is:  (1) informed of the relationship between the physician and patient and the respective role of any other health care provider with respect to management of the patient; and (2) notified that they may decline to receive medical services by telemedicine and may withdraw from such care at any time. Patient verbally consented to receive this service via voice-only telephone call.       HPI:  12/17/2024: 59 y.o. female presents with c/o hearing loss and cerumen buildup. States she has had to get her ears flushed in the past. Denies any hx of otologic infections or procedures. 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 Reports having an audiogram 5-10 years ago with b/l loss which was worse on one side.     03/14/2025: F/u to review audiogram. States she had an audiogram here about 5 years ago and was told she had some hearing loss at that time.     Audio:        Assessment and plan:  59 y.o. female with subjective HL. Audiogram WNL with type A tymps- reviewed and reassurance provided. I do not see any hx of audiogram in our system.  - Baby oil to b/l ear canals 1-2x/wk for ceruminolysis  - RTC PRN    Heather Castellon NP      This service was not originating from a related E/M service provided within the previous 7 days nor will  to an E/M service or procedure within the next 24 hours or my soonest available appointment.  Prevailing standard of care was able to be met  in this audio-only visit.

## 2025-03-17 ENCOUNTER — OFFICE VISIT (OUTPATIENT)
Dept: OTOLARYNGOLOGY | Facility: CLINIC | Age: 60
End: 2025-03-17
Payer: MEDICAID

## 2025-03-17 DIAGNOSIS — H91.93 DECREASED HEARING OF BOTH EARS: Primary | ICD-10-CM

## 2025-03-17 RX ORDER — TIRZEPATIDE 2.5 MG/.5ML
INJECTION, SOLUTION SUBCUTANEOUS
COMMUNITY
Start: 2025-03-13

## 2025-03-17 RX ORDER — AMMONIUM LACTATE 12 G/100G
CREAM TOPICAL
COMMUNITY
Start: 2025-03-09

## 2025-03-18 ENCOUNTER — HOSPITAL ENCOUNTER (OUTPATIENT)
Dept: RADIOLOGY | Facility: HOSPITAL | Age: 60
Discharge: HOME | End: 2025-03-18
Attending: NURSE PRACTITIONER
Payer: MEDICAID

## 2025-03-18 ENCOUNTER — HOSPITAL ENCOUNTER (OUTPATIENT)
Dept: RADIOLOGY | Facility: HOSPITAL | Age: 60
Discharge: HOME | End: 2025-03-18
Attending: SURGERY
Payer: MEDICAID

## 2025-03-18 DIAGNOSIS — Z12.31 SCREENING MAMMOGRAM FOR BREAST CANCER: ICD-10-CM

## 2025-03-18 PROCEDURE — 74176 CT ABD & PELVIS W/O CONTRAST: CPT | Mod: TC

## 2025-03-18 PROCEDURE — 77067 SCR MAMMO BI INCL CAD: CPT | Mod: TC

## 2025-03-19 ENCOUNTER — INFUSION (OUTPATIENT)
Dept: INFUSION THERAPY | Facility: HOSPITAL | Age: 60
End: 2025-03-19
Attending: INTERNAL MEDICINE
Payer: MEDICAID

## 2025-03-19 VITALS
WEIGHT: 192.88 LBS | HEART RATE: 77 BPM | BODY MASS INDEX: 30.27 KG/M2 | DIASTOLIC BLOOD PRESSURE: 61 MMHG | SYSTOLIC BLOOD PRESSURE: 100 MMHG | TEMPERATURE: 98 F | OXYGEN SATURATION: 100 % | HEIGHT: 67 IN | RESPIRATION RATE: 20 BRPM

## 2025-03-19 DIAGNOSIS — E61.1 IRON DEFICIENCY: Primary | ICD-10-CM

## 2025-03-19 DIAGNOSIS — D50.9 IRON DEFICIENCY ANEMIA, UNSPECIFIED IRON DEFICIENCY ANEMIA TYPE: ICD-10-CM

## 2025-03-19 PROCEDURE — 63600175 PHARM REV CODE 636 W HCPCS

## 2025-03-19 PROCEDURE — 96374 THER/PROPH/DIAG INJ IV PUSH: CPT

## 2025-03-19 RX ORDER — EPINEPHRINE 1 MG/ML
0.3 INJECTION INTRAMUSCULAR; INTRAVENOUS; SUBCUTANEOUS ONCE AS NEEDED
Status: DISCONTINUED | OUTPATIENT
Start: 2025-03-19 | End: 2025-03-19 | Stop reason: HOSPADM

## 2025-03-19 RX ORDER — SODIUM CHLORIDE 0.9 % (FLUSH) 0.9 %
10 SYRINGE (ML) INJECTION
Status: DISCONTINUED | OUTPATIENT
Start: 2025-03-19 | End: 2025-03-19 | Stop reason: HOSPADM

## 2025-03-19 RX ORDER — HEPARIN 100 UNIT/ML
500 SYRINGE INTRAVENOUS
OUTPATIENT
Start: 2025-03-26

## 2025-03-19 RX ORDER — SODIUM CHLORIDE 0.9 % (FLUSH) 0.9 %
10 SYRINGE (ML) INJECTION
OUTPATIENT
Start: 2025-03-26

## 2025-03-19 RX ORDER — HEPARIN 100 UNIT/ML
500 SYRINGE INTRAVENOUS
Status: DISCONTINUED | OUTPATIENT
Start: 2025-03-19 | End: 2025-03-19 | Stop reason: HOSPADM

## 2025-03-19 RX ORDER — EPINEPHRINE 0.3 MG/.3ML
0.3 INJECTION SUBCUTANEOUS ONCE AS NEEDED
OUTPATIENT
Start: 2025-03-26

## 2025-03-19 RX ORDER — DIPHENHYDRAMINE HYDROCHLORIDE 50 MG/ML
50 INJECTION, SOLUTION INTRAMUSCULAR; INTRAVENOUS ONCE AS NEEDED
Status: DISCONTINUED | OUTPATIENT
Start: 2025-03-19 | End: 2025-03-19 | Stop reason: HOSPADM

## 2025-03-19 RX ORDER — DIPHENHYDRAMINE HYDROCHLORIDE 50 MG/ML
50 INJECTION, SOLUTION INTRAMUSCULAR; INTRAVENOUS ONCE AS NEEDED
OUTPATIENT
Start: 2025-03-26

## 2025-03-19 RX ADMIN — IRON SUCROSE 200 MG: 20 INJECTION, SOLUTION INTRAVENOUS at 11:03

## 2025-03-21 DIAGNOSIS — K82.9 DISEASE OF GALLBLADDER: Primary | ICD-10-CM

## 2025-03-21 DIAGNOSIS — K43.2 RECURRENT VENTRAL HERNIA: ICD-10-CM

## 2025-03-26 ENCOUNTER — INFUSION (OUTPATIENT)
Dept: INFUSION THERAPY | Facility: HOSPITAL | Age: 60
End: 2025-03-26
Attending: INTERNAL MEDICINE
Payer: MEDICAID

## 2025-03-26 VITALS
BODY MASS INDEX: 30.56 KG/M2 | SYSTOLIC BLOOD PRESSURE: 117 MMHG | RESPIRATION RATE: 20 BRPM | TEMPERATURE: 98 F | HEIGHT: 67 IN | OXYGEN SATURATION: 98 % | WEIGHT: 194.69 LBS | HEART RATE: 75 BPM | DIASTOLIC BLOOD PRESSURE: 76 MMHG

## 2025-03-26 DIAGNOSIS — E61.1 IRON DEFICIENCY: Primary | ICD-10-CM

## 2025-03-26 DIAGNOSIS — D50.9 IRON DEFICIENCY ANEMIA, UNSPECIFIED IRON DEFICIENCY ANEMIA TYPE: ICD-10-CM

## 2025-03-26 PROCEDURE — 63600175 PHARM REV CODE 636 W HCPCS

## 2025-03-26 PROCEDURE — 96374 THER/PROPH/DIAG INJ IV PUSH: CPT

## 2025-03-26 RX ORDER — SODIUM CHLORIDE 0.9 % (FLUSH) 0.9 %
10 SYRINGE (ML) INJECTION
OUTPATIENT
Start: 2025-04-02

## 2025-03-26 RX ORDER — HEPARIN 100 UNIT/ML
500 SYRINGE INTRAVENOUS
OUTPATIENT
Start: 2025-04-02

## 2025-03-26 RX ORDER — EPINEPHRINE 0.3 MG/.3ML
0.3 INJECTION SUBCUTANEOUS ONCE AS NEEDED
OUTPATIENT
Start: 2025-04-02

## 2025-03-26 RX ORDER — DIPHENHYDRAMINE HYDROCHLORIDE 50 MG/ML
50 INJECTION, SOLUTION INTRAMUSCULAR; INTRAVENOUS ONCE AS NEEDED
OUTPATIENT
Start: 2025-04-02

## 2025-03-26 RX ORDER — SODIUM CHLORIDE 0.9 % (FLUSH) 0.9 %
10 SYRINGE (ML) INJECTION
Status: DISCONTINUED | OUTPATIENT
Start: 2025-03-26 | End: 2025-03-26 | Stop reason: HOSPADM

## 2025-03-26 RX ADMIN — IRON SUCROSE 200 MG: 20 INJECTION, SOLUTION INTRAVENOUS at 10:03

## 2025-03-27 ENCOUNTER — TELEPHONE (OUTPATIENT)
Dept: INTERNAL MEDICINE | Facility: CLINIC | Age: 60
End: 2025-03-27
Payer: MEDICAID

## 2025-04-02 ENCOUNTER — HOSPITAL ENCOUNTER (OUTPATIENT)
Dept: RADIOLOGY | Facility: HOSPITAL | Age: 60
Discharge: HOME | End: 2025-04-02
Attending: SURGERY
Payer: MEDICAID

## 2025-04-02 ENCOUNTER — INFUSION (OUTPATIENT)
Dept: INFUSION THERAPY | Facility: HOSPITAL | Age: 60
End: 2025-04-02
Attending: INTERNAL MEDICINE
Payer: MEDICAID

## 2025-04-02 VITALS
TEMPERATURE: 99 F | WEIGHT: 188.81 LBS | RESPIRATION RATE: 20 BRPM | DIASTOLIC BLOOD PRESSURE: 77 MMHG | HEART RATE: 85 BPM | HEIGHT: 67 IN | BODY MASS INDEX: 29.63 KG/M2 | SYSTOLIC BLOOD PRESSURE: 96 MMHG | OXYGEN SATURATION: 98 %

## 2025-04-02 DIAGNOSIS — K82.9 DISEASE OF GALLBLADDER: ICD-10-CM

## 2025-04-02 DIAGNOSIS — K43.2 RECURRENT VENTRAL HERNIA: ICD-10-CM

## 2025-04-02 DIAGNOSIS — E61.1 IRON DEFICIENCY: Primary | ICD-10-CM

## 2025-04-02 DIAGNOSIS — D50.9 IRON DEFICIENCY ANEMIA, UNSPECIFIED IRON DEFICIENCY ANEMIA TYPE: ICD-10-CM

## 2025-04-02 PROCEDURE — 76700 US EXAM ABDOM COMPLETE: CPT | Mod: TC

## 2025-04-02 PROCEDURE — A4216 STERILE WATER/SALINE, 10 ML: HCPCS

## 2025-04-02 PROCEDURE — 63600175 PHARM REV CODE 636 W HCPCS

## 2025-04-02 PROCEDURE — 25000003 PHARM REV CODE 250

## 2025-04-02 PROCEDURE — 96374 THER/PROPH/DIAG INJ IV PUSH: CPT

## 2025-04-02 RX ORDER — EPINEPHRINE 0.3 MG/.3ML
0.3 INJECTION SUBCUTANEOUS ONCE AS NEEDED
OUTPATIENT
Start: 2025-04-09

## 2025-04-02 RX ORDER — SODIUM CHLORIDE 0.9 % (FLUSH) 0.9 %
10 SYRINGE (ML) INJECTION
OUTPATIENT
Start: 2025-04-09

## 2025-04-02 RX ORDER — HEPARIN 100 UNIT/ML
500 SYRINGE INTRAVENOUS
OUTPATIENT
Start: 2025-04-09

## 2025-04-02 RX ORDER — DIPHENHYDRAMINE HYDROCHLORIDE 50 MG/ML
50 INJECTION, SOLUTION INTRAMUSCULAR; INTRAVENOUS ONCE AS NEEDED
OUTPATIENT
Start: 2025-04-09

## 2025-04-02 RX ORDER — SODIUM CHLORIDE 0.9 % (FLUSH) 0.9 %
10 SYRINGE (ML) INJECTION
Status: DISCONTINUED | OUTPATIENT
Start: 2025-04-02 | End: 2025-04-02 | Stop reason: HOSPADM

## 2025-04-02 RX ORDER — EPINEPHRINE 1 MG/ML
0.3 INJECTION INTRAMUSCULAR; INTRAVENOUS; SUBCUTANEOUS ONCE AS NEEDED
Status: DISCONTINUED | OUTPATIENT
Start: 2025-04-02 | End: 2025-04-02 | Stop reason: HOSPADM

## 2025-04-02 RX ORDER — DIPHENHYDRAMINE HYDROCHLORIDE 50 MG/ML
50 INJECTION, SOLUTION INTRAMUSCULAR; INTRAVENOUS ONCE AS NEEDED
Status: DISCONTINUED | OUTPATIENT
Start: 2025-04-02 | End: 2025-04-02 | Stop reason: HOSPADM

## 2025-04-02 RX ADMIN — IRON SUCROSE 200 MG: 20 INJECTION, SOLUTION INTRAVENOUS at 10:04

## 2025-04-02 RX ADMIN — Medication 10 ML: at 10:04

## 2025-04-09 ENCOUNTER — OFFICE VISIT (OUTPATIENT)
Dept: RHEUMATOLOGY | Facility: CLINIC | Age: 60
End: 2025-04-09
Payer: MEDICAID

## 2025-04-09 ENCOUNTER — INFUSION (OUTPATIENT)
Dept: INFUSION THERAPY | Facility: HOSPITAL | Age: 60
End: 2025-04-09
Attending: INTERNAL MEDICINE
Payer: MEDICAID

## 2025-04-09 VITALS
OXYGEN SATURATION: 100 % | BODY MASS INDEX: 30.26 KG/M2 | HEART RATE: 69 BPM | TEMPERATURE: 98 F | RESPIRATION RATE: 18 BRPM | HEIGHT: 67 IN | WEIGHT: 192.81 LBS | DIASTOLIC BLOOD PRESSURE: 72 MMHG | SYSTOLIC BLOOD PRESSURE: 107 MMHG

## 2025-04-09 VITALS
SYSTOLIC BLOOD PRESSURE: 122 MMHG | HEART RATE: 73 BPM | HEIGHT: 67 IN | DIASTOLIC BLOOD PRESSURE: 74 MMHG | OXYGEN SATURATION: 99 % | RESPIRATION RATE: 18 BRPM | WEIGHT: 193.31 LBS | BODY MASS INDEX: 30.34 KG/M2

## 2025-04-09 DIAGNOSIS — R68.2 DRY MOUTH: ICD-10-CM

## 2025-04-09 DIAGNOSIS — H04.123 DRY EYES: Primary | ICD-10-CM

## 2025-04-09 DIAGNOSIS — E61.1 IRON DEFICIENCY: ICD-10-CM

## 2025-04-09 DIAGNOSIS — F17.210 CIGARETTE NICOTINE DEPENDENCE WITHOUT COMPLICATION: ICD-10-CM

## 2025-04-09 DIAGNOSIS — E61.1 IRON DEFICIENCY: Primary | ICD-10-CM

## 2025-04-09 DIAGNOSIS — Z79.4 TYPE 2 DIABETES MELLITUS WITHOUT COMPLICATION, WITH LONG-TERM CURRENT USE OF INSULIN: ICD-10-CM

## 2025-04-09 DIAGNOSIS — D50.9 IRON DEFICIENCY ANEMIA, UNSPECIFIED IRON DEFICIENCY ANEMIA TYPE: ICD-10-CM

## 2025-04-09 DIAGNOSIS — E11.9 TYPE 2 DIABETES MELLITUS WITHOUT COMPLICATION, WITH LONG-TERM CURRENT USE OF INSULIN: ICD-10-CM

## 2025-04-09 DIAGNOSIS — G47.33 OSA (OBSTRUCTIVE SLEEP APNEA): ICD-10-CM

## 2025-04-09 DIAGNOSIS — I10 PRIMARY HYPERTENSION: ICD-10-CM

## 2025-04-09 DIAGNOSIS — D75.839 THROMBOCYTOSIS: ICD-10-CM

## 2025-04-09 PROCEDURE — 25000003 PHARM REV CODE 250

## 2025-04-09 PROCEDURE — 96374 THER/PROPH/DIAG INJ IV PUSH: CPT

## 2025-04-09 PROCEDURE — 63600175 PHARM REV CODE 636 W HCPCS

## 2025-04-09 PROCEDURE — 99215 OFFICE O/P EST HI 40 MIN: CPT | Mod: PBBFAC,25 | Performed by: INTERNAL MEDICINE

## 2025-04-09 PROCEDURE — A4216 STERILE WATER/SALINE, 10 ML: HCPCS

## 2025-04-09 RX ORDER — HEPARIN 100 UNIT/ML
500 SYRINGE INTRAVENOUS
OUTPATIENT
Start: 2025-04-09

## 2025-04-09 RX ORDER — SODIUM CHLORIDE 0.9 % (FLUSH) 0.9 %
10 SYRINGE (ML) INJECTION
OUTPATIENT
Start: 2025-04-09

## 2025-04-09 RX ORDER — DIPHENHYDRAMINE HYDROCHLORIDE 50 MG/ML
50 INJECTION, SOLUTION INTRAMUSCULAR; INTRAVENOUS ONCE AS NEEDED
OUTPATIENT
Start: 2025-04-09

## 2025-04-09 RX ORDER — SODIUM CHLORIDE 0.9 % (FLUSH) 0.9 %
10 SYRINGE (ML) INJECTION
Status: DISCONTINUED | OUTPATIENT
Start: 2025-04-09 | End: 2025-04-09 | Stop reason: HOSPADM

## 2025-04-09 RX ORDER — EPINEPHRINE 0.3 MG/.3ML
0.3 INJECTION SUBCUTANEOUS ONCE AS NEEDED
OUTPATIENT
Start: 2025-04-09

## 2025-04-09 RX ORDER — FLASH GLUCOSE SENSOR
KIT MISCELLANEOUS
COMMUNITY
Start: 2025-03-17

## 2025-04-09 RX ORDER — HEPARIN 100 UNIT/ML
500 SYRINGE INTRAVENOUS
Status: DISCONTINUED | OUTPATIENT
Start: 2025-04-09 | End: 2025-04-09 | Stop reason: HOSPADM

## 2025-04-09 RX ADMIN — Medication 10 ML: at 12:04

## 2025-04-09 RX ADMIN — IRON SUCROSE 200 MG: 20 INJECTION, SOLUTION INTRAVENOUS at 12:04

## 2025-04-09 NOTE — PLAN OF CARE
Venofer #5/5 given. Tolerated well. Next appts confirmed with pt. Stated uses portal to view next appts. Dc'd home in stable condition.

## 2025-04-09 NOTE — PROGRESS NOTES
Patient ID: [id number]     Chief Complaint: sjogrens disease  (Possible diagnosis )    HPI:     Jessica Walker is a 59 y.o. female here today for a follow up after test results.     Today is her 1st visit with me, reviewed previous labs and records.    Referred for eval of positive MELANY, lab work completed due to dry eyes and dry mouth on February, 2024 revealed positive MELANY speckled 1:160, C3/C4 okay, SSA/SSB negative.   She reports chronic fatigue for the past 10-15 years- states symptoms are worse as the years progressed. Use to be able to do laundry and dishes without issues but now will cause fatigue.   She does report occ joint pain, mainly to her back- no history of injury- did have Xray with PCP and advised degenerative changes- has not tried PT- denies any numbness or tingling in her legs. Denies red/warm/swollen joints. Occ morning stiffness 15 minutes and not daily.   She reports she just does not feel well overall, will get episodes of dizziness, nausea and weakness- suffers with anemia and is currently on Iron infusions- states has been on infusions for years.   She will be having her HH repaired after she has been cleared by Cardiology- she had a LHC 9/23/2024 and states has some blockage, no stents at this time- continuing to monitoring.  Stress test was normal per patient. Was told she has diastolic dysfunction.    She does admit dry eyes and dry mouth- she does follow with Optometrist and using Restasis as directed and provides some relief. She goes to [facility] eye clinic.  She reports she drinks a lot of coffee but does try to drink water in between- she drinks 3-4 cups of coffee a day or more- was drinking all day and all night but has stopped drinking at night.   She does have some teeth that have been falling- worse this past year- has lost all top teeth with only 2 remaining- does not follow with a dentist.   She does have some anxiety and depression- not on any meds at this time as she  states s/e with meds- make fatigue worse so not currently taking- denies any s/h ideation.     Follows with Heme-Onc for iron-deficiency anemia, thrombocytosis.  Follows with GYN for history of heavy menses, had D&C last year.    PMH:  NIDDM/HTN/Anemia/Vitamin-D deficiency/Tobacco use/JIN/Leukocytosis/Dyslipidemia    Hematology- [facility]  Cardiology- [facility]  GI- [facility]  [facility]- Dr. [facility]   Dr. Delaney Caballero    Denies history of fevers, rashes, photosensitivity, oral or nasal ulcers, h/o MI, stroke, seizures, h/o PE or DVT, Raynaud's phenomenon, uveitis, malignancies.   Family history of autoimmune disease: Sister has Hashimoto's   Pregnancies: 2 Miscarriages: None  Smoking:  Start age of 10-12 years old, hx of 1-1.5 ppd. She is trying to quit- currently at 1 ppd.- enc cessation when taking.     Social History     Tobacco Use   Smoking Status Every Day    Current packs/day: 1.00    Average packs/day: 1 pack/day for 47.3 years (47.3 ttl pk-yrs)    Types: Cigarettes    Start date: 1978    Passive exposure: Current   Smokeless Tobacco Never        -------------------------------------    Abnormal Pap smear of cervix    Acute pancreatitis    Had surgery for pancreatitis in June 1996    Amenorrhea    Anemia    Colon polyp    When I had colonoscopy, I had one small polyp they removed    Depression    Diabetes mellitus    Dieulafoy lesion of stomach    Diverticulitis    I think they said when I had my first colonoscopy around 2019 that I have diverticulitis    Diverticulosis    I think they said when I had my first colonoscopy around 2019 that I have diverticulosis    Encounter for comprehensive diabetic foot examination, type 2 diabetes mellitus    Essential thrombocytosis    GERD (gastroesophageal reflux disease)    Had surgery for pancreatitis in June 1996 and acid reflux since then    Hiatal hernia    Hyperlipidemia LDL goal <70    Hypertension    Iron deficiency    JAK2 gene mutation    Mild CAD     Obesity (BMI 30-39.9)    Overgrown toenails    Personal history of colonic polyps    PMB (postmenopausal bleeding)    Sleep apnea    No CPAP    Thrombocytosis    Tobacco user    Type 2 diabetes mellitus, with long-term current use of insulin    Xerosis of skin        Past Surgical History:   Procedure Laterality Date    ANGIOGRAM, CORONARY, WITH LEFT HEART CATHETERIZATION N/A 09/23/2024    Procedure: Angiogram, Coronary, with Left Heart Cath;  Surgeon: Francesco Mensah MD;  Location: [facility] CATH LAB;  Service: Cardiology;  Laterality: N/A;    CHOLECYSTECTOMY      COLONOSCOPY  10/2021    DILATION AND CURETTAGE OF UTERUS  2023    EGD, WITH CLOSED BIOPSY N/A 12/19/2024    Procedure: EGD, WITH CLOSED BIOPSY;  Surgeon: Ravindra Camejo MD;  Location: [facility];  Service: Endoscopy;  Laterality: N/A;  A) BX DUODENUM B) BX GASTRIC ANTRUM FOR H.PYLORI, C) BX GE JUNCTION @ 32CM. 8CM HIATAL HERNIA    ESOPHAGOGASTRODUODENOSCOPY N/A 06/26/2023    Procedure: EGD (ESOPHAGOGASTRODUODENOSCOPY);  Surgeon: Kita Larose MD;  Location: [facility] ENDOSCOPY;  Service: Gastroenterology;  Laterality: N/A;    EYE SURGERY  7/6/93    LK and RK    HYSTEROSCOPY WITH DILATION AND CURETTAGE OF UTERUS N/A 09/26/2023    Procedure: HYSTEROSCOPY, WITH DILATION AND CURETTAGE OF UTERUS;  Surgeon: Deepti Chappell MD;  Location: [facility] OR;  Service: OB/GYN;  Laterality: N/A;  myosure    INTRALUMINAL GASTROINTESTINAL TRACT IMAGING VIA CAPSULE N/A 06/29/2023    Procedure: IMAGING PROCEDURE, GI TRACT, INTRALUMINAL, VIA CAPSULE;  Surgeon: Kita Larose MD;  Location: [facility] ENDOSCOPY;  Service: Gastroenterology;  Laterality: N/A;    Removal of Goiter      UPPER GASTROINTESTINAL ENDOSCOPY         Review of patient's allergies indicates:   Allergen Reactions    Iodine Hives    Ivp dye [iodinated contrast media] Itching       Current Outpatient Medications   Medication Instructions    ammonium lactate 12 % Crea SMARTSIG:sparingly Topical Twice  "Daily    aspirin (ECOTRIN) 81 mg, Nightly    atorvastatin (LIPITOR) 40 mg, Oral, Nightly    BD ULTRA-FINE SHORT PEN NEEDLE 31 gauge x 5/16" Ndle 1 each, Subcutaneous, 3 times daily    blood-glucose meter,continuous (DEXCOM ) Misc Dexcom G7 .  Use as directed with Dexcom G7 sensors.    carbamide peroxide (DEBROX) 6.5 % otic solution 5 drops, Right Ear, 2 times daily    cycloSPORINE (RESTASIS) 0.05 % ophthalmic emulsion 1 drop, 2 times daily    DEXCOM G7 SENSOR Amy Dexcom G7 sensors.  Use every 10 days as directed.    FLUoxetine 20 mg    FREESTYLE ABDULKADIR 2 SENSOR Kit USE EVERY 14 DAYS AS DIRECTED    ibuprofen (ADVIL,MOTRIN) 800 mg, Every 6 hours PRN    insulin lispro 100 unit/mL injection SMARTSI Unit(s) SUB-Q Daily    LANTUS SOLOSTAR U-100 INSULIN 47 Units, Every morning    linaCLOtide (LINZESS) 72 mcg Cap capsule Patient states takes as needed    lisinopriL 10 mg, Daily    magnesium chloride (MAG 64) 64 mg, Oral, 2 times daily    metFORMIN (GLUCOPHAGE) 1,000 mg, Oral, 2 times daily    metoprolol tartrate (LOPRESSOR) 25 mg, Oral, 2 times daily    MOUNJARO 2.5 mg/0.5 mL PnIj     nicotine (NICODERM CQ) 21 mg/24 hr 1 patch, Transdermal, Daily    nicotine polacrilex 2 mg, Oral, As needed (PRN)    nitroGLYCERIN (NITROSTAT) 0.4 mg, Sublingual, Every 5 min PRN    OMNIPOD 5 INTRO,G6/[id number], Crtg Inject into the skin.    OMNIPOD 5, G6/ABDULKADIR 2 PLUS, Crtg Inject into the skin.    pantoprazole (PROTONIX) 40 mg, Oral, Every morning    TRUE METRIX GLUCOSE TEST STRIP Strp USE 1 STRIP TO CHECK GLUCOSE ONCE DAILY    VEOZAH 45 mg, Oral, Daily       Social History     Socioeconomic History    Marital status: Single   Tobacco Use    Smoking status: Every Day     Current packs/day: 1.00     Average packs/day: 1 pack/day for 47.3 years (47.3 ttl pk-yrs)     Types: Cigarettes     Start date:      Passive exposure: Current    Smokeless tobacco: Never   Substance and Sexual Activity    Alcohol use: Not Currently "    Drug use: Not Currently    Sexual activity: Not Currently     Birth control/protection: None     Comment: Havent been sexually active since      Social Drivers of Health     Financial Resource Strain: Low Risk  (2024)    Overall Financial Resource Strain (CARDIA)     Difficulty of Paying Living Expenses: Not very hard   Food Insecurity: Food Insecurity Present (2024)    Hunger Vital Sign     Worried About Running Out of Food in the Last Year: Never true     Ran Out of Food in the Last Year: Sometimes true   Transportation Needs: No Transportation Needs (2024)    PRAPARE - Transportation     Lack of Transportation (Medical): No     Lack of Transportation (Non-Medical): No   Physical Activity: Inactive (2024)    Exercise Vital Sign     Days of Exercise per Week: 0 days     Minutes of Exercise per Session: 0 min   Stress: Stress Concern Present (2024)    [employer] - Occupational Stress Questionnaire     Feeling of Stress : Very much   Housing Stability: Unknown (2024)    Housing Stability Vital Sign     Unable to Pay for Housing in the Last Year: Patient declined        Family History   Problem Relation Name Age of Onset    Diabetes Mother Alise          from unknown causes    Coronary artery disease Mother Alise     Alcohol abuse Mother Alise     Arthritis Mother Alise     Heart disease Mother Alise         Had 13 stents in her heart    Hyperlipidemia Mother Alise     Hypertension Mother Alise     Diabetes Father Joo         Still living    Hyperlipidemia Father Joo     Hypertension Father Joo     Cervical cancer Sister Chantale     Arthritis Sister Chantale     Breast cancer Sister Chantale     COPD Sister Chantale     Diabetes Sister Chantale     Depression Sister Chantale     Colon polyps Sister Chantale     Cancer Sister Chantale         Breast cancer survivor at 55    Fibromyalgia Sister Jemma  from cervical cancer at 55     Breast cancer  Sister Jemma  from cervical cancer at 55     Cervical cancer Sister Jemma  from cervical cancer at 55 55    Early death Sister Jemma  from cervical cancer at 55     Other (cervical cancer) Sister Jemma  from cervical cancer at 55 55        Maternal half sister    Cancer Sister Jemma  from cervical cancer at 55          at 55 from cervical cancer    Skin cancer Sister Jemma  from cervical cancer at 55         my half sister Jemma  from cervical cancer at age 55    Miscarriages / Stillbirths Sister Ibis     Skin cancer Sister Ibis         My half sister Ibis had skin cancer    Cancer Sister Ibis         Has had skin cancer    Throat cancer Maternal Grandmother [place] at 64 64    Early death Maternal Grandmother [place] at 64     Cancer Maternal Grandmother [place] at 64          from lung cancer at 64    Diabetes Maternal Grandmother [place] at 64         Had diabetes but  from lung cancee    Leukemia Maternal Grandfather Raza     Cancer Maternal Grandfather Raza     Cancer Maternal Uncle Grzegorz          from cancer        Immunization History   Administered Date(s) Administered    COVID-19, MRNA, LN-S, PF (Pfizer) (Purple Cap) 2021, 2021    COVID-19, mRNA, LNP-S, PF (Moderna) Ages 12+ 2024    Hepatitis B (recombinant) Adjuvanted, 2 dose 2024, 2024    Influenza (FLUBLOK) - Quadrivalent - Recombinant - PF *Preferred* (egg allergy) 2020    Influenza - Quadrivalent 10/24/2016    Influenza - Quadrivalent - PF *Preferred* (6 months and older) 2024    Influenza - Trivalent - Fluarix, Flulaval, Fluzone, Afluria - PF 10/29/2014, 2018, 2021, 2024    Pneumococcal Conjugate - 20 Valent 2024    Pneumococcal Polysaccharide - 23 Valent 2008    Td (Adult), Unspecified Formulation 2008    Td - PF (ADULT) 2008    Tdap 2020    Zoster Recombinant 2020, 2021       Patient Care Team:  Kathy  ANAHI Justice as PCP - General (Family Medicine)  Sade Mata FNP (Inactive) as Nurse Practitioner (Family Medicine)  Mary Castelan RD as Diabetes Educator (Diabetes)  Sade Mata FNP (Inactive) as Nurse Practitioner (Hematology and Oncology)  Kleber Regan FNP as Nurse Practitioner (Hematology and Oncology)  Alla Yuan PA-C as Physician Assistant (Cardiology)  Emely Joy MD as Hypertension Digital Medicine Responsible Provider (Internal Medicine)  Emely Joy MD as Diabetes Digital Medicine Responsible Provider (Internal Medicine)  Kemi Fraser, PharmD as Hypertension Digital Medicine Clinician (Pharmacist)  Kemi Fraser, PharmD as Diabetes Digital Medicine Clinician (Pharmacist)  MEDICAID as Hypertension Digital Medicine Contract  MEDICAID as Diabetes Digital Medicine Contract  Phoenix Downing MD (Endocrinology)     Subjective:         Constitutional:  Denies chills. Denies fever. Denies night sweats. Admits weight loss on Trulicity- weight stable, no longer on Trulicity due to insurance. Admits sleep disturbance- admits was drinking coffee all day so has decreased.   Ophthalmology: Denies blurred vision. Denies diminished visual acuity. Admits dry eyes- has improved with gtts. Denies eye pain. Admits Itching- comes and goes. Denies redness. Denies red eye.   ENT: Denies decreased hearing. Denies oral ulcers. Denies epistaxis. Denies swelling of ears or throat. Admits dry mouth- has improved. Denies swollen glands. Denies hair loss- reports hair very thin but has been thin her whole life.   Endocrine: Admits diabetes. Denies thyroid Problems.   Respiratory: Denies cough. Denies shortness of breath. Denies shortness of breath with exertion- reports has resolved. Denies hemoptysis.   Cardiovascular: Denies chest pain at rest. Denies chest pain with exertion. Denies Irregular heartbeat. Denies palpitations. Denies edema. Denies orthopnea.   Gastrointestinal: Denies  "abdominal pain. Denies diarrhea. Denies nausea. Denies vomiting. Denies hematemesis or hematochezia. Denies change in appetite. Denies heartburn.  Genitourinary: Denies dysuria. Denies urinary frequency. Denies urinary urgency. Denies blood in urine.  Musculoskeletal: See HPI for details  Integumentary: Denies rash. Denies Itching. Admits dry skin- reports hx of very dry skin to her soles of feet and now on her hands. - Dry skin has improved. Denies photosensitivity.   Peripheral Vascular: Denies Ulcers of hands and/or feet. Denies Cold extremities.   Neurologic: Admits dizziness. Denies headache. Denies difficulty speaking. Denies loss of strength.  Denies numbness or tingling.   Psychiatric: Admits depression and anxiety- restarting Remeron with Psych Denies suicidal/homicidal ideations.      Objective:     Visit Vitals  /72   Pulse 69   Temp 97.5 °F (36.4 °C)   Resp 18   Ht 5' 7" (1.702 m)   Wt 87.5 kg (192 lb 12.8 oz)   SpO2 100%   BMI 30.20 kg/m²           Physical Exam    General Appearance: alert, pleasant, in no acute distress.  Skin: Skin color, texture, turgor normal. No rashes or lesions. Bites nails, erythema of nail beds secondary to biting nails.  No rashes or dry skin on exam today.  Eyes:  extraocular movement intact (EOMI), pupils equal, round, reactive to light and accommodation, conjunctiva clear.  ENT: No oral or nasal ulcers. Good salivary pool noted. Poor dentition with several missing teeth noted   Neck:  Neck supple. No adenopathy.   Lungs: CTA bilaterally without crackles, rhonchi, or wheezes.   Heart: RRR w/o murmurs.  No edema. 2+ DP pulse.  Neuro: Alert, oriented, CN II-XII GI, sensory and motor innervation intact.  Musculoskeletal: No active synovitis noted, no pain to bilateral MCPs/PIPs, FROM noted to bilateral wrists, elbows and shoulders with no pain.  Psych: Alert, oriented, normal eye contact.       Labs Reviewed:   02/19/2024 MELANY positive speckled 1:160, C3/C4 okay, , " SSA/SSB negative no other lab completed  2024 CBC WBC 15.64, Hgb 10.2, Hct 36, MCV 68.3, ANC 11.18, CMP Ca+ 10.5, glucose 214, otherwise okay  2024  UPC ok. Urine trace protein, no blood noted, +1 Nitrites, CMP glucose 206, CBC WBC 11.87, platelets 754- stable- follows with hematology, RF negative, CCP negative, Scl 70 negative, MELANY by IFA negative, thyroglobulin negative, TPO negative, aldolase 5.8 okay, centromere negative, C3/C4 okay, vitamin-D 54, CRP 1.30 (<5), CK 29 okay, ESR 9 (<20), dsDNA negative, SSA 52, SSA 60, SSB, Sm, Sm/RNP all negative  11/15/2024 CMP Ca+ 10.6- repeat with PCP, otherwise okay, CBC WBC 11.87, platelets 613-has been in the 700s previously- follows with Hematology, IBM ab negative, MELANY negative, Aldolase 5.8 ok, CK <140, CRP 1.30 (<5), ESR 4 (<20), Extended Myositis panel negative (SSA 52, SSA 60, Sm, Sm/RNP, Jo1, PL12, PL7, EJ, OJ, SRP, Ku, PM/Wbb535,  U3 RNP,Mi2, P155/140, Tif-1 gamma, NORBERTO, MDA5, NXP all negative)    Imagin2023 CT chest abdomen/pelvis W/contrast impression: Left adrenal nodule measuring 1.9 cm, slightly larger compared to prior from 2016, likely a lipid poor adrenal adenoma.  Large hiatal hernia, otherwise no evidence of malignancy to chest abdomen or pelvis.    2023 Lumbar Xray:Impression: Degenerative changes in the lower lumbar spine. Questionable spondylolysis at L5-S1.   Oblique views may be beneficial for correlation.    2024 CXRay: Impression:  No acute cardiopulmonary abnormality.    2024 ECHO: LV EF 65-70%  Assessment:       ICD-10-CM ICD-9-CM   1. Dry eyes  H04.123 375.15   2. Dry mouth  R68.2 527.7   3. Thrombocytosis  D75.839 238.71   4. Iron deficiency  E61.1 280.9   5. Cigarette nicotine dependence without complication  F17.210 305.1   6. Primary hypertension  I10 401.9   7. Type 2 diabetes mellitus without complication, with long-term current use of insulin  E11.9 250.00    Z79.4 V58.67   8. JIN (obstructive sleep  apnea)  G47.33 327.23            Plan:     1. Positive MELANY (antinuclear antibody)- repeat MELANY in July 2024 negative   2/19/2024 revealed MELANY positive speckled 1:160, C3/C4 okay, SSA/SSB negative.  Repeat MELANY and ENAs negative in July 2024.  Admits dry eyes and dry mouth, fair salivary pool on exam.  Continue with symptomatic management of dry eyes and dry mouth.  Continue follow-up with Ophthalmology.  Currently does not have any evidence of Sjogren's or lupus.  Educated the patient, she verbalized understanding.  - discharged from clinic, follow-up with PCP.  She can be re-referred for any new issues in the future.    - history of dry skin, no dry skin on exam today.  Myositis panel negative.  CPK and aldolase normal.  - she has features of chronic pain and fibromyalgia.  Discussed about sleep hygiene, stress management and starting regular aerobic exercise.  Stretching and core muscle strengthening exercises to help with neck and back pain.    2. Iron deficiency/Thrombocytosis  - Followed with Hematology.    3.  Primary hypertension/JIN/T2DM  - continue follow-up with PCP.  - Reports noncompliant with CPAP as is uncomfortable and makes dry mouth worse- enc to discuss with PCP possible re-eval for fitting    4. Mild CAD  - ECHO July 2024 LV EF 65-70%, 09/23/2024 [facility]  - Continue to follow up with Cardiology    5. Cigarette nicotine dependence without complication  - Strongly enc to quit smoking- she reports she does want to quit, ran out of Chantix, she will call her PCP.  - Spent additional 5 minutes discussing about smoking cessation.      6. Dry mouth/Dry Eyes  - Enc Biotene mouth was as directed, sugar free lemon candies-  strongly enc to increase water intake   - Also enc to follow up with dentist for eval/poor dentition  - Continue use of Restasis gtts as directed       Follow up for discharged. . In addition to their scheduled follow up, the patient has also been instructed to follow up on as needed basis.      Total time spent with patient and documentation is 40 minutes. All questions were answered to patient's satisfaction and patient verbalized understanding.

## 2025-04-14 ENCOUNTER — TELEPHONE (OUTPATIENT)
Dept: HEMATOLOGY/ONCOLOGY | Facility: CLINIC | Age: 60
End: 2025-04-14
Payer: MEDICAID

## 2025-04-22 ENCOUNTER — OFFICE VISIT (OUTPATIENT)
Dept: FAMILY MEDICINE | Facility: CLINIC | Age: 60
End: 2025-04-22
Payer: MEDICAID

## 2025-04-22 VITALS
OXYGEN SATURATION: 99 % | HEIGHT: 67 IN | WEIGHT: 194.81 LBS | BODY MASS INDEX: 30.57 KG/M2 | HEART RATE: 76 BPM | DIASTOLIC BLOOD PRESSURE: 75 MMHG | SYSTOLIC BLOOD PRESSURE: 112 MMHG | TEMPERATURE: 98 F

## 2025-04-22 DIAGNOSIS — D17.9 LIPOMA, UNSPECIFIED SITE: ICD-10-CM

## 2025-04-22 DIAGNOSIS — B07.9 VIRAL WARTS, UNSPECIFIED TYPE: Primary | ICD-10-CM

## 2025-04-22 DIAGNOSIS — L82.1 SEBORRHEIC KERATOSIS: ICD-10-CM

## 2025-04-22 PROCEDURE — 99213 OFFICE O/P EST LOW 20 MIN: CPT | Mod: PBBFAC

## 2025-04-22 PROCEDURE — 17110 DESTRUCTION B9 LES UP TO 14: CPT | Mod: PBBFAC | Performed by: FAMILY MEDICINE

## 2025-04-22 NOTE — PROGRESS NOTES
Subjective:       Patient ID: Jessica Walker is a 59 y.o. female.    Chief Complaint: Follow-up (FU on Cryo on right hand)    HPI  60 yo here for follow up of warts on her right index finger and 2 SK in her scalp treated with cryotherapy in February. Pt reports that one of the 2 warts resolved on her finger and one of the two SK resolved. She currently has one 1 wart on her right index finger remaining and 1 SK in the front of her scalp remaining. She desires additional cryo today.   Additionally, pt notes a soft tissue mass over the anterior aspect of her right shoulder. Pt states that it has been present for many years and was told it was a lipoma. Pt denies any pain associated with it currently.     Review of Systems  As per HPI         Objective:      Vitals:    04/22/25 1438   BP: 112/75   Pulse: 76   Temp: 97.9 °F (36.6 °C)       Physical Exam    Gen: alert, no acute distress  Skin: right index finger- 2mm flat, verrucoid lesion. Frontal hair line- hyperpigmented stuck on lesion with cracked surface consistent with appearance of SK. Anterior aspect of right shoulder- large, soft tissue mass  Psych: cooperative, appropriate mood and affect    Procedure Note:  Procedure: cryotherapy for wart x 1 and SK x 1  Performed by: Donna Negrete MD  Consent: signed consent obtained after discussion of risks, benefits, and alternative treatments. Time out completed  Description: Liquid nitrogen used for cryotherapy. Tip held 1 cm from lesion and sprayed in freeze-thaw cycle.   The patient tolerated the procedure well with no complications.     Assessment/Plan:  Viral warts, unspecified type  Seborrheic keratosis  - cryo today  - Post care instructions and return precautions discussed.     Lipoma, unspecified site  -     Ambulatory referral/consult to General Surgery; Future; Expected date: 04/29/2025         Follow up in about 4 weeks (around 5/20/2025) for wart.

## 2025-04-30 ENCOUNTER — OFFICE VISIT (OUTPATIENT)
Dept: FAMILY MEDICINE | Facility: CLINIC | Age: 60
End: 2025-04-30
Payer: MEDICAID

## 2025-04-30 VITALS
WEIGHT: 200.38 LBS | OXYGEN SATURATION: 99 % | HEIGHT: 67 IN | BODY MASS INDEX: 31.45 KG/M2 | HEART RATE: 68 BPM | RESPIRATION RATE: 18 BRPM | SYSTOLIC BLOOD PRESSURE: 113 MMHG | DIASTOLIC BLOOD PRESSURE: 75 MMHG

## 2025-04-30 DIAGNOSIS — E11.69 TYPE 2 DIABETES MELLITUS WITH OTHER SPECIFIED COMPLICATION, WITH LONG-TERM CURRENT USE OF INSULIN: Primary | ICD-10-CM

## 2025-04-30 DIAGNOSIS — I10 PRIMARY HYPERTENSION: ICD-10-CM

## 2025-04-30 DIAGNOSIS — Z79.4 TYPE 2 DIABETES MELLITUS WITH OTHER SPECIFIED COMPLICATION, WITH LONG-TERM CURRENT USE OF INSULIN: Primary | ICD-10-CM

## 2025-04-30 PROCEDURE — 3066F NEPHROPATHY DOC TX: CPT | Mod: CPTII,,, | Performed by: NURSE PRACTITIONER

## 2025-04-30 PROCEDURE — 4010F ACE/ARB THERAPY RXD/TAKEN: CPT | Mod: CPTII,,, | Performed by: NURSE PRACTITIONER

## 2025-04-30 PROCEDURE — 3051F HG A1C>EQUAL 7.0%<8.0%: CPT | Mod: CPTII,,, | Performed by: NURSE PRACTITIONER

## 2025-04-30 PROCEDURE — 99214 OFFICE O/P EST MOD 30 MIN: CPT | Mod: ,,, | Performed by: NURSE PRACTITIONER

## 2025-04-30 PROCEDURE — 3061F NEG MICROALBUMINURIA REV: CPT | Mod: CPTII,,, | Performed by: NURSE PRACTITIONER

## 2025-04-30 PROCEDURE — 3008F BODY MASS INDEX DOCD: CPT | Mod: CPTII,,, | Performed by: NURSE PRACTITIONER

## 2025-04-30 PROCEDURE — 3078F DIAST BP <80 MM HG: CPT | Mod: CPTII,,, | Performed by: NURSE PRACTITIONER

## 2025-04-30 PROCEDURE — 1159F MED LIST DOCD IN RCRD: CPT | Mod: CPTII,,, | Performed by: NURSE PRACTITIONER

## 2025-04-30 PROCEDURE — 3074F SYST BP LT 130 MM HG: CPT | Mod: CPTII,,, | Performed by: NURSE PRACTITIONER

## 2025-04-30 NOTE — PROGRESS NOTES
SUBJECTIVE:     History of Present Illness      Chief Complaint: Establish Care and Medication Refill    HPI:  Patient is a 59 y.o. year old female who presents to clinic to the clinic to establish care as a new patient.  Patient follows with endocrinologist for diabetes.  Recently had appointment with them.  She currently is on an insulin pump.  Patient's accident for medication adjustment for diabetes.  Recommended she follow up with her endocrinologist for this.    Review of Systems:    Review of Systems    12 point review of systems conducted, negative except as stated in the history of present illness. See HPI for details.     Previous History      PCP: Morris Chavez FNP  Review of patient's allergies indicates:   Allergen Reactions    Iodine Hives    Ivp dye [iodinated contrast media] Itching       Past Medical History:   Diagnosis Date    Abnormal Pap smear of cervix     Acute pancreatitis 1996    Had surgery for pancreatitis in June 1996    Amenorrhea 2023    Anemia     Colon polyp 2021    When I had colonoscopy, I had one small polyp they removed    Depression     Diabetes mellitus     Dieulafoy lesion of stomach 12/20/2023    Diverticulitis 2019    I think they said when I had my first colonoscopy around 2019 that I have diverticulitis    Diverticulosis 2019    I think they said when I had my first colonoscopy around 2019 that I have diverticulosis    Encounter for comprehensive diabetic foot examination, type 2 diabetes mellitus 05/29/2024    Essential thrombocytosis 01/10/2023    GERD (gastroesophageal reflux disease) 1996    Had surgery for pancreatitis in June 1996 and acid reflux since then    Hiatal hernia 12/20/2023    Hyperlipidemia LDL goal <70 05/17/2022    Hypertension     Iron deficiency 06/29/2022    JAK2 gene mutation 01/10/2023    Mild CAD 05/17/2022    Obesity (BMI 30-39.9) 05/17/2022    Overgrown toenails 05/29/2024    Personal history of colonic polyps 11/29/2022    PMB  (postmenopausal bleeding)     Sleep apnea     No CPAP    Thrombocytosis 2022    Tobacco user 2022    Type 2 diabetes mellitus, with long-term current use of insulin 2022    Xerosis of skin 2024       Past Surgical History:   Procedure Laterality Date    ANGIOGRAM, CORONARY, WITH LEFT HEART CATHETERIZATION N/A 2024    Procedure: Angiogram, Coronary, with Left Heart Cath;  Surgeon: Francesco Mensah MD;  Location: [facility] CATH LAB;  Service: Cardiology;  Laterality: N/A;    CHOLECYSTECTOMY      COLONOSCOPY  10/2021    DILATION AND CURETTAGE OF UTERUS      EGD, WITH CLOSED BIOPSY N/A 2024    Procedure: EGD, WITH CLOSED BIOPSY;  Surgeon: Ravindra Camejo MD;  Location: [facility];  Service: Endoscopy;  Laterality: N/A;  A) BX DUODENUM B) BX GASTRIC ANTRUM FOR H.PYLORI, C) BX GE JUNCTION @ 32CM. 8CM HIATAL HERNIA    ESOPHAGOGASTRODUODENOSCOPY N/A 2023    Procedure: EGD (ESOPHAGOGASTRODUODENOSCOPY);  Surgeon: Kita Larose MD;  Location: [facility] ENDOSCOPY;  Service: Gastroenterology;  Laterality: N/A;    EYE SURGERY  89    LK and RK    HYSTEROSCOPY WITH DILATION AND CURETTAGE OF UTERUS N/A 2023    Procedure: HYSTEROSCOPY, WITH DILATION AND CURETTAGE OF UTERUS;  Surgeon: Deepti Chappell MD;  Location: [facility] OR;  Service: OB/GYN;  Laterality: N/A;  myosure    INTRALUMINAL GASTROINTESTINAL TRACT IMAGING VIA CAPSULE N/A 2023    Procedure: IMAGING PROCEDURE, GI TRACT, INTRALUMINAL, VIA CAPSULE;  Surgeon: Kita Larose MD;  Location: [facility] ENDOSCOPY;  Service: Gastroenterology;  Laterality: N/A;    Removal of Goiter      UPPER GASTROINTESTINAL ENDOSCOPY       Family History   Problem Relation Name Age of Onset    Diabetes Mother Alise          from unknown causes    Coronary artery disease Mother Alise     Alcohol abuse Mother Alise     Arthritis Mother Alise     Heart disease Mother Alise         Had 13 stents in her heart    Hyperlipidemia  Mother Alise     Hypertension Mother Alise     Diabetes Father Joo         Still living    Hyperlipidemia Father Joo     Hypertension Father Joo     Cervical cancer Sister Chantale     Arthritis Sister Chantale     Breast cancer Sister Chantale     COPD Sister Chantale     Diabetes Sister Chantale     Depression Sister Ch[place]ale     Colon polyps Sister Chantale     Cancer Sister Chantale         Breast cancer survivor at 55    Fibromyalgia Sister Jemma  from cervical cancer at 55     Breast cancer Sister Jemma  from cervical cancer at 55     Cervical cancer Sister Jemma  from cervical cancer at 55 55    Early death Sister Jemma  from cervical cancer at 55     Other (cervical cancer) Sister Jemma  from cervical cancer at 55 55        Maternal half sister    Cancer Sister Jemma  from cervical cancer at 55          at 55 from cervical cancer    Skin cancer Sister Jemma  from cervical cancer at 55         my half sister Jemma  from cervical cancer at age 55    Miscarriages / Stillbirths Sister Ibis     Skin cancer Sister Ibis         My half sister Ibis had skin cancer    Cancer Sister Ibis         Has had skin cancer    Throat cancer Maternal Grandmother [place] at 64 64    Early death Maternal Grandmother [place] at 64     Cancer Maternal Grandmother [place] at 64          from lung cancer at 64    Diabetes Maternal Grandmother [place] at 64         Had diabetes but  from lung cancee    Leukemia Maternal Grandfather Raza     Cancer Maternal Grandfather Raza     Cancer Maternal Uncle Grzegorz          from cancer       Social History[1]     Health Maintenance      Health Maintenance   Topic Date Due    Hepatitis C Screening  Never done    Foot Exam  Never done    LDCT Lung Screen  2017    COVID-19 Vaccine ( season) 2024    Cervical Cancer Screening  2028 (Originally 3/23/2026)    HIV Screening  2030 (Originally 9/15/1980)    Hemoglobin A1c   "09/18/2025    Diabetic Eye Exam  11/06/2025    Diabetes Urine Screening  03/18/2026    Lipid Panel  03/18/2026    Mammogram  03/19/2026    High Dose Statin  04/09/2026    TETANUS VACCINE  11/12/2030    Colorectal Cancer Screening  10/27/2031    RSV Vaccine (Age 60+ and Pregnant patients) (1 - 1-dose 75+ series) 09/15/2040    Shingles Vaccine  Completed    Influenza Vaccine  Completed    Pneumococcal Vaccines (Age 50+)  Completed       OBJECTIVE:     Physical Exam      Vital Signs Reviewed   Visit Vitals  /75   Pulse 68   Resp 18   Ht 5' 7" (1.702 m)   Wt 90.9 kg (200 lb 6.4 oz)   SpO2 99%   BMI 31.39 kg/m²       Physical Exam    Physical Exam:  General: Alert, well nourished, no acute distress, non-toxic appearing.   Eyes: Anicteric sclera, without conjunctival injection, normal lids, no purulent drainage, EOMs grossly intact.   Ears: No tragal tenderness. Tympanic membranes intact, pearly grey, without effusion or erythema and with a positive light reflex.   Mouth: Posterior pharynx without erythema. No exudate, ulcerations, or lesion. No tonsillar swelling.   Neck: Supple, full ROM, no rigidity, no cervical adenopathy.   Cardio: Normal rate and rhythm    Resp: Respirations even and unlabored, clear to auscultation bilaterally.   Abd: No ecchymosis or distension. Normal bowel sounds in all 4 quadrants. No tenderness to palpation. No rebound tenderness or guarding. No CVA tenderness.   Skin: No rashes or open lesions noted.   MSK: No swelling. No abrasions or signs of trauma. Ambulating without assistance.   Neuro: Alert,oriented No focal deficits noted. Facial expressions even.   Psych: Cooperative, Normal affect      Labs   Chemistry:  Lab Results   Component Value Date     04/02/2025    K 4.6 04/02/2025    BUN 11.2 04/02/2025    CREATININE 0.82 04/02/2025    EGFRNORACEVR >60 04/02/2025    CALCIUM 9.3 04/02/2025    ALKPHOS 94 04/02/2025    ALBUMIN 3.8 04/02/2025    BILIDIR 0.1 04/29/2022    IBILI 0.20 " 04/29/2022    AST 16 04/02/2025    ALT 22 04/02/2025    MG 1.40 (L) 01/15/2025    [id number] 41 01/15/2025    TSH 2.044 03/18/2025    JJVVXM2TFDS 0.96 03/18/2025        Lab Results   Component Value Date    HGBA1C 7.1 (H) 03/18/2025        Hematology:  Lab Results   Component Value Date    WBC 15.10 (H) 02/19/2025    HGB 11.1 (L) 02/19/2025    HCT 33.8 (L) 02/19/2025     (H) 02/19/2025       Lipid Panel:  Lab Results   Component Value Date    CHOL 149 03/18/2025    HDL 58 03/18/2025    LDL 66.00 03/18/2025    TRIG 124 03/18/2025    TOTALCHOLEST 3 03/18/2025        Urine:  Lab Results   Component Value Date    APPEARANCEUA Clear 07/09/2024    SGUA 1.022 07/09/2024    PROTEINUA Trace (A) 07/09/2024    KETONESUA Trace (A) 07/09/2024    LEUKOCYTESUR 75 (A) 07/09/2024    RBCUA 0-5 07/09/2024    WBCUA 6-10 (A) 07/09/2024    BACTERIA Few (A) 07/09/2024    SQEPUA Trace (A) 07/09/2024    HYALINECASTS None Seen 07/09/2024    CREATRANDUR 66.4 03/18/2025    PROTEINURINE 14.3 07/09/2024    UPROTCREA 0.1 07/09/2024         Assessment       No diagnosis found.    Plan       Assessment & Plan  Type 2 diabetes mellitus with other specified complication, with long-term current use of insulin  Recommend patient reach out endocrinologist for medication management of diabetes         Primary hypertension  Controlled with medication.   Continue current medication as prescribed   Low salt/ DASH diet   Discussed lifestyle modifications.   encourage aerobic excise at least 30 mins a day   Monitor BP daily goal less than 140/90.   Denies headaches, blurred vision, or dizziness                  Medication List with Changes/Refills   Current Medications    AMMONIUM LACTATE 12 % CREA    SMARTSIG:sparingly Topical Twice Daily    ASPIRIN (ECOTRIN) 81 MG EC TABLET    Take 81 mg by mouth every evening. Stopped sat 12-14-24    ATORVASTATIN (LIPITOR) 40 MG TABLET    Take 1 tablet (40 mg total) by mouth every evening.    BD ULTRA-FINE SHORT  "PEN NEEDLE 31 GAUGE X 5/16" NDLE    Inject 1 each into the skin 3 (three) times daily.    BLOOD-GLUCOSE METER,CONTINUOUS (DEXCOM ) MISC    Dexcom G7 .  Use as directed with Dexcom G7 sensors.    CARBAMIDE PEROXIDE (DEBROX) 6.5 % OTIC SOLUTION    Place 5 drops into the right ear 2 (two) times daily.    CYCLOSPORINE (RESTASIS) 0.05 % OPHTHALMIC EMULSION    Place 1 drop into both eyes 2 (two) times daily.    DEXCOM G7 SENSOR MINI    Dexcom G7 sensors.  Use every 10 days as directed.    FEZOLINETANT (VEOZAH) 45 MG TAB    Take 45 mg by mouth once daily.    FLUOXETINE 20 MG CAPSULE    Take 20 mg by mouth.    FREESTYLE ABDULKADIR 2 SENSOR KIT    USE EVERY 14 DAYS AS DIRECTED    IBUPROFEN (ADVIL,MOTRIN) 800 MG TABLET    Take 800 mg by mouth every 6 (six) hours as needed.    INSULIN LISPRO 100 UNIT/ML INJECTION    SMARTSI Unit(s) SUB-Q Daily    LANTUS SOLOSTAR U-100 INSULIN 100 UNIT/ML (3 ML) INPN PEN    Inject 47 Units into the skin every morning.    LINACLOTIDE (LINZESS) 72 MCG CAP CAPSULE    Patient states takes as needed    LISINOPRIL 10 MG TABLET    Take 10 mg by mouth once daily.    MAGNESIUM CHLORIDE (MAG 64) 64 MG TBEC    Take 1 tablet (64 mg total) by mouth 2 (two) times a day.    METFORMIN (GLUCOPHAGE) 1000 MG TABLET    Take 1 tablet (1,000 mg total) by mouth 2 (two) times daily.    METOPROLOL TARTRATE (LOPRESSOR) 25 MG TABLET    Take 1 tablet (25 mg total) by mouth 2 (two) times daily.    MOUNJARO 2.5 MG/0.5 ML PNIJ        NICOTINE (NICODERM CQ) 21 MG/24 HR    Place 1 patch onto the skin once daily.    NICOTINE POLACRILEX 2 MG LOZG    Take 1 lozenge (2 mg total) by mouth as needed (in place of a cigarette).    NITROGLYCERIN (NITROSTAT) 0.4 MG SL TABLET    Place 1 tablet (0.4 mg total) under the tongue every 5 (five) minutes as needed for Chest pain.    OMNIPOD 5 INTRO,G6/[id number], CRTG    Inject into the skin.    OMNIPOD 5, G6/ABDULKADIR 2 PLUS, CRTG    Inject into the skin.    PANTOPRAZOLE (PROTONIX) " 40 MG TABLET    Take 1 tablet (40 mg total) by mouth every morning.    TRUE METRIX GLUCOSE TEST STRIP STRP    USE 1 STRIP TO CHECK GLUCOSE ONCE DAILY         No follow-ups on file. In addition to their scheduled follow up, the patient has also been instructed to follow up on as needed basis.   Future Appointments   Date Time Provider Department Center   5/6/2025 11:00 AM Lori Rodrigez MD [facility] CARD [place]   5/13/2025 12:00 PM SURGERY CLINIC, [facility] GENERAL SURGERY [facility] [place]    5/15/2025  8:10 AM PROVIDERS, [facility] [place]    5/21/2025  8:30 AM SCHEDULE, FM MINOR SURGERY [facility] FM RES [place]    5/28/2025  1:30 PM Kleber Regan, ANAHI [facility] HEMOMC [place]    6/5/2025  2:00 PM Bridgette Mckeon FNP [facility] GASTRO [place]    6/9/2025 11:00 AM RESIDENTS, [facility] [place]    7/17/2025  1:40 PM Leni Presley MD [facility] [place]    11/20/2025  8:10 AM Shabana Lucas FNP [facility] [place]        ANAHI Curry              [1]   Social History  Tobacco Use    Smoking status: Every Day     Current packs/day: 1.00     Average packs/day: 1 pack/day for 47.3 years (47.3 ttl pk-yrs)     Types: Cigarettes     Start date: 1978     Passive exposure: Current    Smokeless tobacco: Never   Substance Use Topics    Alcohol use: Not Currently    Drug use: Not Currently

## 2025-04-30 NOTE — ASSESSMENT & PLAN NOTE
Controlled with medication.   Continue current medication as prescribed   Low salt/ DASH diet   Discussed lifestyle modifications.   encourage aerobic excise at least 30 mins a day   Monitor BP daily goal less than 140/90.   Denies headaches, blurred vision, or dizziness

## 2025-05-05 ENCOUNTER — TELEPHONE (OUTPATIENT)
Dept: FAMILY MEDICINE | Facility: CLINIC | Age: 60
End: 2025-05-05
Payer: MEDICAID

## 2025-05-05 NOTE — TELEPHONE ENCOUNTER
Copied from CRM #[id number]. Topic: General Inquiry - Patient Advice  >> May 2, 2025  2:36 PM Wendie wrote:  Pt would like to speak with nurse about med , pt stated that all her med wasn't called

## 2025-05-06 ENCOUNTER — OFFICE VISIT (OUTPATIENT)
Dept: CARDIOLOGY | Facility: CLINIC | Age: 60
End: 2025-05-06
Payer: MEDICAID

## 2025-05-06 VITALS
OXYGEN SATURATION: 99 % | DIASTOLIC BLOOD PRESSURE: 74 MMHG | RESPIRATION RATE: 20 BRPM | SYSTOLIC BLOOD PRESSURE: 117 MMHG | HEART RATE: 81 BPM | HEIGHT: 67 IN | BODY MASS INDEX: 30.95 KG/M2 | WEIGHT: 197.19 LBS

## 2025-05-06 DIAGNOSIS — Z01.810 ENCOUNTER FOR PRE-OPERATIVE CARDIOVASCULAR CLEARANCE: ICD-10-CM

## 2025-05-06 DIAGNOSIS — E66.9 OBESITY (BMI 30-39.9): ICD-10-CM

## 2025-05-06 DIAGNOSIS — I10 PRIMARY HYPERTENSION: Primary | ICD-10-CM

## 2025-05-06 DIAGNOSIS — I10 PRIMARY HYPERTENSION: ICD-10-CM

## 2025-05-06 DIAGNOSIS — I25.10 NON-OCCLUSIVE CORONARY ARTERY DISEASE: ICD-10-CM

## 2025-05-06 DIAGNOSIS — Z01.818 PREOP EXAMINATION: ICD-10-CM

## 2025-05-06 DIAGNOSIS — I25.10 NON-OCCLUSIVE CORONARY ARTERY DISEASE: Primary | ICD-10-CM

## 2025-05-06 PROCEDURE — 99215 OFFICE O/P EST HI 40 MIN: CPT | Mod: PBBFAC | Performed by: INTERNAL MEDICINE

## 2025-05-06 NOTE — PROGRESS NOTES
[facility]   Cardiology Clinic - Follow Up     Cardiology Attending: Dr. Rodrigez  Date of Visit: 2025    Subjective:      Jessica Walker is a 59 y.o. female with non obstructive epicardial coronary artery disease, HTN, T2DM (A1c 7.1), HLD, JAK2 mutation, SARAH, JIN on CPAP, and class I obesity who presents for follow up and pre-operative risk stratification prior to planned hernia surgery.     The patient reports her blood pressure is well controlled at home but she does know the values.     The patient denies congestive symptoms of heart failure including orthopnea, PND, abdominal distension, or lower extremity edema. She reports intermittent dyspnea on exertion.     The patient denies anginal symptoms.     Medications:   [Current Medications]    [Current Medications]  Current Outpatient Medications   Medication Sig Dispense Refill    aspirin (ECOTRIN) 81 MG EC tablet Take 81 mg by mouth every evening. Stopped sat 24      atorvastatin (LIPITOR) 40 MG tablet Take 1 tablet (40 mg total) by mouth every evening. 90 tablet 3    fezolinetant (VEOZAH) 45 mg Tab Take 45 mg by mouth once daily. 30 tablet 2    ibuprofen (ADVIL,MOTRIN) 800 MG tablet Take 800 mg by mouth every 6 (six) hours as needed.      insulin lispro 100 unit/mL injection SMARTSI Unit(s) SUB-Q Daily      linaCLOtide (LINZESS) 72 mcg Cap capsule Patient states takes as needed      lisinopriL 10 MG tablet Take 10 mg by mouth once daily.      metFORMIN (GLUCOPHAGE) 1000 MG tablet Take 1 tablet (1,000 mg total) by mouth 2 (two) times daily. 180 tablet 3    metoprolol tartrate (LOPRESSOR) 25 MG tablet Take 1 tablet (25 mg total) by mouth 2 (two) times daily. 180 tablet 3    nitroGLYCERIN (NITROSTAT) 0.4 MG SL tablet Place 1 tablet (0.4 mg total) under the tongue every 5 (five) minutes as needed for Chest pain. 25 tablet 6    pantoprazole (PROTONIX) 40 MG tablet Take 1 tablet (40 mg total) by mouth every morning. 30 tablet 11     "ammonium lactate 12 % Crea SMARTSIG:sparingly Topical Twice Daily (Patient not taking: Reported on 4/30/2025)      BD ULTRA-FINE SHORT PEN NEEDLE 31 gauge x 5/16" Ndle Inject 1 each into the skin 3 (three) times daily. 100 each 1    blood-glucose meter,continuous (DEXCOM ) Misc Dexcom G7 .  Use as directed with Dexcom G7 sensors. 1 each 0    carbamide peroxide (DEBROX) 6.5 % otic solution Place 5 drops into the right ear 2 (two) times daily. 18 mL 1    cycloSPORINE (RESTASIS) 0.05 % ophthalmic emulsion Place 1 drop into both eyes 2 (two) times daily.      DEXCOM G7 SENSOR Amy Dexcom G7 sensors.  Use every 10 days as directed. 3 each 11    FLUoxetine 20 MG capsule Take 20 mg by mouth. (Patient not taking: Reported on 5/6/2025)      FREESTYLE ABDULKADIR 2 SENSOR Kit USE EVERY 14 DAYS AS DIRECTED      LANTUS SOLOSTAR U-100 INSULIN 100 unit/mL (3 mL) InPn pen Inject 47 Units into the skin every morning. (Patient not taking: Reported on 5/6/2025)      magnesium chloride (MAG 64) 64 mg TbEC Take 1 tablet (64 mg total) by mouth 2 (two) times a day. (Patient not taking: Reported on 5/6/2025) 14 tablet 0    MOUNJARO 2.5 mg/0.5 mL PnIj  (Patient not taking: Reported on 5/6/2025)      nicotine (NICODERM CQ) 21 mg/24 hr Place 1 patch onto the skin once daily. (Patient not taking: Reported on 5/6/2025) 14 patch 0    nicotine polacrilex 2 MG Lozg Take 1 lozenge (2 mg total) by mouth as needed (in place of a cigarette). (Patient not taking: Reported on 5/6/2025) 72 lozenge 0    OMNIPOD 5 INTRO,G6/CBSLK4RKPR, Crtg Inject into the skin.      OMNIPOD 5, G6/ABDULKADIR 2 PLUS, Crtg Inject into the skin.      TRUE METRIX GLUCOSE TEST STRIP Strp USE 1 STRIP TO CHECK GLUCOSE ONCE DAILY 100 each 1     No current facility-administered medications for this visit.     Facility-Administered Medications Ordered in Other Visits   Medication Dose Route Frequency Provider Last Rate Last Admin    0.9%  NaCl infusion   Intravenous Continuous " Marielos Bahena MD        albuterol-ipratropium 2.5 mg-0.5 mg/3 mL nebulizer solution 3 mL  3 mL Nebulization Once PRN Marisa Ribera MD        dextrose 10% bolus 125 mL 125 mL  12.5 g Intravenous PRN Katherine Bernal S, FNP        dextrose 10% bolus 125 mL 125 mL  12.5 g Intravenous PRN Katherine Bernal S, ROBINP        HYDROmorphone injection 0.2 mg  0.2 mg Intravenous Q5 Min PRN Marisa Ribera MD        HYDROmorphone injection 0.5 mg  0.5 mg Intravenous Q5 Min PRN Marisa Ribera MD        insulin aspart U-100 injection 2-9 Units  2-9 Units Subcutaneous PRN Katherine Bernal S, ROBINP        insulin aspart U-100 injection 4-12 Units  4-12 Units Subcutaneous PRN Katherine Bernal S, FNP        lactated ringers infusion   Intravenous Continuous Marisa Ribera  mL/hr at 09/26/23 0856 Rate Change at 09/26/23 0856    LIDOcaine (PF) 10 mg/ml (1%) injection 10 mg  1 mL Intradermal Once Katherine Bernal FNP        ondansetron injection 4 mg  4 mg Intravenous Once Marisa Ribera MD        oxyCODONE-acetaminophen 5-325 mg per tablet 2 tablet  2 tablet Oral Once Marisa Ribera MD        prochlorperazine injection Soln 5 mg  5 mg Intravenous Once PRN Marisa Ribera MD           I have reviewed and updated the patient's medications, allergies, past medical history, surgical history, social history and family history as needed.    Review of Systems:     The patient denies headaches, changes in vision, nausea, emesis, fevers, chills, chest pain, palpitations, abdominal pain, or changes in urinary or bowel habits.     Objective:     Wt Readings from Last 3 Encounters:   05/06/25 89.4 kg (197 lb 3.2 oz)   04/30/25 90.9 kg (200 lb 6.4 oz)   04/22/25 88.4 kg (194 lb 12.8 oz)     Temp Readings from Last 3 Encounters:   04/22/25 97.9 °F (36.6 °C)   04/09/25 97.5 °F (36.4 °C)     BP Readings from Last 3 Encounters:   05/06/25 117/74   04/30/25 113/75   04/22/25 112/75     Pulse  "Readings from Last 3 Encounters:   05/06/25 81   04/30/25 68   04/22/25 76       Vitals:    05/06/25 1144   BP: 117/74   BP Location: Left arm   Patient Position: Sitting   Pulse: 81   Resp: 20   TempSrc: Oral   SpO2: 99%   Weight: 89.4 kg (197 lb 3.2 oz)   Height: 5' 7" (1.702 m)     Body mass index is 30.89 kg/m².    Physical Exam  Vitals reviewed.   Constitutional:       General: She is not in acute distress.     Appearance: Normal appearance. She is obese. She is not ill-appearing.   HENT:      Head: Normocephalic and atraumatic.      Right Ear: External ear normal.      Left Ear: External ear normal.      Nose: Nose normal.      Mouth/Throat:      Mouth: Mucous membranes are moist.   Eyes:      Conjunctiva/sclera: Conjunctivae normal.   Cardiovascular:      Rate and Rhythm: Normal rate and regular rhythm.      Pulses: Normal pulses.      Heart sounds: Normal heart sounds. No murmur heard.  Pulmonary:      Effort: Pulmonary effort is normal. No respiratory distress.      Breath sounds: Normal breath sounds. No stridor. No wheezing, rhonchi or rales.   Chest:      Chest wall: No tenderness.   Abdominal:      General: Abdomen is flat. Bowel sounds are normal. There is no distension.      Palpations: Abdomen is soft.   Musculoskeletal:      Cervical back: Neck supple.      Right lower leg: No edema.      Left lower leg: No edema.   Skin:     General: Skin is warm and dry.      Capillary Refill: Capillary refill takes less than 2 seconds.   Neurological:      Mental Status: She is alert and oriented to person, place, and time.   Psychiatric:         Mood and Affect: Mood normal.         Behavior: Behavior normal.        Labs:   I have reviewed the following labs below:      Lab Results   Component Value Date    WBC 15.10 (H) 02/19/2025    HGB 11.1 (L) 02/19/2025    HCT 33.8 (L) 02/19/2025     (H) 02/19/2025    MCV 83.0 02/19/2025    RDW 14.7 02/19/2025     Lab Results   Component Value Date     " 04/02/2025    K 4.6 04/02/2025     04/02/2025    CO2 24 04/02/2025    BUN 11.2 04/02/2025     (H) 04/02/2025    CALCIUM 9.3 04/02/2025    MG 1.40 (L) 01/15/2025     Lab Results   Component Value Date    PROT 6.8 04/02/2025    ALBUMIN 3.8 04/02/2025    BILITOT 0.5 04/02/2025    AST 16 04/02/2025    ALKPHOS 94 04/02/2025    ALT 22 04/02/2025     Cholesterol Total   Date Value Ref Range Status   03/18/2025 149 <=200 mg/dL Final     HDL Cholesterol   Date Value Ref Range Status   03/18/2025 58 35 - 60 mg/dL Final     LDL Cholesterol   Date Value Ref Range Status   03/18/2025 66.00 50.00 - 140.00 mg/dL Final     Comment:     LDL calculated using the Friedewald equation.     Triglyceride   Date Value Ref Range Status   03/18/2025 124 37 - 140 mg/dL Final     Lab Results   Component Value Date    HGBA1C 7.1 (H) 03/18/2025    HGBA1C 7.1 (H) 12/17/2024    HGBA1C 6.6 02/19/2024    CREATININE 0.82 04/02/2025     Lab Results   Component Value Date    TSH 2.044 03/18/2025     Lab Results   Component Value Date    IRON 26 (L) 02/19/2025    TIBC 325 02/19/2025    FERRITIN 17.90 02/19/2025    [id number] 1,225 (H) 01/23/2024     Lab Results   Component Value Date    INR 1.0 12/12/2024    PROTIME <10.0 (L) 12/12/2024    APTT 25.3 12/12/2024      Lab Results   Component Value Date    TROPONINI <0.010 04/19/2023    BNP 25.8 04/19/2023     Cardiovascular Studies:   I have reviewed the following studies below:      TTE:   Summary  Show Result Comparison     Left Ventricle: The left ventricle is normal in size. Mildly increased wall thickness. There is normal systolic function with a visually estimated ejection fraction of 65 - 70%. Grade I diastolic dysfunction.    Right Ventricle: Normal right ventricular cavity size. Systolic function is normal.    Mitral Valve: There is mild regurgitation.    Tricuspid Valve: There is trace regurgitation.    LHC/Cors:  FINDINGS  LVEDP:  18 mmHg  Left Main:  No obstructive epicardial  coronary artery disease.  LAD:  There is a 30% lesion in the proximal portion of the vessel. There is a 60% lesion in the mid portion of the vessel at the bifurcation of the 2nd diagonal and the mid LAD. iFR was performed and not significant (0.93). There was 50% ostial stenosis of the 1st diagonal. The 1st diagonal is a small vessel < 2 mm.   Circumflex:  No obstructive epicardial coronary artery disease.   RCA:  No obstructive epicardial coronary artery disease. There is a mid RCA 30% lesion. Dominant.   Blood loss:  less than 20 cc.  iFR = 0.93 in mid LAD.  iFR > 0.89 is not significant Guide used:  6 Fr Ikkari 3.5     Assessment/Plan:   Jessica Walker is a 59 y.o. female with non obstructive epicardial coronary artery disease, HTN, T2DM (A1c 7.1), HLD, JAK2 mutation, SARAH, JIN on CPAP, and class I obesity who presents for follow up and pre-operative risk stratification prior to planned hernia surgery.     Plan:  Preoperative cardiovascular risk assessment   -Patient needs to undergo hernia repair.   -Without angina. Without decompensated heart failure. Without significant valvular disease.   -Coronary angiogram showed nonobstructive coronary artery disease  -The patient is moderate CV risk for a moderate risk procedure  -She can hold aspirin 5 days prior to procedure. Please resume once hemostasis is achieved.      Nonobstructive CAD  -Coronary angiogram obtained in the setting of dyspnea and fatigue and abnormal nuclear stress test.  -C on 9/23/2024 showed 60% mid LAD stenosis with IFR 0.93, 1st diagonal 50% ostial stenosis, 30% proximal LAD stenosis, mid RCA 30% stenosis.  -Continue aspirin, statin and Lopressor      Hyperlipidemia   -LDL at goal 68 in January 20, 2024   -Continue Lipitor 40 mg    Return to clinic in 6 months.    Rick Horn  [facility] Chief Fellow, PGY-6  05/06/2025 12:48 PM  [facility]       Duplicate note due to encounter [id number] being cancelled.

## 2025-05-06 NOTE — PROGRESS NOTES
"    [facility]   Cardiology Clinic - Follow Up     Cardiology Attending: Dr. Rodrigez  Date of Visit: 5/6/2025    Subjective:      Jessica Walker is a 59 y.o. female with non obstructive epicardial coronary artery disease, HTN, T2DM (A1c 7.1), HLD, JAK2 mutation, SARAH, JIN on CPAP, and class I obesity who presents for follow up and pre-operative risk stratification prior to planned hernia surgery.     The patient reports her blood pressure is well controlled at home but she does know the values.     The patient denies congestive symptoms of heart failure including orthopnea, PND, abdominal distension, or lower extremity edema. She reports intermittent dyspnea on exertion.     The patient denies anginal symptoms.     Medications:   Current Medications[1]    I have reviewed and updated the patient's medications, allergies, past medical history, surgical history, social history and family history as needed.    Review of Systems:     The patient denies headaches, changes in vision, nausea, emesis, fevers, chills, chest pain, palpitations, abdominal pain, or changes in urinary or bowel habits.     Objective:     Wt Readings from Last 3 Encounters:   05/06/25 89.4 kg (197 lb 3.2 oz)   04/30/25 90.9 kg (200 lb 6.4 oz)   04/22/25 88.4 kg (194 lb 12.8 oz)     Temp Readings from Last 3 Encounters:   04/22/25 97.9 °F (36.6 °C)   04/09/25 97.5 °F (36.4 °C)     BP Readings from Last 3 Encounters:   05/06/25 117/74   04/30/25 113/75   04/22/25 112/75     Pulse Readings from Last 3 Encounters:   05/06/25 81   04/30/25 68   04/22/25 76       Vitals:    05/06/25 1144   BP: 117/74   BP Location: Left arm   Patient Position: Sitting   Pulse: 81   Resp: 20   TempSrc: Oral   SpO2: 99%   Weight: 89.4 kg (197 lb 3.2 oz)   Height: 5' 7" (1.702 m)     Body mass index is 30.89 kg/m².    Physical Exam  Vitals reviewed.   Constitutional:       General: She is not in acute distress.     Appearance: Normal appearance. She is obese. She is " not ill-appearing.   HENT:      Head: Normocephalic and atraumatic.      Right Ear: External ear normal.      Left Ear: External ear normal.      Nose: Nose normal.      Mouth/Throat:      Mouth: Mucous membranes are moist.   Eyes:      Conjunctiva/sclera: Conjunctivae normal.   Cardiovascular:      Rate and Rhythm: Normal rate and regular rhythm.      Pulses: Normal pulses.      Heart sounds: Normal heart sounds. No murmur heard.  Pulmonary:      Effort: Pulmonary effort is normal. No respiratory distress.      Breath sounds: Normal breath sounds. No stridor. No wheezing, rhonchi or rales.   Chest:      Chest wall: No tenderness.   Abdominal:      General: Abdomen is flat. Bowel sounds are normal. There is no distension.      Palpations: Abdomen is soft.   Musculoskeletal:      Cervical back: Neck supple.      Right lower leg: No edema.      Left lower leg: No edema.   Skin:     General: Skin is warm and dry.      Capillary Refill: Capillary refill takes less than 2 seconds.   Neurological:      Mental Status: She is alert and oriented to person, place, and time.   Psychiatric:         Mood and Affect: Mood normal.         Behavior: Behavior normal.        Labs:   I have reviewed the following labs below:      Lab Results   Component Value Date    WBC 15.10 (H) 02/19/2025    HGB 11.1 (L) 02/19/2025    HCT 33.8 (L) 02/19/2025     (H) 02/19/2025    MCV 83.0 02/19/2025    RDW 14.7 02/19/2025     Lab Results   Component Value Date     04/02/2025    K 4.6 04/02/2025     04/02/2025    CO2 24 04/02/2025    BUN 11.2 04/02/2025     (H) 04/02/2025    CALCIUM 9.3 04/02/2025    MG 1.40 (L) 01/15/2025     Lab Results   Component Value Date    PROT 6.8 04/02/2025    ALBUMIN 3.8 04/02/2025    BILITOT 0.5 04/02/2025    AST 16 04/02/2025    ALKPHOS 94 04/02/2025    ALT 22 04/02/2025     Cholesterol Total   Date Value Ref Range Status   03/18/2025 149 <=200 mg/dL Final     HDL Cholesterol   Date Value Ref  Range Status   03/18/2025 58 35 - 60 mg/dL Final     LDL Cholesterol   Date Value Ref Range Status   03/18/2025 66.00 50.00 - 140.00 mg/dL Final     Comment:     LDL calculated using the Friedewald equation.     Triglyceride   Date Value Ref Range Status   03/18/2025 124 37 - 140 mg/dL Final     Lab Results   Component Value Date    HGBA1C 7.1 (H) 03/18/2025    HGBA1C 7.1 (H) 12/17/2024    HGBA1C 6.6 02/19/2024    CREATININE 0.82 04/02/2025     Lab Results   Component Value Date    TSH 2.044 03/18/2025     Lab Results   Component Value Date    IRON 26 (L) 02/19/2025    TIBC 325 02/19/2025    FERRITIN 17.90 02/19/2025    [id number] 1,225 (H) 01/23/2024     Lab Results   Component Value Date    INR 1.0 12/12/2024    PROTIME <10.0 (L) 12/12/2024    APTT 25.3 12/12/2024      Lab Results   Component Value Date    TROPONINI <0.010 04/19/2023    BNP 25.8 04/19/2023     Cardiovascular Studies:   I have reviewed the following studies below:      TTE:   Summary  Show Result Comparison     Left Ventricle: The left ventricle is normal in size. Mildly increased wall thickness. There is normal systolic function with a visually estimated ejection fraction of 65 - 70%. Grade I diastolic dysfunction.    Right Ventricle: Normal right ventricular cavity size. Systolic function is normal.    Mitral Valve: There is mild regurgitation.    Tricuspid Valve: There is trace regurgitation.    LHC/Cors:  FINDINGS  LVEDP:  18 mmHg  Left Main:  No obstructive epicardial coronary artery disease.  LAD:  There is a 30% lesion in the proximal portion of the vessel. There is a 60% lesion in the mid portion of the vessel at the bifurcation of the 2nd diagonal and the mid LAD. iFR was performed and not significant (0.93). There was 50% ostial stenosis of the 1st diagonal. The 1st diagonal is a small vessel < 2 mm.   Circumflex:  No obstructive epicardial coronary artery disease.   RCA:  No obstructive epicardial coronary artery disease. There is a mid  RCA 30% lesion. Dominant.   Blood loss:  less than 20 cc.  iFR = 0.93 in mid LAD.  iFR > 0.89 is not significant Guide used:  6 Fr Ikkari 3.5     Assessment/Plan:   Jessica Walker is a 59 y.o. female with non obstructive epicardial coronary artery disease, HTN, T2DM (A1c 7.1), HLD, JAK2 mutation, SARAH, JIN on CPAP, and class I obesity who presents for follow up and pre-operative risk stratification prior to planned hernia surgery.     Plan:  Preoperative cardiovascular risk assessment   -Patient needs to undergo hernia repair.   -Without angina. Without decompensated heart failure. Without significant valvular disease.   -Coronary angiogram showed nonobstructive coronary artery disease  -The patient is moderate CV risk for a moderate risk procedure  -She can hold aspirin 5 days prior to procedure. Please resume once hemostasis is achieved.      Nonobstructive CAD  -Coronary angiogram obtained in the setting of dyspnea and fatigue and abnormal nuclear stress test.  -LHC on 2024 showed 60% mid LAD stenosis with IFR 0.93, 1st diagonal 50% ostial stenosis, 30% proximal LAD stenosis, mid RCA 30% stenosis.  -Continue aspirin, statin and Lopressor      Hyperlipidemia   -LDL at goal 68 in 2024   -Continue Lipitor 40 mg    Return to clinic in 6 months.    Rick Horn  [facility] Cardiology Chief Fellow, PGY-6  2025 12:48 PM  [facility]          [1]   Current Outpatient Medications   Medication Sig Dispense Refill    aspirin (ECOTRIN) 81 MG EC tablet Take 81 mg by mouth every evening. Stopped sat 1224      atorvastatin (LIPITOR) 40 MG tablet Take 1 tablet (40 mg total) by mouth every evening. 90 tablet 3    fezolinetant (VEOZAH) 45 mg Tab Take 45 mg by mouth once daily. 30 tablet 2    ibuprofen (ADVIL,MOTRIN) 800 MG tablet Take 800 mg by mouth every 6 (six) hours as needed.      insulin lispro 100 unit/mL injection SMARTSI Unit(s) SUB-Q Daily      linaCLOtide (LINZESS) 72 mcg Cap capsule  "Patient states takes as needed      lisinopriL 10 MG tablet Take 10 mg by mouth once daily.      metFORMIN (GLUCOPHAGE) 1000 MG tablet Take 1 tablet (1,000 mg total) by mouth 2 (two) times daily. 180 tablet 3    metoprolol tartrate (LOPRESSOR) 25 MG tablet Take 1 tablet (25 mg total) by mouth 2 (two) times daily. 180 tablet 3    nitroGLYCERIN (NITROSTAT) 0.4 MG SL tablet Place 1 tablet (0.4 mg total) under the tongue every 5 (five) minutes as needed for Chest pain. 25 tablet 6    pantoprazole (PROTONIX) 40 MG tablet Take 1 tablet (40 mg total) by mouth every morning. 30 tablet 11    ammonium lactate 12 % Crea SMARTSIG:sparingly Topical Twice Daily (Patient not taking: Reported on 4/30/2025)      BD ULTRA-FINE SHORT PEN NEEDLE 31 gauge x 5/16" Ndle Inject 1 each into the skin 3 (three) times daily. 100 each 1    blood-glucose meter,continuous (DEXCOM ) Misc Dexcom G7 .  Use as directed with Dexcom G7 sensors. 1 each 0    carbamide peroxide (DEBROX) 6.5 % otic solution Place 5 drops into the right ear 2 (two) times daily. 18 mL 1    cycloSPORINE (RESTASIS) 0.05 % ophthalmic emulsion Place 1 drop into both eyes 2 (two) times daily.      DEXCOM G7 SENSOR Amy Dexcom G7 sensors.  Use every 10 days as directed. 3 each 11    FLUoxetine 20 MG capsule Take 20 mg by mouth. (Patient not taking: Reported on 5/6/2025)      FREESTYLE ABDULKADIR 2 SENSOR Kit USE EVERY 14 DAYS AS DIRECTED      LANTUS SOLOSTAR U-100 INSULIN 100 unit/mL (3 mL) InPn pen Inject 47 Units into the skin every morning. (Patient not taking: Reported on 5/6/2025)      magnesium chloride (MAG 64) 64 mg TbEC Take 1 tablet (64 mg total) by mouth 2 (two) times a day. (Patient not taking: Reported on 5/6/2025) 14 tablet 0    MOUNJARO 2.5 mg/0.5 mL PnIj  (Patient not taking: Reported on 5/6/2025)      nicotine (NICODERM CQ) 21 mg/24 hr Place 1 patch onto the skin once daily. (Patient not taking: Reported on 5/6/2025) 14 patch 0    nicotine polacrilex 2 " MG Lozg Take 1 lozenge (2 mg total) by mouth as needed (in place of a cigarette). (Patient not taking: Reported on 5/6/2025) 72 lozenge 0    OMNIPOD 5 INTRO,G6/CUIMN4RWYR, Crtg Inject into the skin.      OMNIPOD 5, G6/ABDULKADIR 2 PLUS, Crtg Inject into the skin.      TRUE METRIX GLUCOSE TEST STRIP Strp USE 1 STRIP TO CHECK GLUCOSE ONCE DAILY 100 each 1     No current facility-administered medications for this visit.     Facility-Administered Medications Ordered in Other Visits   Medication Dose Route Frequency Provider Last Rate Last Admin    0.9%  NaCl infusion   Intravenous Continuous Marielos Bahena MD        albuterol-ipratropium 2.5 mg-0.5 mg/3 mL nebulizer solution 3 mL  3 mL Nebulization Once PRN Marisa Ribera MD        dextrose 10% bolus 125 mL 125 mL  12.5 g Intravenous PRN Katherine Bernal, ROBINP        dextrose 10% bolus 125 mL 125 mL  12.5 g Intravenous PRN Katherine Bernal FNP        HYDROmorphone injection 0.2 mg  0.2 mg Intravenous Q5 Min PRN Marisa Ribera MD        HYDROmorphone injection 0.5 mg  0.5 mg Intravenous Q5 Min PRN Marisa Ribera MD        insulin aspart U-100 injection 2-9 Units  2-9 Units Subcutaneous PRN Katherine Bernal, ANAHI        insulin aspart U-100 injection 4-12 Units  4-12 Units Subcutaneous PRN Katherine Bernal, ROBINP        lactated ringers infusion   Intravenous Continuous Marisa Ribera  mL/hr at 09/26/23 0856 Rate Change at 09/26/23 0856    LIDOcaine (PF) 10 mg/ml (1%) injection 10 mg  1 mL Intradermal Once Katherine Bernal FNP        ondansetron injection 4 mg  4 mg Intravenous Once Marisa Ribera MD        oxyCODONE-acetaminophen 5-325 mg per tablet 2 tablet  2 tablet Oral Once Marisa Ribera MD        prochlorperazine injection Soln 5 mg  5 mg Intravenous Once PRN Marisa Ribera MD

## 2025-05-13 ENCOUNTER — OFFICE VISIT (OUTPATIENT)
Dept: SURGERY | Facility: CLINIC | Age: 60
End: 2025-05-13
Payer: MEDICAID

## 2025-05-13 VITALS
OXYGEN SATURATION: 96 % | HEIGHT: 67 IN | HEART RATE: 96 BPM | RESPIRATION RATE: 20 BRPM | SYSTOLIC BLOOD PRESSURE: 111 MMHG | BODY MASS INDEX: 31.73 KG/M2 | WEIGHT: 202.19 LBS | DIASTOLIC BLOOD PRESSURE: 73 MMHG | TEMPERATURE: 98 F

## 2025-05-13 DIAGNOSIS — D17.9 LIPOMA, UNSPECIFIED SITE: ICD-10-CM

## 2025-05-13 PROCEDURE — 99213 OFFICE O/P EST LOW 20 MIN: CPT | Mod: PBBFAC

## 2025-05-13 NOTE — PROGRESS NOTES
[facility] General Surgery Clinic Note    HPI: Jessica Walker is a 59 y.o. with PMH mild CAD, HTN, HLD, hiatal hernia - upcoming surgical repair with Dr. Dudley, presents with baseball sized lipoma on anterior R shoulder. Pt reports extensive history of this growth, stopped growing years ago. Denies any symptoms. Pt interested in ruling out cancer but apprehensive about surgical excisional biopsy. Inquired if she could have the mass removed at the same time of hiatal hernia repair. Otherwise would prefer to not have the lipoma surgically removed.    PMH:   Past Medical History:   Diagnosis Date    Abnormal Pap smear of cervix     Acute pancreatitis 1996    Had surgery for pancreatitis in June 1996    Amenorrhea 2023    Anemia     Colon polyp 2021    When I had colonoscopy, I had one small polyp they removed    Depression     Diabetes mellitus     Dieulafoy lesion of stomach 12/20/2023    Diverticulitis 2019    I think they said when I had my first colonoscopy around 2019 that I have diverticulitis    Diverticulosis 2019    I think they said when I had my first colonoscopy around 2019 that I have diverticulosis    Encounter for comprehensive diabetic foot examination, type 2 diabetes mellitus 05/29/2024    Essential thrombocytosis 01/10/2023    GERD (gastroesophageal reflux disease) 1996    Had surgery for pancreatitis in June 1996 and acid reflux since then    Hiatal hernia 12/20/2023    Hyperlipidemia LDL goal <70 05/17/2022    Hypertension     Iron deficiency 06/29/2022    JAK2 gene mutation 01/10/2023    Mild CAD 05/17/2022    Obesity (BMI 30-39.9) 05/17/2022    Overgrown toenails 05/29/2024    Personal history of colonic polyps 11/29/2022    PMB (postmenopausal bleeding)     Sleep apnea     No CPAP    Thrombocytosis 06/29/2022    Tobacco user 05/17/2022    Type 2 diabetes mellitus, with long-term current use of insulin 05/17/2022    Xerosis of skin 05/29/2024      Meds: Current Medications[1]  Allergies:    Review of patient's allergies indicates:   Allergen Reactions    Iodine Hives    Ivp dye [iodinated contrast media] Itching     Social History: Social History[2]  Family History:   Family History   Problem Relation Name Age of Onset    Diabetes Mother Alise          from unknown causes    Coronary artery disease Mother Alise     Alcohol abuse Mother Alise     Arthritis Mother Alise     Heart disease Mother Alise         Had 13 stents in her heart    Hyperlipidemia Mother Alise     Hypertension Mother Alise     Diabetes Father Joo         Still living    Hyperlipidemia Father Joo     Hypertension Father Joo     Cervical cancer Sister Chantale     Arthritis Sister Chantale     Breast cancer Sister Chantale     COPD Sister Chantale     Diabetes Sister Chantale     Depression Sister Chantale     Colon polyps Sister Chantale     Cancer Sister Chantale         Breast cancer survivor at 55    Fibromyalgia Sister Jemma  from cervical cancer at 55     Breast cancer Sister Jemma  from cervical cancer at 55     Cervical cancer Sister Jemma  from cervical cancer at 55 55    Early death Sister Jemma  from cervical cancer at 55     Other (cervical cancer) Sister Jemma  from cervical cancer at 55 55        Maternal half sister    Cancer Sister Jemma  from cervical cancer at 55          at 55 from cervical cancer    Skin cancer Sister Jemma  from cervical cancer at 55         my half sister Jemma  from cervical cancer at age 55    Miscarriages / Stillbirths Sister Ibis     Skin cancer Sister Ibis         My half sister Ibis had skin cancer    Cancer Sister Ibis         Has had skin cancer    Throat cancer Maternal Grandmother Marin at 64 64    Early death Maternal Grandmother Marin at 64     Cancer Maternal Grandmother [place] at 64          from lung cancer at 64    Diabetes Maternal Grandmother Marin at 64         Had diabetes but  from lung cancee    Leukemia Maternal  Grandfather Raza     Cancer Maternal Grandfather Raza     Cancer Maternal Uncle Grzegorz          from cancer     Surgical History:   Past Surgical History:   Procedure Laterality Date    ANGIOGRAM, CORONARY, WITH LEFT HEART CATHETERIZATION N/A 2024    Procedure: Angiogram, Coronary, with Left Heart Cath;  Surgeon: Francesco Mensah MD;  Location: [facility] CATH LAB;  Service: Cardiology;  Laterality: N/A;    CHOLECYSTECTOMY      COLONOSCOPY  10/2021    DILATION AND CURETTAGE OF UTERUS      EGD, WITH CLOSED BIOPSY N/A 2024    Procedure: EGD, WITH CLOSED BIOPSY;  Surgeon: Ravindra Camejo MD;  Location: [facility];  Service: Endoscopy;  Laterality: N/A;  A) BX DUODENUM B) BX GASTRIC ANTRUM FOR H.PYLORI, C) BX GE JUNCTION @ 32CM. 8CM HIATAL HERNIA    ESOPHAGOGASTRODUODENOSCOPY N/A 2023    Procedure: EGD (ESOPHAGOGASTRODUODENOSCOPY);  Surgeon: Kita Larose MD;  Location: [facility] ENDOSCOPY;  Service: Gastroenterology;  Laterality: N/A;    EYE SURGERY  89    LK and RK    HYSTEROSCOPY WITH DILATION AND CURETTAGE OF UTERUS N/A 2023    Procedure: HYSTEROSCOPY, WITH DILATION AND CURETTAGE OF UTERUS;  Surgeon: Deepti Chappell MD;  Location: [facility] OR;  Service: OB/GYN;  Laterality: N/A;  myosure    INTRALUMINAL GASTROINTESTINAL TRACT IMAGING VIA CAPSULE N/A 2023    Procedure: IMAGING PROCEDURE, GI TRACT, INTRALUMINAL, VIA CAPSULE;  Surgeon: Kita Larose MD;  Location: [facility] ENDOSCOPY;  Service: Gastroenterology;  Laterality: N/A;    Removal of Goiter      UPPER GASTROINTESTINAL ENDOSCOPY       Review of Systems:  Skin: No rashes or itching.  Head: Denies headache or recent trauma.  Eyes: Denies eye pain or double vision.  Neck: Denies swelling or hoarseness of voice.  Respiratory: Denies shortness of breath or chest pain  Cardiac: Denies palpitations or swelling in hands/feet.  Gastrointestinal: Denies nausea, denies vomiting.   Urinary: Denies dysuria or  hematuria.  Vascular: Denies claudication or leg swelling.  Neuro: Denies motor deficits. Denies weakness.  Endocrine: Denies excessive sweating or cold intolerance.  Psych: Denies memory problems. Denies anxiety.    Objective:    Vitals:  Vitals:    05/13/25 1225   BP: 111/73   Pulse: 96   Resp: 20   Temp: 98.1 °F (36.7 °C)          Physical Exam:  Gen: NAD  Neuro: awake, alert, answering questions appropriately  CV: RRR  Resp: non-labored breathing, DEEDEE  Abd: soft, ND, NT  : deferred   Ext: moves all 4 spontaneously and purposefully,   Skin: warm, well perfused. 4cm diameter round, mobile, mass on anterior right shoulder     Assessment/Plan:  Jessica Walker is a 59 y.o. with PMH mild CAD, HTN, HLD, hiatal hernia - upcoming surgical repair with Dr. Dudley (moderate CV risk), presents with 4cm diameter freely mobile mass that has remained asymptomatic and stable size for 10 years. Pt interested in ruling out cancer but apprehensive about surgical excisional biopsy.  Patient states that she will benefit from excision of the mass over punch biopsy.  Patient provided reassurance that given the history and characteristics of mass, it is likely benign.  Patient states that she is a moderate risk candidate for any surgery, and she would like to discuss with Dr. Cortes again who will be doing a hiatal hernia repair on her to also remove the mass on right shoulder.  Follow up with [facility] general surgery as needed.     - RTC as needed    Leeannluda Patel, MS3  05/13/2025 12:17 PM     I saw the patient with the medical student and agree with the documentation. I have altered the note as needed.    Magen Schroeder MD. PhD  [facility] General Surgery, PGY1           [1]   Current Outpatient Medications:     aspirin (ECOTRIN) 81 MG EC tablet, Take 81 mg by mouth every evening. Stopped sat 12-14-24, Disp: , Rfl:     atorvastatin (LIPITOR) 40 MG tablet, Take 1 tablet (40 mg total) by mouth every evening., Disp: 90 tablet,  "Rfl: 3    BD ULTRA-FINE SHORT PEN NEEDLE 31 gauge x 5/16" Ndle, Inject 1 each into the skin 3 (three) times daily., Disp: 100 each, Rfl: 1    blood-glucose meter,continuous (DEXCOM ) Misc, Dexcom G7 .  Use as directed with Dexcom G7 sensors., Disp: 1 each, Rfl: 0    carbamide peroxide (DEBROX) 6.5 % otic solution, Place 5 drops into the right ear 2 (two) times daily., Disp: 18 mL, Rfl: 1    cycloSPORINE (RESTASIS) 0.05 % ophthalmic emulsion, Place 1 drop into both eyes 2 (two) times daily., Disp: , Rfl:     DEXCOM G7 SENSOR Amy, Dexcom G7 sensors.  Use every 10 days as directed., Disp: 3 each, Rfl: 11    fezolinetant (VEOZAH) 45 mg Tab, Take 45 mg by mouth once daily., Disp: 30 tablet, Rfl: 2    FREESTYLE ABDULKADIR 2 SENSOR Kit, USE EVERY 14 DAYS AS DIRECTED, Disp: , Rfl:     ibuprofen (ADVIL,MOTRIN) 800 MG tablet, Take 800 mg by mouth every 6 (six) hours as needed., Disp: , Rfl:     insulin lispro 100 unit/mL injection, SMARTSI Unit(s) SUB-Q Daily, Disp: , Rfl:     linaCLOtide (LINZESS) 72 mcg Cap capsule, Patient states takes as needed, Disp: , Rfl:     lisinopriL 10 MG tablet, Take 10 mg by mouth once daily., Disp: , Rfl:     magnesium chloride (MAG 64) 64 mg TbEC, Take 1 tablet (64 mg total) by mouth 2 (two) times a day., Disp: 14 tablet, Rfl: 0    metFORMIN (GLUCOPHAGE) 1000 MG tablet, Take 1 tablet (1,000 mg total) by mouth 2 (two) times daily., Disp: 180 tablet, Rfl: 3    metoprolol tartrate (LOPRESSOR) 25 MG tablet, Take 1 tablet (25 mg total) by mouth 2 (two) times daily., Disp: 180 tablet, Rfl: 3    nitroGLYCERIN (NITROSTAT) 0.4 MG SL tablet, Place 1 tablet (0.4 mg total) under the tongue every 5 (five) minutes as needed for Chest pain., Disp: 25 tablet, Rfl: 6    OMNIPOD 5 INTRO,G6/GYRBE5ZNIL, Crtg, Inject into the skin., Disp: , Rfl:     OMNIPOD 5, G6/ABDULKADIR 2 PLUS, Crtg, Inject into the skin., Disp: , Rfl:     pantoprazole (PROTONIX) 40 MG tablet, Take 1 tablet (40 mg total) by mouth " every morning., Disp: 30 tablet, Rfl: 11    TRUE METRIX GLUCOSE TEST STRIP Strp, USE 1 STRIP TO CHECK GLUCOSE ONCE DAILY, Disp: 100 each, Rfl: 1    ammonium lactate 12 % Crea, SMARTSIG:sparingly Topical Twice Daily (Patient not taking: Reported on 4/9/2025), Disp: , Rfl:     FLUoxetine 20 MG capsule, Take 20 mg by mouth. (Patient not taking: Reported on 5/13/2025), Disp: , Rfl:     LANTUS SOLOSTAR U-100 INSULIN 100 unit/mL (3 mL) InPn pen, Inject 47 Units into the skin every morning. (Patient not taking: Reported on 5/13/2025), Disp: , Rfl:     MOUNJARO 2.5 mg/0.5 mL PnIj, , Disp: , Rfl:     nicotine (NICODERM CQ) 21 mg/24 hr, Place 1 patch onto the skin once daily. (Patient not taking: Reported on 3/17/2025), Disp: 14 patch, Rfl: 0    nicotine polacrilex 2 MG Lozg, Take 1 lozenge (2 mg total) by mouth as needed (in place of a cigarette). (Patient not taking: Reported on 3/17/2025), Disp: 72 lozenge, Rfl: 0  No current facility-administered medications for this visit.    Facility-Administered Medications Ordered in Other Visits:     0.9%  NaCl infusion, , Intravenous, Continuous, Marielos Bahena MD    albuterol-ipratropium 2.5 mg-0.5 mg/3 mL nebulizer solution 3 mL, 3 mL, Nebulization, Once PRN, Marisa Ribera MD    dextrose 10% bolus 125 mL 125 mL, 12.5 g, Intravenous, PRN, Katherine Bernal, ROBINP    dextrose 10% bolus 125 mL 125 mL, 12.5 g, Intravenous, PRN, Katherine Bernal, ANAHI    HYDROmorphone injection 0.2 mg, 0.2 mg, Intravenous, Q5 Min PRN, Marisa Ribera MD    HYDROmorphone injection 0.5 mg, 0.5 mg, Intravenous, Q5 Min PRN, Marisa Ribera MD    insulin aspart U-100 injection 2-9 Units, 2-9 Units, Subcutaneous, PRN, Katherine Bernal, ANAHI    insulin aspart U-100 injection 4-12 Units, 4-12 Units, Subcutaneous, PRN, Katherine Bernal FNP    lactated ringers infusion, , Intravenous, Continuous, Marisa Ribera MD, Last Rate: 125 mL/hr at 09/26/23 0856, Rate Change at  09/26/23 0856    LIDOcaine (PF) 10 mg/ml (1%) injection 10 mg, 1 mL, Intradermal, Once, Katherine Bernal, ANAHI    ondansetron injection 4 mg, 4 mg, Intravenous, Once, Marisa Ribera MD    oxyCODONE-acetaminophen 5-325 mg per tablet 2 tablet, 2 tablet, Oral, Once, Marisa Ribera MD    prochlorperazine injection Soln 5 mg, 5 mg, Intravenous, Once PRN, Marisa Ribera MD  [2]   Social History  Tobacco Use    Smoking status: Every Day     Current packs/day: 1.00     Average packs/day: 1 pack/day for 47.4 years (47.4 ttl pk-yrs)     Types: Cigarettes     Start date: 1978     Passive exposure: Current    Smokeless tobacco: Never   Substance Use Topics    Alcohol use: Not Currently    Drug use: Not Currently

## 2025-05-14 NOTE — PROGRESS NOTES
I have reviewed the notes, assessments, and/or procedures performed by Dr Medina, I concur with her/his documentation of Jessica Walker.  Date of Service: 5/13/2025    [facility]

## 2025-05-20 DIAGNOSIS — K76.0 FATTY METAMORPHOSIS OF LIVER: Primary | ICD-10-CM

## 2025-05-21 ENCOUNTER — TELEPHONE (OUTPATIENT)
Dept: FAMILY MEDICINE | Facility: CLINIC | Age: 60
End: 2025-05-21
Payer: MEDICAID

## 2025-05-21 ENCOUNTER — OFFICE VISIT (OUTPATIENT)
Dept: FAMILY MEDICINE | Facility: CLINIC | Age: 60
End: 2025-05-21
Payer: MEDICAID

## 2025-05-21 VITALS
RESPIRATION RATE: 20 BRPM | BODY MASS INDEX: 31.67 KG/M2 | SYSTOLIC BLOOD PRESSURE: 102 MMHG | OXYGEN SATURATION: 98 % | HEIGHT: 67 IN | DIASTOLIC BLOOD PRESSURE: 65 MMHG | HEART RATE: 68 BPM | TEMPERATURE: 98 F

## 2025-05-21 DIAGNOSIS — B07.9 VIRAL WARTS, UNSPECIFIED TYPE: Primary | ICD-10-CM

## 2025-05-21 PROCEDURE — 17110 DESTRUCTION B9 LES UP TO 14: CPT | Mod: PBBFAC | Performed by: FAMILY MEDICINE

## 2025-05-21 PROCEDURE — 99215 OFFICE O/P EST HI 40 MIN: CPT | Mod: PBBFAC

## 2025-05-21 NOTE — PROGRESS NOTES
"[facility] OFFICE VISIT NOTE  Jessica Walker  [id number]  05/21/2025      Chief Complaint: Follow-up (Warts on right hand on index finger. Did cryo previous but still is there )    Jessica Walker is a 59 y.o. female  presenting to minor procedure clinic for follow up viral wart of R index finger x1 and SK of forehead x1. Last seen 4/2025  s/p cryo. States the SK has resolved, however the wart is persistent. The wart has been present for many years. Desires further cryo    Review of System    As per HPI    Blood pressure 102/65, pulse 68, temperature 98.1 °F (36.7 °C), temperature source Oral, resp. rate 20, height 5' 7" (1.702 m), SpO2 98%.   Physical Exam  Constitutional:       General: She is not in acute distress.  Pulmonary:      Effort: Pulmonary effort is normal.   Skin:     Findings: Lesion present.      Comments: right index finger: ~ 2mm raised, verrucoid lesion w/o surrounding erythema    Neurological:      Mental Status: She is alert and oriented to person, place, and time.       Procedure: Cryotherapy of wart of R index finger   Supervised by: Dr Negrete  Consent: Risks and benefits of therapy discussed with patient who voices understanding and agrees with planned care. No barriers to communication or understanding identified. After obtaining informed consent, the patient's identity, procedure, and site were verified during a pause prior to proceeding with the minor surgical procedure as per universal protocol recommendations.     Liquid nitrogen used for cryotherapy. Tip held 1 cm from lesion and sprayed in freeze-thaw cycle.     Assessment:   1. Viral warts, unspecified type      Plan:  -Cryotherapy x 1 of R index finger performed, tolerated well  -Return to clinic in 1 month    Shalom Cohen MD  [facility] HO-3      "

## 2025-05-21 NOTE — TELEPHONE ENCOUNTER
Pt would like to speak with provider about Trulicity Rx. Pt states she spoke with you about it at her last appt, but Rx was not sent in.

## 2025-05-28 ENCOUNTER — PATIENT MESSAGE (OUTPATIENT)
Dept: ADMINISTRATIVE | Facility: OTHER | Age: 60
End: 2025-05-28
Payer: MEDICAID

## 2025-05-28 ENCOUNTER — OFFICE VISIT (OUTPATIENT)
Dept: HEMATOLOGY/ONCOLOGY | Facility: CLINIC | Age: 60
End: 2025-05-28
Payer: MEDICAID

## 2025-05-28 DIAGNOSIS — D75.839 THROMBOCYTOSIS: ICD-10-CM

## 2025-05-28 DIAGNOSIS — D50.9 IRON DEFICIENCY ANEMIA, UNSPECIFIED IRON DEFICIENCY ANEMIA TYPE: Primary | ICD-10-CM

## 2025-05-28 DIAGNOSIS — Z72.0 TOBACCO USE: ICD-10-CM

## 2025-05-28 PROCEDURE — 3061F NEG MICROALBUMINURIA REV: CPT | Mod: CPTII,95,,

## 2025-05-28 PROCEDURE — 1160F RVW MEDS BY RX/DR IN RCRD: CPT | Mod: CPTII,95,,

## 2025-05-28 PROCEDURE — 3066F NEPHROPATHY DOC TX: CPT | Mod: CPTII,95,,

## 2025-05-28 PROCEDURE — 3051F HG A1C>EQUAL 7.0%<8.0%: CPT | Mod: CPTII,95,,

## 2025-05-28 PROCEDURE — 4010F ACE/ARB THERAPY RXD/TAKEN: CPT | Mod: CPTII,95,,

## 2025-05-28 PROCEDURE — 1159F MED LIST DOCD IN RCRD: CPT | Mod: CPTII,95,,

## 2025-05-28 PROCEDURE — 98006 SYNCH AUDIO-VIDEO EST MOD 30: CPT | Mod: 95,,,

## 2025-05-28 RX ORDER — EPINEPHRINE 0.3 MG/.3ML
0.3 INJECTION SUBCUTANEOUS ONCE AS NEEDED
Status: CANCELLED | OUTPATIENT
Start: 2025-05-28

## 2025-05-28 RX ORDER — SODIUM CHLORIDE 0.9 % (FLUSH) 0.9 %
10 SYRINGE (ML) INJECTION
Status: CANCELLED | OUTPATIENT
Start: 2025-05-28

## 2025-05-28 RX ORDER — SODIUM FERRIC GLUCONATE COMPLEX IN SUCROSE 12.5 MG/ML
125 INJECTION INTRAVENOUS
Status: CANCELLED | OUTPATIENT
Start: 2025-05-28

## 2025-05-28 RX ORDER — HEPARIN 100 UNIT/ML
500 SYRINGE INTRAVENOUS
Status: CANCELLED | OUTPATIENT
Start: 2025-05-28

## 2025-05-28 NOTE — PROGRESS NOTES
The patient location is: [place]  The chief complaint leading to consultation is: Follow up visit regarding iron deficiency anemia    Visit type: audiovisual    Face to Face time with patient: 15 minutes   30 minutes of total time spent on the encounter, which includes face to face time and non-face to face time preparing to see the patient (eg, review of tests), Obtaining and/or reviewing separately obtained history, Documenting clinical information in the electronic or other health record, Independently interpreting results (not separately reported) and communicating results to the patient/family/caregiver, or Care coordination (not separately reported).         Each patient to whom he or she provides medical services by telemedicine is:  (1) informed of the relationship between the physician and patient and the respective role of any other health care provider with respect to management of the patient; and (2) notified that he or she may decline to receive medical services by telemedicine and may withdraw from such care at any time.    Notes:   Reason for Follow-up:  recurrent severe iron-deficiency anemia; no definite GI source of bleeding  -low risk essential thrombocytosis     Current Treatment:  ASA 81mg po daily     Treatment History:  -S/P Feraheme 510 mg IV x2 (01/11/2023, 01/18/2023)  -S/P Ferrlecit 125mg IV x 8  (12/22,1/5,1/19,1/26,2/2,2/21)  -S/P Venofer 250mg IV X 5 doses (4/19,4/26,5/3,6/27)  -S/P Venofer 250mg IV (7/11, 7/18, 7/25, in 8/1)  -S/P Venofer 250mg x 5 doses (3/12,3/19,3/26,4/2, and 4/9)     Past medical history: NIDDM.  Hypertension.  Dyslipidemia.  History of anemia.  Vitamin D deficiency.  Tobacco abuse.  Obesity.  Obstructive sleep apnea.  HFpEF. Leukocytosis.  Cholecystectomy.  Goiter surgery.  Social history: Single.  Lives in [place], [place].  Has 2 grown up sons.  Has been smoking 1-1/2 packs of cigarettes daily since age 10.  Has tried to quit smoking many times but  does not get refills of Chantix, therefore, has been unable to.  Currently, down to 10 cigarettes daily.  Denies history of alcohol or illicit drug abuse.  Family history: Sister  from cervical cancer at age 55.  Health maintenance:   EGD 2018 (Dr. Koo in [place]: Esophageal dilation).    Colonoscopy 2018 (Dr. Koo in [place]), apparently to be repeated in 2 years.    Mammogram 2018: Negative for malignancy.   ThinPrep cervical Pap smear 2016: Negative for intraepithelial lesion or malignancy; negative for HPV high-risk types (type 16 and type 18/45)  Menstrual and OB/GYN history: Menopause in .  For last 3 months or so, has experienced vaginal spotting.  Apparently, had Pap smear done at [facility] 2 months back (probably 2018), which was apparently unremarkable. Pelvic ultrasound on 2019 showed normal endometrium; likely myomatous changes of uterus with a lower uterine segment lipoma leiomyoma.         History of Present Illness:   59-year-old lady referred from [facility] for evaluation of leukocytosis.     For details, please see my last note dated 2020.  Please also refer to assessment and plan section.     Interval History 25:  Patient presents to the clinic for a virtual visit to follow up regarding iron-deficiency anemia.  She reports feeling more fatigued today.  She says she feels like she needs iron.  Denies any active bleeding or any bruising.  She admits to smoking 1 and a half packs of cigarettes per day.  Lab work was reviewed with the patient (stable).  All future appointments were discussed           Physical Examination:   Physical Exam was not completed today as this was a telemedicine visit.     Assessment:  Severe iron deficiency anemia:   Presented as moderate anemia with severe microcytosis    EGD, colonoscopy negative for any bleeders (2018)   Severe iron deficiency    Feraheme  (03/2019)   Hemoglobin dropped from 14.8 (06/2019) to 9.5 (08/28/2019) due to iron deficiency   Feraheme x2 (09/2019) --> improvement in iron stores and normalization of hemoglobin (9.5 --> 13.7)   CT enterogram (09/11/2019): Hiatal hernia; 1.7 cm left adrenal nodule   -03/10/2020: Remains iron deficient (transferrin saturation 13.2%, ferritin 19.2)   -Did not present for Feraheme shots in a timely manner   -04/27/2020: Ferritin 4.9 (down from 19.2 on 03/10/2020); however, hemoglobin 12.4, MCV 83.0   (Iron deficiency erythropoiesis)   -Feraheme 510 mg IV x2 (04/27/2020; 05/04/2020)   -06/01/2020: Iron deficiency resolved with parenteral iron therapy   -However, as of 06/01/2020, severe recurrent iron deficiency remains unexplained   -07/01/2020: Iron stores dropping again within a month; hemoglobin remains normal   -09/02/2020: Iron stores normal/stable (ferritin 21, transferrin saturation 15.8%); hemoglobin 14.8, normal   -09/08/2020: Feels better and more energetic than before   -11/12/2020: Iron deficient; hemoglobin 15.1  -08/04/2020:  [facility]:  Insurance denied capsule endoscopy.  Upper single balloon enteroscopy performed.  1. Duodenum biopsy:  Minimal focal acute inflammation with reactive changes; no celiac sprue  2. Duodenal bulb:  Biopsy showed no significant histopathologic findings; no evidence of celiac sprue  3. Stomach biopsy:  Chemical/reactive gastropathy; H pylori stain negative  -S/P Feraheme 510 mg IV x2 (05/17/2021, 05/24/2021)  -S/P Feraheme 510 mg IV x2 (07/09/2021, 07/19/2021)  -10/27/2021: Colonoscopy (iron-deficiency anemia):  Diffuse diverticulosis; otherwise unremarkable  -10/27/2021:  EGD with APC fulguration and biopsy (iron-deficiency anemia): unctate AVMs within the stomach.  A directed fulguration with APC with good result achieved, superficial ulcerations within the duodenal bulb, possibly secondary to daily aspirin use.  Biopsy taken in the antrum to rule out Helicobacter pylori by  pathology  -S/P Feraheme 510 mg IV x2 (08/29/2022, 09/08/2022)  -11/11/2022:  WBC 12.7.  Hemoglobin 12.8.  Platelets 543 K. ferritin 251.70 (was 93.95 on 08/29/2022, 24.88 on 06/29/2022) (transferrin saturation was 19% on 08/29/2022, 7% on 06/29/2022)  -01/11/2023: Labs reviewed.  Hemoglobin 11.2 (hemoglobin was 15.9 on 08/29/2022).  MCV 85.0.  Platelets 562 K, stable.  Ferritin 9.02, down from 251.7 on 11/11/2022.  Transferrin saturation is 6%, low.  CMP unremarkable except glucose 188 mg/dL.  -S/P Feraheme 510 mg IV x2 (01/11/2023, 01/18/2023)  -s/p IV iron in September and October 2024  To summarize:   -Recurrent severe iron deficiency anemia, unexplained   -EGD, colonoscopy negative (02/2018)   -CT enterogram negative (09/11/2019)   -has required multiple rounds of parenteral iron therapy over past 4-5 years  -no source of bleeding, H pylori gastritis, or celiac sprue on multiple GI endoscopic procedures including EGD, colonoscopy, CT enteroscopy, and upper single balloon enteroscopy   [ferritin level has dropped:  9.02 (01/11/2023), down from 251.7 (11/11/2022)]  [Hemoglobin: 11.2 on 01/11/2023; down from 15.9 on 08/29/2022)  -treated with Feraheme 510 mg IV x2 (01/11/2023, 01/18/2023)  -02/27/2023:  Hemoglobin 14.6, remarkably improved with Feraheme.  Transferrin saturation 17%, improved but remains low. Ferritin 55.55 (was 9.02 on 01/11/2023)  -09/21/2023: Hgb 11.6, iron 26, iron sat 9 %, TIBC 276, Ferritin 30.30  --repeat IV iron in September and October 2024  -12/30/24 ferritin 60, hGB 14.2, MCV 81.    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   -Recurrent severe iron deficiency anemia, unexplained, last treated in September and October with IV iron. Ferriin currently 60 and MCV is normal with HGB 14.2.  -EGD, colonoscopy negative (02/2018). She will be undergoing another EGD 12/19 per patient and subsequently scheduled for hernia repair.   -CT enterogram negative (09/11/2019)  -has required multiple rounds of parenteral iron therapy over past 4-5 years  -no source of bleeding, H pylori gastritis, or celiac sprue on multiple GI endoscopic procedures including EGD, colonoscopy, CT enteroscopy, and upper single balloon enteroscopy  >>>  [ferritin level has dropped:  9.02 (01/11/2023), down from 251.7 (11/11/2022)]  [Hemoglobin: 11.2 on 01/11/2023; down from 15.9 on 08/29/2022)  -treated with Feraheme 510 mg IV x2 (01/11/2023, 01/18/2023)  -02/27/2023:  Hemoglobin 14.6, remarkably improved with Feraheme.  Transferrin saturation 17%, improved but remains low. Ferritin 55.55 (was 9.02 on 01/11/2023)  -8/2/2023: Hgb 14.8, Hct 47.4, Plt 633, iron 40, iron sat 15% (improved)  -requiring multiple rounds of parenteral iron therapy in the past, essentially with negative exhaustive GI workup     Iron Deficiency Anemia:   - give Fereheme x2 doses-Hgb 14.7 Ferritin 20.40 Iron sat 7%  -RTC with  MD 6 weeks after last dose of iron with labs prior (CBC/CMP/TIBC/Iron Profile)     Low risk essential  thrombocytosis    -No history of Thrombosis. Age < 60  -Continue OTC ASA 81 mg tablet daily. Do not miss any doses.   -Advised her elevated platelets can be secondary to anemia as well.      Smoker:   - Instructed the patient on smoking cessation  -smokes 1.5 packs/day    Above discussed at length with her.  All questions answered.    She understands and agrees with this plan.     This service was not originating from a related E/M service provided within the previous 7 days nor will  to an E/M service or procedure within the next 24 hours or my soonest available appointment.  Prevailing standard of care was able to be met in this audio-only visit.

## 2025-05-29 ENCOUNTER — LAB VISIT (OUTPATIENT)
Dept: LAB | Facility: HOSPITAL | Age: 60
End: 2025-05-29
Attending: INTERNAL MEDICINE
Payer: MEDICAID

## 2025-05-29 DIAGNOSIS — Z79.4 ENCOUNTER FOR LONG-TERM (CURRENT) USE OF INSULIN: Primary | ICD-10-CM

## 2025-05-29 DIAGNOSIS — E11.65 INADEQUATELY CONTROLLED DIABETES MELLITUS: ICD-10-CM

## 2025-05-29 LAB — TSH SERPL-ACNC: 2.71 UIU/ML (ref 0.35–4.94)

## 2025-05-29 PROCEDURE — 84443 ASSAY THYROID STIM HORMONE: CPT

## 2025-05-29 PROCEDURE — 36415 COLL VENOUS BLD VENIPUNCTURE: CPT

## 2025-06-03 DIAGNOSIS — K21.9 GASTRO-ESOPHAGEAL REFLUX DISEASE WITHOUT ESOPHAGITIS: Primary | ICD-10-CM

## 2025-06-03 DIAGNOSIS — R10.13 EPIGASTRIC PAIN: ICD-10-CM

## 2025-06-03 RX ORDER — HEPARIN 100 UNIT/ML
500 SYRINGE INTRAVENOUS
OUTPATIENT
Start: 2025-06-03

## 2025-06-03 RX ORDER — EPINEPHRINE 0.3 MG/.3ML
0.3 INJECTION SUBCUTANEOUS ONCE AS NEEDED
OUTPATIENT
Start: 2025-06-03

## 2025-06-03 RX ORDER — SODIUM CHLORIDE 0.9 % (FLUSH) 0.9 %
10 SYRINGE (ML) INJECTION
OUTPATIENT
Start: 2025-06-03

## 2025-06-04 ENCOUNTER — TELEPHONE (OUTPATIENT)
Dept: GASTROENTEROLOGY | Facility: CLINIC | Age: 60
End: 2025-06-04
Payer: MEDICAID

## 2025-06-09 ENCOUNTER — CLINICAL SUPPORT (OUTPATIENT)
Dept: SMOKING CESSATION | Facility: CLINIC | Age: 60
End: 2025-06-09
Payer: COMMERCIAL

## 2025-06-09 ENCOUNTER — CLINICAL SUPPORT (OUTPATIENT)
Dept: GYNECOLOGY | Facility: CLINIC | Age: 60
End: 2025-06-09
Payer: MEDICAID

## 2025-06-09 DIAGNOSIS — N95.1 VASOMOTOR SYMPTOMS DUE TO MENOPAUSE: Primary | ICD-10-CM

## 2025-06-09 DIAGNOSIS — F17.200 NICOTINE DEPENDENCE, UNCOMPLICATED: Primary | ICD-10-CM

## 2025-06-09 PROCEDURE — 99999 PR PBB SHADOW E&M-EST. PATIENT-LVL I: CPT | Mod: PBBFAC,,, | Performed by: GENERAL PRACTICE

## 2025-06-09 PROCEDURE — 99407 BEHAV CHNG SMOKING > 10 MIN: CPT | Mod: S$PBB,,, | Performed by: GENERAL PRACTICE

## 2025-06-09 NOTE — PROGRESS NOTES
[facility] OB/GYN CLINIC NOTE  [facility]  [address]  [place] [address]  Phone: [phone]  Fax: [phone]    Subjective:     Jessica Walker is a 59 y.o.  who presents virtually for follow up Veozah. Seen 3 months ago and was started on Veozah.     States hot flashes are improved compared to before starting. Used to have 3-4 episodes now only 1-2. Still states trouble with sleep although she admits to poor sleep hygiene and drinking several cups of coffee a day. Discontinued fluoxetine a few months ago by her psychiatrist although she was not able to explain why.      PMH: SARAH requiring IV iron infusions, HTN, DM, and tobacco use. She denies a history of liver or kidney disease.      Family history: sister with estrogen positive breast cancer and Hashimoto's thyroiditis    Gyn History:  LMP: No LMP recorded. Patient is postmenopausal. LMP around .  Pap Hx: 3/2023 - NILM, HPV (-). Denies h/o abnormal pap smears.  Denies h/o STI.    MedHx:   Past Medical History:   Diagnosis Date    Abnormal Pap smear of cervix     Acute pancreatitis     Had surgery for pancreatitis in 1996    Amenorrhea     Anemia     Colon polyp     When I had colonoscopy, I had one small polyp they removed    Depression     Diabetes mellitus     Dieulafoy lesion of stomach 2023    Diverticulitis     I think they said when I had my first colonoscopy around 2019 that I have diverticulitis    Diverticulosis     I think they said when I had my first colonoscopy around 2019 that I have diverticulosis    Encounter for comprehensive diabetic foot examination, type 2 diabetes mellitus 2024    Essential thrombocytosis 01/10/2023    GERD (gastroesophageal reflux disease)     Had surgery for pancreatitis in 1996 and acid reflux since then    Hiatal hernia 2023    Hyperlipidemia LDL goal <70 2022    Hypertension     Iron deficiency 2022    JAK2 gene mutation 01/10/2023     Mild CAD 2022    Obesity (BMI 30-39.9) 2022    Overgrown toenails 2024    Personal history of colonic polyps 2022    PMB (postmenopausal bleeding)     Sleep apnea     No CPAP    Thrombocytosis 2022    Tobacco user 2022    Type 2 diabetes mellitus, with long-term current use of insulin 2022    Xerosis of skin 2024       SurgHx:   Past Surgical History:   Procedure Laterality Date    ANGIOGRAM, CORONARY, WITH LEFT HEART CATHETERIZATION N/A 2024    Procedure: Angiogram, Coronary, with Left Heart Cath;  Surgeon: Francesco Mensah MD;  Location: [facility] CATH LAB;  Service: Cardiology;  Laterality: N/A;    CHOLECYSTECTOMY      COLONOSCOPY  10/2021    DILATION AND CURETTAGE OF UTERUS      EGD, WITH CLOSED BIOPSY N/A 2024    Procedure: EGD, WITH CLOSED BIOPSY;  Surgeon: Ravindra Camejo MD;  Location: [facility];  Service: Endoscopy;  Laterality: N/A;  A) BX DUODENUM B) BX GASTRIC ANTRUM FOR H.PYLORI, C) BX GE JUNCTION @ 32CM. 8CM HIATAL HERNIA    ESOPHAGOGASTRODUODENOSCOPY N/A 2023    Procedure: EGD (ESOPHAGOGASTRODUODENOSCOPY);  Surgeon: Kita Larose MD;  Location: [facility] ENDOSCOPY;  Service: Gastroenterology;  Laterality: N/A;    EYE SURGERY  89    LK and RK    HYSTEROSCOPY WITH DILATION AND CURETTAGE OF UTERUS N/A 2023    Procedure: HYSTEROSCOPY, WITH DILATION AND CURETTAGE OF UTERUS;  Surgeon: Deepti Chappell MD;  Location: [facility] OR;  Service: OB/GYN;  Laterality: N/A;  myosure    INTRALUMINAL GASTROINTESTINAL TRACT IMAGING VIA CAPSULE N/A 2023    Procedure: IMAGING PROCEDURE, GI TRACT, INTRALUMINAL, VIA CAPSULE;  Surgeon: Kita Larose MD;  Location: [facility] ENDOSCOPY;  Service: Gastroenterology;  Laterality: N/A;    Removal of Goiter      UPPER GASTROINTESTINAL ENDOSCOPY         Medications:   Current Medications[1]      Family History   Problem Relation Name Age of Onset    Diabetes Mother Alise          from  unknown causes    Coronary artery disease Mother Alise     Alcohol abuse Mother Alise     Arthritis Mother Alise     Heart disease Mother Alise         Had 13 stents in her heart    Hyperlipidemia Mother Alise     Hypertension Mother Alise     Diabetes Father Joo         Still living    Hyperlipidemia Father Joo     Hypertension Father Joo     Cervical cancer Sister Chantale     Arthritis Sister Chantale     Breast cancer Sister Chantale     COPD Sister Chantale     Diabetes Sister Chantale     Depression Sister Chantale     Colon polyps Sister Chantale     Cancer Sister Chantale         Breast cancer survivor at 55    Fibromyalgia Sister Jemma  from cervical cancer at 55     Breast cancer Sister Jemma  from cervical cancer at 55     Cervical cancer Sister Jemma  from cervical cancer at 55 55    Early death Sister Jemma  from cervical cancer at 55     Other (cervical cancer) Sister Jemma  from cervical cancer at 55 55        Maternal half sister    Cancer Sister Jemma  from cervical cancer at 55          at 55 from cervical cancer    Skin cancer Sister Jemma  from cervical cancer at 55         my half sister Jemma  from cervical cancer at age 55    Miscarriages / Stillbirths Sister Ibis     Skin cancer Sister Ibis         My half sister Ibis had skin cancer    Cancer Sister Ibis         Has had skin cancer    Throat cancer Maternal Grandmother Marin at 64 64    Early death Maternal Grandmother Bushnell at 64     Cancer Maternal Grandmother Bushnell at 64          from lung cancer at 64    Diabetes Maternal Grandmother Marin at 64         Had diabetes but  from lung cancee    Leukemia Maternal Grandfather Raza     Cancer Maternal Grandfather Raza     Cancer Maternal Uncle Grzegorz          from cancer       Social Hx: Denies tobacco, alcohol and illicit drug usage.  Social History[2]    Review of Systems  Denies fevers, chills, headache, blurry vision, nausea,  vomiting, dizziness, or syncope.   Denies chest pain, shortness of breath, RUQ pain, or calf pain.    Objective:   There were no vitals filed for this visit.  There is no height or weight on file to calculate BMI.    Physical Exam:     General: alert and oriented, in no acute distress    Relevant Labs:   CMP , 3/18,  reviewed and WNL      Assessment/Plan:    Jessica Walker is a 59 y.o.  with hot flashes.    Vasomotor symptoms due to menopause  - CMP reviewed and wnl  - will order CMP in 3 months to monitor LFTs  - continue Veozah  - advised patient to call psychiatrist to consider restarting SSRI if able  - f/u 3 months for re-evaluation of symptoms.     Future Appointments   Date Time Provider Department Center   2025  1:00 PM [facility] MRI1 650 LB LIMIT [facility]   2025 11:00 AM INFUSION ROOM 534, [facility] CHEMOTHERAPY INFUSION [facility] CHEMO [place]   2025  9:30 AM SCHEDULE,  MINOR SURGERY [facility] FM RES [place]    2025  1:30 PM INFUSION ROOM 537, [facility] CHEMOTHERAPY INFUSION [facility] CHEMO [place]   2025  1:00 PM Morris Chavez, [facility]   2025 11:30 AM INFUSION ROOM 530, [facility] CHEMOTHERAPY INFUSION [facility] CHEMO [place]   2025 11:30 AM INFUSION ROOM 537, [facility] CHEMOTHERAPY INFUSION [facility] CHEMO [place]   2025 11:30 AM INFUSION ROOM 530, [facility] CHEMOTHERAPY INFUSION [facility] CHEMO [place]   2025  8:30 AM PROVIDERS, [facility] AMIN OPHTH [place]   2025 10:30 AM Lori Rodrigez MD [facility] CARD [place]   2025  8:10 AM Shabana Lucas FNP [facility] GYN [place]       Patient and plan were discussed with Dr. Chappell.    Jass Ferro MD  Family Medicine, [facility]         The patient location is: [place]  The chief complaint leading to consultation is: hot flashes f/u    Visit type: audiovisual    Face to Face time with patient:   30 minutes of total time spent on the encounter, which includes  "face to face time and non-face to face time preparing to see the patient (eg, review of tests), Obtaining and/or reviewing separately obtained history, Documenting clinical information in the electronic or other health record, Independently interpreting results (not separately reported) and communicating results to the patient/family/caregiver, or Care coordination (not separately reported).         Each patient to whom he or she provides medical services by telemedicine is:  (1) informed of the relationship between the physician and patient and the respective role of any other health care provider with respect to management of the patient; and (2) notified that he or she may decline to receive medical services by telemedicine and may withdraw from such care at any time.    Notes:                                [1]   Current Outpatient Medications:     ammonium lactate 12 % Crea, SMARTSIG:sparingly Topical Twice Daily (Patient not taking: Reported on 6/9/2025), Disp: , Rfl:     aspirin (ECOTRIN) 81 MG EC tablet, Take 81 mg by mouth every evening. Stopped sat 12-14-24, Disp: , Rfl:     atorvastatin (LIPITOR) 40 MG tablet, Take 1 tablet (40 mg total) by mouth every evening., Disp: 90 tablet, Rfl: 3    BD ULTRA-FINE SHORT PEN NEEDLE 31 gauge x 5/16" Ndle, Inject 1 each into the skin 3 (three) times daily., Disp: 100 each, Rfl: 1    blood-glucose meter,continuous (DEXCOM ) Misc, Dexcom G7 .  Use as directed with Dexcom G7 sensors., Disp: 1 each, Rfl: 0    carbamide peroxide (DEBROX) 6.5 % otic solution, Place 5 drops into the right ear 2 (two) times daily., Disp: 18 mL, Rfl: 1    cycloSPORINE (RESTASIS) 0.05 % ophthalmic emulsion, Place 1 drop into both eyes 2 (two) times daily., Disp: , Rfl:     DEXCOM G7 SENSOR Amy, Dexcom G7 sensors.  Use every 10 days as directed., Disp: 3 each, Rfl: 11    fezolinetant (VEOZAH) 45 mg Tab, Take 45 mg by mouth once daily., Disp: 30 tablet, Rfl: 2    FLUoxetine 20 MG " capsule, Take 20 mg by mouth. (Patient not taking: Reported on 2025), Disp: , Rfl:     FREESTYLE ABDULKADIR 2 SENSOR Kit, USE EVERY 14 DAYS AS DIRECTED, Disp: , Rfl:     ibuprofen (ADVIL,MOTRIN) 800 MG tablet, Take 800 mg by mouth every 6 (six) hours as needed., Disp: , Rfl:     insulin lispro 100 unit/mL injection, SMARTSI Unit(s) SUB-Q Daily, Disp: , Rfl:     LANTUS SOLOSTAR U-100 INSULIN 100 unit/mL (3 mL) InPn pen, Inject 47 Units into the skin every morning. (Patient not taking: Reported on 2025), Disp: , Rfl:     linaCLOtide (LINZESS) 72 mcg Cap capsule, Patient states takes as needed, Disp: , Rfl:     lisinopriL 10 MG tablet, Take 10 mg by mouth once daily., Disp: , Rfl:     magnesium chloride (MAG 64) 64 mg TbEC, Take 1 tablet (64 mg total) by mouth 2 (two) times a day., Disp: 14 tablet, Rfl: 0    metFORMIN (GLUCOPHAGE) 1000 MG tablet, Take 1 tablet (1,000 mg total) by mouth 2 (two) times daily., Disp: 180 tablet, Rfl: 3    metoprolol tartrate (LOPRESSOR) 25 MG tablet, Take 1 tablet (25 mg total) by mouth 2 (two) times daily., Disp: 180 tablet, Rfl: 3    MOUNJARO 2.5 mg/0.5 mL PnIj, , Disp: , Rfl:     nicotine (NICODERM CQ) 21 mg/24 hr, Place 1 patch onto the skin once daily. (Patient not taking: Reported on 2025), Disp: 14 patch, Rfl: 0    nicotine polacrilex 2 MG Lozg, Take 1 lozenge (2 mg total) by mouth as needed (in place of a cigarette). (Patient not taking: Reported on 2025), Disp: 72 lozenge, Rfl: 0    nitroGLYCERIN (NITROSTAT) 0.4 MG SL tablet, Place 1 tablet (0.4 mg total) under the tongue every 5 (five) minutes as needed for Chest pain., Disp: 25 tablet, Rfl: 6    OMNIPOD 5 INTRO,G6/JUCKE1ZXRZ, Crtg, Inject into the skin., Disp: , Rfl:     OMNIPOD 5, G6/ABDULKADIR 2 PLUS, Crtg, Inject into the skin., Disp: , Rfl:     pantoprazole (PROTONIX) 40 MG tablet, Take 1 tablet (40 mg total) by mouth every morning., Disp: 30 tablet, Rfl: 11    TRUE METRIX GLUCOSE TEST STRIP Strp, USE 1 STRIP TO  CHECK GLUCOSE ONCE DAILY, Disp: 100 each, Rfl: 1  No current facility-administered medications for this visit.    Facility-Administered Medications Ordered in Other Visits:     0.9%  NaCl infusion, , Intravenous, Continuous, Marielos Bahena MD    albuterol-ipratropium 2.5 mg-0.5 mg/3 mL nebulizer solution 3 mL, 3 mL, Nebulization, Once PRN, Marisa Ribera MD    dextrose 10% bolus 125 mL 125 mL, 12.5 g, Intravenous, PRN, Froylan Bernalmy S, FNP    dextrose 10% bolus 125 mL 125 mL, 12.5 g, Intravenous, PRN, Katherine Bernal S, FNP    HYDROmorphone injection 0.2 mg, 0.2 mg, Intravenous, Q5 Min PRN, Marisa Ribera MD    insulin aspart U-100 injection 2-9 Units, 2-9 Units, Subcutaneous, PRN, Katherine Bernal S, FNP    insulin aspart U-100 injection 4-12 Units, 4-12 Units, Subcutaneous, PRN, Katherine Bernal S, FNP    lactated ringers infusion, , Intravenous, Continuous, Marisa Ribera MD, Last Rate: 125 mL/hr at 09/26/23 0856, Rate Change at 09/26/23 0856    LIDOcaine (PF) 10 mg/ml (1%) injection 10 mg, 1 mL, Intradermal, Once, Katherine Bernal, ANAHI    ondansetron injection 4 mg, 4 mg, Intravenous, Once, Marisa Ribera MD    oxyCODONE-acetaminophen 5-325 mg per tablet 2 tablet, 2 tablet, Oral, Once, Marisa Ribera MD    prochlorperazine injection Soln 5 mg, 5 mg, Intravenous, Once PRN, Marisa Ribera MD  [2]   Social History  Socioeconomic History    Marital status: Single   Tobacco Use    Smoking status: Every Day     Current packs/day: 1.00     Average packs/day: 1 pack/day for 47.4 years (47.4 ttl pk-yrs)     Types: Cigarettes     Start date: 1978     Passive exposure: Current    Smokeless tobacco: Never   Substance and Sexual Activity    Alcohol use: Not Currently    Drug use: Not Currently    Sexual activity: Not Currently     Birth control/protection: None     Comment: Havent been sexually active since 2006     Social Drivers of Health     Financial Resource  Strain: Medium Risk (4/15/2025)    Overall Financial Resource Strain (CARDIA)     Difficulty of Paying Living Expenses: Somewhat hard   Food Insecurity: Food Insecurity Present (4/15/2025)    Hunger Vital Sign     Worried About Running Out of Food in the Last Year: Sometimes true     Ran Out of Food in the Last Year: Sometimes true   Transportation Needs: Unmet Transportation Needs (4/15/2025)    PRAPARE - Transportation     Lack of Transportation (Medical): Yes     Lack of Transportation (Non-Medical): Yes   Physical Activity: Inactive (4/15/2025)    Exercise Vital Sign     Days of Exercise per Week: 0 days     Minutes of Exercise per Session: 0 min   Stress: Stress Concern Present (4/15/2025)    [place] [employer] - Occupational Stress Questionnaire     Feeling of Stress : Very much   Housing Stability: Unknown (4/15/2025)    Housing Stability Vital Sign     Unable to Pay for Housing in the Last Year: Patient declined     Number of Times Moved in the Last Year: 0     Homeless in the Last Year: No

## 2025-06-10 ENCOUNTER — CLINICAL SUPPORT (OUTPATIENT)
Dept: SMOKING CESSATION | Facility: CLINIC | Age: 60
End: 2025-06-10
Payer: MEDICAID

## 2025-06-10 ENCOUNTER — TELEPHONE (OUTPATIENT)
Dept: FAMILY MEDICINE | Facility: CLINIC | Age: 60
End: 2025-06-10
Payer: MEDICAID

## 2025-06-10 DIAGNOSIS — Z00.00 WELLNESS EXAMINATION: Primary | ICD-10-CM

## 2025-06-10 DIAGNOSIS — I10 PRIMARY HYPERTENSION: ICD-10-CM

## 2025-06-10 DIAGNOSIS — E11.69 TYPE 2 DIABETES MELLITUS WITH OTHER SPECIFIED COMPLICATION, WITH LONG-TERM CURRENT USE OF INSULIN: ICD-10-CM

## 2025-06-10 DIAGNOSIS — Z79.4 TYPE 2 DIABETES MELLITUS WITH OTHER SPECIFIED COMPLICATION, WITH LONG-TERM CURRENT USE OF INSULIN: ICD-10-CM

## 2025-06-10 DIAGNOSIS — F17.200 NICOTINE DEPENDENCE: ICD-10-CM

## 2025-06-10 RX ORDER — DM/P-EPHED/ACETAMINOPH/DOXYLAM 30-7.5/3
2 LIQUID (ML) ORAL
Qty: 72 LOZENGE | Refills: 0 | Status: SHIPPED | OUTPATIENT
Start: 2025-06-10

## 2025-06-10 RX ORDER — IBUPROFEN 200 MG
1 TABLET ORAL DAILY
Qty: 28 PATCH | Refills: 0 | Status: SHIPPED | OUTPATIENT
Start: 2025-06-10

## 2025-06-11 ENCOUNTER — TELEPHONE (OUTPATIENT)
Dept: SMOKING CESSATION | Facility: CLINIC | Age: 60
End: 2025-06-11
Payer: MEDICAID

## 2025-06-11 ENCOUNTER — TELEPHONE (OUTPATIENT)
Dept: FAMILY MEDICINE | Facility: CLINIC | Age: 60
End: 2025-06-11
Payer: MEDICAID

## 2025-06-11 DIAGNOSIS — Z00.00 WELLNESS EXAMINATION: Primary | ICD-10-CM

## 2025-06-11 NOTE — PROGRESS NOTES
FTND score of 6 indicates a high dependence to Nicotine.  Patient is smoking 20 cpd.  SHAISTA-D score of 37 is perceived as significant mental distress and probable depression.  NRT patches and lozenges ordered at this time.  Instructions given for use.  Behavioral changes discussed at this visit.  Patient states that she does not want to smoke outside.  We discussed moving from bedroom to bathroom to smoke.

## 2025-06-13 ENCOUNTER — INFUSION (OUTPATIENT)
Dept: INFUSION THERAPY | Facility: HOSPITAL | Age: 60
End: 2025-06-13
Attending: INTERNAL MEDICINE
Payer: MEDICAID

## 2025-06-13 VITALS
HEART RATE: 98 BPM | BODY MASS INDEX: 31.63 KG/M2 | TEMPERATURE: 98 F | DIASTOLIC BLOOD PRESSURE: 79 MMHG | SYSTOLIC BLOOD PRESSURE: 106 MMHG | WEIGHT: 201.5 LBS | RESPIRATION RATE: 18 BRPM | HEIGHT: 67 IN | OXYGEN SATURATION: 97 %

## 2025-06-13 DIAGNOSIS — D50.9 IRON DEFICIENCY ANEMIA, UNSPECIFIED IRON DEFICIENCY ANEMIA TYPE: ICD-10-CM

## 2025-06-13 DIAGNOSIS — E61.1 IRON DEFICIENCY: Primary | ICD-10-CM

## 2025-06-13 PROCEDURE — 25000003 PHARM REV CODE 250

## 2025-06-13 PROCEDURE — 96374 THER/PROPH/DIAG INJ IV PUSH: CPT

## 2025-06-13 PROCEDURE — 63600175 PHARM REV CODE 636 W HCPCS

## 2025-06-13 RX ORDER — EPINEPHRINE 1 MG/ML
0.3 INJECTION INTRAMUSCULAR; INTRAVENOUS; SUBCUTANEOUS ONCE AS NEEDED
Status: DISCONTINUED | OUTPATIENT
Start: 2025-06-13 | End: 2025-06-13 | Stop reason: HOSPADM

## 2025-06-13 RX ORDER — EPINEPHRINE 0.3 MG/.3ML
0.3 INJECTION SUBCUTANEOUS ONCE AS NEEDED
OUTPATIENT
Start: 2025-06-20

## 2025-06-13 RX ORDER — SODIUM CHLORIDE 0.9 % (FLUSH) 0.9 %
10 SYRINGE (ML) INJECTION
OUTPATIENT
Start: 2025-06-20

## 2025-06-13 RX ORDER — HEPARIN 100 UNIT/ML
500 SYRINGE INTRAVENOUS
Status: DISCONTINUED | OUTPATIENT
Start: 2025-06-13 | End: 2025-06-13 | Stop reason: HOSPADM

## 2025-06-13 RX ORDER — SODIUM CHLORIDE 0.9 % (FLUSH) 0.9 %
10 SYRINGE (ML) INJECTION
Status: DISCONTINUED | OUTPATIENT
Start: 2025-06-13 | End: 2025-06-13 | Stop reason: HOSPADM

## 2025-06-13 RX ORDER — HEPARIN 100 UNIT/ML
500 SYRINGE INTRAVENOUS
OUTPATIENT
Start: 2025-06-20

## 2025-06-13 RX ADMIN — IRON SUCROSE 200 MG: 20 INJECTION, SOLUTION INTRAVENOUS at 11:06

## 2025-06-13 RX ADMIN — SODIUM CHLORIDE: 9 INJECTION, SOLUTION INTRAVENOUS at 11:06

## 2025-06-13 NOTE — NURSING
Pt ambulates with steady gait to room. C/o being tried all the time. Educated pt on #1 of 5 for Venofer.

## 2025-06-17 ENCOUNTER — TELEPHONE (OUTPATIENT)
Dept: FAMILY MEDICINE | Facility: CLINIC | Age: 60
End: 2025-06-17
Payer: MEDICAID

## 2025-06-17 NOTE — TELEPHONE ENCOUNTER
Copied from CRM #[id number]. Topic: General Inquiry - Patient Advice  >> Jun 17, 2025  3:45 PM Adriana wrote:  Who Called: Jessica Walker    Caller is requesting assistance/information from provider's office.    Symptoms (please be specific): n/a   How long has patient had these symptoms:  n/a  List of preferred pharmacies on file (remove unneeded): n/a      Preferred Method of Contact: Phone Call  Patient's Preferred Phone Number on File: [phone]   Best Call Back Number, if different:[phone]  Additional Information: Pt would like to know if she can drink water before lipid panel blood work.

## 2025-06-18 ENCOUNTER — OFFICE VISIT (OUTPATIENT)
Dept: FAMILY MEDICINE | Facility: CLINIC | Age: 60
End: 2025-06-18
Payer: MEDICAID

## 2025-06-18 ENCOUNTER — INFUSION (OUTPATIENT)
Dept: INFUSION THERAPY | Facility: HOSPITAL | Age: 60
End: 2025-06-18
Attending: INTERNAL MEDICINE
Payer: MEDICAID

## 2025-06-18 VITALS
TEMPERATURE: 99 F | HEIGHT: 67 IN | HEART RATE: 94 BPM | RESPIRATION RATE: 18 BRPM | SYSTOLIC BLOOD PRESSURE: 104 MMHG | WEIGHT: 202.38 LBS | TEMPERATURE: 98 F | OXYGEN SATURATION: 98 % | RESPIRATION RATE: 20 BRPM | HEART RATE: 88 BPM | DIASTOLIC BLOOD PRESSURE: 69 MMHG | BODY MASS INDEX: 31.8 KG/M2 | WEIGHT: 202.63 LBS | OXYGEN SATURATION: 99 % | BODY MASS INDEX: 31.7 KG/M2 | SYSTOLIC BLOOD PRESSURE: 127 MMHG | DIASTOLIC BLOOD PRESSURE: 75 MMHG

## 2025-06-18 DIAGNOSIS — D50.9 IRON DEFICIENCY ANEMIA, UNSPECIFIED IRON DEFICIENCY ANEMIA TYPE: Primary | ICD-10-CM

## 2025-06-18 DIAGNOSIS — B07.9 VIRAL WARTS, UNSPECIFIED TYPE: Primary | ICD-10-CM

## 2025-06-18 DIAGNOSIS — E78.5 HYPERLIPIDEMIA LDL GOAL <70: ICD-10-CM

## 2025-06-18 LAB
25(OH)D3+25(OH)D2 SERPL-MCNC: 39 NG/ML (ref 30–80)
ALBUMIN SERPL-MCNC: 3.7 G/DL (ref 3.5–5)
ALBUMIN/GLOB SERPL: 1.1 RATIO (ref 1.1–2)
ALP SERPL-CCNC: 102 UNIT/L (ref 40–150)
ALT SERPL-CCNC: 48 UNIT/L (ref 0–55)
ANION GAP SERPL CALC-SCNC: 8 MEQ/L
AST SERPL-CCNC: 44 UNIT/L (ref 11–45)
BASOPHILS # BLD AUTO: 0.11 X10(3)/MCL
BASOPHILS NFR BLD AUTO: 0.9 %
BILIRUB SERPL-MCNC: 0.3 MG/DL
BUN SERPL-MCNC: 10.4 MG/DL (ref 9.8–20.1)
CALCIUM SERPL-MCNC: 9.3 MG/DL (ref 8.4–10.2)
CHLORIDE SERPL-SCNC: 106 MMOL/L (ref 98–107)
CHOLEST SERPL-MCNC: 157 MG/DL
CHOLEST/HDLC SERPL: 3 {RATIO} (ref 0–5)
CO2 SERPL-SCNC: 23 MMOL/L (ref 22–29)
CREAT SERPL-MCNC: 0.71 MG/DL (ref 0.55–1.02)
CREAT/UREA NIT SERPL: 15
EOSINOPHIL # BLD AUTO: 0.52 X10(3)/MCL (ref 0–0.9)
EOSINOPHIL NFR BLD AUTO: 4.4 %
ERYTHROCYTE [DISTWIDTH] IN BLOOD BY AUTOMATED COUNT: 17.5 % (ref 11.5–17)
EST. AVERAGE GLUCOSE BLD GHB EST-MCNC: 180 MG/DL
GFR SERPLBLD CREATININE-BSD FMLA CKD-EPI: >60 ML/MIN/1.73/M2
GLOBULIN SER-MCNC: 3.4 GM/DL (ref 2.4–3.5)
GLUCOSE SERPL-MCNC: 184 MG/DL (ref 74–100)
HBA1C MFR BLD: 7.9 %
HCT VFR BLD AUTO: 41.7 % (ref 37–47)
HDLC SERPL-MCNC: 61 MG/DL (ref 35–60)
HGB BLD-MCNC: 13.4 G/DL (ref 12–16)
IMM GRANULOCYTES # BLD AUTO: 0.29 X10(3)/MCL (ref 0–0.04)
IMM GRANULOCYTES NFR BLD AUTO: 2.5 %
LDLC SERPL CALC-MCNC: 77 MG/DL (ref 50–140)
LYMPHOCYTES # BLD AUTO: 1.91 X10(3)/MCL (ref 0.6–4.6)
LYMPHOCYTES NFR BLD AUTO: 16.3 %
MCH RBC QN AUTO: 25.2 PG (ref 27–31)
MCHC RBC AUTO-ENTMCNC: 32.1 G/DL (ref 33–36)
MCV RBC AUTO: 78.5 FL (ref 80–94)
MONOCYTES # BLD AUTO: 1.07 X10(3)/MCL (ref 0.1–1.3)
MONOCYTES NFR BLD AUTO: 9.1 %
NEUTROPHILS # BLD AUTO: 7.81 X10(3)/MCL (ref 2.1–9.2)
NEUTROPHILS NFR BLD AUTO: 66.8 %
NRBC BLD AUTO-RTO: 0 %
PLATELET # BLD AUTO: 576 X10(3)/MCL (ref 130–400)
PMV BLD AUTO: 10.2 FL (ref 7.4–10.4)
POTASSIUM SERPL-SCNC: 4.1 MMOL/L (ref 3.5–5.1)
PROT SERPL-MCNC: 7.1 GM/DL (ref 6.4–8.3)
RBC # BLD AUTO: 5.31 X10(6)/MCL (ref 4.2–5.4)
SODIUM SERPL-SCNC: 137 MMOL/L (ref 136–145)
TRIGL SERPL-MCNC: 95 MG/DL (ref 37–140)
TSH SERPL-ACNC: 4.26 UIU/ML (ref 0.35–4.94)
VLDLC SERPL CALC-MCNC: 19 MG/DL
WBC # BLD AUTO: 11.71 X10(3)/MCL (ref 4.5–11.5)

## 2025-06-18 PROCEDURE — 84443 ASSAY THYROID STIM HORMONE: CPT

## 2025-06-18 PROCEDURE — 17110 DESTRUCTION B9 LES UP TO 14: CPT | Mod: PBBFAC | Performed by: FAMILY MEDICINE

## 2025-06-18 PROCEDURE — 96374 THER/PROPH/DIAG INJ IV PUSH: CPT

## 2025-06-18 PROCEDURE — 63600175 PHARM REV CODE 636 W HCPCS

## 2025-06-18 PROCEDURE — 80061 LIPID PANEL: CPT

## 2025-06-18 PROCEDURE — 82306 VITAMIN D 25 HYDROXY: CPT

## 2025-06-18 PROCEDURE — 80053 COMPREHEN METABOLIC PANEL: CPT

## 2025-06-18 PROCEDURE — 83036 HEMOGLOBIN GLYCOSYLATED A1C: CPT

## 2025-06-18 PROCEDURE — 85025 COMPLETE CBC W/AUTO DIFF WBC: CPT

## 2025-06-18 PROCEDURE — 99213 OFFICE O/P EST LOW 20 MIN: CPT | Mod: PBBFAC,25

## 2025-06-18 RX ORDER — EPINEPHRINE 1 MG/ML
0.3 INJECTION INTRAMUSCULAR; INTRAVENOUS; SUBCUTANEOUS ONCE AS NEEDED
Status: DISCONTINUED | OUTPATIENT
Start: 2025-06-18 | End: 2025-06-18 | Stop reason: HOSPADM

## 2025-06-18 RX ORDER — HEPARIN 100 UNIT/ML
500 SYRINGE INTRAVENOUS
Status: DISCONTINUED | OUTPATIENT
Start: 2025-06-18 | End: 2025-06-18 | Stop reason: HOSPADM

## 2025-06-18 RX ORDER — EPINEPHRINE 0.3 MG/.3ML
0.3 INJECTION SUBCUTANEOUS ONCE AS NEEDED
OUTPATIENT
Start: 2025-06-25

## 2025-06-18 RX ORDER — HEPARIN 100 UNIT/ML
500 SYRINGE INTRAVENOUS
OUTPATIENT
Start: 2025-06-25

## 2025-06-18 RX ORDER — SODIUM CHLORIDE 0.9 % (FLUSH) 0.9 %
10 SYRINGE (ML) INJECTION
Status: DISCONTINUED | OUTPATIENT
Start: 2025-06-18 | End: 2025-06-18 | Stop reason: HOSPADM

## 2025-06-18 RX ORDER — EPINEPHRINE 0.3 MG/.3ML
0.3 INJECTION SUBCUTANEOUS ONCE AS NEEDED
Status: CANCELLED | OUTPATIENT
Start: 2025-06-25

## 2025-06-18 RX ORDER — SODIUM CHLORIDE 0.9 % (FLUSH) 0.9 %
10 SYRINGE (ML) INJECTION
OUTPATIENT
Start: 2025-06-25

## 2025-06-18 RX ADMIN — IRON SUCROSE 200 MG: 20 INJECTION, SOLUTION INTRAVENOUS at 09:06

## 2025-06-18 NOTE — NURSING
Patient arrived ambulatory to infusion alert and oriented with no complaints at this time. Pt is here for #2/5 Venofer, pt tolerated tx well.  Pt dc'd home.      Tita Monae RN

## 2025-06-18 NOTE — PROGRESS NOTES
Subjective:       Patient ID: Jessica Walker is a 59 y.o. female.    Chief Complaint: Follow-up (Follow up on cryo on right finger)    HPI  58 yo in minor surgery for follow up of a wart on her right finger that has been treated with cryo. Pt reports no problems after cryo. One other wart has since resolved but this one on her right finger is still present and she desires additional treatment. Previous SK on her scalp have also resolved after cryo.     Review of Systems  As per HPI         Objective:      Vitals:    06/18/25 0940   BP: 104/69   Pulse: 94   Resp: 18   Temp: 98.6 °F (37 °C)       Physical Exam    Gen: alert, no acute distress  Skin: verrucoid lesion to right index finger  Psych: cooperative, appropriate mood and affect    Procedure Note:  Procedure: cryotherapy for wart  Performed by: Donna Negrete MD  Consent: signed consent obtained after discussion of risks, benefits, and alternative treatments. Time out completed  Description: Liquid nitrogen used for cryotherapy. Tip held 1 cm from lesion and sprayed in freeze-thaw cycle.   The patient tolerated the procedure well with no complications.       Assessment/Plan:  Viral warts, unspecified type    - cryo today  - Post care instructions and return precautions discussed.        Follow up in about 4 weeks (around 7/16/2025) for wart.

## 2025-06-19 ENCOUNTER — OFFICE VISIT (OUTPATIENT)
Dept: FAMILY MEDICINE | Facility: CLINIC | Age: 60
End: 2025-06-19
Payer: MEDICAID

## 2025-06-19 VITALS
OXYGEN SATURATION: 97 % | RESPIRATION RATE: 18 BRPM | BODY MASS INDEX: 31.63 KG/M2 | WEIGHT: 201.5 LBS | DIASTOLIC BLOOD PRESSURE: 73 MMHG | HEIGHT: 67 IN | TEMPERATURE: 98 F | HEART RATE: 90 BPM | SYSTOLIC BLOOD PRESSURE: 106 MMHG

## 2025-06-19 DIAGNOSIS — Z72.0 TOBACCO USER: ICD-10-CM

## 2025-06-19 DIAGNOSIS — Z79.4 TYPE 2 DIABETES MELLITUS WITH OTHER SPECIFIED COMPLICATION, WITH LONG-TERM CURRENT USE OF INSULIN: ICD-10-CM

## 2025-06-19 DIAGNOSIS — E11.69 TYPE 2 DIABETES MELLITUS WITH OTHER SPECIFIED COMPLICATION, WITH LONG-TERM CURRENT USE OF INSULIN: ICD-10-CM

## 2025-06-19 DIAGNOSIS — Z00.00 WELLNESS EXAMINATION: Primary | ICD-10-CM

## 2025-06-19 PROCEDURE — 3078F DIAST BP <80 MM HG: CPT | Mod: CPTII,,, | Performed by: NURSE PRACTITIONER

## 2025-06-19 PROCEDURE — 3066F NEPHROPATHY DOC TX: CPT | Mod: CPTII,,, | Performed by: NURSE PRACTITIONER

## 2025-06-19 PROCEDURE — 3074F SYST BP LT 130 MM HG: CPT | Mod: CPTII,,, | Performed by: NURSE PRACTITIONER

## 2025-06-19 PROCEDURE — 1160F RVW MEDS BY RX/DR IN RCRD: CPT | Mod: CPTII,,, | Performed by: NURSE PRACTITIONER

## 2025-06-19 PROCEDURE — 3061F NEG MICROALBUMINURIA REV: CPT | Mod: CPTII,,, | Performed by: NURSE PRACTITIONER

## 2025-06-19 PROCEDURE — 3051F HG A1C>EQUAL 7.0%<8.0%: CPT | Mod: CPTII,,, | Performed by: NURSE PRACTITIONER

## 2025-06-19 PROCEDURE — 99406 BEHAV CHNG SMOKING 3-10 MIN: CPT | Mod: ,,, | Performed by: NURSE PRACTITIONER

## 2025-06-19 PROCEDURE — 4010F ACE/ARB THERAPY RXD/TAKEN: CPT | Mod: CPTII,,, | Performed by: NURSE PRACTITIONER

## 2025-06-19 PROCEDURE — 99396 PREV VISIT EST AGE 40-64: CPT | Mod: 25,,, | Performed by: NURSE PRACTITIONER

## 2025-06-19 PROCEDURE — 1159F MED LIST DOCD IN RCRD: CPT | Mod: CPTII,,, | Performed by: NURSE PRACTITIONER

## 2025-06-19 PROCEDURE — 3008F BODY MASS INDEX DOCD: CPT | Mod: CPTII,,, | Performed by: NURSE PRACTITIONER

## 2025-06-19 NOTE — ASSESSMENT & PLAN NOTE
The patient was counseled on the dangers of tobacco use, and was advised to quit.  Reviewed strategies to maximize success, including written materials.     Patient was counseled regarding smoking for 3-10 minutes.     Orders:    CT Chest Lung Screening Low Dose; Future

## 2025-06-19 NOTE — PROGRESS NOTES
SUBJECTIVE:     History of Present Illness      Chief Complaint: Annual Exam    HPI:  Patient is a 59 y.o. year old female who presents to clinic for annual wellness and lab review.  Medical history includes diabetes, hypertension, iron-deficiency anemia patient follows up with Heme-Onc for iron.  Follows up with  endocrinologist is currently managed on insulin pump.  Patient is up-to-date with mammogram colon screening.  She is currently scheduled for CT of the abdomen tomorrow follows with Dr. Lucho Camejo     Review of Systems:    Review of Systems    12 point review of systems conducted, negative except as stated in the history of present illness. See HPI for details.     Previous History      PCP: Morris Chavez FNP  Review of patient's allergies indicates:   Allergen Reactions    Iodine Hives    Ivp dye [iodinated contrast media] Itching       Past Medical History:   Diagnosis Date    Abnormal Pap smear of cervix     Acute pancreatitis 1996    Had surgery for pancreatitis in June 1996    Amenorrhea 2023    Anemia     Colon polyp 2021    When I had colonoscopy, I had one small polyp they removed    Depression     Diabetes mellitus     Dieulafoy lesion of stomach 12/20/2023    Diverticulitis 2019    I think they said when I had my first colonoscopy around 2019 that I have diverticulitis    Diverticulosis 2019    I think they said when I had my first colonoscopy around 2019 that I have diverticulosis    Encounter for comprehensive diabetic foot examination, type 2 diabetes mellitus 05/29/2024    Essential thrombocytosis 01/10/2023    GERD (gastroesophageal reflux disease) 1996    Had surgery for pancreatitis in June 1996 and acid reflux since then    Hiatal hernia 12/20/2023    Hyperlipidemia LDL goal <70 05/17/2022    Hypertension     Iron deficiency 06/29/2022    JAK2 gene mutation 01/10/2023    Mild CAD 05/17/2022    Obesity (BMI 30-39.9) 05/17/2022    Overgrown toenails 05/29/2024    Personal  history of colonic polyps 2022    PMB (postmenopausal bleeding)     Sleep apnea     No CPAP    Thrombocytosis 2022    Tobacco user 2022    Type 2 diabetes mellitus, with long-term current use of insulin 2022    Xerosis of skin 2024       Past Surgical History:   Procedure Laterality Date    ANGIOGRAM, CORONARY, WITH LEFT HEART CATHETERIZATION N/A 2024    Procedure: Angiogram, Coronary, with Left Heart Cath;  Surgeon: Francesco Mensah MD;  Location: [facility] CATH LAB;  Service: Cardiology;  Laterality: N/A;    CHOLECYSTECTOMY      COLONOSCOPY  10/2021    DILATION AND CURETTAGE OF UTERUS      EGD, WITH CLOSED BIOPSY N/A 2024    Procedure: EGD, WITH CLOSED BIOPSY;  Surgeon: Ravindra Camejo MD;  Location: [facility];  Service: Endoscopy;  Laterality: N/A;  A) BX DUODENUM B) BX GASTRIC ANTRUM FOR H.PYLORI, C) BX GE JUNCTION @ 32CM. 8CM HIATAL HERNIA    ESOPHAGOGASTRODUODENOSCOPY N/A 2023    Procedure: EGD (ESOPHAGOGASTRODUODENOSCOPY);  Surgeon: Kita Larose MD;  Location: [facility] ENDOSCOPY;  Service: Gastroenterology;  Laterality: N/A;    EYE SURGERY  89    LK and RK    HYSTEROSCOPY WITH DILATION AND CURETTAGE OF UTERUS N/A 2023    Procedure: HYSTEROSCOPY, WITH DILATION AND CURETTAGE OF UTERUS;  Surgeon: Deepti Chappell MD;  Location: [facility] OR;  Service: OB/GYN;  Laterality: N/A;  myosure    INTRALUMINAL GASTROINTESTINAL TRACT IMAGING VIA CAPSULE N/A 2023    Procedure: IMAGING PROCEDURE, GI TRACT, INTRALUMINAL, VIA CAPSULE;  Surgeon: Kita Larose MD;  Location: [facility] ENDOSCOPY;  Service: Gastroenterology;  Laterality: N/A;    Removal of Goiter      UPPER GASTROINTESTINAL ENDOSCOPY       Family History   Problem Relation Name Age of Onset    Diabetes Mother Alise          from unknown causes    Coronary artery disease Mother Alise     Alcohol abuse Mother Alise     Arthritis Mother Alise     Heart disease Mother Alise          Had 13 stents in her heart    Hyperlipidemia Mother Alise     Hypertension Mother Alise     Diabetes Father Joo         Still living    Hyperlipidemia Father Joo     Hypertension Father Joo     Cervical cancer Sister Chantale     Arthritis Sister Chantale     Breast cancer Sister Chantale     COPD Sister Chantale     Diabetes Sister Chantale     Depression Sister Chantale     Colon polyps Sister Chantale     Cancer Sister Chantale         Breast cancer survivor at 55    Fibromyalgia Sister Jemma  from cervical cancer at 55     Breast cancer Sister Jemma  from cervical cancer at 55     Cervical cancer Sister Jemma  from cervical cancer at 55 55    Early death Sister Jemma  from cervical cancer at 55     Other (cervical cancer) Sister Jemma  from cervical cancer at 55 55        Maternal half sister    Cancer Sister Jemma  from cervical cancer at 55          at 55 from cervical cancer    Skin cancer Sister Jemma  from cervical cancer at 55         my half sister Jemma  from cervical cancer at age 55    Miscarriages / Stillbirths Sister Ibis     Skin cancer Sister Ibis         My half sister Ibis had skin cancer    Cancer Sister Ibis         Has had skin cancer    Throat cancer Maternal Grandmother Marin at 64 64    Early death Maternal Grandmother Marin at 64     Cancer Maternal Grandmother [place] at 64          from lung cancer at 64    Diabetes Maternal Grandmother Marin at 64         Had diabetes but  from lung cancee    Leukemia Maternal Grandfather Raza     Cancer Maternal Grandfather Raza     Cancer Maternal Uncle Grzegorz          from cancer       Social History[1]     Health Maintenance      Health Maintenance   Topic Date Due    Hepatitis C Screening  Never done    Foot Exam  Never done    LDCT Lung Screen  2017    COVID-19 Vaccine ( season) 2024    Cervical Cancer Screening  2028 (Originally 3/23/2026)    HIV Screening  2030  "(Originally 9/15/1980)    Diabetic Eye Exam  11/06/2025    Hemoglobin A1c  12/18/2025    Diabetes Urine Screening  03/18/2026    Mammogram  03/19/2026    Lipid Panel  06/18/2026    High Dose Statin  06/19/2026    TETANUS VACCINE  11/12/2030    Colorectal Cancer Screening  10/27/2031    RSV Vaccine (Age 60+ and Pregnant patients) (1 - 1-dose 75+ series) 09/15/2040    Shingles Vaccine  Completed    Influenza Vaccine  Completed    Pneumococcal Vaccines (Age 50+)  Completed       OBJECTIVE:     Physical Exam      Vital Signs Reviewed   Visit Vitals  /73 (BP Location: Left arm, Patient Position: Sitting)   Pulse 90   Temp 98 °F (36.7 °C) (Oral)   Resp 18   Ht 5' 7" (1.702 m)   Wt 91.4 kg (201 lb 8 oz)   SpO2 97%   BMI 31.56 kg/m²       Physical Exam    Physical Exam:  General: Alert, well nourished, no acute distress, non-toxic appearing.   Eyes: Anicteric sclera, without conjunctival injection, normal lids, no purulent drainage, EOMs grossly intact.   Ears: No tragal tenderness. Tympanic membranes intact, pearly grey, without effusion or erythema and with a positive light reflex.   Mouth: Posterior pharynx without erythema. No exudate, ulcerations, or lesion. No tonsillar swelling.   Neck: Supple, full ROM, no rigidity, no cervical adenopathy.   Cardio: Normal rate and rhythm    Resp: Respirations even and unlabored, clear to auscultation bilaterally.   Abd: No ecchymosis or distension. Normal bowel sounds in all 4 quadrants. No tenderness to palpation. No rebound tenderness or guarding. No CVA tenderness.   Skin: No rashes or open lesions noted.   MSK: No swelling. No abrasions or signs of trauma. Ambulating without assistance.   Neuro: Alert,oriented No focal deficits noted. Facial expressions even.   Psych: Cooperative, Normal affect      Labs   Chemistry:  Lab Results   Component Value Date     06/18/2025    K 4.1 06/18/2025    BUN 10.4 06/18/2025    CREATININE 0.71 06/18/2025    EGFRNORACEVR >60 " 06/18/2025    CALCIUM 9.3 06/18/2025    ALKPHOS 102 06/18/2025    ALBUMIN 3.7 06/18/2025    BILIDIR 0.1 04/29/2022    IBILI 0.20 04/29/2022    AST 44 06/18/2025    ALT 48 06/18/2025    MG 1.40 (L) 01/15/2025    SETIGGYY83QU 39 06/18/2025    TSH 4.263 06/18/2025    VEQZVC1VBQH 0.96 03/18/2025        Lab Results   Component Value Date    HGBA1C 7.9 (H) 06/18/2025        Hematology:  Lab Results   Component Value Date    WBC 11.71 (H) 06/18/2025    HGB 13.4 06/18/2025    HCT 41.7 06/18/2025     (H) 06/18/2025       Lipid Panel:  Lab Results   Component Value Date    CHOL 157 06/18/2025    HDL 61 (H) 06/18/2025    LDL 77.00 06/18/2025    TRIG 95 06/18/2025    TOTALCHOLEST 3 06/18/2025        Urine:  Lab Results   Component Value Date    APPEARANCEUA Clear 07/09/2024    SGUA 1.022 07/09/2024    PROTEINUA Trace (A) 07/09/2024    KETONESUA Trace (A) 07/09/2024    LEUKOCYTESUR 75 (A) 07/09/2024    RBCUA 0-5 07/09/2024    WBCUA 6-10 (A) 07/09/2024    BACTERIA Few (A) 07/09/2024    SQEPUA Trace (A) 07/09/2024    HYALINECASTS None Seen 07/09/2024    CREATRANDUR 66.4 03/18/2025    PROTEINURINE 14.3 07/09/2024    UPROTCREA 0.1 07/09/2024         Assessment         ICD-10-CM ICD-9-CM   1. Wellness examination  Z00.00 V70.0   2. Type 2 diabetes mellitus with other specified complication, with long-term current use of insulin  E11.69 250.80    Z79.4 V58.67   3. Tobacco user  Z72.0 305.1       Plan       Assessment & Plan  Wellness examination  Discussed labs and preventative screenings   Overall health status reviewed.    Significant chronic conditions addressed, including ongoing treatment plans.   Good health habits reinforced.    Cardiovascular disease risk factors discussed.   Appropriate recommendations and preventative care medical   information provided with annual wellness exams encouraged.  Vaccination status   Mammo- UTD  Cervical - UTD   ColonUTD       Orders:    Magnesium; Future    Type 2 diabetes mellitus with  "other specified complication, with long-term current use of insulin  Stable continue medications and follow up with endocrinology   Continue Current medication as prescribed.  Low carb ,low sugar diet, low fat .  Follow ADA, examine feet daily.   Recommend annual diabetic  eye exam  Discussed importance of wearing supportive diabetic shoes/socks.    encourage areobic exercise at least 30 mins a day/ 5 days a week   Hemoglobin A1c   Date Value Ref Range Status   06/18/2025 7.9 (H) <=7.0 % Final   03/18/2025 7.1 (H) <=7.0 % Final   12/17/2024 7.1 (H) <=7.0 % Final         Orders:    Ambulatory referral/consult to Podiatry; Future    Tobacco user  The patient was counseled on the dangers of tobacco use, and was advised to quit.  Reviewed strategies to maximize success, including written materials.     Patient was counseled regarding smoking for 3-10 minutes.     Orders:    CT Chest Lung Screening Low Dose; Future       Orders Placed This Encounter    CT Chest Lung Screening Low Dose    Magnesium    Ambulatory referral/consult to Podiatry      Medication List with Changes/Refills   Current Medications    ASPIRIN (ECOTRIN) 81 MG EC TABLET    Take 81 mg by mouth every evening. Stopped sat 12-14-24    ATORVASTATIN (LIPITOR) 40 MG TABLET    Take 1 tablet (40 mg total) by mouth every evening.    BD ULTRA-FINE SHORT PEN NEEDLE 31 GAUGE X 5/16" NDLE    Inject 1 each into the skin 3 (three) times daily.    BLOOD-GLUCOSE METER,CONTINUOUS (DEXCOM ) MISC    Dexcom G7 .  Use as directed with Dexcom G7 sensors.    CYCLOSPORINE (RESTASIS) 0.05 % OPHTHALMIC EMULSION    Place 1 drop into both eyes 2 (two) times daily.    DEXCOM G7 SENSOR MINI    Dexcom G7 sensors.  Use every 10 days as directed.    FEZOLINETANT (VEOZAH) 45 MG TAB    Take 45 mg by mouth once daily.    FREESTYLE ABDULKADIR 2 SENSOR KIT    USE EVERY 14 DAYS AS DIRECTED    IBUPROFEN (ADVIL,MOTRIN) 800 MG TABLET    Take 800 mg by mouth every 6 (six) hours as " needed.    INSULIN LISPRO 100 UNIT/ML INJECTION    SMARTSI Unit(s) SUB-Q Daily    LINACLOTIDE (LINZESS) 72 MCG CAP CAPSULE    Patient states takes as needed    LISINOPRIL 10 MG TABLET    Take 10 mg by mouth once daily.    MAGNESIUM CHLORIDE (MAG 64) 64 MG TBEC    Take 1 tablet (64 mg total) by mouth 2 (two) times a day.    METFORMIN (GLUCOPHAGE) 1000 MG TABLET    Take 1 tablet (1,000 mg total) by mouth 2 (two) times daily.    METOPROLOL TARTRATE (LOPRESSOR) 25 MG TABLET    Take 1 tablet (25 mg total) by mouth 2 (two) times daily.    NICOTINE POLACRILEX 2 MG LOZG    Take 1 lozenge (2 mg total) by mouth as needed (in place of a cigarette).    NITROGLYCERIN (NITROSTAT) 0.4 MG SL TABLET    Place 1 tablet (0.4 mg total) under the tongue every 5 (five) minutes as needed for Chest pain.    OMNIPOD 5 INTRO,G6/VHFBJ4PQTX, CRTG    Inject into the skin.    OMNIPOD 5, G6/ABDULKADIR 2 PLUS, CRTG    Inject into the skin.    PANTOPRAZOLE (PROTONIX) 40 MG TABLET    Take 1 tablet (40 mg total) by mouth every morning.    TRUE METRIX GLUCOSE TEST STRIP STRP    USE 1 STRIP TO CHECK GLUCOSE ONCE DAILY   Discontinued Medications    AMMONIUM LACTATE 12 % CREA    SMARTSIG:sparingly Topical Twice Daily    CARBAMIDE PEROXIDE (DEBROX) 6.5 % OTIC SOLUTION    Place 5 drops into the right ear 2 (two) times daily.    FLUOXETINE 20 MG CAPSULE    Take 20 mg by mouth.    LANTUS SOLOSTAR U-100 INSULIN 100 UNIT/ML (3 ML) INPN PEN    Inject 47 Units into the skin every morning.    MOUNJARO 2.5 MG/0.5 ML PNIJ        NICOTINE (NICODERM CQ) 21 MG/24 HR    Place 1 patch onto the skin once daily.         Follow up in about 6 months (around 2025), or if symptoms worsen or fail to improve, for 6 MONTH FOLLOW UP. In addition to their scheduled follow up, the patient has also been instructed to follow up on as needed basis.   Future Appointments   Date Time Provider Department Center   2025  3:30 PM Hugh Wright, RRT [facility]    6/25/2025 11:30 AM INFUSION ROOM 530, [facility] INFUSION [facility] [place] [facility]   7/2/2025 11:30 AM INFUSION ROOM 537, [facility] INFUSION [facility] [place]   7/9/2025 11:30 AM INFUSION ROOM 530, [facility] INFUSION [facility] [place] [facility]   7/23/2025  8:00 AM SCHEDULE, FM MINOR SURGERY [facility] FM RES [place]   7/23/2025 11:40 AM Beni Estrada MD [facility] HEMOMC [place]   7/23/2025 12:30 PM NURSE, [facility] HEMATOLOGY ONCOLOGY [facility] HEMOM [place]   9/10/2025  2:45 PM RESIDENTS, [facility] GYN [facility] GYN [place] [facility]   9/19/2025  8:30 AM PROVIDERS, ELOISA [facility]ON AMIN OPHTH [place] [facility]   11/12/2025 10:30 AM Lori Rodrigez MD [facility] CARD [place] [facility]   11/20/2025  8:10 AM Shabana Lucas FNP [facility] GYN [place]   12/17/2025  1:00 PM Morris Chavez FNP [facility]MED [facility]   6/24/2026  2:30 PM [facility]       ANAHI Curry                  [1]   Social History  Tobacco Use    Smoking status: Every Day     Current packs/day: 1.00     Average packs/day: 1 pack/day for 47.5 years (47.5 ttl pk-yrs)     Types: Cigarettes     Start date: 1978     Passive exposure: Current    Smokeless tobacco: Never   Substance Use Topics    Alcohol use: Not Currently    Drug use: Not Currently

## 2025-06-19 NOTE — ASSESSMENT & PLAN NOTE
Stable continue medications and follow up with endocrinology   Continue Current medication as prescribed.  Low carb ,low sugar diet, low fat .  Follow ADA, examine feet daily.   Recommend annual diabetic  eye exam  Discussed importance of wearing supportive diabetic shoes/socks.    encourage areobic exercise at least 30 mins a day/ 5 days a week   Hemoglobin A1c   Date Value Ref Range Status   06/18/2025 7.9 (H) <=7.0 % Final   03/18/2025 7.1 (H) <=7.0 % Final   12/17/2024 7.1 (H) <=7.0 % Final         Orders:    Ambulatory referral/consult to Podiatry; Future

## 2025-06-20 ENCOUNTER — HOSPITAL ENCOUNTER (OUTPATIENT)
Dept: RADIOLOGY | Facility: HOSPITAL | Age: 60
Discharge: HOME | End: 2025-06-20
Attending: SURGERY
Payer: MEDICAID

## 2025-06-20 DIAGNOSIS — K21.9 GASTRO-ESOPHAGEAL REFLUX DISEASE WITHOUT ESOPHAGITIS: ICD-10-CM

## 2025-06-20 DIAGNOSIS — R10.13 EPIGASTRIC PAIN: ICD-10-CM

## 2025-06-20 PROCEDURE — 74181 MRI ABDOMEN W/O CONTRAST: CPT | Mod: TC

## 2025-06-24 ENCOUNTER — CLINICAL SUPPORT (OUTPATIENT)
Dept: SMOKING CESSATION | Facility: CLINIC | Age: 60
End: 2025-06-24
Payer: COMMERCIAL

## 2025-06-24 ENCOUNTER — TELEPHONE (OUTPATIENT)
Dept: SMOKING CESSATION | Facility: CLINIC | Age: 60
End: 2025-06-24
Payer: MEDICAID

## 2025-06-24 DIAGNOSIS — F17.200 NICOTINE DEPENDENCE: Primary | ICD-10-CM

## 2025-06-24 PROCEDURE — 99404 PREV MED CNSL INDIV APPRX 60: CPT | Mod: 95,,, | Performed by: FAMILY MEDICINE

## 2025-06-24 NOTE — PROGRESS NOTES
Individual Follow-Up Form    6/24/2025    Quit Date: N/A    Clinical Status of Patient: Outpatient    Length of Service: 45 minutes    Continuing Medication: no    Other Medications: NRT mailed to patient per pharmacy      Target Symptoms: Withdrawal and medication side effects. The following were  rated moderate (3) to severe (4) on TCRS:  Moderate (3): urges and cravings all day.    Severe (4): urges and cravings all day.    Comments: completion of TCRS (Tobacco Cessation Rating Scale) reviewed strategies, cues, and triggers. Introduced the negative impact of tobacco on health, the health advantages of discontinuing the use of tobacco, time line improved health changes after a quit, withdrawal issues to expect from nicotine and habit, and ways to achieve the goal of a quit.  Patient states that she continues to smoke, but has not been counting cigarettes due to sleeping a lot.  Patient was smoking 20cpd, but thinks she is smoking less due to sleeping more.  Patient states she did not start NRT.  Patient was unsure if she received NRT.  Pharmacy was contacted and delivery receipt was emailed to patient.  NRT patch and lozenge instructions reviewed.  Insulin resistance and smoking was discussed.  We discussed leaving her room to smoke each cigarette.  We discussed leaving her cigarettes in the bathroom.  Patient discussed leaving a sticky note by her cigarettes and making a dottie each time she smokes.  We discussed waiting 30 minutes after waking from naps to smoke.  Patient remains on prescribed tobacco cessation medication regimen of 21 mg patch without any negative side effects at this time.  The patient will continue with virtual therapy sessions and medication monitoring by CTTS. Prescribed medication management will be by physician.      Diagnosis: F17.200    Next Visit: 1 week

## 2025-06-24 NOTE — TELEPHONE ENCOUNTER
Patient called and voicemail was left.  Patient notified on voicemail that her prescription was delivered on 6/13/25 at about 10:15.  My contact information was given.

## 2025-06-25 ENCOUNTER — INFUSION (OUTPATIENT)
Dept: INFUSION THERAPY | Facility: HOSPITAL | Age: 60
End: 2025-06-25
Attending: INTERNAL MEDICINE
Payer: MEDICAID

## 2025-06-25 ENCOUNTER — TELEPHONE (OUTPATIENT)
Dept: FAMILY MEDICINE | Facility: CLINIC | Age: 60
End: 2025-06-25
Payer: MEDICAID

## 2025-06-25 VITALS
HEIGHT: 67 IN | WEIGHT: 203.25 LBS | RESPIRATION RATE: 20 BRPM | OXYGEN SATURATION: 98 % | BODY MASS INDEX: 31.9 KG/M2 | SYSTOLIC BLOOD PRESSURE: 134 MMHG | HEART RATE: 96 BPM | TEMPERATURE: 98 F | DIASTOLIC BLOOD PRESSURE: 82 MMHG

## 2025-06-25 DIAGNOSIS — D50.9 IRON DEFICIENCY ANEMIA, UNSPECIFIED IRON DEFICIENCY ANEMIA TYPE: ICD-10-CM

## 2025-06-25 DIAGNOSIS — E61.1 IRON DEFICIENCY: Primary | ICD-10-CM

## 2025-06-25 PROCEDURE — 96374 THER/PROPH/DIAG INJ IV PUSH: CPT

## 2025-06-25 PROCEDURE — 63600175 PHARM REV CODE 636 W HCPCS

## 2025-06-25 RX ORDER — HEPARIN 100 UNIT/ML
500 SYRINGE INTRAVENOUS
Status: DISCONTINUED | OUTPATIENT
Start: 2025-06-25 | End: 2025-06-25 | Stop reason: HOSPADM

## 2025-06-25 RX ORDER — HEPARIN 100 UNIT/ML
500 SYRINGE INTRAVENOUS
OUTPATIENT
Start: 2025-07-02

## 2025-06-25 RX ORDER — SODIUM CHLORIDE 0.9 % (FLUSH) 0.9 %
10 SYRINGE (ML) INJECTION
OUTPATIENT
Start: 2025-07-02

## 2025-06-25 RX ORDER — EPINEPHRINE 0.3 MG/.3ML
0.3 INJECTION SUBCUTANEOUS ONCE AS NEEDED
OUTPATIENT
Start: 2025-07-02

## 2025-06-25 RX ORDER — EPINEPHRINE 0.3 MG/.3ML
0.3 INJECTION SUBCUTANEOUS ONCE AS NEEDED
Status: DISCONTINUED | OUTPATIENT
Start: 2025-06-25 | End: 2025-06-25 | Stop reason: HOSPADM

## 2025-06-25 RX ORDER — SODIUM CHLORIDE 0.9 % (FLUSH) 0.9 %
10 SYRINGE (ML) INJECTION
Status: DISCONTINUED | OUTPATIENT
Start: 2025-06-25 | End: 2025-06-25 | Stop reason: HOSPADM

## 2025-06-25 RX ADMIN — IRON SUCROSE 200 MG: 20 INJECTION, SOLUTION INTRAVENOUS at 12:06

## 2025-06-25 NOTE — TELEPHONE ENCOUNTER
Podiatrist referral was sent to Dr William Garcia , [phone], Due to Dr Aramis Ortiz doesn't accept her insurance

## 2025-07-01 ENCOUNTER — CLINICAL SUPPORT (OUTPATIENT)
Dept: SMOKING CESSATION | Facility: CLINIC | Age: 60
End: 2025-07-01
Payer: COMMERCIAL

## 2025-07-01 DIAGNOSIS — F17.200 NICOTINE DEPENDENCE: ICD-10-CM

## 2025-07-01 PROCEDURE — 99403 PREV MED CNSL INDIV APPRX 45: CPT | Mod: 95,,,

## 2025-07-01 RX ORDER — IBUPROFEN 200 MG
1 TABLET ORAL DAILY
Qty: 28 PATCH | Refills: 0 | Status: SHIPPED | OUTPATIENT
Start: 2025-07-01

## 2025-07-01 RX ORDER — DM/P-EPHED/ACETAMINOPH/DOXYLAM 30-7.5/3
2 LIQUID (ML) ORAL
Qty: 72 LOZENGE | Refills: 0 | Status: SHIPPED | OUTPATIENT
Start: 2025-07-01

## 2025-07-01 NOTE — PROGRESS NOTES
Individual Follow-Up Form    7/1/2025    Quit Date: N/A    Clinical Status of Patient: Outpatient    Length of Service: 45 minutes    Continuing Medication: yes  Patches or Nicotine Lozenges    Other Medications:      Target Symptoms: Withdrawal and medication side effects. The following were  rated moderate (3) to severe (4) on TCRS:  Moderate (3): urges and cravings all day  Severe (4): urges and cravings all day    Comments: completion of TCRS (Tobacco Cessation Rating Scale) reviewed strategies, cues, and triggers. Introduced the negative impact of tobacco on health, the health advantages of discontinuing the use of tobacco, time line improved health changes after a quit, withdrawal issues to expect from nicotine and habit, and ways to achieve the goal of a quit.  Patient states that she thinks she smoked 10 cigarettes yesterday, but is not diligently counting how much she smokes.  Patient states that she could not find her medication that was mailed to her.  Another email receipt of package deliver was sent to the patient.  NRT medications reordered.  Patient was given the pharmacy contact information.  Patient states that she has been trying not to smoke when she awakes from naps.  Patient states that she has not made an effort to leave her room to smoke.  We discussed the importance of behavioral change with NRT use.  Patient's motivation/mood was low during this visit.  Patient rescheduled our next visit for 7/15/25 hoping for more improvement after starting NRT.  My contact information was given to patient again.  Patient will begin the prescribed tobacco cessation medication regimen of 21 mg patch without any negative side effects at this time.  The patient will continue with virtual therapy sessions and medication monitoring by CTTS. Prescribed medication management will be by physician.     Diagnosis: F17.200    Next Visit: 2 weeks

## 2025-07-02 ENCOUNTER — INFUSION (OUTPATIENT)
Dept: INFUSION THERAPY | Facility: HOSPITAL | Age: 60
End: 2025-07-02
Attending: INTERNAL MEDICINE
Payer: MEDICAID

## 2025-07-02 VITALS
OXYGEN SATURATION: 98 % | HEIGHT: 67 IN | BODY MASS INDEX: 31.49 KG/M2 | TEMPERATURE: 98 F | SYSTOLIC BLOOD PRESSURE: 115 MMHG | RESPIRATION RATE: 18 BRPM | HEART RATE: 111 BPM | WEIGHT: 200.63 LBS | DIASTOLIC BLOOD PRESSURE: 79 MMHG

## 2025-07-02 DIAGNOSIS — E61.1 IRON DEFICIENCY: Primary | ICD-10-CM

## 2025-07-02 DIAGNOSIS — D50.9 IRON DEFICIENCY ANEMIA, UNSPECIFIED IRON DEFICIENCY ANEMIA TYPE: ICD-10-CM

## 2025-07-02 PROCEDURE — 63600175 PHARM REV CODE 636 W HCPCS

## 2025-07-02 PROCEDURE — 96374 THER/PROPH/DIAG INJ IV PUSH: CPT

## 2025-07-02 RX ORDER — EPINEPHRINE 0.3 MG/.3ML
0.3 INJECTION SUBCUTANEOUS ONCE AS NEEDED
OUTPATIENT
Start: 2025-07-09

## 2025-07-02 RX ORDER — HEPARIN 100 UNIT/ML
500 SYRINGE INTRAVENOUS
OUTPATIENT
Start: 2025-07-09

## 2025-07-02 RX ORDER — SODIUM CHLORIDE 0.9 % (FLUSH) 0.9 %
10 SYRINGE (ML) INJECTION
OUTPATIENT
Start: 2025-07-09

## 2025-07-02 RX ORDER — HEPARIN 100 UNIT/ML
500 SYRINGE INTRAVENOUS
Status: DISCONTINUED | OUTPATIENT
Start: 2025-07-02 | End: 2025-07-02 | Stop reason: HOSPADM

## 2025-07-02 RX ORDER — SODIUM CHLORIDE 0.9 % (FLUSH) 0.9 %
10 SYRINGE (ML) INJECTION
Status: DISCONTINUED | OUTPATIENT
Start: 2025-07-02 | End: 2025-07-02 | Stop reason: HOSPADM

## 2025-07-02 RX ADMIN — IRON SUCROSE 200 MG: 20 INJECTION, SOLUTION INTRAVENOUS at 11:07

## 2025-07-02 NOTE — NURSING
Pt ambulates with steady gait, alert & oriented x4. C/o being tired as normal. Educated pt on tx #4/5 of venofer. Tolerated tx well.

## 2025-07-09 ENCOUNTER — DOCUMENTATION ONLY (OUTPATIENT)
Dept: INFUSION THERAPY | Facility: HOSPITAL | Age: 60
End: 2025-07-09
Payer: MEDICAID

## 2025-07-11 ENCOUNTER — HOSPITAL ENCOUNTER (OUTPATIENT)
Dept: RADIOLOGY | Facility: HOSPITAL | Age: 60
Discharge: HOME | End: 2025-07-11
Attending: NURSE PRACTITIONER
Payer: MEDICAID

## 2025-07-11 DIAGNOSIS — Z87.891 PERSONAL HISTORY OF SMOKING: ICD-10-CM

## 2025-07-11 PROCEDURE — 71271 CT THORAX LUNG CANCER SCR C-: CPT | Mod: TC

## 2025-07-14 ENCOUNTER — TELEPHONE (OUTPATIENT)
Dept: FAMILY MEDICINE | Facility: CLINIC | Age: 60
End: 2025-07-14
Payer: MEDICAID

## 2025-07-15 ENCOUNTER — CLINICAL SUPPORT (OUTPATIENT)
Dept: SMOKING CESSATION | Facility: CLINIC | Age: 60
End: 2025-07-15
Payer: COMMERCIAL

## 2025-07-15 DIAGNOSIS — F17.200 NICOTINE DEPENDENCE: Primary | ICD-10-CM

## 2025-07-15 PROCEDURE — 99404 PREV MED CNSL INDIV APPRX 60: CPT | Mod: 95,,,

## 2025-07-15 NOTE — PROGRESS NOTES
Individual Follow-Up Form    7/15/2025    Quit Date: N/A    Clinical Status of Patient: Outpatient    Length of Service: 60 minutes    Continuing Medication: yes  Patches    Other Medications: NRT lozenges available     Target Symptoms: Withdrawal and medication side effects. The following were  rated moderate (3) to severe (4) on TCRS:  Moderate (3): urges and craving the throughout the day.  Severe (4): urges and cravings while awake.    Comments: completion of TCRS (Tobacco Cessation Rating Scale) reviewed strategies, controlling environment, cues, triggers, new goals set.  Introduced high risk situations with preparation interventions, withdrawal issues of habit and nicotine, Understanding urges, cravings, stress and relaxation. Open discussion with intervention discussion.  Patient states that she thinks she has smoked 5 cigarettes today, but was unsure.  Patient states that she has not made an attempt to count how much she smokes.  We discussed the importance of paying attention to her smoking habit.  Patient states that she started the NRT patch yesterday and feels it is helping to control urges.  Patient states that she was able to refrain from smoking earlier when her partner lighted a cigarette in front of her.  We discussed not smoking any cigarettes with her partner.  Patient continues to refuse smoking outside or anywhere else other than her bedroom.  We discussed the benefit of creating healthy barriers to her habit.  Patient states that she doesn't feel well, has no set schedule, and sleeps on and off all day.  We discussed options to help her remember to wear the NRT patch daily including setting a clock/phone alarm or placing her patches by her night time pills.  Patient states she has not tried her NRT lozenges and will look for them after our appointment.  Patient was unwilling to set any goals for smoking reduction or behavioral change.  Patient's cigarettes were next to her during our  appointment and we discussed leaving them in the kitchen so she would have to get up to get a cigarette.  Patient discussed that previously she was able to lay out a set number of cigarettes each day to help her wean her smoking.  Patient remains on prescribed tobacco cessation medication regimen of 21 mg patch without any negative side effects at this time.  The patient will continue with virtual therapy sessions and medication monitoring by CTTS. Prescribed medication management will be by physician.      Diagnosis: F17.200    Next Visit: 2 weeks

## 2025-07-16 ENCOUNTER — INFUSION (OUTPATIENT)
Dept: INFUSION THERAPY | Facility: HOSPITAL | Age: 60
End: 2025-07-16
Attending: INTERNAL MEDICINE
Payer: MEDICAID

## 2025-07-16 VITALS
SYSTOLIC BLOOD PRESSURE: 156 MMHG | HEIGHT: 67 IN | OXYGEN SATURATION: 95 % | DIASTOLIC BLOOD PRESSURE: 71 MMHG | WEIGHT: 200.38 LBS | HEART RATE: 119 BPM | RESPIRATION RATE: 18 BRPM | BODY MASS INDEX: 31.45 KG/M2 | TEMPERATURE: 98 F

## 2025-07-16 DIAGNOSIS — D50.9 IRON DEFICIENCY ANEMIA, UNSPECIFIED IRON DEFICIENCY ANEMIA TYPE: ICD-10-CM

## 2025-07-16 DIAGNOSIS — E61.1 IRON DEFICIENCY: Primary | ICD-10-CM

## 2025-07-16 PROCEDURE — 96374 THER/PROPH/DIAG INJ IV PUSH: CPT

## 2025-07-16 PROCEDURE — 63600175 PHARM REV CODE 636 W HCPCS

## 2025-07-16 RX ORDER — EPINEPHRINE 0.3 MG/.3ML
0.3 INJECTION SUBCUTANEOUS ONCE AS NEEDED
OUTPATIENT
Start: 2025-07-16

## 2025-07-16 RX ORDER — SODIUM CHLORIDE 0.9 % (FLUSH) 0.9 %
10 SYRINGE (ML) INJECTION
OUTPATIENT
Start: 2025-07-16

## 2025-07-16 RX ORDER — SODIUM CHLORIDE 0.9 % (FLUSH) 0.9 %
10 SYRINGE (ML) INJECTION
Status: DISCONTINUED | OUTPATIENT
Start: 2025-07-16 | End: 2025-07-16 | Stop reason: HOSPADM

## 2025-07-16 RX ORDER — HEPARIN 100 UNIT/ML
500 SYRINGE INTRAVENOUS
OUTPATIENT
Start: 2025-07-16

## 2025-07-16 RX ADMIN — IRON SUCROSE 200 MG: 20 INJECTION, SOLUTION INTRAVENOUS at 11:07

## 2025-07-22 DIAGNOSIS — D50.9 IRON DEFICIENCY ANEMIA, UNSPECIFIED IRON DEFICIENCY ANEMIA TYPE: Primary | ICD-10-CM

## 2025-07-22 NOTE — PROGRESS NOTES
History:  Past Medical History:   Diagnosis Date    Abnormal Pap smear of cervix     Acute pancreatitis 1996    Had surgery for pancreatitis in June 1996    Amenorrhea 2023    Anemia     Colon polyp 2021    When I had colonoscopy, I had one small polyp they removed    Depression     Diabetes mellitus     Dieulafoy lesion of stomach 12/20/2023    Diverticulitis 2019    I think they said when I had my first colonoscopy around 2019 that I have diverticulitis    Diverticulosis 2019    I think they said when I had my first colonoscopy around 2019 that I have diverticulosis    Encounter for comprehensive diabetic foot examination, type 2 diabetes mellitus 05/29/2024    Essential thrombocytosis 01/10/2023    GERD (gastroesophageal reflux disease) 1996    Had surgery for pancreatitis in June 1996 and acid reflux since then    Hiatal hernia 12/20/2023    Hyperlipidemia LDL goal <70 05/17/2022    Hypertension     Iron deficiency 06/29/2022    JAK2 gene mutation 01/10/2023    Mild CAD 05/17/2022    Obesity (BMI 30-39.9) 05/17/2022    Overgrown toenails 05/29/2024    Personal history of colonic polyps 11/29/2022    PMB (postmenopausal bleeding)     Sleep apnea     No CPAP    Thrombocytosis 06/29/2022    Tobacco user 05/17/2022    Type 2 diabetes mellitus, with long-term current use of insulin 05/17/2022    Xerosis of skin 05/29/2024       Past Surgical History:   Procedure Laterality Date    ANGIOGRAM, CORONARY, WITH LEFT HEART CATHETERIZATION N/A 09/23/2024    Procedure: Angiogram, Coronary, with Left Heart Cath;  Surgeon: Francesco Mensah MD;  Location: [facility] CATH LAB;  Service: Cardiology;  Laterality: N/A;    CHOLECYSTECTOMY      COLONOSCOPY  10/2021    DILATION AND CURETTAGE OF UTERUS  2023    EGD, WITH CLOSED BIOPSY N/A 12/19/2024    Procedure: EGD, WITH CLOSED BIOPSY;  Surgeon: Ravindra Camejo MD;  Location: [facility];  Service: Endoscopy;  Laterality: N/A;  A) BX DUODENUM B) BX GASTRIC ANTRUM FOR H.PYLORI, C) BX GE  JUNCTION @ 32CM. 8CM HIATAL HERNIA    ESOPHAGOGASTRODUODENOSCOPY N/A 06/26/2023    Procedure: EGD (ESOPHAGOGASTRODUODENOSCOPY);  Surgeon: Kita Larose MD;  Location: [facility] ENDOSCOPY;  Service: Gastroenterology;  Laterality: N/A;    EYE SURGERY  7/6/93    LK and RK    HYSTEROSCOPY WITH DILATION AND CURETTAGE OF UTERUS N/A 09/26/2023    Procedure: HYSTEROSCOPY, WITH DILATION AND CURETTAGE OF UTERUS;  Surgeon: Deepti Chappell MD;  Location: [facility] OR;  Service: OB/GYN;  Laterality: N/A;  myosure    INTRALUMINAL GASTROINTESTINAL TRACT IMAGING VIA CAPSULE N/A 06/29/2023    Procedure: IMAGING PROCEDURE, GI TRACT, INTRALUMINAL, VIA CAPSULE;  Surgeon: Kiat Larose MD;  Location: [facility] ENDOSCOPY;  Service: Gastroenterology;  Laterality: N/A;    Removal of Goiter      UPPER GASTROINTESTINAL ENDOSCOPY        Social History     Socioeconomic History    Marital status: Single   Tobacco Use    Smoking status: Every Day     Current packs/day: 1.00     Average packs/day: 1 pack/day for 47.6 years (47.6 ttl pk-yrs)     Types: Cigarettes     Start date: 1978     Passive exposure: Current    Smokeless tobacco: Never   Substance and Sexual Activity    Alcohol use: Not Currently    Drug use: Not Currently    Sexual activity: Not Currently     Birth control/protection: None     Comment: Havent been sexually active since 2006     Social Drivers of Health     Financial Resource Strain: Medium Risk (4/15/2025)    Overall Financial Resource Strain (CARDIA)     Difficulty of Paying Living Expenses: Somewhat hard   Food Insecurity: Food Insecurity Present (4/15/2025)    Hunger Vital Sign     Worried About Running Out of Food in the Last Year: Sometimes true     Ran Out of Food in the Last Year: Sometimes true   Transportation Needs: Unmet Transportation Needs (4/15/2025)    PRAPARE - Transportation     Lack of Transportation (Medical): Yes     Lack of Transportation (Non-Medical): Yes   Physical Activity: Inactive (4/15/2025)     Exercise Vital Sign     Days of Exercise per Week: 0 days     Minutes of Exercise per Session: 0 min   Stress: Stress Concern Present (4/15/2025)    [employer] - Occupational Stress Questionnaire     Feeling of Stress : Very much   Housing Stability: Unknown (4/15/2025)    Housing Stability Vital Sign     Unable to Pay for Housing in the Last Year: Patient declined     Number of Times Moved in the Last Year: 0     Homeless in the Last Year: No      Family History   Problem Relation Name Age of Onset    Diabetes Mother Alise          from unknown causes    Coronary artery disease Mother Alise     Alcohol abuse Mother Alise     Arthritis Mother Alise     Heart disease Mother Alise         Had 13 stents in her heart    Hyperlipidemia Mother Alise     Hypertension Mother Alise     Diabetes Father Joo         Still living    Hyperlipidemia Father Joo     Hypertension Father Joo     Cervical cancer Sister Chantale     Arthritis Sister Chantale     Breast cancer Sister Chantale     COPD Sister Chantale     Diabetes Sister Chantale     Depression Sister Chantale     Colon polyps Sister Chantale     Cancer Sister Chantale         Breast cancer survivor at 55    Fibromyalgia Sister Jemma  from cervical cancer at 55     Breast cancer Sister Jemma  from cervical cancer at 55     Cervical cancer Sister Jemma  from cervical cancer at 55 55    Early death Sister Jemma  from cervical cancer at 55     Other (cervical cancer) Sister Jemma  from cervical cancer at 55 55        Maternal half sister    Cancer Sister Jemma  from cervical cancer at 55          at 55 from cervical cancer    Skin cancer Sister Jemma  from cervical cancer at 55         my half sister Jemma  from cervical cancer at age 55    Miscarriages / Stillbirths Sister Ibis     Skin cancer Sister Ibis         My half sister Ibis had skin cancer    Cancer Sister Ibis         Has had skin  cancer    Throat cancer Maternal Grandmother Lyman at 64 64    Early death Maternal Grandmother Lyman at 64     Cancer Maternal Grandmother Marin at 64          from lung cancer at 64    Diabetes Maternal Grandmother Marin at 64         Had diabetes but  from lung cancee    Leukemia Maternal Grandfather Raza     Cancer Maternal Grandfather Raza     Cancer Maternal Uncle Grzegorz          from cancer      Reason for Follow-up:  -recurrent severe iron-deficiency anemia; no definite GI source of bleeding  -low risk essential thrombocytosis    History of Present Illness:   Iron deficiency anemia      Oncologic/Hematologic History:  Oncology History    No history exists.   Past medical history: NIDDM.  Hypertension.  Dyslipidemia.  History of anemia.  Vitamin D deficiency.  Tobacco abuse.  Obesity.  Obstructive sleep apnea.  HFpEF. Leukocytosis.  Cholecystectomy.  Goiter surgery.  Social history: Single.  Lives in [place], [place].  Has 2 grown up sons.  Has been smoking 1-1/2 packs of cigarettes daily since age 10.  Has tried to quit smoking many times but does not get refills of Chantix, therefore, has been unable to.  Currently, down to 10 cigarettes daily.  Denies history of alcohol or illicit drug abuse.  Family history: Sister  from cervical cancer at age 55.  Health maintenance:   EGD 2018 (Dr. Koo in [place]: Esophageal dilation).    Colonoscopy 2018 (Dr. Koo in [place]), apparently to be repeated in 2 years.    Mammogram 2018: Negative for malignancy.   ThinPrep cervical Pap smear 2016: Negative for intraepithelial lesion or malignancy; negative for HPV high-risk types (type 16 and type 18/45)  Menstrual and OB/GYN history: Menopause in .  For last 3 months or so, has experienced vaginal spotting.  Apparently, had Pap smear done at [facility] 2 months back (probably 2018), which was apparently unremarkable. Pelvic  ultrasound on 02/08/2019 showed normal endometrium; likely myomatous changes of uterus with a lower uterine segment lipoma leiomyoma.       53-year-old lady referred from [facility] for evaluation of leukocytosis.    For details, please see my last note dated 09/03/2020.  Please also refer to assessment and plan section.    02/22/2019:  Presents for initial hematology consultation.  Has been feeling weak and tired for last 10 years, without any recent worsening.  Menopause in 2015.  Vaginal spotting for last 3 months, apparently with negative Pap smear, and on ultrasound of pelvis, normal endometrium but uterine leiomyomata.  No repeated fevers or infections.  No unintentional weight loss.  No unusual headaches, loss of consciousness, seizures, strokelike symptoms.  No unusual cough, hemoptysis, bleeding in any other form, or severe exertional dyspnea.  No abdominal pain, nausea, or vomiting.  No hematemesis, melena, or hematochezia.  Appetite is variable but more or less stable.  No urinary problems.    Interval History:  OP IV IRON THERAPY   [No matching plan found]     02/27/2023:   -S/P Feraheme 510 mg IV x2 (01/11/2023, 01/18/2023)  -02/01/2023:  Bilateral screening mammogram (comparison:  01/07/2022 mammogram): Both breasts negative (BI-RADS 1)  -no showed 02/22/2023  -02/27/2022:  Hemoglobin 14.6 (was 11.2 on 01/11/2023, 12.8 on 11/11/2022).  MCV 85.0, normal.  Serum iron 50.  TIBC 301.  Transferrin saturation 17% (was 6% on 01/11/2023).  Ferritin level is pending. Ferritin 55.55 (was 9.02 on 01/11/2023)  -02/27/2023:  Platelets 623 K  Labs reviewed.    Presents for a follow-up visit.  Despite normalization of hemoglobin Feraheme, as usual, continues to complain of a multitude of chronic symptoms including weakness, fatigue, vision changes, pressure and dryness in eyes, hot flashes, dyspnea, trouble swallowing, nausea, numbness and tingling in feet, etc..  Fair appetite.  No abnormal  bleeding in any form.    07/23/2025:   -labs indicate chronic iron-deficiency and variable amount of microcytic anemia  -04/19/2023:  Ferrlecit 125 mg IV  -s/p Feraheme 510 mg IV x2 (05/02/2023, 05/09/2023  -S/P Feraheme 510 mg IV x2 (06/30/2023, 07/07/2023)  -S/P Feraheme 510 mg IV x2 (10/09/2023-10/24/2023)  -s/p Ferrlecit 125 mg IV x8 (12/08/2023-02/21/2024  -S/P Venofer 250 mg IV x4 (04/19/2024-05/10/2024  -S/P Venofer 250 mg IV x5 (06/27/2024-08/01/2024)  -S/P Venofer 200 mg IV x5 (09/13/2024-10/11/2024)  -S/P Venofer 200 mg IV x5 (03/12/2025-04/09/2025)  -S/P Venofer 200 mg IV x5 (06/13/2025-07/16/2025)  -02/26/2018: Colonoscopy:  Weight loss, functional diarrhea, anemia of unknown etiology:  Diverticulosis left side of colon, sigmoid colon, and ascending colon; normal mucosa right colon; 6 mm polyp ascending colon  -10/27/2021: Colonoscopy:  Diffuse diverticulosis, otherwise unremarkable colonoscopies  -04/19/2023:  Endocervical polyp, polypectomy: Endometrial polyp, no malignancy  -09/26/2023:  Hysteroscopy with dilatation and curettage of uterus (indication:  Postmenopausal bleeding):  Uterus 9 week size, mobile; atrophic endometrium globally without lesion or polyps  -09/26/2023: Endometrial biopsy:  Scant fragments of benign endometrial tissue with predominantly mucus; insufficient tissue for further diagnostic evaluation  -06/26/2023:  EGD: 7 cm type 3 paraesophageal hiatal hernia with Buddy ulcers; small Dieulafoy's lesion with mild oozing seen in the gastric body treated with APC; duodenum normal  -06/29/2023: Video capsule endoscopy:  Normal duodenum, normal jejunum, normal ileum  -06/13/2024:  Upper GI with small-bowel follow-through (comparison: CT 08/18/2023; diaphragmatic hernia without obstruction or gangrene): Large hiatal hernia  -09/23/2024:  Cardiac catheterization:  Nonobstructive CAD  -12/19/2024:  EGD (Ravindra Camejo MD):  Normal duodenum into jejunum; duodenal biopsy; gastritis of  antrum, fundus, cardia of the stomach; antrum biopsy; GE junction biopsy; normal esophagus, cricopharyngeus muscle, vocal cords  -12/19/2024: EGD:  Duodenal biopsy, no significant pathologic change; stomach antrum biopsy, reactive gastropathy; GE junction biopsy, no significant pathologic change  -03/19/2025: Bilateral screening mammogram (comparison:  02/19/2024 mammogram, etc.):  BI-RADS: 1-negative  -07/11/2025:  CT chest lung screening low-dose without contrast (comparison: 08/18/2023):  Lung rads category: 2-benign appearance or behavior-continue annual screening with LDCT in 12 months  -moderate-large hiatal hernia imaging studies  -09/11/2025: Scheduled for robotic repair of hiatal hernia with Dr. Ravindra Camejo  -07/23/2025:  Hemoglobin 14.7 normal; platelets 595 K chronically elevated; serum iron normal, TIBC normal, ferritin 328 elevated (was 20.40, borderline/low on 05/28/2025, before Venofer); CMP reviewed  Presents for a follow-up visit.  Chronic weakness and fatigue.  Appetite is okay.  Some constipation.  No GI bleeding.  No hematemesis, melena, or hematochezia.  No bleeding in any other form, either.  ECOG 1.      Immunization History   Administered Date(s) Administered    COVID-19, MRNA, LN-S, PF (Pfizer) (Purple Cap) 08/31/2021, 09/23/2021    COVID-19, mRNA, LNP-S, PF (Moderna) Ages 12+ 01/19/2024    Hepatitis B (recombinant) Adjuvanted, 2 dose 01/19/2024, 02/19/2024    Influenza (FLUBLOK) - Quadrivalent - Recombinant - PF *Preferred* (egg allergy) 11/12/2020    Influenza - Quadrivalent 10/24/2016    Influenza - Quadrivalent - PF *Preferred* (6 months and older) 01/19/2024    Influenza - Trivalent - Fluarix, Flulaval, Fluzone, Afluria - PF 10/29/2014, 11/05/2018, 12/01/2021, 11/06/2024    Pneumococcal Conjugate - 20 Valent 01/19/2024    Pneumococcal Polysaccharide - 23 Valent 02/28/2008    Td (Adult), Unspecified Formulation 03/19/2008    Td - PF (ADULT) 03/19/2008    Tdap 11/12/2020    Zoster  "Recombinant 2020, 2021     Review of patient's allergies indicates:   Allergen Reactions    Iodine Hives    Ivp dye [iodinated contrast media] Itching     Medications:  Current Outpatient Medications on File Prior to Visit   Medication Sig Dispense Refill    aspirin (ECOTRIN) 81 MG EC tablet Take 81 mg by mouth every evening. Stopped sat 12-24      atorvastatin (LIPITOR) 40 MG tablet Take 1 tablet (40 mg total) by mouth every evening. 90 tablet 3    BD ULTRA-FINE SHORT PEN NEEDLE 31 gauge x 5/16" Ndle Inject 1 each into the skin 3 (three) times daily. 100 each 1    blood-glucose meter,continuous (DEXCOM ) Misc Dexcom G7 .  Use as directed with Dexcom G7 sensors. 1 each 0    cycloSPORINE (RESTASIS) 0.05 % ophthalmic emulsion Place 1 drop into both eyes 2 (two) times daily.      DEXCOM G7 SENSOR Amy Dexcom G7 sensors.  Use every 10 days as directed. 3 each 11    fezolinetant (VEOZAH) 45 mg Tab Take 45 mg by mouth once daily. 30 tablet 2    FREESTYLE ABDULKADIR 2 SENSOR Kit USE EVERY 14 DAYS AS DIRECTED      ibuprofen (ADVIL,MOTRIN) 800 MG tablet Take 800 mg by mouth every 6 (six) hours as needed.      insulin lispro 100 unit/mL injection SMARTSI Unit(s) SUB-Q Daily      linaCLOtide (LINZESS) 72 mcg Cap capsule Patient states takes as needed      lisinopriL 10 MG tablet Take 10 mg by mouth once daily.      magnesium chloride (MAG 64) 64 mg TbEC Take 1 tablet (64 mg total) by mouth 2 (two) times a day. 14 tablet 0    metFORMIN (GLUCOPHAGE) 1000 MG tablet Take 1 tablet (1,000 mg total) by mouth 2 (two) times daily. 180 tablet 3    metoprolol tartrate (LOPRESSOR) 25 MG tablet Take 1 tablet (25 mg total) by mouth 2 (two) times daily. 180 tablet 3    nicotine (NICODERM CQ) 21 mg/24 hr Place 1 patch onto the skin once daily. 28 patch 0    OMNIPOD 5 INTRO,G6/WQMRS9ZRXV, Crtg Inject into the skin.      OMNIPOD 5, G6/ABDULKADIR 2 PLUS, Crtg Inject into the skin.      pantoprazole (PROTONIX) 40 MG " tablet Take 1 tablet (40 mg total) by mouth every morning. 30 tablet 11    TRUE METRIX GLUCOSE TEST STRIP Strp USE 1 STRIP TO CHECK GLUCOSE ONCE DAILY 100 each 1    nicotine polacrilex 2 MG Lozg Take 1 lozenge (2 mg total) by mouth as needed (in place of a cigarette). (Patient not taking: Reported on 7/23/2025) 72 lozenge 0    nitroGLYCERIN (NITROSTAT) 0.4 MG SL tablet Place 1 tablet (0.4 mg total) under the tongue every 5 (five) minutes as needed for Chest pain. (Patient not taking: Reported on 7/23/2025) 25 tablet 6     Current Facility-Administered Medications on File Prior to Visit   Medication Dose Route Frequency Provider Last Rate Last Admin    0.9%  NaCl infusion   Intravenous Continuous Marielos Bahena MD        albuterol-ipratropium 2.5 mg-0.5 mg/3 mL nebulizer solution 3 mL  3 mL Nebulization Once PRN Marisa Ribera MD        dextrose 10% bolus 125 mL 125 mL  12.5 g Intravenous PRN Katherine Bernal, ANAHI        dextrose 10% bolus 125 mL 125 mL  12.5 g Intravenous PRN Katherine Bernal, ANAHI        HYDROmorphone injection 0.2 mg  0.2 mg Intravenous Q5 Min PRN Marisa Ribera MD        insulin aspart U-100 injection 2-9 Units  2-9 Units Subcutaneous PRN Katherine Bernal, ANAHI        insulin aspart U-100 injection 4-12 Units  4-12 Units Subcutaneous PRN Katherine Bernal, ROBINP        lactated ringers infusion   Intravenous Continuous Marisa Ribera  mL/hr at 09/26/23 0856 Rate Change at 09/26/23 0856    LIDOcaine (PF) 10 mg/ml (1%) injection 10 mg  1 mL Intradermal Once Katherine Bernal FNP        ondansetron injection 4 mg  4 mg Intravenous Once Marisa Ribera MD        oxyCODONE-acetaminophen 5-325 mg per tablet 2 tablet  2 tablet Oral Once Marisa Ribera MD        prochlorperazine injection Soln 5 mg  5 mg Intravenous Once PRN Marisa Ribera MD         Review of Systems:   All systems reviewed and found to be negative except for the symptoms  "detailed above    Physical Examination:   VITAL SIGNS:   Vitals:    07/23/25 1130   BP: 106/70   Pulse: 107   Resp: 18   Temp: 97.6 °F (36.4 °C)       GENERAL:  In no apparent distress.    HEAD:  No signs of head trauma.  EYES:  Pupils are equal.  Extraocular motions intact.    EARS:  Hearing grossly intact.  MOUTH:  Oropharynx is normal.   NECK:  No adenopathy, no JVD.     CHEST:  Chest with clear breath sounds bilaterally.  No wheezes, rales, rhonchi.    CARDIAC:  Regular rate and rhythm.  S1 and S2, without murmurs, gallops, rubs.  VASCULAR:  No Edema.  Peripheral pulses normal and equal in all extremities.  ABDOMEN:  Soft, without detectable tenderness.  No sign of distention.  No   rebound or guarding, and no masses palpated.   Bowel Sounds normal.  MUSCULOSKELETAL:  Good range of motion of all major joints. Extremities without clubbing, cyanosis or edema.    NEUROLOGIC EXAM:  Alert and oriented x 3.  No focal sensory or strength deficits.   Speech normal.  Follows commands.  PSYCHIATRIC:  Mood normal.    No results for input(s): "CBC" in the last 72 hours.   No results for input(s): "CMP" in the last 72 hours.     Assessment:  Problem List Items Addressed This Visit       Obesity (BMI 30-39.9)    Tobacco user    Thrombocytosis    Iron deficiency    History of colonic polyps    Essential thrombocytosis    JAK2 gene mutation    PMB (postmenopausal bleeding) - Primary    Dieulafoy lesion of stomach     Orders for 07/23/2025:  Recheck CBC, CMP, serum iron, TIBC in 3 months, then follow-up visit with me  Follow-up with GI  Follow-up with Dr. Ravindra Camejo for hiatal hernia repair  For essential thrombocytosis, to start hydroxyurea on next visit in September or October  Continue baby aspirin daily  Follow-up in 3 months, with labs     Above discussed with the patient.  All questions answered.  Discussed labs and scans and plan of management in detail.  She understands and agrees with this " plan.  =================================      # Severe iron deficiency anemia:   Presented as moderate anemia with severe microcytosis    EGD, colonoscopy negative for any bleeders (02/2018)   Severe iron deficiency    Feraheme (03/2019)   Hemoglobin dropped from 14.8 (06/2019) to 9.5 (08/28/2019) due to iron deficiency   Feraheme x2 (09/2019) --> improvement in iron stores and normalization of hemoglobin (9.5 --> 13.7)   CT enterogram (09/11/2019): Hiatal hernia; 1.7 cm left adrenal nodule   -03/10/2020: Remains iron deficient (transferrin saturation 13.2%, ferritin 19.2)   -Did not present for Feraheme shots in a timely manner   -04/27/2020: Ferritin 4.9 (down from 19.2 on 03/10/2020); however, hemoglobin 12.4, MCV 83.0   (Iron deficiency erythropoiesis)   -Feraheme 510 mg IV x2 (04/27/2020; 05/04/2020)   -06/01/2020: Iron deficiency resolved with parenteral iron therapy   -However, as of 06/01/2020, severe recurrent iron deficiency remains unexplained   -07/01/2020: Iron stores dropping again within a month; hemoglobin remains normal   -09/02/2020: Iron stores normal/stable (ferritin 21, transferrin saturation 15.8%); hemoglobin 14.8, normal   -09/08/2020: Feels better and more energetic than before   -11/12/2020: Iron deficient; hemoglobin 15.1  -08/04/2020:  [facility]:  Insurance denied capsule endoscopy.  Upper single balloon enteroscopy performed.  1. Duodenum biopsy:  Minimal focal acute inflammation with reactive changes; no celiac sprue  2. Duodenal bulb:  Biopsy showed no significant histopathologic findings; no evidence of celiac sprue  3. Stomach biopsy:  Chemical/reactive gastropathy; H pylori stain negative  -S/P Feraheme 510 mg IV x2 (05/17/2021, 05/24/2021)  -S/P Feraheme 510 mg IV x2 (07/09/2021, 07/19/2021)  -10/27/2021: Colonoscopy (iron-deficiency anemia):  Diffuse diverticulosis; otherwise unremarkable  -10/27/2021:  EGD with APC fulguration and biopsy (iron-deficiency anemia): unctate AVMs within  the stomach.  A directed fulguration with APC with good result achieved, superficial ulcerations within the duodenal bulb, possibly secondary to daily aspirin use.  Biopsy taken in the antrum to rule out Helicobacter pylori by pathology  -S/P Feraheme 510 mg IV x2 (08/29/2022, 09/08/2022)  -11/11/2022:  WBC 12.7.  Hemoglobin 12.8.  Platelets 543 K. ferritin 251.70 (was 93.95 on 08/29/2022, 24.88 on 06/29/2022) (transferrin saturation was 19% on 08/29/2022, 7% on 06/29/2022)  -01/11/2023: Labs reviewed.  Hemoglobin 11.2 (hemoglobin was 15.9 on 08/29/2022).  MCV 85.0.  Platelets 562 K, stable.  Ferritin 9.02, down from 251.7 on 11/11/2022.  Transferrin saturation is 6%, low.  CMP unremarkable except glucose 188 mg/dL.  -S/P Feraheme 510 mg IV x2 (01/11/2023, 01/18/2023)  -04/19/2023:  Ferrlecit 125 mg IV  -s/p Feraheme 510 mg IV x2 (05/02/2023, 05/09/2023  -S/P Feraheme 510 mg IV x2 (06/30/2023, 07/07/2023)  -S/P Feraheme 510 mg IV x2 (10/09/2023-10/24/2023)  -s/p Ferrlecit 125 mg IV x8 (12/08/2023-02/21/2024  -S/P Venofer 250 mg IV x4 (04/19/2024-05/10/2024  -S/P Venofer 250 mg IV x5 (06/27/2024-08/01/2024)  -S/P Venofer 200 mg IV x5 (09/13/2024-10/11/2024)  -S/P Venofer 200 mg IV x5 (03/12/2025-04/09/2025)  -S/P Venofer 200 mg IV x5 (06/13/2025-07/16/2025)  -colonoscopy 02/16/2018:  Diverticulosis  -colonoscopy 10/27/2021: Diffuse diverticulosis  -endometrial polypectomy 04/19/2023:  No malignancy  -hysteroscopy and D&C 09/26/2023:  Atrophic endometrium  -endometrial biopsy 09/26/2023:  Nondiagnostic  -EGD 06/26/2025:  7 cm type 3 paraesophageal hiatal hernia with Buddy ulcers; small Dieulafoy's lesion with mild oozing in gastric body, treated with APC  -video capsule endoscopy 06/29/2023: Negative  -upper GI with small-bowel follow-through 06/13/2024:  Large hiatal hernia  -cardiac catheterization 09/23/2024: Nonobstructive CAD  -EGD 12/19/2024:  Gastritis (reactive gastropathy)  -screening mammogram 03/19/2025:  BI-RADS: 1-negative  -LDCT chest without contrast 07/11/2025:  Lung rads category: 2-benign  -09/11/2025: Scheduled for robotic repair of hiatal hernia with Dr. Ravindra Camejo  -07/23/2025:  Hemoglobin 14.7 normal; platelets 595 K chronically elevated; serum iron normal, TIBC normal, ferritin 328 elevated (was 20.40, borderline/low on 05/28/2025, before Venofer); CMP reviewed  Briefly:  -Recurrent severe iron deficiency anemia, unexplained  -EGD, colonoscopy negative (02/2018)  -CT enterogram negative (09/11/2019)  -has required multiple rounds of parenteral iron therapy over past 4-5 years  -upper single balloon enteroscopy   -endometrial biopsy negative 04/19/2023, 09/26/2023  -7 cm paraesophageal hernia with Buddy ulcers and small Dieulafoy's lesion with mild oozing in gastric body, treated with the APC on EGD 06/26/2025  -capsule endoscopy negative 06/29/2023  -large hiatal hernia on upper GI with small-bowel follow-through 06/13/2024  -gastritis with reactive gastropathy on EGD 12/19/2024  -scheduled for robotic repair of hiatal hernia with Dr. Ravindra Camejo 09/11/2025  -07/23/2025: Hemoglobin normal; iron stores normalized with Venofer x5  >>>  Plan:  Recheck CBC and iron stores in 3 months, then follow-up visit  Follow-up with GI  Follow-up with Dr. Camejo for hiatal hernia repair (Dr. Ravindra Camejo MD)    # Thrombocytosis (low risk thrombocytosis):  (age < 60 years; JAK2 V617F mutation positive; no thrombosis)  GAGANDEEP-2 V617F mutation positive (03/2019)  Bone marrow consistent (06/2019)  Low risk IPSET-thrombosis score ET  Cardiovascular risk factors: NIDDM, hypertension, tobacco abuse  -04/27/2020: Platelets 500K, stable  -06/01/2020: Platelets 561K, more or less stable  -09/02/2020: Platelets 544K, stable  -11/12/2020: Platelets 567,000/mm³, stable  -11/11/2022:  Platelets 543 K, stable   -02/27/2023:  Platelets 623 K  -07/23/2025:  Platelets 595 K chronically elevated and stable    >>>  Plan:  Low risk essential thrombocytosis   No indication of myelosuppressive therapy   She will turn; 60 years on 09/15/2025; at that time, by definition, she will become high-risk patient thrombocytosis patient; at that time, we will become high-risk essential thrombocytosis by virtue of age as well as JAK2 V617F mutation positive status; at that point, we will start hydroxyurea in addition to baby aspirin daily  Continue baby aspirin daily (because of underlying cardiovascular risk factors including NIDDM and hypertension)  Avoid smoking.    Medical management of NIDDM and hypertension which are cardiovascular risk factors.  Deferred to PCP.    # Chronic, very mild, benign-appearing, intermittent leukocytosis:  Secondary to smoking or underlying myeloproliferative disorder  GAGANDEEP-2 V617F mutation positive (03/2019)  BCR-ABL 1 fusion transcripts negative (03/2019)  Underlying essential thrombocytosis    Follow-up:  No follow-ups on file.      Answers submitted by the patient for this visit:  Review of Systems Questionnaire (Submitted on 7/17/2025)  appetite change : No  unexpected weight change: No  mouth sores: No  visual disturbance: No  cough: No  shortness of breath: No  chest pain: No  abdominal pain: No  diarrhea: No  frequency: No  back pain: Yes  rash: No  headaches: No  adenopathy: No  nervous/ anxious: Yes

## 2025-07-23 ENCOUNTER — OFFICE VISIT (OUTPATIENT)
Dept: FAMILY MEDICINE | Facility: CLINIC | Age: 60
End: 2025-07-23
Payer: MEDICAID

## 2025-07-23 ENCOUNTER — LAB VISIT (OUTPATIENT)
Dept: LAB | Facility: HOSPITAL | Age: 60
End: 2025-07-23
Attending: NURSE PRACTITIONER
Payer: MEDICAID

## 2025-07-23 ENCOUNTER — OFFICE VISIT (OUTPATIENT)
Dept: HEMATOLOGY/ONCOLOGY | Facility: CLINIC | Age: 60
End: 2025-07-23
Attending: INTERNAL MEDICINE
Payer: MEDICAID

## 2025-07-23 VITALS
WEIGHT: 200 LBS | HEART RATE: 72 BPM | HEIGHT: 67 IN | DIASTOLIC BLOOD PRESSURE: 78 MMHG | OXYGEN SATURATION: 99 % | RESPIRATION RATE: 18 BRPM | TEMPERATURE: 99 F | SYSTOLIC BLOOD PRESSURE: 138 MMHG | BODY MASS INDEX: 31.39 KG/M2

## 2025-07-23 VITALS
DIASTOLIC BLOOD PRESSURE: 70 MMHG | RESPIRATION RATE: 18 BRPM | TEMPERATURE: 98 F | WEIGHT: 200 LBS | HEART RATE: 107 BPM | SYSTOLIC BLOOD PRESSURE: 106 MMHG | BODY MASS INDEX: 31.39 KG/M2 | HEIGHT: 67 IN | OXYGEN SATURATION: 97 %

## 2025-07-23 DIAGNOSIS — Z72.0 TOBACCO USER: ICD-10-CM

## 2025-07-23 DIAGNOSIS — D75.839 THROMBOCYTOSIS: ICD-10-CM

## 2025-07-23 DIAGNOSIS — Z86.0100 HISTORY OF COLONIC POLYPS: ICD-10-CM

## 2025-07-23 DIAGNOSIS — Z00.00 WELLNESS EXAMINATION: ICD-10-CM

## 2025-07-23 DIAGNOSIS — Z15.89 JAK2 GENE MUTATION: ICD-10-CM

## 2025-07-23 DIAGNOSIS — N95.0 PMB (POSTMENOPAUSAL BLEEDING): Primary | ICD-10-CM

## 2025-07-23 DIAGNOSIS — Z79.4 TYPE 2 DIABETES MELLITUS WITH OTHER SPECIFIED COMPLICATION, WITH LONG-TERM CURRENT USE OF INSULIN: ICD-10-CM

## 2025-07-23 DIAGNOSIS — K31.82 DIEULAFOY LESION OF STOMACH: ICD-10-CM

## 2025-07-23 DIAGNOSIS — I10 PRIMARY HYPERTENSION: ICD-10-CM

## 2025-07-23 DIAGNOSIS — D47.3 ESSENTIAL THROMBOCYTOSIS: Primary | ICD-10-CM

## 2025-07-23 DIAGNOSIS — D47.3 ESSENTIAL THROMBOCYTOSIS: ICD-10-CM

## 2025-07-23 DIAGNOSIS — E61.1 IRON DEFICIENCY: ICD-10-CM

## 2025-07-23 DIAGNOSIS — E11.69 TYPE 2 DIABETES MELLITUS WITH OTHER SPECIFIED COMPLICATION, WITH LONG-TERM CURRENT USE OF INSULIN: ICD-10-CM

## 2025-07-23 DIAGNOSIS — B07.9 VIRAL WARTS, UNSPECIFIED TYPE: Primary | ICD-10-CM

## 2025-07-23 DIAGNOSIS — E66.9 OBESITY (BMI 30-39.9): ICD-10-CM

## 2025-07-23 LAB
25(OH)D3+25(OH)D2 SERPL-MCNC: 58 NG/ML (ref 30–80)
ALBUMIN SERPL-MCNC: 3.9 G/DL (ref 3.5–5)
ALBUMIN/GLOB SERPL: 1 RATIO (ref 1.1–2)
ALP SERPL-CCNC: 108 UNIT/L (ref 40–150)
ALT SERPL-CCNC: 29 UNIT/L (ref 0–55)
ANION GAP SERPL CALC-SCNC: 10 MEQ/L
AST SERPL-CCNC: 21 UNIT/L (ref 11–45)
BILIRUB SERPL-MCNC: 0.3 MG/DL
BUN SERPL-MCNC: 9.9 MG/DL (ref 9.8–20.1)
CALCIUM SERPL-MCNC: 9.8 MG/DL (ref 8.4–10.2)
CHLORIDE SERPL-SCNC: 106 MMOL/L (ref 98–107)
CHOLEST SERPL-MCNC: 251 MG/DL
CHOLEST/HDLC SERPL: 4 {RATIO} (ref 0–5)
CO2 SERPL-SCNC: 23 MMOL/L (ref 22–29)
CREAT SERPL-MCNC: 0.73 MG/DL (ref 0.55–1.02)
CREAT/UREA NIT SERPL: 14
EST. AVERAGE GLUCOSE BLD GHB EST-MCNC: 165.7 MG/DL
GFR SERPLBLD CREATININE-BSD FMLA CKD-EPI: >60 ML/MIN/1.73/M2
GLOBULIN SER-MCNC: 3.8 GM/DL (ref 2.4–3.5)
GLUCOSE SERPL-MCNC: 137 MG/DL (ref 74–100)
HBA1C MFR BLD: 7.4 %
HDLC SERPL-MCNC: 70 MG/DL (ref 35–60)
LDLC SERPL CALC-MCNC: 148 MG/DL (ref 50–140)
MAGNESIUM SERPL-MCNC: 1.6 MG/DL (ref 1.6–2.6)
POTASSIUM SERPL-SCNC: 4.3 MMOL/L (ref 3.5–5.1)
PROT SERPL-MCNC: 7.7 GM/DL (ref 6.4–8.3)
SODIUM SERPL-SCNC: 139 MMOL/L (ref 136–145)
TRIGL SERPL-MCNC: 167 MG/DL (ref 37–140)
TSH SERPL-ACNC: 3.06 UIU/ML (ref 0.35–4.94)
VLDLC SERPL CALC-MCNC: 33 MG/DL

## 2025-07-23 PROCEDURE — 17110 DESTRUCTION B9 LES UP TO 14: CPT | Mod: PBBFAC | Performed by: FAMILY MEDICINE

## 2025-07-23 PROCEDURE — 4010F ACE/ARB THERAPY RXD/TAKEN: CPT | Mod: CPTII,,, | Performed by: INTERNAL MEDICINE

## 2025-07-23 PROCEDURE — 80053 COMPREHEN METABOLIC PANEL: CPT

## 2025-07-23 PROCEDURE — 3074F SYST BP LT 130 MM HG: CPT | Mod: CPTII,,, | Performed by: INTERNAL MEDICINE

## 2025-07-23 PROCEDURE — 83735 ASSAY OF MAGNESIUM: CPT

## 2025-07-23 PROCEDURE — 83036 HEMOGLOBIN GLYCOSYLATED A1C: CPT

## 2025-07-23 PROCEDURE — 99213 OFFICE O/P EST LOW 20 MIN: CPT | Mod: PBBFAC

## 2025-07-23 PROCEDURE — 3066F NEPHROPATHY DOC TX: CPT | Mod: CPTII,,, | Performed by: INTERNAL MEDICINE

## 2025-07-23 PROCEDURE — 1160F RVW MEDS BY RX/DR IN RCRD: CPT | Mod: CPTII,,, | Performed by: INTERNAL MEDICINE

## 2025-07-23 PROCEDURE — 3061F NEG MICROALBUMINURIA REV: CPT | Mod: CPTII,,, | Performed by: INTERNAL MEDICINE

## 2025-07-23 PROCEDURE — 80061 LIPID PANEL: CPT

## 2025-07-23 PROCEDURE — 82306 VITAMIN D 25 HYDROXY: CPT

## 2025-07-23 PROCEDURE — 3078F DIAST BP <80 MM HG: CPT | Mod: CPTII,,, | Performed by: INTERNAL MEDICINE

## 2025-07-23 PROCEDURE — 36415 COLL VENOUS BLD VENIPUNCTURE: CPT

## 2025-07-23 PROCEDURE — 99214 OFFICE O/P EST MOD 30 MIN: CPT | Mod: S$PBB,,, | Performed by: INTERNAL MEDICINE

## 2025-07-23 PROCEDURE — 3051F HG A1C>EQUAL 7.0%<8.0%: CPT | Mod: CPTII,,, | Performed by: INTERNAL MEDICINE

## 2025-07-23 PROCEDURE — 1159F MED LIST DOCD IN RCRD: CPT | Mod: CPTII,,, | Performed by: INTERNAL MEDICINE

## 2025-07-23 PROCEDURE — 99215 OFFICE O/P EST HI 40 MIN: CPT | Mod: PBBFAC,25,27 | Performed by: INTERNAL MEDICINE

## 2025-07-23 PROCEDURE — 84443 ASSAY THYROID STIM HORMONE: CPT

## 2025-07-23 PROCEDURE — 3008F BODY MASS INDEX DOCD: CPT | Mod: CPTII,,, | Performed by: INTERNAL MEDICINE

## 2025-07-23 NOTE — Clinical Note
Orders for 07/23/2025: Recheck CBC, CMP, serum iron, TIBC in 3 months, then follow-up visit with me Follow-up with GI Follow-up with Dr. Ravindra Camejo for hiatal hernia repair For essential thrombocytosis, to start hydroxyurea on next visit in September or October Continue baby aspirin daily Follow-up in 3 months, with labs

## 2025-07-23 NOTE — PROGRESS NOTES
Subjective:       Patient ID: Jessica Walker is a 59 y.o. female.    Chief Complaint: Warts    HPI  60 yo returns to minor surgery for a wart on her right finger that has been treated with cryo in the past. Other warts on her hands have resolved with cryo but this one has remained.     Review of Systems  As per HPI         Objective:      Vitals:    07/23/25 0824   BP: 138/78   Pulse: 72   Resp: 18   Temp: 98.6 °F (37 °C)       Physical Exam      Gen: alert, no acute distress  Skin: verrucoid lesion to right index finger  Psych: cooperative, appropriate mood and affect     Procedure Note:  Procedure: cryotherapy for wart  Performed by: Donna Negrete MD  Consent: signed consent obtained after discussion of risks, benefits, and alternative treatments. Time out completed  Description: Liquid nitrogen used for cryotherapy. Tip held 1 cm from lesion and sprayed in freeze-thaw cycle.   The patient tolerated the procedure well with no complications.     Assessment/Plan:  Viral warts, unspecified type    - cryo today  - Post care instructions and return precautions discussed.   - recommended salicylic acid at home additionally     Follow up in about 4 weeks (around 8/20/2025) for warts.

## 2025-07-28 ENCOUNTER — TELEPHONE (OUTPATIENT)
Dept: GASTROENTEROLOGY | Facility: CLINIC | Age: 60
End: 2025-07-28
Payer: MEDICAID

## 2025-07-28 NOTE — TELEPHONE ENCOUNTER
----- Message from Christina sent at 7/25/2025  3:04 PM CDT -----  Hernia Surgery September 11 with      Callback [phone]

## 2025-07-29 ENCOUNTER — TELEPHONE (OUTPATIENT)
Dept: SMOKING CESSATION | Facility: CLINIC | Age: 60
End: 2025-07-29
Payer: MEDICAID

## 2025-07-29 NOTE — TELEPHONE ENCOUNTER
1st attempt, patient called after not checking in for our scheduled virtual visit.  Patient answered phone, phone call then disconnected.  Patient called me back and call was disconnected again.  Patient and I had numerous calls with disconnections.  Patient agreed to reschedule for tomorrow at noon.  A voicemail was also left with my contact information.

## 2025-07-30 ENCOUNTER — CLINICAL SUPPORT (OUTPATIENT)
Dept: SMOKING CESSATION | Facility: CLINIC | Age: 60
End: 2025-07-30
Payer: COMMERCIAL

## 2025-07-30 DIAGNOSIS — F17.200 NICOTINE DEPENDENCE: Primary | ICD-10-CM

## 2025-07-30 PROCEDURE — 99404 PREV MED CNSL INDIV APPRX 60: CPT | Mod: 95,,,

## 2025-07-30 NOTE — PROGRESS NOTES
Individual Follow-Up Form    7/30/2025    Quit Date: N/A    Clinical Status of Patient: Outpatient    Length of Service: 60 minutes    Continuing Medication: yes  Patches or Nicotine Lozenges    Other Medications:      Target Symptoms: Withdrawal and medication side effects. The following were  rated moderate (3) to severe (4) on TCRS:  Moderate (3): urges and cravings all day  Severe (4): urges and cravings all day    Comments: completion of TCRS (Tobacco Cessation Rating Scale) reviewed strategies, controlling environment, cues, triggers, new goals set. Introduced high risk situations with preparation interventions, caffeine similarities with withdrawal issues of habit and nicotine, Alcohol, Understanding urges, cravings, stress and relaxation. Open discussion with intervention discussion.  Patient states that she smoked 10 cpd yesterday.  I congratulated her for the reduction.  Patient states that she has not been wearing NRT patches, because she can't find them.  We reviewed keeping her patches next to her evening pills to help remember to use.  Patient states that she has not been using NRT lozenges because she forgets.  NRT lozenges were on the bed with patient during appointment.  We discussed the importance of behavioral change.  Patient refuses to smoke outside due to heat and fear she will pass out if she smokes outside.  We discussed smoking each cigarette in her bathroom and leaving her bedroom to smoke.  Patient stated she would try.   Patient states that her motivation to quit was low and we discussed her health and increased insulin resistance when smoking.  Patient declined to set any new goals for smoking rate reduction.  Patient remains on prescribed tobacco cessation medication regimen of 21 mg patch without any negative side effects at this time.  The patient will continue with virtual therapy sessions and medication monitoring by CTTS. Prescribed medication management will be by physician.      Diagnosis: F17.200    Next Visit: 2 weeks

## 2025-08-13 ENCOUNTER — CLINICAL SUPPORT (OUTPATIENT)
Dept: SMOKING CESSATION | Facility: CLINIC | Age: 60
End: 2025-08-13
Payer: COMMERCIAL

## 2025-08-13 DIAGNOSIS — F17.200 NICOTINE DEPENDENCE: ICD-10-CM

## 2025-08-13 PROCEDURE — 99404 PREV MED CNSL INDIV APPRX 60: CPT | Mod: 95,,,

## 2025-08-13 RX ORDER — VARENICLINE TARTRATE 1 MG/1
1 TABLET, FILM COATED ORAL 2 TIMES DAILY
Qty: 60 TABLET | Refills: 0 | Status: SHIPPED | OUTPATIENT
Start: 2025-08-13

## 2025-08-13 RX ORDER — IBUPROFEN 200 MG
1 TABLET ORAL DAILY
Qty: 28 PATCH | Refills: 0 | Status: SHIPPED | OUTPATIENT
Start: 2025-08-13

## 2025-08-20 ENCOUNTER — OFFICE VISIT (OUTPATIENT)
Dept: FAMILY MEDICINE | Facility: CLINIC | Age: 60
End: 2025-08-20
Payer: MEDICAID

## 2025-08-20 VITALS
SYSTOLIC BLOOD PRESSURE: 110 MMHG | OXYGEN SATURATION: 99 % | TEMPERATURE: 99 F | WEIGHT: 200.63 LBS | BODY MASS INDEX: 31.49 KG/M2 | RESPIRATION RATE: 18 BRPM | HEIGHT: 67 IN | DIASTOLIC BLOOD PRESSURE: 73 MMHG | HEART RATE: 74 BPM

## 2025-08-20 DIAGNOSIS — B07.9 VIRAL WARTS, UNSPECIFIED TYPE: Primary | ICD-10-CM

## 2025-08-20 PROCEDURE — 99213 OFFICE O/P EST LOW 20 MIN: CPT | Mod: PBBFAC,25

## 2025-08-20 PROCEDURE — 17110 DESTRUCTION B9 LES UP TO 14: CPT | Mod: PBBFAC | Performed by: FAMILY MEDICINE

## 2025-08-26 DIAGNOSIS — I10 PRIMARY HYPERTENSION: ICD-10-CM

## 2025-08-26 DIAGNOSIS — I25.10 MILD CAD: ICD-10-CM

## 2025-08-26 RX ORDER — METOPROLOL TARTRATE 25 MG/1
25 TABLET, FILM COATED ORAL 2 TIMES DAILY
Qty: 180 TABLET | Refills: 3 | Status: SHIPPED | OUTPATIENT
Start: 2025-08-26

## 2025-09-03 ENCOUNTER — CLINICAL SUPPORT (OUTPATIENT)
Dept: SMOKING CESSATION | Facility: CLINIC | Age: 60
End: 2025-09-03
Payer: COMMERCIAL

## 2025-09-03 DIAGNOSIS — F17.200 NICOTINE DEPENDENCE: Primary | ICD-10-CM

## 2025-09-03 PROCEDURE — 99404 PREV MED CNSL INDIV APPRX 60: CPT | Mod: 95,,,

## 2025-09-04 ENCOUNTER — HOSPITAL ENCOUNTER (OUTPATIENT)
Dept: RADIOLOGY | Facility: HOSPITAL | Age: 60
Discharge: HOME | End: 2025-09-04
Attending: SURGERY
Payer: MEDICAID

## 2025-09-04 DIAGNOSIS — Z01.811 PRE-OP CHEST EXAM: ICD-10-CM

## 2025-09-04 DIAGNOSIS — D17.21 LIPOMA OF RIGHT SHOULDER: Primary | ICD-10-CM

## 2025-09-04 PROCEDURE — 71046 X-RAY EXAM CHEST 2 VIEWS: CPT | Mod: TC

## (undated) DEVICE — SOL IRRI STRL WATER 1000ML

## (undated) DEVICE — DRSNG POLYSKIN TRNSPAR 4X4.75

## (undated) DEVICE — HEMOSTAT VASC BAND REG 24CM

## (undated) DEVICE — TRAY SKIN SCRUB WET PREMIUM

## (undated) DEVICE — KIT SURGICAL COLON .25 1.1OZ

## (undated) DEVICE — INTRO KIT MICPUNC STIFF CANN

## (undated) DEVICE — GLOVE SIGNATURE ESSNTL LTX 6.5

## (undated) DEVICE — SET EXT NAMIC ARTERIAL 12IN

## (undated) DEVICE — Device

## (undated) DEVICE — MANIFOLD 4 PORT

## (undated) DEVICE — GUIDEWIRE EMERALD 3MM 175X5CM

## (undated) DEVICE — SEE L#152161

## (undated) DEVICE — SEAL LENS SCOPE MYOSURE

## (undated) DEVICE — KIT SURGICAL TURNOVER

## (undated) DEVICE — PACK FLUENT DISPOSABLE

## (undated) DEVICE — MARKER WRITESITE SKIN CHLRAPRP

## (undated) DEVICE — DRESSING TELFA STRL 4X3 LF

## (undated) DEVICE — SOL NACL IRR 3000ML

## (undated) DEVICE — OMNIPAQUE 350MG 150ML VIAL

## (undated) DEVICE — DEVICE BASIXCOMPAK INFL 20ML

## (undated) DEVICE — PAD CURAD NONADH 3X4IN

## (undated) DEVICE — GLOVE PROTEXIS LIGHT BROWN 5.5

## (undated) DEVICE — DRAPE UNDERBUTTOCKS PCH STRL

## (undated) DEVICE — BITE BLOCK ADULT LATEX FREE

## (undated) DEVICE — GLOVE SIGNATURE MICRO LTX 7.5

## (undated) DEVICE — SPONGE LAP XRAY STERILE 18X18

## (undated) DEVICE — PROTECTOR ONE-STEP ARM REG

## (undated) DEVICE — LIDOCAINE HCI VISCOUS SOL 2%

## (undated) DEVICE — HANDLE DEVON RIGID OR LIGHT

## (undated) DEVICE — GLOVE DERMASSURE GRN LF 6.5

## (undated) DEVICE — GLOVE SENSICARE PI GRN 8

## (undated) DEVICE — SOLIDIFIER BOTTLE 1500CC

## (undated) DEVICE — POSITIONER HEAD SUPINE PINK

## (undated) DEVICE — INTRODUCER CATH 6F 11CM

## (undated) DEVICE — PAD DEFIB CADENCE ADULT R2

## (undated) DEVICE — FORCEP CAPTURA PRO SPK 230CM

## (undated) DEVICE — NDL BLUNT FILL 18G 1IN

## (undated) DEVICE — CATH 5FR SIM1 GLIDECATH

## (undated) DEVICE — INTRODUCER TRNSRADIAL 6F 10CM

## (undated) DEVICE — MOUTHPIECE ENDO 60FR

## (undated) DEVICE — GAUZE VISTEC XR DTECT 16 4X4IN

## (undated) DEVICE — COVER CIV-FLEX PROBE US 58IN

## (undated) DEVICE — TOWEL OR DISP STRL BLUE 4/PK

## (undated) DEVICE — GOWN POLY REINF BRTH SLV XL

## (undated) DEVICE — DRAPE RADIAL BRACHIAL 29X42IN

## (undated) DEVICE — CATH HEARTRAIL III IKARI LEFT

## (undated) DEVICE — GLOVE SENSICARE PI GRN 6

## (undated) DEVICE — PACK DRAPE PERI/GYN TIBURON

## (undated) DEVICE — PAD PREP CUFFED NS 24X48IN

## (undated) DEVICE — PILLCAM SB3 EX EXTENDED

## (undated) DEVICE — GUIDEWIRE OMNI J TIP 185CM

## (undated) DEVICE — CATH RED RUBBER LATEX 14F 16IN

## (undated) DEVICE — CATH OPTITORQUE RADIAL 5FR